# Patient Record
Sex: MALE | Race: WHITE | Employment: OTHER | URBAN - METROPOLITAN AREA
[De-identification: names, ages, dates, MRNs, and addresses within clinical notes are randomized per-mention and may not be internally consistent; named-entity substitution may affect disease eponyms.]

---

## 2017-02-07 ENCOUNTER — OFFICE VISIT (OUTPATIENT)
Dept: NEUROLOGY | Age: 82
End: 2017-02-07

## 2017-02-07 VITALS
RESPIRATION RATE: 16 BRPM | DIASTOLIC BLOOD PRESSURE: 80 MMHG | HEIGHT: 70 IN | SYSTOLIC BLOOD PRESSURE: 134 MMHG | BODY MASS INDEX: 29.03 KG/M2 | OXYGEN SATURATION: 98 % | WEIGHT: 202.8 LBS | HEART RATE: 61 BPM

## 2017-02-07 DIAGNOSIS — E55.9 VITAMIN D DEFICIENCY: ICD-10-CM

## 2017-02-07 DIAGNOSIS — E11.42 DIABETIC PERIPHERAL NEUROPATHY ASSOCIATED WITH TYPE 2 DIABETES MELLITUS (HCC): ICD-10-CM

## 2017-02-07 DIAGNOSIS — R42 DIZZINESS: ICD-10-CM

## 2017-02-07 DIAGNOSIS — R26.0 SENSORY ATAXIC GAIT: ICD-10-CM

## 2017-02-07 DIAGNOSIS — I65.23 STENOSIS OF BOTH INTERNAL CAROTID ARTERIES: ICD-10-CM

## 2017-02-07 DIAGNOSIS — H53.2 MONOCULAR DIPLOPIA OF BOTH EYES: ICD-10-CM

## 2017-02-07 DIAGNOSIS — H81.13 BPV (BENIGN POSITIONAL VERTIGO), BILATERAL: ICD-10-CM

## 2017-02-07 DIAGNOSIS — G45.0 VBI (VERTEBROBASILAR INSUFFICIENCY): ICD-10-CM

## 2017-02-07 DIAGNOSIS — Z86.73 HISTORY OF STROKE: ICD-10-CM

## 2017-02-07 DIAGNOSIS — E89.0 HYPOTHYROIDISM, POSTSURGICAL: ICD-10-CM

## 2017-02-07 DIAGNOSIS — E53.8 B12 DEFICIENCY: Primary | ICD-10-CM

## 2017-02-07 DIAGNOSIS — E53.8 B12 DEFICIENCY: ICD-10-CM

## 2017-02-07 DIAGNOSIS — R27.0 ATAXIA: ICD-10-CM

## 2017-02-07 PROBLEM — H81.10 BPV (BENIGN POSITIONAL VERTIGO): Status: ACTIVE | Noted: 2017-02-07

## 2017-02-07 RX ORDER — MECLIZINE HYDROCHLORIDE 25 MG/1
25 TABLET ORAL
Qty: 100 TAB | Refills: 11 | Status: SHIPPED | OUTPATIENT
Start: 2017-02-07 | End: 2017-02-17

## 2017-02-07 NOTE — LETTER
2/7/2017 9:18 PM 
 
Patient:  Sunshine Mosley YOB: 1928 Date of Visit: 2/7/2017 Dear No Recipients: Thank you for referring Mr. Prosper Child to me for evaluation/treatment. Below are the relevant portions of my assessment and plan of care. Consult Subjective:  
 
Sunshine Mosley is a 80 y.o. right-handed  male who is seen for urgent work in evaluation as a new patient for new problem of marked worsening of his ataxia and dizziness and new onset diplopia. The patient has a several week history of increasing dizziness when he moves around associated with instability and unsteadiness when he walks. 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 He was treated for a vestibular dysfunction and seemed to slowly improvein April 2013. He has not had any recent therapy and seems to be worsening. He has a new onset of double vision that seems to come and go with the images being side by side, and he has seen his ophthalmologist who could not really find a clear cause. He is also had some visual symptoms with black dots in front of his eyes and when he stares down at the floor these black dots seem to move. Again ophthalmology has not been able to find any ophthalmologic cause. He seems to have some progressive hearing loss bilaterally and some difficulty with memory loss. He was referred to us for further evaluation. He has seen ENT physicians Dr. Susana Cole for his hearing loss and tinnitus, and no real help was found there. We tried to explain to him that the tinnitus is part of the hearing loss and no medication will really help that. He has had a previous stroke after heart surgery, back in 1996 but no recurrent strokes since that time.   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 He came to see us because of concern that he might have something else going on. 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 He did not have any new focal symptoms as far as weakness or sensory loss, no double vision no blurred vision and no real strokelike symptoms. He had a CTA that showed areas of stenosis in the posterior fossa and vertebral arteries. That was done in 2007. He is on aspirin therapy at this time. He had a carotid Doppler study done today that showed no significant disease. He had a normal CT of the brain in 2007, he has known B12 deficiency and takes a supplement. He had a stroke in 1996 after his CABG, but has had no other stroke since that time and has no significant residual.  He had an MRI scan October 2013 just showed age-related changes. His dizziness seems to come on when he stands up or moves quickly. No recent trauma, falls, fever, meningismus, or other causes for his symptoms. He also has a history of chronic numbness in his feet has been present for about 30 years That began in 1985, mildly symptomatic and more painful at night time. Workup in the past has been negative for treatable causes he says. He has minimal numbness in his hands and no bowel bladder dysfunction, and no family history of similar problems. He does feel it might be slightly worse. His complete review of systems in symptoms was negative for any other new medical problems,, complications or illnesses other than the recent cold and sinus problems and vertigo. Past Medical History Diagnosis Date  CAD (coronary artery disease)  Calculus of kidney  Cancer [facility]) prostate  1996  
 Hearing loss  Heart attack ([facility] [address]  Hypercholesterolemia  Hypertension  Incisional hernia 5/10/2011  Movement disorder  Other ill-defined conditions(799.89) elevated cholesterol  Other ill-defined conditions(799.89)   
  glaucoma  Other ill-defined conditions(799.89)   
  abd hernia  Stroke ([facility] [address]   
  left arm tingling  Thyroid disease  Thyroid mass 5/10/2011 Past Surgical History Procedure Laterality Date  Pr prostate biopsy, needle, saturation sampling  Hx thyroidectomy  1/3/2013  Hx heent    
  thyroid biopsy  Hx heent    
  thyroidectomy 2013  Pr cardiac surg procedure unlist  1996  
  cabg x3  Hx aortic valve replacement  2015  Hx orthopaedic  2/2011  
  compression fracture d.t. fall (back),no surgery Family History Problem Relation Age of Onset  Cancer Mother   
  stomach  
 Heart Disease Sister  Heart Disease Father  Diabetes Son   
 Heart Disease Son  Anesth Problems Neg Hx Social History Substance Use Topics  Smoking status: Former Smoker Packs/day: 0.50 Years: 3.00 Quit date: 1/1/1958  Smokeless tobacco: Never Used  Alcohol use No  
   
Current Outpatient Prescriptions Medication Sig Dispense Refill  meclizine (ANTIVERT) 25 mg tablet Take 1 Tab by mouth three (3) times daily as needed for up to 10 days. 100 Tab 11  
 lovastatin (MEVACOR) 40 mg tablet Take 40 mg by mouth nightly.  brimonidine (ALPHAGAN) 0.15 % ophthalmic solution Administer 1 Drop to both eyes two (2) times a day.  gabapentin (NEURONTIN) 100 mg capsule Take  by mouth nightly.  SYNTHROID 125 mcg tablet Take 1 Tab by mouth Daily (before breakfast). 90 Tab 4  
 dutasteride (AVODART) 0.5 mg capsule Take 0.5 mg by mouth daily.  cyanocobalamin (VITAMIN B-12) 1,000 mcg tablet Take 1,000 mcg by mouth daily.  amlodipine (NORVASC) 10 mg tablet Take 10 mg by mouth daily.  clopidogrel (PLAVIX) 75 mg tablet Take 75 mg by mouth daily.  irbesartan (AVAPRO) 75 mg tablet Take 150 mg by mouth nightly.  tamsulosin (FLOMAX) 0.4 mg capsule Take 0.4 mg by mouth nightly.  aspirin 81 mg tablet Take 81 mg by mouth.  DORZOLAMIDE HCL/TIMOLOL MALEAT (COSOPT OP) Apply 1 Drop to eye two (2) times a day.  latanoprost (XALATAN) 0.005 % ophthalmic solution Administer 1 Drop to both eyes nightly.  colesevelam (WELCHOL) 625 mg tablet Take 625 mg by mouth two (2) times daily (with meals). No Known Allergies Review of Systems: A comprehensive review of systems was negative except for: Constitutional: positive for fatigue and malaise Ears, nose, mouth, throat, and face: positive for earaches and nasal congestion Genitourinary: positive for frequency, dysuria, hesitancy and decreased stream 
Musculoskeletal: positive for myalgias, arthralgias and stiff joints Neurological: positive for dizziness, vertigo, paresthesia and gait problems Behvioral/Psych: positive for anxiety and depression Objective: I 
 
 
NEUROLOGICAL EXAM: 
 
Appearance: The patient is well developed, well nourished, provides a coherent history and is in no acute distress. Mental Status: Oriented to time, place and person, but patient seems to have mild memory impairment and given his history. Mood and affect appropriate. Cranial Nerves:   Intact visual fields. Fundi are benign. LAMAR, EOM's full, but he has an occasional slight deviation of his left eye outward with an exophoric strabismus, no nystagmus, no ptosis. Facial sensation is normal. Corneal reflexes are not tested. Facial movement is symmetric. Hearing is abnormal bilaterally. Palate is midline with normal sternocleidomastoid and trapezius muscles are normal. Tongue is midline. Motor:  5/5 strength in upper and lower proximal and distal muscles. Normal bulk and tone. No fasciculations. Reflexes:   Deep tendon reflexes 1+/4 and symmetrical. No Babinski or clonus present. On Nylan-Barany testing patient develops only mild dizziness Sensory:   Abnormal to touch, pinprick and vibration and both lower extremities to knee level. Gait:  Abnormal gait with patient with slightly wide-based gait. Tremor:   No tremor noted. Cerebellar:  Probably abnormal Romberg and tandem cerebellar signs present. Neurovascular:  Normal heart sounds and regular rhythm, peripheral pulses decreased, and no carotid bruits. Assessment:  
 
 
Plan:  
 
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 He will be followed in 3 months time or earlier if need be He will call if any problem in the interim. Office visit was 60 minutes today examining the patient, going over his history, deciding on a diagnosis, prognosis and therapy and treatment needed, and reviewing his MRI scans and CAT scans on the PACS system personally myself, and reviewing all his previous office notes and evaluations and lab tests on the computer, and I concluded that indeed he is a high risk for vertebrobasilar insufficiency and basal artery stenosis and high risk for possible stroke and marked debility and morbidity. Signed By: Sidney De La Rosa MD   
 February 7, 2017 This note will not be viewable in [address] If you have questions, please do not hesitate to call me. I look forward to following  Cori Amato along with you. Sincerely, Dakota Soriano MD

## 2017-02-07 NOTE — PATIENT INSTRUCTIONS

## 2017-02-07 NOTE — MR AVS SNAPSHOT
Visit Information Date & Time Provider Department Dept. Phone Encounter #  
 2/7/2017  8:00 AM Ana Maria Brannon MD [facility] [phone] [id number] Follow-up Instructions Return in about 3 months (around 5/7/2017). Upcoming Health Maintenance Date Due  
 FOOT EXAM Q1 2/19/1938 MICROALBUMIN Q1 2/19/1938 EYE EXAM RETINAL OR DILATED Q1 2/19/1938 ZOSTER VACCINE AGE 60> 2/19/1988 GLAUCOMA SCREENING Q2Y 2/19/1993 Pneumococcal 65+ Low/Medium Risk (1 of 2 - PCV13) 2/19/1993 MEDICARE YEARLY EXAM 2/19/1993 LIPID PANEL Q1 3/20/2010 HEMOGLOBIN A1C Q6M 10/23/2013 INFLUENZA AGE 9 TO ADULT 8/1/2016 DTaP/Tdap/Td series (2 - Td) 2/19/2023 Allergies as of 2/7/2017  Review Complete On: 2/7/2017 By: Ana Maria Brannon MD  
 No Known Allergies Current Immunizations  Never Reviewed Name Date Tdap 2/19/2013  5:42 AM  
  
 Not reviewed this visit You Were Diagnosed With   
  
 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 
ICD-9-CM: 250.60, 357.2 Sensory ataxic gait     ICD-10-CM: R26.0 ICD-9-CM: 842. 2 Hypothyroidism, postsurgical     ICD-10-CM: E89.0 ICD-9-CM: 244.0   
 VBI (vertebrobasilar insufficiency)     ICD-10-CM: G45.0 ICD-9-CM: 435.3 History of stroke     ICD-10-CM: Z86.73 
ICD-9-CM: V12.54 Ataxia     ICD-10-CM: R27.0 ICD-9-CM: 781.3 Monocular diplopia of both eyes     ICD-10-CM: H53.2 ICD-9-CM: 368.2 Dizziness     ICD-10-CM: Z72 ICD-9-CM: 780.4 Vitamin D deficiency     ICD-10-CM: E55.9 ICD-9-CM: 268.9 BPV (benign positional vertigo), bilateral     ICD-10-CM: H81.13 
ICD-9-CM: 386.11 Vitals BP Pulse Resp Height(growth percentile) Weight(growth percentile) SpO2  
 134/80 61 16 5' 10\" (1.778 m) 202 lb 12.8 oz (92 kg) 98% BMI Smoking Status 29.1 kg/m2 Former Smoker Vitals History BMI and BSA Data Body Mass Index Body Surface Area  
 29.1 kg/m 2 2.13 m 2 Preferred Pharmacy Pharmacy Name Phone [id number] [address] [phone] Your Updated Medication List  
  
   
This list is accurate as of: 2/7/17  9:08 AM.  Always use your most recent med list.  
  
  
  
  
 aspirin 81 mg tablet Take 81 mg by mouth. AVAPRO 75 mg tablet Generic drug:  irbesartan Take 150 mg by mouth nightly. brimonidine 0.15 % ophthalmic solution Commonly known as:  Vernida French Administer 1 Drop to both eyes two (2) times a day. COSOPT OP Apply 1 Drop to eye two (2) times a day. dutasteride 0.5 mg capsule Commonly known as:  AVODART Take 0.5 mg by mouth daily. FLOMAX 0.4 mg capsule Generic drug:  tamsulosin Take 0.4 mg by mouth nightly.  
  
 gabapentin 100 mg capsule Commonly known as:  NEURONTIN Take  by mouth nightly. lovastatin 40 mg tablet Commonly known as:  MEVACOR Take 40 mg by mouth nightly. meclizine 25 mg tablet Commonly known as:  ANTIVERT Take 1 Tab by mouth three (3) times daily as needed for up to 10 days. NORVASC 10 mg tablet Generic drug:  amLODIPine Take 10 mg by mouth daily. PLAVIX 75 mg Tab Generic drug:  clopidogrel Take 75 mg by mouth daily. SYNTHROID 125 mcg tablet Generic drug:  levothyroxine Take 1 Tab by mouth Daily (before breakfast). VITAMIN B-12 1,000 mcg tablet Generic drug:  cyanocobalamin Take 1,000 mcg by mouth daily. WELCHOL 625 mg tablet Generic drug:  colesevelam  
Take 625 mg by mouth two (2) times daily (with meals). XALATAN 0.005 % ophthalmic solution Generic drug:  latanoprost  
Administer 1 Drop to both eyes nightly. Prescriptions Sent to Pharmacy Refills  
 meclizine (ANTIVERT) 25 mg tablet 11 Sig: Take 1 Tab by mouth three (3) times daily as needed for up to 10 days. Class: Normal  
 Pharmacy[id number] [address]  #: [phone] Route: Oral  
  
We Performed the Following DAMARI COMPREHENSIVE PLUS PANEL [[id number] Custom] CK [id number] CPT(R)] HEMOGLOBIN A1C WITH EAG [47281 CPT(R)] MYASTHENIA GRAVIS EVALUATION [id number] CPT(R)] REFERRAL TO PHYSICAL THERAPY [ZQX07 Custom] Comments: [facility] for vestibular therapy for BPV and ataxia and also needs gait training and strengthening SED RATE (ESR) B4032554 CPT(R)] TSH 3RD GENERATION [38385 CPT(R)] VITAMIN B12 & FOLATE [01431 CPT(R)] VITAMIN D, 25 HYROXY PANEL [[id number] Custom] Follow-up Instructions Return in about 3 months (around 5/7/2017). To-Do List   
 02/07/2017 Imaging:  DUPLEX CAROTID BILATERAL AMB NEURO   
  
 02/14/2017 Imaging:  MRA BRAIN WO CONT   
  
 02/14/2017 Imaging:  MRI BRAIN W WO CONT Referral Information Referral ID Referred By Referred To  
  
 [id number] Lisset COLVIN Not Available Visits Status Start Date End Date 1 New Request 2/7/17 2/7/18 If your referral has a status of pending review or denied, additional information will be sent to support the outcome of this decision. Patient Instructions A Healthy Lifestyle: Care Instructions Your Care Instructions A healthy lifestyle can help you feel good, stay at a healthy weight, and have plenty of energy for both work and play. A healthy lifestyle is something you can share with your whole family.  
A healthy lifestyle also can lower your risk for serious health problems, such as high blood pressure, heart disease, and diabetes. You can follow a few steps listed below to improve your health and the health of your family. Follow-up care is a key part of your treatment and safety. Be sure to make and go to all appointments, and call your doctor if you are having problems. Its also a good idea to know your test results and keep a list of the medicines you take. How can you care for yourself at home? · Do not eat too much sugar, fat, or fast foods. You can still have dessert and treats now and then. The goal is moderation. · Start small to improve your eating habits. Pay attention to portion sizes, drink less juice and soda pop, and eat more fruits and vegetables. ¨ Eat a healthy amount of food. A 3-ounce serving of meat, for example, is about the size of a deck of cards. Fill the rest of your plate with vegetables and whole grains. ¨ Limit the amount of soda and sports drinks you have every day. Drink more water when you are thirsty. ¨ Eat at least 5 servings of fruits and vegetables every day. It may seem like a lot, but it is not hard to reach this goal. A serving or helping is 1 piece of fruit, 1 cup of vegetables, or 2 cups of leafy, raw vegetables. Have an apple or some carrot sticks as an afternoon snack instead of a candy bar. Try to have fruits and/or vegetables at every meal. 
· Make exercise part of your daily routine. You may want to start with simple activities, such as walking, bicycling, or slow swimming. Try to be active 30 to 60 minutes every day. You do not need to do all 30 to 60 minutes all at once. For example, you can exercise 3 times a day for 10 or 20 minutes. Moderate exercise is safe for most people, but it is always a good idea to talk to your doctor before starting an exercise program. 
· Keep moving. [place] Feeling the lawn, work in the garden, or [place]. Take the stairs instead of the elevator at work. · If you smoke, quit. People who smoke have an increased risk for heart attack, stroke, cancer, and other lung illnesses. Quitting is hard, but there are ways to boost your chance of quitting tobacco for good. ¨ Use nicotine gum, patches, or lozenges. ¨ Ask your doctor about stop-smoking programs and medicines. ¨ Keep trying. In addition to reducing your risk of diseases in the future, you will notice some benefits soon after you stop using tobacco. If you have shortness of breath or asthma symptoms, they will likely get better within a few weeks after you quit. · Limit how much alcohol you drink. Moderate amounts of alcohol (up to 2 drinks a day for men, 1 drink a day for women) are okay. But drinking too much can lead to liver problems, high blood pressure, and other health problems. Family health If you have a family, there are many things you can do together to improve your health. · Eat meals together as a family as often as possible. · Eat healthy foods. This includes fruits, vegetables, lean meats and dairy, and whole grains. · Include your family in your fitness plan. Most people think of activities such as jogging or tennis as the way to fitness, but there are many ways you and your family can be more active. Anything that makes you breathe hard and gets your heart pumping is exercise. Here are some tips: 
¨ Walk to do errands or to take your child to school or the bus. ¨ Go for a family bike ride after dinner instead of watching TV. Where can you learn more? Go to http://percy-luciana.info/. Enter D136 in the search box to learn more about \"A Healthy Lifestyle: Care Instructions. \" Current as of: July 26, 2016 Content Version: 11.1 © 9407-0595 [employer]. Care instructions adapted under license by [employer] (which disclaims liability or warranty for this information).  If you have questions about a medical condition or this instruction, always ask your healthcare professional. [place] [address] any warranty or liability for your use of this information. Introducing [facility]! Kim Patel introduces Say-Hey patient portal. Now you can access parts of your medical record, email your doctor's office, and request medication refills online. 1. In your internet browser, go to [email or web address] 2. Click on the First Time User? Click Here link in the Sign In box. You will see the New Member Sign Up page. 3. Enter your Say-Hey Access Code exactly as it appears below. You will not need to use this code after youve completed the sign-up process. If you do not sign up before the expiration date, you must request a new code. · Say-Hey Access Code: [email or web address] Expires: 5/8/2017  7:37 AM 
 
4. Enter the last four digits of your Social Security Number (xxxx) and Date of Birth (mm/dd/yyyy) as indicated and click Submit. You will be taken to the next sign-up page. 5. Create a Say-Hey ID. This will be your Say-Hey login ID and cannot be changed, so think of one that is secure and easy to remember. 6. Create a Say-Hey password. You can change your password at any time. 7. Enter your Password Reset Question and Answer. This can be used at a later time if you forget your password. 8. Enter your e-mail address. You will receive e-mail notification when new information is available in [address]. 9. Click Sign Up. You can now view and download portions of your medical record. 10. Click the Download Summary menu link to download a portable copy of your medical information. If you have questions, please visit the Frequently Asked Questions section of the Say-Hey website. Remember, Say-Hey is NOT to be used for urgent needs. For medical emergencies, dial 911. Now available from your iPhone and Android! Please provide this summary of care documentation to your next provider. Your primary care clinician is listed as Mary Locus. If you have any questions after today's visit, please call [phone].

## 2017-02-08 NOTE — PROGRESS NOTES
Consult    Subjective:     Roseline Barnett is a 80 y.o. right-handed  male who is seen for urgent work in evaluation as a new patient for new problem of marked worsening of his ataxia and dizziness and new onset diplopia. The patient has a several week history of increasing dizziness when he moves around associated with instability and unsteadiness when he walks. 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 He was treated for a vestibular dysfunction and seemed to slowly improvein April 2013. He has not had any recent therapy and seems to be worsening. He has a new onset of double vision that seems to come and go with the images being side by side, and he has seen his ophthalmologist who could not really find a clear cause. He is also had some visual symptoms with black dots in front of his eyes and when he stares down at the floor these black dots seem to move. Again ophthalmology has not been able to find any ophthalmologic cause. He seems to have some progressive hearing loss bilaterally and some difficulty with memory loss. He was referred to us for further evaluation. He has seen ENT physicians Dr. Haig Hammans for his hearing loss and tinnitus, and no real help was found there. We tried to explain to him that the tinnitus is part of the hearing loss and no medication will really help that. He has had a previous stroke after heart surgery, back in 1996 but no recurrent strokes since that time. 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 He came to see us because of concern that he might have something else going on. 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  He did not have any new focal symptoms as far as weakness or sensory loss, no double vision no blurred vision and no real strokelike symptoms. He had a CTA that showed areas of stenosis in the posterior fossa and vertebral arteries. That was done in 2007. He is on aspirin therapy at this time. He had a carotid Doppler study done today that showed no significant disease. He had a normal CT of the brain in 2007, he has known B12 deficiency and takes a supplement. He had a stroke in 1996 after his CABG, but has had no other stroke since that time and has no significant residual.  He had an MRI scan October 2013 just showed age-related changes. His dizziness seems to come on when he stands up or moves quickly. No recent trauma, falls, fever, meningismus, or other causes for his symptoms. He also has a history of chronic numbness in his feet has been present for about 30 years That began in 1985, mildly symptomatic and more painful at night time. Workup in the past has been negative for treatable causes he says. He has minimal numbness in his hands and no bowel bladder dysfunction, and no family history of similar problems. He does feel it might be slightly worse. His complete review of systems in symptoms was negative for any other new medical problems,, complications or illnesses other than the recent cold and sinus problems and vertigo.     Past Medical History   Diagnosis Date    CAD (coronary artery disease)     Calculus of kidney     Cancer ([address] prostate  1996    Hearing loss     Heart attack ([address]     Hypercholesterolemia     Hypertension     Incisional hernia 5/10/2011    Movement disorder     Other ill-defined conditions(799.89)      elevated cholesterol    Other ill-defined conditions(799.89)      glaucoma    Other ill-defined conditions(799.89)      abd hernia    Stroke ([address]      left arm tingling    Thyroid disease     Thyroid mass 5/10/2011      Past Surgical History   Procedure Laterality Date    Pr prostate biopsy, needle, saturation sampling      Hx thyroidectomy  1/3/2013    Hx heent       thyroid biopsy    Hx heent       thyroidectomy 2013    Pr cardiac surg procedure unlist  1996     cabg x3    Hx aortic valve replacement  2015    Hx orthopaedic  2/2011     compression fracture d.t. fall (back),no surgery     Family History   Problem Relation Age of Onset    Cancer Mother      stomach    Heart Disease Sister     Heart Disease Father     Diabetes Son     Heart Disease Son     Anesth Problems Neg Hx       Social History   Substance Use Topics    Smoking status: Former Smoker     Packs/day: 0.50     Years: 3.00     Quit date: 1/1/1958    Smokeless tobacco: Never Used    Alcohol use No       Current Outpatient Prescriptions   Medication Sig Dispense Refill    meclizine (ANTIVERT) 25 mg tablet Take 1 Tab by mouth three (3) times daily as needed for up to 10 days. 100 Tab 11    lovastatin (MEVACOR) 40 mg tablet Take 40 mg by mouth nightly.  brimonidine (ALPHAGAN) 0.15 % ophthalmic solution Administer 1 Drop to both eyes two (2) times a day.  gabapentin (NEURONTIN) 100 mg capsule Take  by mouth nightly.  SYNTHROID 125 mcg tablet Take 1 Tab by mouth Daily (before breakfast). 90 Tab 4    dutasteride (AVODART) 0.5 mg capsule Take 0.5 mg by mouth daily.  cyanocobalamin (VITAMIN B-12) 1,000 mcg tablet Take 1,000 mcg by mouth daily.  amlodipine (NORVASC) 10 mg tablet Take 10 mg by mouth daily.  clopidogrel (PLAVIX) 75 mg tablet Take 75 mg by mouth daily.  irbesartan (AVAPRO) 75 mg tablet Take 150 mg by mouth nightly.  tamsulosin (FLOMAX) 0.4 mg capsule Take 0.4 mg by mouth nightly.  aspirin 81 mg tablet Take 81 mg by mouth.  DORZOLAMIDE HCL/TIMOLOL MALEAT (COSOPT OP) Apply 1 Drop to eye two (2) times a day.  latanoprost (XALATAN) 0.005 % ophthalmic solution Administer 1 Drop to both eyes nightly.       colesevelam (WELCHOL) 625 mg tablet Take 625 mg by mouth two (2) times daily (with meals). No Known Allergies     Review of Systems:  A comprehensive review of systems was negative except for: Constitutional: positive for fatigue and malaise  Ears, nose, mouth, throat, and face: positive for earaches and nasal congestion  Genitourinary: positive for frequency, dysuria, hesitancy and decreased stream  Musculoskeletal: positive for myalgias, arthralgias and stiff joints  Neurological: positive for dizziness, vertigo, paresthesia and gait problems  Behvioral/Psych: positive for anxiety and depression     Objective:     I      NEUROLOGICAL EXAM:    Appearance: The patient is well developed, well nourished, provides a coherent history and is in no acute distress. Mental Status: Oriented to time, place and person, but patient seems to have mild memory impairment and given his history. Mood and affect appropriate. Cranial Nerves:   Intact visual fields. Fundi are benign. LAMAR, EOM's full, but he has an occasional slight deviation of his left eye outward with an exophoric strabismus, no nystagmus, no ptosis. Facial sensation is normal. Corneal reflexes are not tested. Facial movement is symmetric. Hearing is abnormal bilaterally. Palate is midline with normal sternocleidomastoid and trapezius muscles are normal. Tongue is midline. Motor:  5/5 strength in upper and lower proximal and distal muscles. Normal bulk and tone. No fasciculations. Reflexes:   Deep tendon reflexes 1+/4 and symmetrical. No Babinski or clonus present. On Nylan-Barany testing patient develops only mild dizziness   Sensory:   Abnormal to touch, pinprick and vibration and both lower extremities to knee level. Gait:  Abnormal gait with patient with slightly wide-based gait. Tremor:   No tremor noted. Cerebellar:  Probably abnormal Romberg and tandem cerebellar signs present. Neurovascular:  Normal heart sounds and regular rhythm, peripheral pulses decreased, and no carotid bruits.            Assessment: Plan:     Patient with new onset double vision, increasing hearing loss, increasing ataxia, increasing dizziness and vertigo all suggesting progressive posterior fossa lesion possibly  Patient needs MRI of the brain and MRA of the brain and carotid Dopplers to rule out treatable causes of his illness  Basilar artery and vertebral artery stenosis, on antiplatelet therapy with possible new vertebral basilar insufficiency  Idiopathic polyneuropathy of unclear type and cause, probably related to prediabetic state  Previous CVA after CABG in 1996 but stable since that time on antiplatelet therapy  Complete metabolic panel was also sent to rule out other causes of his neuropathy, ataxia, and ocular and vestibular symptoms  Patient will call for results of his tests he might need repeat EMG studies in the future. He will be followed in 3 months time or earlier if need be  He will call if any problem in the interim. Office visit was 60 minutes today examining the patient, going over his history, deciding on a diagnosis, prognosis and therapy and treatment needed, and reviewing his MRI scans and CAT scans on the PACS system personally myself, and reviewing all his previous office notes and evaluations and lab tests on the computer, and I concluded that indeed he is a high risk for vertebrobasilar insufficiency and basal artery stenosis and high risk for possible stroke and marked debility and morbidity. Signed By: Hattie Gould MD     February 7, 2017       This note will not be viewable in [address]

## 2017-02-08 NOTE — PROCEDURES
This study consisted of pulsed wave Doppler examination, Color-flow imaging, and Duplex imaging of both the right and left carotid systems, and both vertebral arteries.        Imaging of both right and left carotid systems showed mild mixed plaquing at the bifurcations and proximal and distal internal and external carotid arteries bilaterally, with stenosis in the range of 16-49 % only and with no flow abnormalities identified.        Both vertebral arteries were unable to be visualized, most likely reflecting technical difficulty, but cannot completely rule out congenital anomaly, or occlusive cerebrovascular disease.   If clinically indicated, other imaging modalities like CTA or MRA may be of further diagnostic benefit.    Clinical correlation recommended

## 2017-02-10 LAB
25(OH)D2 SERPL-MCNC: <1 NG/ML
25(OH)D3 SERPL-MCNC: 13 NG/ML
25(OH)D3+25(OH)D2 SERPL-MCNC: 14 NG/ML
ACHR BIND AB SER-SCNC: <0.03 NMOL/L (ref 0–0.24)
CENTROMERE B AB SER-ACNC: <0.2 AI (ref 0–0.9)
CHROMATIN AB SERPL-ACNC: <0.2 AI (ref 0–0.9)
CK SERPL-CCNC: 105 U/L (ref 24–204)
DSDNA AB SER-ACNC: 4 IU/ML (ref 0–9)
ENA JO1 AB SER-ACNC: <0.2 AI (ref 0–0.9)
ENA RNP AB SER-ACNC: <0.2 AI (ref 0–0.9)
ENA SCL70 AB SER-ACNC: <0.2 AI (ref 0–0.9)
ENA SM AB SER-ACNC: <0.2 AI (ref 0–0.9)
ENA SM+RNP AB SER-ACNC: <0.2 AI (ref 0–0.9)
ENA SS-A AB SER-ACNC: <0.2 AI (ref 0–0.9)
ENA SS-B AB SER-ACNC: <0.2 AI (ref 0–0.9)
ERYTHROCYTE [SEDIMENTATION RATE] IN BLOOD BY WESTERGREN METHOD: 13 MM/HR (ref 0–30)
EST. AVERAGE GLUCOSE BLD GHB EST-MCNC: 134 MG/DL
FOLATE SERPL-MCNC: 10.5 NG/ML
HBA1C MFR BLD: 6.3 % (ref 4.8–5.6)
RIBOSOMAL P AB SER-ACNC: <0.2 AI (ref 0–0.9)
SEE BELOW:, 164879: NORMAL
STRIA MUS AB TITR SER IF: NEGATIVE {TITER}
TSH SERPL DL<=0.005 MIU/L-ACNC: 1.12 UIU/ML (ref 0.45–4.5)
VIT B12 SERPL-MCNC: 901 PG/ML (ref 211–946)

## 2017-02-17 ENCOUNTER — HOSPITAL ENCOUNTER (OUTPATIENT)
Dept: MRI IMAGING | Age: 82
Discharge: HOME | End: 2017-02-17
Attending: PSYCHIATRY & NEUROLOGY
Payer: MEDICARE

## 2017-02-17 DIAGNOSIS — R26.0 SENSORY ATAXIC GAIT: ICD-10-CM

## 2017-02-17 DIAGNOSIS — R27.0 ATAXIA: ICD-10-CM

## 2017-02-17 DIAGNOSIS — Z86.73 HISTORY OF STROKE: ICD-10-CM

## 2017-02-17 DIAGNOSIS — E11.42 DIABETIC PERIPHERAL NEUROPATHY ASSOCIATED WITH TYPE 2 DIABETES MELLITUS (HCC): ICD-10-CM

## 2017-02-17 DIAGNOSIS — G45.0 VBI (VERTEBROBASILAR INSUFFICIENCY): ICD-10-CM

## 2017-02-17 DIAGNOSIS — E89.0 HYPOTHYROIDISM, POSTSURGICAL: ICD-10-CM

## 2017-02-17 DIAGNOSIS — I65.23 STENOSIS OF BOTH INTERNAL CAROTID ARTERIES: ICD-10-CM

## 2017-02-17 DIAGNOSIS — H53.2 MONOCULAR DIPLOPIA OF BOTH EYES: ICD-10-CM

## 2017-02-17 DIAGNOSIS — E55.9 VITAMIN D DEFICIENCY: ICD-10-CM

## 2017-02-17 DIAGNOSIS — R42 DIZZINESS: ICD-10-CM

## 2017-02-17 DIAGNOSIS — E53.8 B12 DEFICIENCY: ICD-10-CM

## 2017-02-17 DIAGNOSIS — H81.13 BPV (BENIGN POSITIONAL VERTIGO), BILATERAL: ICD-10-CM

## 2017-02-17 PROCEDURE — 70551 MRI BRAIN STEM W/O DYE: CPT

## 2017-02-17 PROCEDURE — 70544 MR ANGIOGRAPHY HEAD W/O DYE: CPT

## 2017-02-20 ENCOUNTER — TELEPHONE (OUTPATIENT)
Dept: NEUROLOGY | Age: 82
End: 2017-02-20

## 2017-02-27 ENCOUNTER — TELEPHONE (OUTPATIENT)
Dept: NEUROLOGY | Age: 82
End: 2017-02-27

## 2017-02-27 NOTE — TELEPHONE ENCOUNTER
Faxed referral to [facility] for vestibular therapy for BPV w/ clinicals, insurance card, demographic sheet.

## 2017-03-08 ENCOUNTER — TELEPHONE (OUTPATIENT)
Dept: NEUROLOGY | Age: 82
End: 2017-03-08

## 2017-03-08 NOTE — TELEPHONE ENCOUNTER
I called the patient, told him he has stenosis and possibly even occlusion of the basilar artery, with reconstitution, and his best option is to continue aspirin and Plavix, the only other option is to take Coumadin he does not want to do that because he said he almost  on that in the past.  He wants copy of his reports    Lyric Daughters please send him a copy of the MRA and MRI that I printed off

## 2017-05-10 ENCOUNTER — OFFICE VISIT (OUTPATIENT)
Dept: NEUROLOGY | Age: 82
End: 2017-05-10

## 2017-05-10 VITALS
RESPIRATION RATE: 16 BRPM | OXYGEN SATURATION: 98 % | DIASTOLIC BLOOD PRESSURE: 70 MMHG | HEIGHT: 70 IN | BODY MASS INDEX: 29.63 KG/M2 | SYSTOLIC BLOOD PRESSURE: 140 MMHG | WEIGHT: 207 LBS | HEART RATE: 81 BPM

## 2017-05-10 DIAGNOSIS — G45.0 VBI (VERTEBROBASILAR INSUFFICIENCY): ICD-10-CM

## 2017-05-10 DIAGNOSIS — R27.0 ATAXIA: ICD-10-CM

## 2017-05-10 DIAGNOSIS — G60.8 IDIOPATHIC SMALL AND LARGE FIBER SENSORY NEUROPATHY: Primary | ICD-10-CM

## 2017-05-10 DIAGNOSIS — R26.0 SENSORY ATAXIC GAIT: ICD-10-CM

## 2017-05-10 DIAGNOSIS — E11.42 DIABETIC PERIPHERAL NEUROPATHY ASSOCIATED WITH TYPE 2 DIABETES MELLITUS (HCC): ICD-10-CM

## 2017-05-10 DIAGNOSIS — I65.23 STENOSIS OF BOTH INTERNAL CAROTID ARTERIES: ICD-10-CM

## 2017-05-10 PROBLEM — G47.01 INSOMNIA DUE TO MEDICAL CONDITION: Status: ACTIVE | Noted: 2017-05-10

## 2017-05-10 RX ORDER — MEGESTROL ACETATE 20 MG/1
20 TABLET ORAL DAILY
COMMUNITY
End: 2019-01-11 | Stop reason: ALTCHOICE

## 2017-05-10 RX ORDER — MELATONIN 5 MG
5 CAPSULE ORAL
COMMUNITY
End: 2019-01-11 | Stop reason: ALTCHOICE

## 2017-05-10 NOTE — LETTER
5/10/2017 12:54 PM 
 
Patient:  Brody Jones YOB: 1928 Date of Visit: 5/10/2017 Dear No Recipients: Thank you for referring Mr. Devon Cai to me for evaluation/treatment. Below are the relevant portions of my assessment and plan of care. Consult Subjective:  
 
Brody Jones is a 80 y.o. 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 We discussed the option of Coumadin but the patient said he wanted no part of Coumadin at that time because of bad experience with his family members. 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 He is doing well and has no active neurological problems. He had been seen for marked worsening of his ataxia and dizziness and new onset diplopia that occurred during his last visit February 2017. He was treated for a vestibular dysfunction and seemed to slowly improvein April 2013. He is also had some visual symptoms with black dots in front of his eyes and when he stares down at the floor these black dots seem to move and some blurred vision in his right eye will be seen the ophthalmologist for that in the next week or 2. He seems to have some progressive hearing loss bilaterally and some difficulty with memory loss. He was referred to us for further evaluation. He has seen ENT physicians Dr. Max Rodney for his hearing loss and tinnitus, and no real help was found there. We tried to explain to him that the tinnitus is part of the hearing loss and no medication will really help that. He has had a previous stroke after heart surgery, back in 1996 but no recurrent strokes since that time.   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 He came to see us because of concern that he might have something else going on. 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 He did not have any new focal symptoms as far as weakness or sensory loss, no double vision no blurred vision and no real strokelike symptoms. He had a CTA that showed areas of stenosis in the posterior fossa and vertebral arteries. That was done in 2007. He is on aspirin therapy at this time. He had a carotid Doppler study done in February 2017 that showed no significant disease. He had a stroke in 1996 after his CABG, but has had no other stroke since that time and has no significant residual.  His dizziness seems to come on when he stands up or moves quickly. No recent trauma, falls, fever, meningismus, or other causes for his symptoms. He also has a history of chronic numbness in his feet has been present for about 30 years That began in 1985, mildly symptomatic and more painful at night time. Workup in the past has been negative for treatable causes he says. He has minimal numbness in his hands and no bowel bladder dysfunction, and no family history of similar problems. He does feel it might be slightly worse. His complete review of systems in symptoms was negative for any other new medical problems,, complications or illnesses other than the recent cold and sinus problems and vertigo. Past Medical History:  
Diagnosis Date  CAD (coronary artery disease)  Calculus of kidney  Cancer [facility]) prostate  1996  
 Hearing loss  Heart attack ([facility] [address]  Hypercholesterolemia  Hypertension  Incisional hernia 5/10/2011  Movement disorder  Other ill-defined conditions   
 elevated cholesterol  Other ill-defined conditions   
 glaucoma  Other ill-defined conditions   
 abd hernia  Stroke ([facility] [address]   
 left arm tingling  Thyroid disease  Thyroid mass 5/10/2011 Past Surgical History:  
Procedure Laterality Date [address]  
 cabg x3  
 HX AORTIC VALVE REPLACEMENT  2015  HX HEENT    
 thyroid biopsy  HX HEENT    
 thyroidectomy 2013  HX ORTHOPAEDIC  2/2011  
 compression fracture d.t. fall (back),no surgery  HX THYROIDECTOMY  1/3/2013  SD PROSTATE BIOPSY, NEEDLE, SATURATION SAMPLING Family History Problem Relation Age of Onset  Cancer Mother   
  stomach  
 Heart Disease Sister  Heart Disease Father  Diabetes Son   
 Heart Disease Son  Anesth Problems Neg Hx Social History Substance Use Topics  Smoking status: Former Smoker Packs/day: 0.50 Years: 3.00 Quit date: 1/1/1958  Smokeless tobacco: Never Used  Alcohol use No  
   
Current Outpatient Prescriptions Medication Sig Dispense Refill  megestrol (MEGACE) 20 mg tablet Take 20 mg by mouth daily. Patient takes 1/2 tab in morning and 1/2 tab in evening  melatonin 5 mg cap capsule Take 5 mg by mouth nightly.  lovastatin (MEVACOR) 40 mg tablet Take 40 mg by mouth nightly.  brimonidine (ALPHAGAN) 0.15 % ophthalmic solution Administer 1 Drop to both eyes two (2) times a day.  gabapentin (NEURONTIN) 100 mg capsule Take  by mouth nightly.  SYNTHROID 125 mcg tablet Take 1 Tab by mouth Daily (before breakfast). 90 Tab 4  
 dutasteride (AVODART) 0.5 mg capsule Take 0.5 mg by mouth daily.  cyanocobalamin (VITAMIN B-12) 1,000 mcg tablet Take 1,000 mcg by mouth daily.  amlodipine (NORVASC) 10 mg tablet Take 10 mg by mouth daily.  clopidogrel (PLAVIX) 75 mg tablet Take 75 mg by mouth daily.  irbesartan (AVAPRO) 75 mg tablet Take 150 mg by mouth nightly.  tamsulosin (FLOMAX) 0.4 mg capsule Take 0.4 mg by mouth nightly.  aspirin 81 mg tablet Take 81 mg by mouth.  DORZOLAMIDE HCL/TIMOLOL MALEAT (COSOPT OP) Apply 1 Drop to eye two (2) times a day.  latanoprost (XALATAN) 0.005 % ophthalmic solution Administer 1 Drop to both eyes nightly.  colesevelam (WELCHOL) 625 mg tablet Take 625 mg by mouth two (2) times daily (with meals). No Known Allergies Review of Systems: A comprehensive review of systems was negative except for: Constitutional: positive for fatigue and malaise Ears, nose, mouth, throat, and face: positive for earaches and nasal congestion Genitourinary: positive for frequency, dysuria, hesitancy and decreased stream 
Musculoskeletal: positive for myalgias, arthralgias and stiff joints Neurological: positive for dizziness, vertigo, paresthesia and gait problems Behvioral/Psych: positive for anxiety and depression Objective: I 
 
 
NEUROLOGICAL EXAM: 
 
Appearance: The patient is well developed, well nourished, provides a coherent history and is in no acute distress. Mental Status: Oriented to time, place and person, but patient seems to have mild memory impairment and given his history. Mood and affect appropriate. Cranial Nerves:   Intact visual fields. Fundi are benign. LAMAR, EOM's full, but he has an occasional slight deviation of his left eye outward with an exophoric strabismus, no nystagmus, no ptosis. Facial sensation is normal. Corneal reflexes are not tested. Facial movement is symmetric. Hearing is abnormal bilaterally. Palate is midline with normal sternocleidomastoid and trapezius muscles are normal. Tongue is midline. Neck is supple without meningismus or bruits Temporal arteries are not tender or enlarged Motor:  5/5 strength in upper and lower proximal and distal muscles. Normal bulk and tone. No fasciculations. Reflexes:   Deep tendon reflexes 1+/4 and symmetrical. No Babinski or clonus present. On Nylan-Barany testing patient develops only mild dizziness Sensory:   Abnormal to touch, pinprick and vibration and both lower extremities to knee level. Gait:  Abnormal gait with patient with slightly wide-based gait. Tremor:   No tremor noted. Cerebellar:  Probably abnormal Romberg and tandem cerebellar signs present. Neurovascular:  Normal heart sounds and regular rhythm, peripheral pulses decreased, and no carotid bruits. Assessment:  
 
 
Plan:  
 
Patient with new onset of occluded basilar artery, and hypo-plastic vertebrobasilar system, but patient refuses Coumadin and says he is doing better on hormonal therapy so we will continue that MRI of the brain and neck and MRA of the brain all reviewed personally on the PACS system with the patient today. 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 He will be followed in 12 months time or earlier if need be He will call if any problem in the interim. Office visit was 30 minutes today examining the patient, going over his history, deciding on a diagnosis, prognosis and therapy and treatment needed, and reviewing his MRI scans and CAT scans on the PACS system personally myself, and reviewing all his previous office notes and evaluations and lab tests on the computer, and I concluded that indeed he is a high risk for vertebrobasilar insufficiency and basal artery stenosis and high risk for possible stroke and marked debility and morbidity. Signed By: Perri Orellana MD   
 May 10, 2017 This note will not be viewable in [address] If you have questions, please do not hesitate to call me. I look forward to following Mr. Maya Walter along with you. Sincerely, Delmi Fox MD

## 2017-05-10 NOTE — PATIENT INSTRUCTIONS
[facility]   Statement to Patients  April 1, 2014      In an effort to ensure the large volume of patient prescription refills is processed in the most efficient and expeditious manner, we are asking our patients to assist us by calling your Pharmacy for all prescription refills, this will include also your  Mail Order Pharmacy. The pharmacy will contact our office electronically to continue the refill process. Please do not wait until the last minute to call your pharmacy. We need at least 48 hours (2days) to fill prescriptions. We also encourage you to call your pharmacy before going to  your prescription to make sure it is ready. With regard to controlled substance prescription refill requests (narcotic refills) that need to be picked up at our office, we ask your cooperation by providing us with at least 72 hours (3days) notice that you will need a refill. We will not refill narcotic prescription refill requests after 4:00pm on any weekday, Monday through Thursday, or after 2:00pm on Fridays, or on the weekends. We encourage everyone to explore another way of getting your prescription refill request processed using [employer], our patient web portal through our electronic medical record system. [employer] is an efficient and effective way to communicate your medication request directly to the office and  downloadable as an nery on your smart phone . [employer] also features a review functionality that allows you to view your medication list as well as leave messages for your physician. Are you ready to get connected? If so please review the attatched instructions or speak to any of our staff to get you set up right away! Thank you so much for your cooperation. Should you have any questions please contact our Practice Administrator.     The Physicians and Staff,  [facility]

## 2017-05-10 NOTE — PROGRESS NOTES
Consult    Subjective:     Aris Prado is a 80 y.o. 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 We discussed the option of Coumadin but the patient said he wanted no part of Coumadin at that time because of bad experience with his family members. 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 He is doing well and has no active neurological problems. He had been seen for marked worsening of his ataxia and dizziness and new onset diplopia that occurred during his last visit February 2017. He was treated for a vestibular dysfunction and seemed to slowly improvein April 2013. He is also had some visual symptoms with black dots in front of his eyes and when he stares down at the floor these black dots seem to move and some blurred vision in his right eye will be seen the ophthalmologist for that in the next week or 2. He seems to have some progressive hearing loss bilaterally and some difficulty with memory loss. He was referred to us for further evaluation. He has seen ENT physicians Dr. Adeline Salomon for his hearing loss and tinnitus, and no real help was found there. We tried to explain to him that the tinnitus is part of the hearing loss and no medication will really help that. He has had a previous stroke after heart surgery, back in 1996 but no recurrent strokes since that time. 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 He came to see us because of concern that he might have something else going on.  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 He did not have any new focal symptoms as far as weakness or sensory loss, no double vision no blurred vision and no real strokelike symptoms. He had a CTA that showed areas of stenosis in the posterior fossa and vertebral arteries. That was done in 2007. He is on aspirin therapy at this time. He had a carotid Doppler study done in February 2017 that showed no significant disease. He had a stroke in 1996 after his CABG, but has had no other stroke since that time and has no significant residual.  His dizziness seems to come on when he stands up or moves quickly. No recent trauma, falls, fever, meningismus, or other causes for his symptoms. He also has a history of chronic numbness in his feet has been present for about 30 years That began in 1985, mildly symptomatic and more painful at night time. Workup in the past has been negative for treatable causes he says. He has minimal numbness in his hands and no bowel bladder dysfunction, and no family history of similar problems. He does feel it might be slightly worse. His complete review of systems in symptoms was negative for any other new medical problems,, complications or illnesses other than the recent cold and sinus problems and vertigo.     Past Medical History:   Diagnosis Date    CAD (coronary artery disease)     Calculus of kidney     Cancer ([address] prostate  1996    Hearing loss     Heart attack ([address]     Hypercholesterolemia     Hypertension     Incisional hernia 5/10/2011    Movement disorder     Other ill-defined conditions     elevated cholesterol    Other ill-defined conditions     glaucoma    Other ill-defined conditions     abd hernia    Stroke ([address]     left arm tingling    Thyroid disease     Thyroid mass 5/10/2011      Past Surgical History:   Procedure Laterality Date    CARDIAC SURG PROCEDURE UNLIST  1996    cabg x3    HX AORTIC VALVE REPLACEMENT  2015    HX HEENT      thyroid biopsy  HX HEENT      thyroidectomy 2013    HX ORTHOPAEDIC  2/2011    compression fracture d.t. fall (back),no surgery    HX THYROIDECTOMY  1/3/2013    [place] PROSTATE BIOPSY, NEEDLE, SATURATION SAMPLING       Family History   Problem Relation Age of Onset    Cancer Mother      stomach    Heart Disease Sister     Heart Disease Father     Diabetes Son     Heart Disease Son     Anesth Problems Neg Hx       Social History   Substance Use Topics    Smoking status: Former Smoker     Packs/day: 0.50     Years: 3.00     Quit date: 1/1/1958    Smokeless tobacco: Never Used    Alcohol use No       Current Outpatient Prescriptions   Medication Sig Dispense Refill    megestrol (MEGACE) 20 mg tablet Take 20 mg by mouth daily. Patient takes 1/2 tab in morning and 1/2 tab in evening      melatonin 5 mg cap capsule Take 5 mg by mouth nightly.  lovastatin (MEVACOR) 40 mg tablet Take 40 mg by mouth nightly.  brimonidine (ALPHAGAN) 0.15 % ophthalmic solution Administer 1 Drop to both eyes two (2) times a day.  gabapentin (NEURONTIN) 100 mg capsule Take  by mouth nightly.  SYNTHROID 125 mcg tablet Take 1 Tab by mouth Daily (before breakfast). 90 Tab 4    dutasteride (AVODART) 0.5 mg capsule Take 0.5 mg by mouth daily.  cyanocobalamin (VITAMIN B-12) 1,000 mcg tablet Take 1,000 mcg by mouth daily.  amlodipine (NORVASC) 10 mg tablet Take 10 mg by mouth daily.  clopidogrel (PLAVIX) 75 mg tablet Take 75 mg by mouth daily.  irbesartan (AVAPRO) 75 mg tablet Take 150 mg by mouth nightly.  tamsulosin (FLOMAX) 0.4 mg capsule Take 0.4 mg by mouth nightly.  aspirin 81 mg tablet Take 81 mg by mouth.  DORZOLAMIDE HCL/TIMOLOL MALEAT (COSOPT OP) Apply 1 Drop to eye two (2) times a day.  latanoprost (XALATAN) 0.005 % ophthalmic solution Administer 1 Drop to both eyes nightly.  colesevelam (WELCHOL) 625 mg tablet Take 625 mg by mouth two (2) times daily (with meals). No Known Allergies     Review of Systems:  A comprehensive review of systems was negative except for: Constitutional: positive for fatigue and malaise  Ears, nose, mouth, throat, and face: positive for earaches and nasal congestion  Genitourinary: positive for frequency, dysuria, hesitancy and decreased stream  Musculoskeletal: positive for myalgias, arthralgias and stiff joints  Neurological: positive for dizziness, vertigo, paresthesia and gait problems  Behvioral/Psych: positive for anxiety and depression     Objective:     I      NEUROLOGICAL EXAM:    Appearance: The patient is well developed, well nourished, provides a coherent history and is in no acute distress. Mental Status: Oriented to time, place and person, but patient seems to have mild memory impairment and given his history. Mood and affect appropriate. Cranial Nerves:   Intact visual fields. Fundi are benign. LAMAR, EOM's full, but he has an occasional slight deviation of his left eye outward with an exophoric strabismus, no nystagmus, no ptosis. Facial sensation is normal. Corneal reflexes are not tested. Facial movement is symmetric. Hearing is abnormal bilaterally. Palate is midline with normal sternocleidomastoid and trapezius muscles are normal. Tongue is midline. Neck is supple without meningismus or bruits  Temporal arteries are not tender or enlarged    Motor:  5/5 strength in upper and lower proximal and distal muscles. Normal bulk and tone. No fasciculations. Reflexes:   Deep tendon reflexes 1+/4 and symmetrical. No Babinski or clonus present. On Nylan-Barany testing patient develops only mild dizziness   Sensory:   Abnormal to touch, pinprick and vibration and both lower extremities to knee level. Gait:  Abnormal gait with patient with slightly wide-based gait. Tremor:   No tremor noted. Cerebellar:  Probably abnormal Romberg and tandem cerebellar signs present.    Neurovascular:  Normal heart sounds and regular rhythm, peripheral pulses decreased, and no carotid bruits. Assessment:       Plan:     Patient with new onset of occluded basilar artery, and hypo-plastic vertebrobasilar system, but patient refuses Coumadin and says he is doing better on hormonal therapy so we will continue that  MRI of the brain and neck and MRA of the brain all reviewed personally on the PACS system with the patient today. 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 He will be followed in 12 months time or earlier if need be  He will call if any problem in the interim. Office visit was 30 minutes today examining the patient, going over his history, deciding on a diagnosis, prognosis and therapy and treatment needed, and reviewing his MRI scans and CAT scans on the PACS system personally myself, and reviewing all his previous office notes and evaluations and lab tests on the computer, and I concluded that indeed he is a high risk for vertebrobasilar insufficiency and basal artery stenosis and high risk for possible stroke and marked debility and morbidity. Signed By: Vivian Childers MD     May 10, 2017       This note will not be viewable in [address]

## 2017-05-10 NOTE — MR AVS SNAPSHOT
Visit Information Date & Time Provider Department Dept. Phone Encounter #  
 5/10/2017 12:00 PM Elissa Barkley MD [facility] at [facility] [phone] [id number] Follow-up Instructions Return in about 1 year (around 5/10/2018). Upcoming Health Maintenance Date Due  
 FOOT EXAM Q1 2/19/1938 MICROALBUMIN Q1 2/19/1938 EYE EXAM RETINAL OR DILATED Q1 2/19/1938 ZOSTER VACCINE AGE 60> 2/19/1988 GLAUCOMA SCREENING Q2Y 2/19/1993 Pneumococcal 65+ Low/Medium Risk (1 of 2 - PCV13) 2/19/1993 MEDICARE YEARLY EXAM 2/19/1993 LIPID PANEL Q1 3/20/2010 INFLUENZA AGE 9 TO ADULT 8/1/2017 HEMOGLOBIN A1C Q6M 8/7/2017 DTaP/Tdap/Td series (2 - Td) 2/19/2023 Allergies as of 5/10/2017  Review Complete On: 5/10/2017 By: Elissa Barkley MD  
 No Known Allergies Current Immunizations  Never Reviewed Name Date Tdap 2/19/2013  5:42 AM  
  
 Not reviewed this visit You Were Diagnosed With   
  
 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 
ICD-9-CM: 250.60, 357.2 Ataxia     ICD-10-CM: R27.0 ICD-9-CM: 045. 3 VBI (vertebrobasilar insufficiency)     ICD-10-CM: G45.0 ICD-9-CM: 435.3 Stenosis of both internal carotid arteries     ICD-10-CM: I65.23 ICD-9-CM: 433.10, 433.30 Sensory ataxic gait     ICD-10-CM: R26.0 ICD-9-CM: 885. 2 Vitals BP Pulse Resp Height(growth percentile) Weight(growth percentile) SpO2  
 140/70 81 16 5' 10\" (1.778 m) 207 lb (93.9 kg) 98% BMI Smoking Status 29.7 kg/m2 Former Smoker Vitals History BMI and BSA Data Body Mass Index Body Surface Area  
 29.7 kg/m 2 2.15 m 2 Preferred Pharmacy Pharmacy Name Phone [id number]- [place], [facility] [phone] Your Updated Medication List  
  
   
This list is accurate as of: 5/10/17 12:42 PM.  Always use your most recent med list.  
  
  
  
  
 aspirin 81 mg tablet Take 81 mg by mouth. AVAPRO 75 mg tablet Generic drug:  irbesartan Take 150 mg by mouth nightly. brimonidine 0.15 % ophthalmic solution Commonly known as:  [place] Novel Administer 1 Drop to both eyes two (2) times a day. COSOPT OP Apply 1 Drop to eye two (2) times a day. dutasteride 0.5 mg capsule Commonly known as:  AVODART Take 0.5 mg by mouth daily. FLOMAX 0.4 mg capsule Generic drug:  tamsulosin Take 0.4 mg by mouth nightly.  
  
 gabapentin 100 mg capsule Commonly known as:  NEURONTIN Take  by mouth nightly. lovastatin 40 mg tablet Commonly known as:  MEVACOR Take 40 mg by mouth nightly. megestrol 20 mg tablet Commonly known as:  MEGACE Take 20 mg by mouth daily. Patient takes 1/2 tab in morning and 1/2 tab in evening  
  
 melatonin 5 mg Cap capsule Take 5 mg by mouth nightly. NORVASC 10 mg tablet Generic drug:  amLODIPine Take 10 mg by mouth daily. PLAVIX 75 mg Tab Generic drug:  clopidogrel Take 75 mg by mouth daily. SYNTHROID 125 mcg tablet Generic drug:  levothyroxine Take 1 Tab by mouth Daily (before breakfast). VITAMIN B-12 1,000 mcg tablet Generic drug:  cyanocobalamin Take 1,000 mcg by mouth daily. WELCHOL 625 mg tablet Generic drug:  colesevelam  
Take 625 mg by mouth two (2) times daily (with meals). XALATAN 0.005 % ophthalmic solution Generic drug:  latanoprost  
Administer 1 Drop to both eyes nightly. Follow-up Instructions Return in about 1 year (around 5/10/2018). Patient Instructions PRESCRIPTION REFILL POLICY [facility] Statement to Patients April 1, 2014 In an effort to ensure the large volume of patient prescription refills is processed in the most efficient and expeditious manner, we are asking our patients to assist us by calling your Pharmacy for all prescription refills, this will include also your  Mail Order Pharmacy. The pharmacy will contact our office electronically to continue the refill process. Please do not wait until the last minute to call your pharmacy. We need at least 48 hours (2days) to fill prescriptions. We also encourage you to call your pharmacy before going to  your prescription to make sure it is ready. With regard to controlled substance prescription refill requests (narcotic refills) that need to be picked up at our office, we ask your cooperation by providing us with at least 72 hours (3days) notice that you will need a refill. We will not refill narcotic prescription refill requests after 4:00pm on any weekday, Monday through Thursday, or after 2:00pm on Fridays, or on the weekends. We encourage everyone to explore another way of getting your prescription refill request processed using [employer], our patient web portal through our electronic medical record system. [employer] is an efficient and effective way to communicate your medication request directly to the office and  downloadable as an nery on your smart phone . [employer] also features a review functionality that allows you to view your medication list as well as leave messages for your physician. Are you ready to get connected? If so please review the attatched instructions or speak to any of our staff to get you set up right away! Thank you so much for your cooperation. Should you have any questions please contact our Practice Administrator. The Physicians and Staff,  [facility] Introducing [facility]!    
 [facility] introduces [employer] patient portal. Now you can access parts of your medical record, email your doctor's office, and request medication refills online. 1. In your internet browser, go to [email or web address] 2. Click on the First Time User? Click Here link in the Sign In box. You will see the New Member Sign Up page. 3. Enter your [employer] Access Code exactly as it appears below. You will not need to use this code after youve completed the sign-up process. If you do not sign up before the expiration date, you must request a new code. · [employer] Access Code: [email or web address] Expires: 8/8/2017 12:34 PM 
 
4. Enter the last four digits of your Social Security Number (xxxx) and Date of Birth (mm/dd/yyyy) as indicated and click Submit. You will be taken to the next sign-up page. 5. Create a [employer] ID. This will be your [employer] login ID and cannot be changed, so think of one that is secure and easy to remember. 6. Create a [employer] password. You can change your password at any time. 7. Enter your Password Reset Question and Answer. This can be used at a later time if you forget your password. 8. Enter your e-mail address. You will receive e-mail notification when new information is available in [address] 9. Click Sign Up. You can now view and download portions of your medical record. 10. Click the Download Summary menu link to download a portable copy of your medical information. If you have questions, please visit the Frequently Asked Questions section of the [employer] website. Remember, [employer] is NOT to be used for urgent needs. For medical emergencies, dial 911. Now available from your iPhone and Android! Please provide this summary of care documentation to your next provider. Your primary care clinician is listed as [place]es. If you have any questions after today's visit, please call [phone].

## 2017-12-24 ENCOUNTER — APPOINTMENT (OUTPATIENT)
Dept: GENERAL RADIOLOGY | Age: 82
End: 2017-12-24
Attending: EMERGENCY MEDICINE
Payer: MEDICARE

## 2017-12-24 ENCOUNTER — HOSPITAL ENCOUNTER (EMERGENCY)
Age: 82
Discharge: HOME | End: 2017-12-24
Attending: EMERGENCY MEDICINE
Payer: MEDICARE

## 2017-12-24 VITALS
DIASTOLIC BLOOD PRESSURE: 65 MMHG | SYSTOLIC BLOOD PRESSURE: 154 MMHG | HEIGHT: 71 IN | WEIGHT: 204 LBS | HEART RATE: 86 BPM | OXYGEN SATURATION: 97 % | RESPIRATION RATE: 14 BRPM | TEMPERATURE: 97.5 F | BODY MASS INDEX: 28.56 KG/M2

## 2017-12-24 DIAGNOSIS — M25.561 CHRONIC PAIN OF RIGHT KNEE: Primary | ICD-10-CM

## 2017-12-24 DIAGNOSIS — G89.29 CHRONIC PAIN OF RIGHT KNEE: Primary | ICD-10-CM

## 2017-12-24 PROCEDURE — 99282 EMERGENCY DEPT VISIT SF MDM: CPT

## 2017-12-24 NOTE — ED PROVIDER NOTES
EMERGENCY DEPARTMENT HISTORY AND PHYSICAL EXAM      Date: 12/24/2017  Patient Name: Sunshine Mosley    History of Presenting Illness     Chief Complaint   Patient presents with    Knee Pain       History Provided By: Patient    HPI: Sunshine Mosley, 80 y.o. male with PMHx significant for CAD, CVA, and HTN, presents to the ED with cc of progressively worsening non-radiating right knee pain since 4 days ago. Pt reports associated swelling to his right knee as well. He states that he has had chronic pain in his right knee for years but received relief from regularly Cortisone shots , most recently received 8 months ago. He notes an upcoming appointment w/ Dr. Snyder on 1/3/18. However, due to not being able to bear the pain, pt came to ED in hopes to receive a Cortisone injection to his right knee. He reports no improvement of pain when taking Advil. Pt is able to ambulate with a cane. Pt denies any recent injury or GLFs. Pt specifically denies calf pain, fever, chills, abdominal pain, CP, nausea, vomiting, and diarrhea. Note: Pt refused pain medication at this time. PCP: MD Ac Sanderson MD    There are no other complaints, changes, or physical findings at this time. Current Outpatient Prescriptions   Medication Sig Dispense Refill    lovastatin (MEVACOR) 40 mg tablet Take 40 mg by mouth nightly.  brimonidine (ALPHAGAN) 0.15 % ophthalmic solution Administer 1 Drop to both eyes two (2) times a day.  gabapentin (NEURONTIN) 100 mg capsule Take  by mouth nightly.  SYNTHROID 125 mcg tablet Take 1 Tab by mouth Daily (before breakfast). 90 Tab 4    dutasteride (AVODART) 0.5 mg capsule Take 0.5 mg by mouth daily.  cyanocobalamin (VITAMIN B-12) 1,000 mcg tablet Take 1,000 mcg by mouth daily.  amlodipine (NORVASC) 10 mg tablet Take 10 mg by mouth daily.  clopidogrel (PLAVIX) 75 mg tablet Take 75 mg by mouth daily.       tamsulosin (FLOMAX) 0.4 mg capsule Take 0.4 mg by mouth nightly.  aspirin 81 mg tablet Take 81 mg by mouth.  DORZOLAMIDE HCL/TIMOLOL MALEAT (COSOPT OP) Apply 1 Drop to eye two (2) times a day.  latanoprost (XALATAN) 0.005 % ophthalmic solution Administer 1 Drop to both eyes nightly.  megestrol (MEGACE) 20 mg tablet Take 20 mg by mouth daily. Patient takes 1/2 tab in morning and 1/2 tab in evening      melatonin 5 mg cap capsule Take 5 mg by mouth nightly.  irbesartan (AVAPRO) 75 mg tablet Take 150 mg by mouth nightly.  colesevelam (WELCHOL) 625 mg tablet Take 625 mg by mouth two (2) times daily (with meals).          Past History     Past Medical History:  Past Medical History:   Diagnosis Date    CAD (coronary artery disease)     Calculus of kidney     Cancer ([address] prostate  1996    Hearing loss     [facility]     Hypercholesterolemia     Hypertension     Incisional hernia 5/10/2011    Movement disorder     Other ill-defined conditions     elevated cholesterol    Other ill-defined conditions     glaucoma    Other ill-defined conditions     abd hernia    Stroke ([address]     left arm tingling    Thyroid disease     Thyroid mass 5/10/2011       Past Surgical History:  Past Surgical History:   Procedure Laterality Date    CARDIAC SURG PROCEDURE UNLIST  1996    cabg x3    HX AORTIC VALVE REPLACEMENT  2015    HX HEENT      thyroid biopsy    HX HEENT      thyroidectomy 2013    HX ORTHOPAEDIC  2/2011    compression fracture d.t. fall (back),no surgery    HX THYROIDECTOMY  1/3/2013    OR PROSTATE BIOPSY, NEEDLE, SATURATION SAMPLING         Family History:  Family History   Problem Relation Age of Onset    Cancer Mother      stomach    Heart Disease Sister     Heart Disease Father     Diabetes Son     Heart Disease Son     Anesth Problems Neg Hx        Social History:  Social History   Substance Use Topics    Smoking status: Former Smoker     Packs/day: 0.50     Years: 3.00     Quit date: 1/1/1958    Smokeless tobacco: Never Used    Alcohol use No       Allergies:  No Known Allergies    Review of Systems   Review of Systems   Constitutional: Negative for chills, fatigue and fever. HENT: Negative for congestion, rhinorrhea and sore throat. Eyes: Negative for pain, discharge and visual disturbance. Respiratory: Negative for cough, chest tightness, shortness of breath and wheezing. Cardiovascular: Negative for chest pain, palpitations and leg swelling. Gastrointestinal: Negative for abdominal pain, constipation, diarrhea, nausea and vomiting. Genitourinary: Negative for dysuria, frequency and hematuria. Musculoskeletal: Positive for joint swelling (right knee) and myalgias (right knee). Negative for arthralgias and back pain. Skin: Negative for rash. Neurological: Negative for dizziness, weakness, light-headedness and headaches. Psychiatric/Behavioral: Negative. Physical Exam   Physical Exam   Constitutional: He is oriented to person, place, and time. He appears well-developed and well-nourished. No distress. HENT:   Head: Normocephalic and atraumatic. Eyes: EOM are normal. Right eye exhibits no discharge. Left eye exhibits no discharge. No scleral icterus. Neck: Normal range of motion. Neck supple. No tracheal deviation present. Cardiovascular: Normal rate, regular rhythm, normal heart sounds and intact distal pulses. Exam reveals no gallop and no friction rub. No murmur heard. Pulses:       Dorsalis pedis pulses are 2+ on the right side, and 2+ on the left side. Pulmonary/Chest: Effort normal and breath sounds normal. No respiratory distress. He has no wheezes. He has no rales. Abdominal: Soft. He exhibits no distension. There is no tenderness. Musculoskeletal:   Right knee: Good ROM although some pain with ROM. Minimal warmth, slight edema. No erythema noted. No calf TTP   Lymphadenopathy:     He has no cervical adenopathy.    Neurological: He is alert and oriented to person, place, and time. Gait (stable) normal.   Skin: Skin is warm and dry. No rash noted. Psychiatric: He has a normal mood and affect. Nursing note and vitals reviewed. Medical Decision Making   I am the first provider for this patient. I reviewed the vital signs, available nursing notes, past medical history, past surgical history, family history and social history. Vital Signs-Reviewed the patient's vital signs. Patient Vitals for the past 12 hrs:   Temp Pulse Resp BP SpO2   12/24/17 0615 - - - 154/65 97 %   12/24/17 0611 97.5 °F (36.4 °C) 86 14 - 96 %   12/24/17 0608 97.5 °F (36.4 °C) - - - -     Records Reviewed: Old Medical Records    Provider Notes (Medical Decision Making):   Patient presents to ED with right knee pain. He has a long standing history of right knee pain but receives relief from cortisone injections. This episode began four days ago. No recent injury or trauma. He is able to bear weight without difficulty. Suspect OA. Doubt septic joint, inflammatory arthritis, gout. Advised patient I do not perform cortisone injections in ED and he would have to follow up with orthopedist.  Offered x-ray which patient initially agreed to. Offered pain medication, but pt refused it due to side effects. He refused tylenol/motrin as well. While waiting for x-ray, patient became agitated because he was not able to receive a cortisone injection, dressed himself and advised nursing he was leaving. He ambulated with cane out of ED without difficulty. He left prior to receiving paperwork. ED Course:   Initial assessment performed. The patients presenting problems have been discussed, and they are in agreement with the care plan formulated and outlined with them. I have encouraged them to ask questions as they arise throughout their visit. PROGRESS NOTE:   7:10 AM  Discussed pain medication options with patient.  Pt refused prescriptions to take home due to side effects. PROGRESS NOTE:   7:11 AM   Pt is frustrated because he can't receive a Cortisone injection in ED. He dressed and ambulated with cane out of ED prior to X-ray. Pt left before receiving discharge paper. Discharge Note:  7:11 AM  The pt is ready for discharge. The pt's signs, symptoms, diagnosis, and discharge instructions have been discussed and pt has conveyed their understanding. The pt is to follow up as recommended or return to ER should their symptoms worsen. Plan has been discussed and pt is in agreement. PLAN:  1. Discharge Medication List as of 12/24/2017  7:17 AM        2. Follow-up Information     None        Return to ED if worse     Diagnosis     Clinical Impression:   1. Chronic pain of right knee        Attestations: This note is prepared by [employer], acting as Scribe for MD Ramón Ocampo MD: The scribe's documentation has been prepared under my direction and personally reviewed by me in its entirety. I confirm that the note above accurately reflects all work, treatment, procedures, and medical decision making performed by me.

## 2017-12-24 NOTE — ED NOTES
Pt here 1 hour 15mins. Pt notified, we are not able to give cortisone injections. Pt discontent refusing xray and dressed, and walked out. MD aware. Pt unwilling to stay for paperwork to be completed. Pt ambulatory, no iv placed.

## 2018-05-09 ENCOUNTER — OFFICE VISIT (OUTPATIENT)
Dept: NEUROLOGY | Age: 83
End: 2018-05-09

## 2018-05-09 VITALS
BODY MASS INDEX: 28.28 KG/M2 | WEIGHT: 202 LBS | OXYGEN SATURATION: 97 % | HEIGHT: 71 IN | HEART RATE: 65 BPM | DIASTOLIC BLOOD PRESSURE: 62 MMHG | SYSTOLIC BLOOD PRESSURE: 130 MMHG

## 2018-05-09 DIAGNOSIS — E11.42 DIABETIC PERIPHERAL NEUROPATHY ASSOCIATED WITH TYPE 2 DIABETES MELLITUS (HCC): ICD-10-CM

## 2018-05-09 DIAGNOSIS — R42 DIZZINESS: ICD-10-CM

## 2018-05-09 DIAGNOSIS — G45.0 VBI (VERTEBROBASILAR INSUFFICIENCY): ICD-10-CM

## 2018-05-09 DIAGNOSIS — H81.13 BENIGN PAROXYSMAL POSITIONAL VERTIGO DUE TO BILATERAL VESTIBULAR DISORDER: ICD-10-CM

## 2018-05-09 DIAGNOSIS — Z86.73 HISTORY OF STROKE: ICD-10-CM

## 2018-05-09 DIAGNOSIS — G60.8 IDIOPATHIC SMALL AND LARGE FIBER SENSORY NEUROPATHY: ICD-10-CM

## 2018-05-09 DIAGNOSIS — I65.23 STENOSIS OF BOTH INTERNAL CAROTID ARTERIES: Primary | ICD-10-CM

## 2018-05-09 DIAGNOSIS — R27.0 ATAXIA: ICD-10-CM

## 2018-05-09 RX ORDER — GABAPENTIN 100 MG/1
300 CAPSULE ORAL
Qty: 270 CAP | Refills: 3 | Status: SHIPPED | OUTPATIENT
Start: 2018-05-09 | End: 2019-01-11 | Stop reason: ALTCHOICE

## 2018-05-09 NOTE — MR AVS SNAPSHOT
[address], 
[id number], [address] [facility] 
[phone] Patient: Gretchen Butler MRN: [id number] PCK:9/[birth year]30 Visit Information Date & Time Provider Department Dept. Phone Encounter #  
 5/9/2018  1:00 PM Severo Frederic, MD [facility] [phone] [id number] Follow-up Instructions Return in about 6 months (around 11/9/2018). Upcoming Health Maintenance Date Due  
 FOOT EXAM Q1 2/19/1938 MICROALBUMIN Q1 2/19/1938 EYE EXAM RETINAL OR DILATED Q1 2/19/1938 ZOSTER VACCINE AGE 60> 12/19/1987 GLAUCOMA SCREENING Q2Y 2/19/1993 Pneumococcal 65+ Low/Medium Risk (1 of 2 - PCV13) 2/19/1993 LIPID PANEL Q1 3/20/2010 HEMOGLOBIN A1C Q6M 8/7/2017 MEDICARE YEARLY EXAM 3/14/2018 Influenza Age 5 to Adult 8/1/2018 DTaP/Tdap/Td series (2 - Td) 2/19/2023 Allergies as of 5/9/2018  Review Complete On: 5/9/2018 By: Severo Frederic, MD  
 No Known Allergies Current Immunizations  Never Reviewed Name Date Tdap 2/19/2013  5:42 AM  
  
 Not reviewed this visit You Were Diagnosed With   
  
 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 
ICD-9-CM: 250.60, 357.2 Benign paroxysmal positional vertigo due to bilateral vestibular disorder     ICD-10-CM: H81.13 
ICD-9-CM: 386.11   
 VBI (vertebrobasilar insufficiency)     ICD-10-CM: G45.0 ICD-9-CM: 435.3 Idiopathic small and large fiber sensory neuropathy     ICD-10-CM: G60.8 ICD-9-CM: 356.4 Ataxia     ICD-10-CM: R27.0 ICD-9-CM: 781.3 Dizziness     ICD-10-CM: P05 ICD-9-CM: 780.4 History of stroke     ICD-10-CM: Z86.73 
ICD-9-CM: V12.54 Vitals BP Pulse Height(growth percentile) Weight(growth percentile) SpO2 BMI  
 130/62 65 5' 11\" (1.803 m) 202 lb (91.6 kg) 97% 28.17 kg/m2 Smoking Status Former Smoker Vitals History BMI and BSA Data Body Mass Index Body Surface Area  
 28.17 kg/m 2 2.14 m 2 Preferred Pharmacy Pharmacy Name Phone [address] [phone] Your Updated Medication List  
  
   
This list is accurate as of 5/9/18  1:47 PM.  Always use your most recent med list.  
  
  
  
  
 aspirin 81 mg tablet Take 81 mg by mouth. AVAPRO 75 mg tablet Generic drug:  irbesartan Take 150 mg by mouth nightly. brimonidine 0.15 % ophthalmic solution Commonly known as:  Evelina Osman Administer 1 Drop to both eyes two (2) times a day. COSOPT OP Apply 1 Drop to eye two (2) times a day. dutasteride 0.5 mg capsule Commonly known as:  AVODART Take 0.5 mg by mouth daily. FLOMAX 0.4 mg capsule Generic drug:  tamsulosin Take 0.4 mg by mouth nightly.  
  
 gabapentin 100 mg capsule Commonly known as:  NEURONTIN Take 3 Caps by mouth nightly. lovastatin 40 mg tablet Commonly known as:  MEVACOR Take 40 mg by mouth nightly. megestrol 20 mg tablet Commonly known as:  MEGACE Take 20 mg by mouth daily. Patient takes 1/2 tab in morning and 1/2 tab in evening  
  
 melatonin 5 mg Cap capsule Take 5 mg by mouth nightly. NORVASC 10 mg tablet Generic drug:  amLODIPine Take 10 mg by mouth daily. PLAVIX 75 mg Tab Generic drug:  clopidogrel Take 75 mg by mouth daily. SYNTHROID 125 mcg tablet Generic drug:  levothyroxine Take 1 Tab by mouth Daily (before breakfast). VITAMIN B-12 1,000 mcg tablet Generic drug:  cyanocobalamin Take 1,000 mcg by mouth daily. WELCHOL 625 mg tablet Generic drug:  colesevelam  
 Take 625 mg by mouth two (2) times daily (with meals). XALATAN 0.005 % ophthalmic solution Generic drug:  latanoprost  
Administer 1 Drop to both eyes nightly. Prescriptions Sent to Pharmacy Refills  
 gabapentin (NEURONTIN) 100 mg capsule 3 Sig: Take 3 Caps by mouth nightly. Class: Normal  
 Pharmacy: [address], [address] [place] [address] #: [phone] Route: Oral  
  
Follow-up Instructions Return in about 6 months (around 11/9/2018). Patient Instructions Office Policies Phone calls/patient messages: Please allow up to 24 hours for someone in the office to contact you about your call or message. Be mindful your provider may be out of the office or your message may require further review. We encourage you to use [employer] for your messages as this is a faster, more efficient way to communicate with our office Medication Refills: 
 
Prescription medications require up to 48 business hours to process. We encourage you to use [employer] for your refills. For controlled medications: Please allow up to 72 business hours to process. Certain medications may require you to  a written prescription at our office. NO narcotic/controlled medications will be prescribed after 4pm Monday through Friday or on weekends Form/Paperwork Completion: 
 
Please note there is a $25 fee for all paperwork completed by our providers. We ask that you allow 7-14 business days. Pre-payment is due prior to picking up/faxing the completed form. You may also download your forms to [employer] to have your doctor print off. A Healthy Lifestyle: Care Instructions Your Care Instructions A healthy lifestyle can help you feel good, stay at a healthy weight, and have plenty of energy for both work and play. A healthy lifestyle is something you can share with your whole family. A healthy lifestyle also can lower your risk for serious health problems, such as high blood pressure, heart disease, and diabetes. You can follow a few steps listed below to improve your health and the health of your family. Follow-up care is a key part of your treatment and safety. Be sure to make and go to all appointments, and call your doctor if you are having problems. It's also a good idea to know your test results and keep a list of the medicines you take. How can you care for yourself at home? · Do not eat too much sugar, fat, or fast foods. You can still have dessert and treats now and then. The goal is moderation. · Start small to improve your eating habits. Pay attention to portion sizes, drink less juice and soda pop, and eat more fruits and vegetables. ¨ Eat a healthy amount of food. A 3-ounce serving of meat, for example, is about the size of a deck of cards. Fill the rest of your plate with vegetables and whole grains. ¨ Limit the amount of soda and sports drinks you have every day. Drink more water when you are thirsty. ¨ Eat at least 5 servings of fruits and vegetables every day. It may seem like a lot, but it is not hard to reach this goal. A serving or helping is 1 piece of fruit, 1 cup of vegetables, or 2 cups of leafy, raw vegetables. Have an apple or some carrot sticks as an afternoon snack instead of a candy bar. Try to have fruits and/or vegetables at every meal. 
· Make exercise part of your daily routine. You may want to start with simple activities, such as walking, bicycling, or slow swimming. Try to be active 30 to 60 minutes every day. You do not need to do all 30 to 60 minutes all at once. For example, you can exercise 3 times a day for 10 or 20 minutes. Moderate exercise is safe for most people, but it is always a good idea to talk to your doctor before starting an exercise program. 
· Keep moving. Shana Rodas the lawn, work in the garden, or [employer] ([employer]).  Take the stairs instead of the elevator at work. · If you smoke, quit. People who smoke have an increased risk for heart attack, stroke, cancer, and other lung illnesses. Quitting is hard, but there are ways to boost your chance of quitting tobacco for good. ¨ Use nicotine gum, patches, or lozenges. ¨ Ask your doctor about stop-smoking programs and medicines. ¨ Keep trying. In addition to reducing your risk of diseases in the future, you will notice some benefits soon after you stop using tobacco. If you have shortness of breath or asthma symptoms, they will likely get better within a few weeks after you quit. · Limit how much alcohol you drink. Moderate amounts of alcohol (up to 2 drinks a day for men, 1 drink a day for women) are okay. But drinking too much can lead to liver problems, high blood pressure, and other health problems. Family health If you have a family, there are many things you can do together to improve your health. · Eat meals together as a family as often as possible. · Eat healthy foods. This includes fruits, vegetables, lean meats and dairy, and whole grains. · Include your family in your fitness plan. Most people think of activities such as jogging or tennis as the way to fitness, but there are many ways you and your family can be more active. Anything that makes you breathe hard and gets your heart pumping is exercise. Here are some tips: 
¨ Walk to do errands or to take your child to school or the bus. ¨ Go for a family bike ride after dinner instead of watching TV. Where can you learn more? Go to http://percy-luciana.info/. Enter J992 in the search box to learn more about \"A Healthy Lifestyle: Care Instructions. \" Current as of: May 12, 2017 Content Version: 11.4 © 7166-0632 Healthwise, [employer].  Care instructions adapted under license by [employer] (which disclaims liability or warranty for this information). If you have questions about a medical condition or this instruction, always ask your healthcare professional. [address] any warranty or liability for your use of this information. Introducing [address] [facility] introduces [employer] patient portal. Now you can access parts of your medical record, email your doctor's office, and request medication refills online. 1. In your internet browser, go to [email or web address] 2. Click on the First Time User? Click Here link in the Sign In box. You will see the New Member Sign Up page. 3. Enter your [employer] Access Code exactly as it appears below. You will not need to use this code after youve completed the sign-up process. If you do not sign up before the expiration date, you must request a new code. · [employer] Access Code: [email or web address] Expires: 8/7/2018  1:37 PM 
 
4. Enter the last four digits of your Social Security Number (xxxx) and Date of Birth (mm/dd/yyyy) as indicated and click Submit. You will be taken to the next sign-up page. 5. Create a Play[employer] ID. This will be your [employer] login ID and cannot be changed, so think of one that is secure and easy to remember. 6. Create a [employer] password. You can change your password at any time. 7. Enter your Password Reset Question and Answer. This can be used at a later time if you forget your password. 8. Enter your e-mail address. You will receive e-mail notification when new information is available in [address] 9. Click Sign Up. You can now view and download portions of your medical record. 10. Click the Download Summary menu link to download a portable copy of your medical information. If you have questions, please visit the Frequently Asked Questions section of the [employer] website. Remember, [employer] is NOT to be used for urgent needs. For medical emergencies, dial 911. Now available from your iPhone and Android! Please provide this summary of care documentation to your next provider. Your primary care clinician is listed as Kelechi Theodore. If you have any questions after today's visit, please call [phone].

## 2018-05-09 NOTE — LETTER
5/9/2018 2:56 PM 
 
Patient:  Nelda Martinez YOB: 1928 Date of Visit: 5/9/2018 Dear No Recipients: Thank you for referring Mr. Dot Gonzalez to me for evaluation/treatment. Below are the relevant portions of my assessment and plan of care. Consult Subjective:  
 
Nelda Martinez is a 80 y.o. right-handed  male who is seen for evaluation at the request of Dr. Cordell Kwok of new problem of increasing pain in his feet at night to the point he is not sleeping as well because of the burning pain in his feet and from his neuropathy which is thought to be probably diabetic from his latent diabetes his last hemoglobin A1c being 6.3. He is currently taking Neurontin 100 mg at nighttime, we discussed the possible options of increasing his dose, switching medications, but since she is on such a low-dose my first recommendation is that he go ahead and take 200 mg to 300 mg at night an hour or 2 before he goes to bed to see if that will help him sleep better he can control his pain. He has arthritis in his knees and walking a little worse, and had to get a collagen shot in his right knee seems to be helping some being followed by his orthopedic surgeon. He has not had any new focal weakness, sensory loss, double vision, visual changes, increase headache, fever or meningismus or trauma or other focal neurological symptoms. He complains of a burning pain in his feet is worse at night when he tries to go to bed. We will increase his gabapentin as mentioned above. He was somewhat concerned because of a recent TV show showing that gabapentin was related to the people taking narcotics, but advised him that it is a relatively safe drug and should be no problem for him and is not habit-forming or addicting. He is agreeable to try the medications as above.   He continues take all his vitamins and vitamin D and his antiplatelet therapy because of his previous history of strokes. 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 We discussed the option of Coumadin but the patient said he wanted no part of Coumadin at that time because of bad experience with his family members. 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 He is doing well and has no active neurological problems. He had been seen for marked worsening of his ataxia and dizziness and new onset diplopia that occurred during his last visit February 2017. He was treated for a vestibular dysfunction and seemed to slowly improvein April 2013. He is also had some visual symptoms with black dots in front of his eyes and when he stares down at the floor these black dots seem to move and some blurred vision in his right eye will be seen the ophthalmologist for that in the next week or 2. He seems to have some progressive hearing loss bilaterally and some difficulty with memory loss. He was referred to us for further evaluation. He has seen ENT physicians Dr. Dilcia Godfrey for his hearing loss and tinnitus, and no real help was found there. We tried to explain to him that the tinnitus is part of the hearing loss and no medication will really help that. He has had a previous stroke after heart surgery, back in 1996 but no recurrent strokes since that time. 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   He came to see us because of concern that he might have something else going on. 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 He did not have any new focal symptoms as far as weakness or sensory loss, no double vision no blurred vision and no real strokelike symptoms. He had a CTA that showed areas of stenosis in the posterior fossa and vertebral arteries. That was done in 2007. He is on aspirin therapy at this time. He had a carotid Doppler study done in February 2017 that showed no significant disease. He had a stroke in 1996 after his CABG, but has had no other stroke since that time and has no significant residual.  His dizziness seems to come on when he stands up or moves quickly. No recent trauma, falls, fever, meningismus, or other causes for his symptoms. He also has a history of chronic numbness in his feet has been present for about 30 years That began in 1985, mildly symptomatic and more painful at night time. Workup in the past has been negative for treatable causes he says. He has minimal numbness in his hands and no bowel bladder dysfunction, and no family history of similar problems. He does feel it might be slightly worse. His complete review of systems in symptoms was negative for any other new medical problems,, complications or illnesses other than the recent cold and sinus problems and vertigo. Past Medical History:  
Diagnosis Date  CAD (coronary artery disease)  Calculus of kidney  Cancer [facility]) prostate  1996  
 Hearing loss  Heart attack ([address]  Hypercholesterolemia  Hypertension  Incisional hernia 5/10/2011  Movement disorder  Other ill-defined conditions(799.89)   
 elevated cholesterol  Other ill-defined conditions(799.89)   
 glaucoma  Other ill-defined conditions(799.89)   
 abd hernia  Stroke ([address]   
 left arm tingling  Thyroid disease  Thyroid mass 5/10/2011 Past Surgical History: Procedure Laterality Date [address] [facility]  
 cabg x3  
 HX AORTIC VALVE REPLACEMENT  2015  HX HEENT    
 thyroid biopsy  HX HEENT    
 thyroidectomy 2013  HX ORCHIECTOMY  01/2017  HX ORTHOPAEDIC  2/2011  
 compression fracture d.t. fall (back),no surgery  HX THYROIDECTOMY  1/3/2013  [place] PROSTATE BIOPSY, NEEDLE, SATURATION SAMPLING Family History Problem Relation Age of Onset  Cancer Mother   
  stomach  
 Heart Disease Sister  Heart Disease Father  Diabetes Son   
 Heart Disease Son  Anesth Problems Neg Hx Social History Substance Use Topics  Smoking status: Former Smoker Packs/day: 0.50 Years: 3.00 Quit date: 1/1/1958  Smokeless tobacco: Never Used  Alcohol use No  
   
Current Outpatient Prescriptions Medication Sig Dispense Refill  gabapentin (NEURONTIN) 100 mg capsule Take 3 Caps by mouth nightly. 270 Cap 3  
 megestrol (MEGACE) 20 mg tablet Take 20 mg by mouth daily. Patient takes 1/2 tab in morning and 1/2 tab in evening  melatonin 5 mg cap capsule Take 5 mg by mouth nightly.  lovastatin (MEVACOR) 40 mg tablet Take 40 mg by mouth nightly.  brimonidine (ALPHAGAN) 0.15 % ophthalmic solution Administer 1 Drop to both eyes two (2) times a day.  SYNTHROID 125 mcg tablet Take 1 Tab by mouth Daily (before breakfast). 90 Tab 4  
 dutasteride (AVODART) 0.5 mg capsule Take 0.5 mg by mouth daily.  cyanocobalamin (VITAMIN B-12) 1,000 mcg tablet Take 1,000 mcg by mouth daily.  amlodipine (NORVASC) 10 mg tablet Take 10 mg by mouth daily.  clopidogrel (PLAVIX) 75 mg tablet Take 75 mg by mouth daily.  irbesartan (AVAPRO) 75 mg tablet Take 150 mg by mouth nightly.  tamsulosin (FLOMAX) 0.4 mg capsule Take 0.4 mg by mouth nightly.  aspirin 81 mg tablet Take 81 mg by mouth.  DORZOLAMIDE HCL/TIMOLOL MALEAT (COSOPT OP) Apply 1 Drop to eye two (2) times a day.  latanoprost (XALATAN) 0.005 % ophthalmic solution Administer 1 Drop to both eyes nightly.  colesevelam (WELCHOL) 625 mg tablet Take 625 mg by mouth two (2) times daily (with meals). No Known Allergies Review of Systems: A comprehensive review of systems was negative except for: Constitutional: positive for fatigue and malaise Ears, nose, mouth, throat, and face: positive for earaches and nasal congestion Genitourinary: positive for frequency, dysuria, hesitancy and decreased stream 
Musculoskeletal: positive for myalgias, arthralgias and stiff joints Neurological: positive for dizziness, vertigo, paresthesia and gait problems Behvioral/Psych: positive for anxiety and depression Objective: I 
 
 
NEUROLOGICAL EXAM: 
 
Appearance: The patient is well developed, well nourished, provides a coherent history and is in no acute distress. Mental Status: Oriented to time, place and person, but patient seems to have mild memory impairment and given his history. Mood and affect appropriate. Cranial Nerves:   Intact visual fields. Fundi are benign. LAMAR, EOM's full, but he has an occasional slight deviation of his left eye outward with an exophoric strabismus, no nystagmus, no ptosis. Facial sensation is normal. Corneal reflexes are not tested. Facial movement is symmetric. Hearing is abnormal bilaterally. Palate is midline with normal sternocleidomastoid and trapezius muscles are normal. Tongue is midline. Neck is supple without meningismus or bruits Temporal arteries are not tender or enlarged Motor:  5/5 strength in upper and lower proximal and distal muscles. Normal bulk and tone. No fasciculations. Reflexes:   Deep tendon reflexes 1+/4 and symmetrical. No Babinski or clonus present. On Nylan-Barany testing patient develops only mild dizziness Sensory:   Abnormal to touch, pinprick and vibration and both lower extremities to knee level. Gait:  Abnormal gait with patient with slightly wide-based gait. Tremor:   No tremor noted. Cerebellar:  Probably abnormal Romberg and tandem cerebellar signs present. Neurovascular:  Normal heart sounds and regular rhythm, peripheral pulses decreased, and no carotid bruits. Assessment:  
 
 
Plan:  
 
Patient with new problem of increasing pain in his feet from his neuropathy, will try to increase his Neurontin to 200 mg to 300 mg at night if tolerated, new prescription sent in for patient, we counseled the patient that it is not habit-forming or addicting is relatively safe and should help his pain at a more therapeutic dose Patient with new onset of occluded basilar artery, and hypo-plastic vertebrobasilar system, but patient refuses Coumadin and says he is doing better on hormonal therapy so we will continue that MRI of the brain and neck and MRA of the brain all reviewed personally on the PACS system with the patient today. 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 He will be followed in 12 months time or earlier if need be He will call if any problem in the interim.   
Office visit was 30 minutes today examining the patient, going over his history, deciding on a diagnosis, prognosis and therapy and treatment needed, and reviewing his MRI scans and CAT scans on the PACS system personally myself, and reviewing all his previous office notes and evaluations and lab tests on the computer, and I concluded that indeed he is a high risk for vertebrobasilar insufficiency and basal artery stenosis and high risk for possible stroke and marked debility and morbidity. Signed By: Macario Hodges MD   
 May 9, 2018 This note will not be viewable in [address] If you have questions, please do not hesitate to call me. I look forward to following Mr. Kristine Sigala along with you. Sincerely, Macario Hodges MD

## 2018-05-09 NOTE — PROGRESS NOTES
Consult    Subjective:     Maikel Swanson is a 80 y.o. right-handed  male who is seen for evaluation at the request of Dr. Jordon Koo of new problem of increasing pain in his feet at night to the point he is not sleeping as well because of the burning pain in his feet and from his neuropathy which is thought to be probably diabetic from his latent diabetes his last hemoglobin A1c being 6.3. He is currently taking Neurontin 100 mg at nighttime, we discussed the possible options of increasing his dose, switching medications, but since she is on such a low-dose my first recommendation is that he go ahead and take 200 mg to 300 mg at night an hour or 2 before he goes to bed to see if that will help him sleep better he can control his pain. He has arthritis in his knees and walking a little worse, and had to get a collagen shot in his right knee seems to be helping some being followed by his orthopedic surgeon. He has not had any new focal weakness, sensory loss, double vision, visual changes, increase headache, fever or meningismus or trauma or other focal neurological symptoms. He complains of a burning pain in his feet is worse at night when he tries to go to bed. We will increase his gabapentin as mentioned above. He was somewhat concerned because of a recent TV show showing that gabapentin was related to the people taking narcotics, but advised him that it is a relatively safe drug and should be no problem for him and is not habit-forming or addicting. He is agreeable to try the medications as above. He continues take all his vitamins and vitamin D and his antiplatelet therapy because of his previous history of strokes. 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 We discussed the option of Coumadin but the patient said he wanted no part of Coumadin at that time because of bad experience with his family members. 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 He is doing well and has no active neurological problems. He had been seen for marked worsening of his ataxia and dizziness and new onset diplopia that occurred during his last visit February 2017. He was treated for a vestibular dysfunction and seemed to slowly improvein April 2013. He is also had some visual symptoms with black dots in front of his eyes and when he stares down at the floor these black dots seem to move and some blurred vision in his right eye will be seen the ophthalmologist for that in the next week or 2. He seems to have some progressive hearing loss bilaterally and some difficulty with memory loss. He was referred to us for further evaluation. He has seen ENT physicians Dr. Nicki Abdullahi for his hearing loss and tinnitus, and no real help was found there. We tried to explain to him that the tinnitus is part of the hearing loss and no medication will really help that. He has had a previous stroke after heart surgery, back in 1996 but no recurrent strokes since that time. 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 He came to see us because of concern that he might have something else going on. 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 He did not have any new focal symptoms as far as weakness or sensory loss, no double vision no blurred vision and no real strokelike symptoms.  He had a CTA that showed areas of stenosis in the posterior fossa and vertebral arteries. That was done in 2007. He is on aspirin therapy at this time. He had a carotid Doppler study done in February 2017 that showed no significant disease. He had a stroke in 1996 after his CABG, but has had no other stroke since that time and has no significant residual.  His dizziness seems to come on when he stands up or moves quickly. No recent trauma, falls, fever, meningismus, or other causes for his symptoms. He also has a history of chronic numbness in his feet has been present for about 30 years That began in 1985, mildly symptomatic and more painful at night time. Workup in the past has been negative for treatable causes he says. He has minimal numbness in his hands and no bowel bladder dysfunction, and no family history of similar problems. He does feel it might be slightly worse. His complete review of systems in symptoms was negative for any other new medical problems,, complications or illnesses other than the recent cold and sinus problems and vertigo.     Past Medical History:   Diagnosis Date    CAD (coronary artery disease)     Calculus of kidney     Cancer ([address] prostate  1996    Hearing loss     Heart attack ([address]     Hypercholesterolemia     Hypertension     Incisional hernia 5/10/2011    Movement disorder     Other ill-defined conditions(799.89)     elevated cholesterol    Other ill-defined conditions(799.89)     glaucoma    Other ill-defined conditions(799.89)     abd hernia    Stroke ([address]     left arm tingling    Thyroid disease     Thyroid mass 5/10/2011      Past Surgical History:   Procedure Laterality Date    CARDIAC SURG PROCEDURE UNLIST  1996    cabg x3    HX AORTIC VALVE REPLACEMENT  2015    HX HEENT      thyroid biopsy    HX HEENT      thyroidectomy 2013    HX ORCHIECTOMY  01/2017    HX ORTHOPAEDIC  2/2011    compression fracture d.t. fall (back),no surgery    HX THYROIDECTOMY  1/3/2013    [place] PROSTATE BIOPSY, NEEDLE, SATURATION SAMPLING       Family History   Problem Relation Age of Onset    Cancer Mother      stomach    Heart Disease Sister     Heart Disease Father     Diabetes Son     Heart Disease Son     Anesth Problems Neg Hx       Social History   Substance Use Topics    Smoking status: Former Smoker     Packs/day: 0.50     Years: 3.00     Quit date: 1/1/1958    Smokeless tobacco: Never Used    Alcohol use No       Current Outpatient Prescriptions   Medication Sig Dispense Refill    gabapentin (NEURONTIN) 100 mg capsule Take 3 Caps by mouth nightly. 270 Cap 3    megestrol (MEGACE) 20 mg tablet Take 20 mg by mouth daily. Patient takes 1/2 tab in morning and 1/2 tab in evening      melatonin 5 mg cap capsule Take 5 mg by mouth nightly.  lovastatin (MEVACOR) 40 mg tablet Take 40 mg by mouth nightly.  brimonidine (ALPHAGAN) 0.15 % ophthalmic solution Administer 1 Drop to both eyes two (2) times a day.  SYNTHROID 125 mcg tablet Take 1 Tab by mouth Daily (before breakfast). 90 Tab 4    dutasteride (AVODART) 0.5 mg capsule Take 0.5 mg by mouth daily.  cyanocobalamin (VITAMIN B-12) 1,000 mcg tablet Take 1,000 mcg by mouth daily.  amlodipine (NORVASC) 10 mg tablet Take 10 mg by mouth daily.  clopidogrel (PLAVIX) 75 mg tablet Take 75 mg by mouth daily.  irbesartan (AVAPRO) 75 mg tablet Take 150 mg by mouth nightly.  tamsulosin (FLOMAX) 0.4 mg capsule Take 0.4 mg by mouth nightly.  aspirin 81 mg tablet Take 81 mg by mouth.  DORZOLAMIDE HCL/TIMOLOL MALEAT (COSOPT OP) Apply 1 Drop to eye two (2) times a day.  latanoprost (XALATAN) 0.005 % ophthalmic solution Administer 1 Drop to both eyes nightly.  colesevelam (WELCHOL) 625 mg tablet Take 625 mg by mouth two (2) times daily (with meals).           No Known Allergies     Review of Systems:  A comprehensive review of systems was negative except for: Constitutional: positive for fatigue and malaise  Ears, nose, mouth, throat, and face: positive for earaches and nasal congestion  Genitourinary: positive for frequency, dysuria, hesitancy and decreased stream  Musculoskeletal: positive for myalgias, arthralgias and stiff joints  Neurological: positive for dizziness, vertigo, paresthesia and gait problems  Behvioral/Psych: positive for anxiety and depression     Objective:     I      NEUROLOGICAL EXAM:    Appearance: The patient is well developed, well nourished, provides a coherent history and is in no acute distress. Mental Status: Oriented to time, place and person, but patient seems to have mild memory impairment and given his history. Mood and affect appropriate. Cranial Nerves:   Intact visual fields. Fundi are benign. LAMAR, EOM's full, but he has an occasional slight deviation of his left eye outward with an exophoric strabismus, no nystagmus, no ptosis. Facial sensation is normal. Corneal reflexes are not tested. Facial movement is symmetric. Hearing is abnormal bilaterally. Palate is midline with normal sternocleidomastoid and trapezius muscles are normal. Tongue is midline. Neck is supple without meningismus or bruits  Temporal arteries are not tender or enlarged    Motor:  5/5 strength in upper and lower proximal and distal muscles. Normal bulk and tone. No fasciculations. Reflexes:   Deep tendon reflexes 1+/4 and symmetrical. No Babinski or clonus present. On Nylan-Barany testing patient develops only mild dizziness   Sensory:   Abnormal to touch, pinprick and vibration and both lower extremities to knee level. Gait:  Abnormal gait with patient with slightly wide-based gait. Tremor:   No tremor noted. Cerebellar:  Probably abnormal Romberg and tandem cerebellar signs present. Neurovascular:  Normal heart sounds and regular rhythm, peripheral pulses decreased, and no carotid bruits.            Assessment:       Plan:     Patient with new problem of increasing pain in his feet from his neuropathy, will try to increase his Neurontin to 200 mg to 300 mg at night if tolerated, new prescription sent in for patient, we counseled the patient that it is not habit-forming or addicting is relatively safe and should help his pain at a more therapeutic dose  Patient with new onset of occluded basilar artery, and hypo-plastic vertebrobasilar system, but patient refuses Coumadin and says he is doing better on hormonal therapy so we will continue that  MRI of the brain and neck and MRA of the brain all reviewed personally on the PACS system with the patient today. 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 He will be followed in 12 months time or earlier if need be  He will call if any problem in the interim. Office visit was 30 minutes today examining the patient, going over his history, deciding on a diagnosis, prognosis and therapy and treatment needed, and reviewing his MRI scans and CAT scans on the PACS system personally myself, and reviewing all his previous office notes and evaluations and lab tests on the computer, and I concluded that indeed he is a high risk for vertebrobasilar insufficiency and basal artery stenosis and high risk for possible stroke and marked debility and morbidity. Signed By: Radha Delacruz MD     May 9, 2018       This note will not be viewable in [address]

## 2018-05-09 NOTE — PATIENT INSTRUCTIONS
Office Policies        Phone calls/patient messages:    Please allow up to 24 hours for someone in the office to contact you about your call or message. Be mindful your provider may be out of the office or your message may require further review. We encourage you to use [employer] for your messages as this is a faster, more efficient way to communicate with our office           Medication Refills:    Prescription medications require up to 48 business hours to process. We encourage you to use [employer] for your refills. For controlled medications: Please allow up to 72 business hours to process. Certain medications may require you to  a written prescription at our office. NO narcotic/controlled medications will be prescribed after 4pm Monday through Friday or on weekends              Form/Paperwork Completion:    Please note there is a $25 fee for all paperwork completed by our providers. We ask that you allow 7-14 business days. Pre-payment is due prior to picking up/faxing the completed form. You may also download your forms to [employer] to have your doctor print off. A Healthy Lifestyle: Care Instructions  Your Care Instructions    A healthy lifestyle can help you feel good, stay at a healthy weight, and have plenty of energy for both work and play. A healthy lifestyle is something you can share with your whole family. A healthy lifestyle also can lower your risk for serious health problems, such as high blood pressure, heart disease, and diabetes. You can follow a few steps listed below to improve your health and the health of your family. Follow-up care is a key part of your treatment and safety. Be sure to make and go to all appointments, and call your doctor if you are having problems. It's also a good idea to know your test results and keep a list of the medicines you take. How can you care for yourself at home? · Do not eat too much sugar, fat, or fast foods.  You can still have dessert and treats now and then. The goal is moderation. · Start small to improve your eating habits. Pay attention to portion sizes, drink less juice and soda pop, and eat more fruits and vegetables. ¨ Eat a healthy amount of food. A 3-ounce serving of meat, for example, is about the size of a deck of cards. Fill the rest of your plate with vegetables and whole grains. ¨ Limit the amount of soda and sports drinks you have every day. Drink more water when you are thirsty. ¨ Eat at least 5 servings of fruits and vegetables every day. It may seem like a lot, but it is not hard to reach this goal. A serving or helping is 1 piece of fruit, 1 cup of vegetables, or 2 cups of leafy, raw vegetables. Have an apple or some carrot sticks as an afternoon snack instead of a candy bar. Try to have fruits and/or vegetables at every meal.  · Make exercise part of your daily routine. You may want to start with simple activities, such as walking, bicycling, or slow swimming. Try to be active 30 to 60 minutes every day. You do not need to do all 30 to 60 minutes all at once. For example, you can exercise 3 times a day for 10 or 20 minutes. Moderate exercise is safe for most people, but it is always a good idea to talk to your doctor before starting an exercise program.  · Keep moving. [employer] the lawn, work in the garden, or [email or web address]. Take the stairs instead of the elevator at work. · If you smoke, quit. People who smoke have an increased risk for heart attack, stroke, cancer, and other lung illnesses. Quitting is hard, but there are ways to boost your chance of quitting tobacco for good. ¨ Use nicotine gum, patches, or lozenges. ¨ Ask your doctor about stop-smoking programs and medicines. ¨ Keep trying.   In addition to reducing your risk of diseases in the future, you will notice some benefits soon after you stop using tobacco. If you have shortness of breath or asthma symptoms, they will likely get better within a few weeks after you quit. · Limit how much alcohol you drink. Moderate amounts of alcohol (up to 2 drinks a day for men, 1 drink a day for women) are okay. But drinking too much can lead to liver problems, high blood pressure, and other health problems. Family health  If you have a family, there are many things you can do together to improve your health. · Eat meals together as a family as often as possible. · Eat healthy foods. This includes fruits, vegetables, lean meats and dairy, and whole grains. · Include your family in your fitness plan. Most people think of activities such as jogging or tennis as the way to fitness, but there are many ways you and your family can be more active. Anything that makes you breathe hard and gets your heart pumping is exercise. Here are some tips:  ¨ Walk to do errands or to take your child to school or the bus. ¨ Go for a family bike ride after dinner instead of watching TV. Where can you learn more? Go to http://percy-luciana.info/. Enter U300 in the search box to learn more about \"A Healthy Lifestyle: Care Instructions. \"  Current as of: May 12, 2017  Content Version: 11.4  © 2814-9301 Healthwise, [employer]. Care instructions adapted under license by [employer] (which disclaims liability or warranty for this information). If you have questions about a medical condition or this instruction, always ask your healthcare professional. [place] [address] any warranty or liability for your use of this information.

## 2018-11-16 ENCOUNTER — APPOINTMENT (OUTPATIENT)
Dept: GENERAL RADIOLOGY | Age: 83
End: 2018-11-16
Attending: PHYSICIAN ASSISTANT
Payer: MEDICARE

## 2018-11-16 ENCOUNTER — HOSPITAL ENCOUNTER (EMERGENCY)
Age: 83
Discharge: HOME | End: 2018-11-16
Attending: EMERGENCY MEDICINE
Payer: MEDICARE

## 2018-11-16 VITALS
OXYGEN SATURATION: 99 % | BODY MASS INDEX: 26.57 KG/M2 | RESPIRATION RATE: 16 BRPM | TEMPERATURE: 98 F | SYSTOLIC BLOOD PRESSURE: 169 MMHG | HEART RATE: 74 BPM | HEIGHT: 71 IN | WEIGHT: 189.82 LBS | DIASTOLIC BLOOD PRESSURE: 66 MMHG

## 2018-11-16 DIAGNOSIS — K59.00 CONSTIPATION, UNSPECIFIED CONSTIPATION TYPE: ICD-10-CM

## 2018-11-16 DIAGNOSIS — K43.9 VENTRAL HERNIA WITHOUT OBSTRUCTION OR GANGRENE: Primary | ICD-10-CM

## 2018-11-16 LAB
ALBUMIN SERPL-MCNC: 3.3 G/DL (ref 3.5–5)
ALBUMIN/GLOB SERPL: 0.8 {RATIO} (ref 1.1–2.2)
ALP SERPL-CCNC: 64 U/L (ref 45–117)
ALT SERPL-CCNC: 21 U/L (ref 12–78)
ANION GAP SERPL CALC-SCNC: 7 MMOL/L (ref 5–15)
AST SERPL-CCNC: 14 U/L (ref 15–37)
BASOPHILS # BLD: 0 K/UL (ref 0–0.1)
BASOPHILS NFR BLD: 1 % (ref 0–1)
BILIRUB SERPL-MCNC: 0.8 MG/DL (ref 0.2–1)
BUN SERPL-MCNC: 16 MG/DL (ref 6–20)
BUN/CREAT SERPL: 19 (ref 12–20)
CALCIUM SERPL-MCNC: 8.8 MG/DL (ref 8.5–10.1)
CHLORIDE SERPL-SCNC: 105 MMOL/L (ref 97–108)
CO2 SERPL-SCNC: 28 MMOL/L (ref 21–32)
CREAT SERPL-MCNC: 0.84 MG/DL (ref 0.7–1.3)
DIFFERENTIAL METHOD BLD: NORMAL
EOSINOPHIL # BLD: 0.1 K/UL (ref 0–0.4)
EOSINOPHIL NFR BLD: 2 % (ref 0–7)
ERYTHROCYTE [DISTWIDTH] IN BLOOD BY AUTOMATED COUNT: 12.1 % (ref 11.5–14.5)
GLOBULIN SER CALC-MCNC: 4 G/DL (ref 2–4)
GLUCOSE SERPL-MCNC: 122 MG/DL (ref 65–100)
HCT VFR BLD AUTO: 40.2 % (ref 36.6–50.3)
HGB BLD-MCNC: 13.7 G/DL (ref 12.1–17)
IMM GRANULOCYTES # BLD: 0 K/UL (ref 0–0.04)
IMM GRANULOCYTES NFR BLD AUTO: 0 % (ref 0–0.5)
INR PPP: 1.1 (ref 0.9–1.1)
LACTATE SERPL-SCNC: 1.3 MMOL/L (ref 0.4–2)
LYMPHOCYTES # BLD: 1.8 K/UL (ref 0.8–3.5)
LYMPHOCYTES NFR BLD: 27 % (ref 12–49)
MCH RBC QN AUTO: 31 PG (ref 26–34)
MCHC RBC AUTO-ENTMCNC: 34.1 G/DL (ref 30–36.5)
MCV RBC AUTO: 91 FL (ref 80–99)
MONOCYTES # BLD: 0.5 K/UL (ref 0–1)
MONOCYTES NFR BLD: 8 % (ref 5–13)
NEUTS SEG # BLD: 4.1 K/UL (ref 1.8–8)
NEUTS SEG NFR BLD: 62 % (ref 32–75)
NRBC # BLD: 0 K/UL (ref 0–0.01)
NRBC BLD-RTO: 0 PER 100 WBC
PLATELET # BLD AUTO: 258 K/UL (ref 150–400)
PMV BLD AUTO: 9.2 FL (ref 8.9–12.9)
POTASSIUM SERPL-SCNC: 3.9 MMOL/L (ref 3.5–5.1)
PROT SERPL-MCNC: 7.3 G/DL (ref 6.4–8.2)
PROTHROMBIN TIME: 10.9 SEC (ref 9–11.1)
RBC # BLD AUTO: 4.42 M/UL (ref 4.1–5.7)
SODIUM SERPL-SCNC: 140 MMOL/L (ref 136–145)
WBC # BLD AUTO: 6.5 K/UL (ref 4.1–11.1)

## 2018-11-16 PROCEDURE — 85025 COMPLETE CBC W/AUTO DIFF WBC: CPT

## 2018-11-16 PROCEDURE — 74022 RADEX COMPL AQT ABD SERIES: CPT

## 2018-11-16 PROCEDURE — 80053 COMPREHEN METABOLIC PANEL: CPT

## 2018-11-16 PROCEDURE — 36415 COLL VENOUS BLD VENIPUNCTURE: CPT

## 2018-11-16 PROCEDURE — 85610 PROTHROMBIN TIME: CPT

## 2018-11-16 PROCEDURE — 83605 ASSAY OF LACTIC ACID: CPT

## 2018-11-16 PROCEDURE — 99283 EMERGENCY DEPT VISIT LOW MDM: CPT

## 2018-11-16 RX ORDER — DOCUSATE SODIUM 100 MG/1
100 CAPSULE, LIQUID FILLED ORAL 2 TIMES DAILY
Qty: 60 CAP | Refills: 2 | Status: SHIPPED | OUTPATIENT
Start: 2018-11-16 | End: 2019-02-14

## 2018-11-16 NOTE — ROUTINE PROCESS
Dr Zena Telles reviewed discharge instructions with the patient. The patient verbalized understanding. Alert and stable to walk at discharge.

## 2018-11-16 NOTE — ED PROVIDER NOTES
EMERGENCY DEPARTMENT HISTORY AND PHYSICAL EXAM 
 
 
Date: 11/16/2018 Patient Name: Kayla Locke PROVIDER IN TRIAGE NOTE: 
7:41 AM 
Virgie Blanco PA-C has evaluated the patient as the Provider in Triage (PIT) for abdominal pain and constipation. He notes that has not had a good BM in 8 days but then today he has had 5 loose stools. He notes that he is concerned because he has a hernia, which has been hard. The vital signs and the triage nurse assessment have been reviewed. The patient and any available family have been advised that the appropriate studies have been ordered to initiate the work up based on the clinical presentation during the assessment. The pt has been advised that they will be accommodated in monitored ED bed as soon as possible. The pt has been requested to contact the triage nurse or PIT immediately if they experiences any changes in their condition during this brief waiting period. History of Presenting Illness Chief Complaint Patient presents with  Constipation  
  pt reports constipation, last normal BM was 8 days ago, reports he has a hernia that has increased in size in right side of abdomen and now feels \"hard\" History Provided By: Patient HPI: Kayla Locke, 80 y.o. male with PMHx significant for CAD, CA of prostate, stroke, MI, HTN, presents ambulatory with cane to the ED with cc of constant, progressive constipation for 8 days with associated R sided intermittent abdominal pain. Pt reports his last normal BM was ~ 8 days ago. Per chart review, pt was seen in ED 01/06/2013 for same symptoms, dx with fecal impaction. He reports during ED visit in 2013, of which stool was disimpacted with soft stool balls removed during rectal exam. Pt mentions endorsing magnesium- citrate last night and woke up ~ 2am with diarrhea. Pt reports conducting ~ 5 episodes of diarrhea, consisting of loose dark stool.  Pt also states sharp abdominal pain is secondary to an enlarged hernia located on R side of abdomen. Pt states following food ingestion last night, the hernia \"harden\" but has now appears to be soft. He denies any hx of abdominal surgery or seeking a GI specialist. Pt conveys compliance of medication regimen, which includes endorsing Plavix and aspirin. He denies any exacerbating and alleviating factors. Pt specifically denies any signs of blood in stool, dysuria, back pain, fever, chills, nausea, vomiting, HA, weakness, and any other associated symptoms. There are no other complaints, changes, or physical findings at this time. PCP: Kiah Dyer MD 
 
Current Outpatient Medications Medication Sig Dispense Refill  docusate sodium (COLACE) 100 mg capsule Take 1 Cap by mouth two (2) times a day for 90 days. 60 Cap 2  
 gabapentin (NEURONTIN) 100 mg capsule Take 3 Caps by mouth nightly. 270 Cap 3  
 megestrol (MEGACE) 20 mg tablet Take 20 mg by mouth daily. Patient takes 1/2 tab in morning and 1/2 tab in evening  melatonin 5 mg cap capsule Take 5 mg by mouth nightly.  lovastatin (MEVACOR) 40 mg tablet Take 40 mg by mouth nightly.  brimonidine (ALPHAGAN) 0.15 % ophthalmic solution Administer 1 Drop to both eyes two (2) times a day.  SYNTHROID 125 mcg tablet Take 1 Tab by mouth Daily (before breakfast). 90 Tab 4  
 dutasteride (AVODART) 0.5 mg capsule Take 0.5 mg by mouth daily.  cyanocobalamin (VITAMIN B-12) 1,000 mcg tablet Take 1,000 mcg by mouth daily.  amlodipine (NORVASC) 10 mg tablet Take 10 mg by mouth daily.  clopidogrel (PLAVIX) 75 mg tablet Take 75 mg by mouth daily.  irbesartan (AVAPRO) 75 mg tablet Take 150 mg by mouth nightly.  tamsulosin (FLOMAX) 0.4 mg capsule Take 0.4 mg by mouth nightly.  aspirin 81 mg tablet Take 81 mg by mouth.  DORZOLAMIDE HCL/TIMOLOL MALEAT (COSOPT OP) Apply 1 Drop to eye two (2) times a day.  latanoprost (XALATAN) 0.005 % ophthalmic solution Administer 1 Drop to both eyes nightly.  colesevelam (WELCHOL) 625 mg tablet Take 625 mg by mouth two (2) times daily (with meals). Past History Past Medical History: 
Past Medical History:  
Diagnosis Date  CAD (coronary artery disease)  Calculus of kidney  Cancer [facility]) prostate  1996  
 Hearing loss  Heart attack ([facility] [address]  Hypercholesterolemia  Hypertension  Incisional hernia 5/10/2011  Movement disorder  Other ill-defined conditions(799.89)   
 elevated cholesterol  Other ill-defined conditions(799.89)   
 glaucoma  Other ill-defined conditions(799.89)   
 abd hernia  Stroke ([facility] [address]   
 left arm tingling  Thyroid disease  Thyroid mass 5/10/2011 Past Surgical History: 
Past Surgical History:  
Procedure Laterality Date  [address]  
 cabg x3  
 HX AORTIC VALVE REPLACEMENT    HX HEENT    
 thyroid biopsy  HX HEENT    
 thyroidectomy   HX ORCHIECTOMY  2017  HX ORTHOPAEDIC  2011  
 compression fracture d.t. fall (back),no surgery  HX THYROIDECTOMY  1/3/2013  CO PROSTATE BIOPSY, NEEDLE, SATURATION SAMPLING Family History: 
Family History Problem Relation Age of Onset  Cancer Mother   
     stomach  
 Heart Disease Sister  Heart Disease Father  Diabetes Son   
 Heart Disease Son  Anesth Problems Neg Hx Social History: 
Social History Tobacco Use  Smoking status: Former Smoker Packs/day: 0.50 Years: 3.00 Pack years: 1.50 Last attempt to quit: 1958 Years since quittin.9  Smokeless tobacco: Never Used Substance Use Topics  Alcohol use: No  
 Drug use: No  
 
 
Allergies: 
No Known Allergies Review of Systems Review of Systems Constitutional: Negative for activity change, chills and fever. HENT: Negative for congestion and sore throat. Eyes: Negative for pain and redness. Respiratory: Negative for cough, chest tightness and shortness of breath. Cardiovascular: Negative for chest pain and palpitations. Gastrointestinal: Positive for abdominal pain, constipation and diarrhea. Negative for nausea and vomiting. Genitourinary: Negative for dysuria, frequency and urgency. Musculoskeletal: Negative for back pain and neck pain. Skin: Negative for rash. Neurological: Negative for syncope, light-headedness and headaches. Psychiatric/Behavioral: Negative for confusion. All other systems reviewed and are negative. Physical Exam  
Physical Exam  
Constitutional: He is oriented to person, place, and time. He appears well-developed and well-nourished. No distress. HENT:  
Head: Normocephalic. Nose: Nose normal.  
Mouth/Throat: Oropharynx is clear and moist. No oropharyngeal exudate. Eyes: Conjunctivae are normal. Pupils are equal, round, and reactive to light. No scleral icterus. Neck: Normal range of motion. Neck supple. No JVD present. No tracheal deviation present. No thyromegaly present. Cardiovascular: Normal rate, regular rhythm and intact distal pulses. Exam reveals no gallop and no friction rub. No murmur heard. Pulmonary/Chest: Effort normal and breath sounds normal. No stridor. No respiratory distress. He has no wheezes. He has no rales. Abdominal: Soft. Bowel sounds are normal. He exhibits no distension. There is no tenderness. There is no rebound and no guarding. A hernia is present. Hernia confirmed positive in the ventral area. Large easily reducible ventral hernia superior to umbilicus Musculoskeletal: Normal range of motion. He exhibits no edema. Lymphadenopathy:  
  He has no cervical adenopathy. Neurological: He is alert and oriented to person, place, and time. No cranial nerve deficit. He exhibits normal muscle tone.  Coordination normal.  
 Skin: Skin is warm and dry. No rash noted. He is not diaphoretic. No erythema. Psychiatric: He has a normal mood and affect. His behavior is normal.  
Nursing note and vitals reviewed. Diagnostic Study Results Labs - Recent Results (from the past 12 hour(s)) CBC WITH AUTOMATED DIFF Collection Time: 11/16/18  8:57 AM  
Result Value Ref Range WBC 6.5 4.1 - 11.1 K/uL  
 RBC 4.42 4.10 - 5.70 M/uL  
 HGB 13.7 12.1 - 17.0 g/dL HCT 40.2 36.6 - 50.3 % MCV 91.0 80.0 - 99.0 FL  
 MCH 31.0 26.0 - 34.0 PG  
 MCHC 34.1 30.0 - 36.5 g/dL  
 RDW 12.1 11.5 - 14.5 % PLATELET 740 773 - 922 K/uL MPV 9.2 8.9 - 12.9 FL  
 NRBC 0.0 0  WBC ABSOLUTE NRBC 0.00 0.00 - 0.01 K/uL NEUTROPHILS 62 32 - 75 % LYMPHOCYTES 27 12 - 49 % MONOCYTES 8 5 - 13 % EOSINOPHILS 2 0 - 7 % BASOPHILS 1 0 - 1 % IMMATURE GRANULOCYTES 0 0.0 - 0.5 % ABS. NEUTROPHILS 4.1 1.8 - 8.0 K/UL  
 ABS. LYMPHOCYTES 1.8 0.8 - 3.5 K/UL  
 ABS. MONOCYTES 0.5 0.0 - 1.0 K/UL  
 ABS. EOSINOPHILS 0.1 0.0 - 0.4 K/UL  
 ABS. BASOPHILS 0.0 0.0 - 0.1 K/UL  
 ABS. IMM. GRANS. 0.0 0.00 - 0.04 K/UL  
 DF AUTOMATED METABOLIC PANEL, COMPREHENSIVE Collection Time: 11/16/18  8:57 AM  
Result Value Ref Range Sodium 140 136 - 145 mmol/L Potassium 3.9 3.5 - 5.1 mmol/L Chloride 105 97 - 108 mmol/L  
 CO2 28 21 - 32 mmol/L Anion gap 7 5 - 15 mmol/L Glucose 122 (H) 65 - 100 mg/dL BUN 16 6 - 20 MG/DL Creatinine 0.84 0.70 - 1.30 MG/DL  
 BUN/Creatinine ratio 19 12 - 20 GFR est AA >60 >60 ml/min/1.73m2 GFR est non-AA >60 >60 ml/min/1.73m2 Calcium 8.8 8.5 - 10.1 MG/DL Bilirubin, total 0.8 0.2 - 1.0 MG/DL  
 ALT (SGPT) 21 12 - 78 U/L  
 AST (SGOT) 14 (L) 15 - 37 U/L Alk. phosphatase 64 45 - 117 U/L Protein, total 7.3 6.4 - 8.2 g/dL Albumin 3.3 (L) 3.5 - 5.0 g/dL Globulin 4.0 2.0 - 4.0 g/dL A-G Ratio 0.8 (L) 1.1 - 2.2 LACTIC ACID  Collection Time: 11/16/18  8:57 AM  
 Result Value Ref Range Lactic acid 1.3 0.4 - 2.0 MMOL/L  
PROTHROMBIN TIME + INR Collection Time: 11/16/18  8:57 AM  
Result Value Ref Range INR 1.1 0.9 - 1.1 Prothrombin time 10.9 9.0 - 11.1 sec Radiologic Studies - CXR Results  (Last 48 hours)  
          
 11/16/18 0755  XR ABD ACUTE W 1 V CHEST Final result Impression:  IMPRESSION: No acute abnormality. Narrative:  EXAM:  XR ABD ACUTE W 1 V CHEST INDICATION:  Abdominal Pain, constipation COMPARISON: 6/15/2015. FINDINGS: The upright chest radiograph demonstrates normal heart size. The  
patient is status post median sternotomy. No acute process is noted in the lung  
fields. There is no pleural effusion or free air under the diaphragm. Supine and upright views of the abdomen demonstrate a nonobstructive bowel gas  
pattern. There is no free intraperitoneal air. Calcifications project over the  
pelvis likely within the prostate gland. Generative changes are seen in the  
lumbar spine and the hip joints bilaterally. No significant fecal stasis is  
noted. Medical Decision Making I am the first provider for this patient. I reviewed the vital signs, available nursing notes, past medical history, past surgical history, family history and social history. Vital Signs-Reviewed the patient's vital signs. Patient Vitals for the past 12 hrs: 
 Temp Pulse Resp BP SpO2  
11/16/18 0716 98 °F (36.7 °C) 74 16 169/66 99 % Pulse Oximetry Analysis - 99% on RA Records Reviewed: Nursing Notes, Old Medical Records, Previous Radiology Studies and Previous Laboratory Studies Provider Notes (Medical Decision Making): DDx: SBO, incarcerated hernia, constipation, fecal impaction ED Course:  
Initial assessment performed.  The patients presenting problems have been discussed, and they are in agreement with the care plan formulated and outlined with them. I have encouraged them to ask questions as they arise throughout their visit. PROCEDURE NOTE - RECTAL EXAM:  
8:44 AM 
Performed by: Jody Sears MD 
Rectal external exam performed WNL. Light brown stool was collected. No evidence of fecal impaction. The procedure took 1-15 minutes, and pt tolerated well. Written by Noe Ramos, ED Scribe, as dictated by Jody Sears MD. Critical Care Time:  
0 minutes Disposition: 
Discharge Note: 
10:51 AM 
The pt is ready for discharge. The pt's signs, symptoms, diagnosis, and discharge instructions have been discussed and pt has conveyed their understanding. The pt is to follow up as recommended or return to ER should their symptoms worsen. Plan has been discussed and pt is in agreement. Abd benign; afebrile; easily reducible hernia and nt; acute series without evidence of obstruction and no evidence of significant fecal stasis; sx improved with soap suds enema; cbc,cmp unremarkable; dc home with colace bid; pt agreed to follow up with pcp next week and also to return to ed if any worsening abd pain; Jody Sears MD 
 
 
PLAN: 
1. Discharge Medication List as of 11/16/2018 10:48 AM  
  
START taking these medications Details  
docusate sodium (COLACE) 100 mg capsule Take 1 Cap by mouth two (2) times a day for 90 days. , Print, Disp-60 Cap, R-2  
  
  
CONTINUE these medications which have NOT CHANGED Details  
gabapentin (NEURONTIN) 100 mg capsule Take 3 Caps by mouth nightly., Normal, Disp-270 Cap, R-3  
  
megestrol (MEGACE) 20 mg tablet Take 20 mg by mouth daily.  Patient takes 1/2 tab in morning and 1/2 tab in evening, Historical Med  
  
melatonin 5 mg cap capsule Take 5 mg by mouth nightly., Historical Med  
  
lovastatin (MEVACOR) 40 mg tablet Take 40 mg by mouth nightly., Historical Med  
  
brimonidine (ALPHAGAN) 0.15 % ophthalmic solution Administer 1 Drop to both eyes two (2) times a day., Historical Med  
  
 SYNTHROID 125 mcg tablet Take 1 Tab by mouth Daily (before breakfast). , Print, Disp-90 Tab, R-4, DIETER  
  
dutasteride (AVODART) 0.5 mg capsule Take 0.5 mg by mouth daily. , Historical Med  
  
cyanocobalamin (VITAMIN B-12) 1,000 mcg tablet Take 1,000 mcg by mouth daily. , Historical Med  
  
amlodipine (NORVASC) 10 mg tablet Take 10 mg by mouth daily. , Historical Med  
  
clopidogrel (PLAVIX) 75 mg tablet Take 75 mg by mouth daily. , Historical Med  
  
irbesartan (AVAPRO) 75 mg tablet Take 150 mg by mouth nightly., Historical Med  
  
tamsulosin (FLOMAX) 0.4 mg capsule Take 0.4 mg by mouth nightly., Historical Med  
  
aspirin 81 mg tablet Take 81 mg by mouth., Historical Med DORZOLAMIDE HCL/TIMOLOL MALEAT (COSOPT OP) Apply 1 Drop to eye two (2) times a day., Historical Med  
  
latanoprost (XALATAN) 0.005 % ophthalmic solution Administer 1 Drop to both eyes nightly., Historical Med  
  
colesevelam (WELCHOL) 625 mg tablet Take 625 mg by mouth two (2) times daily (with meals). , Historical Med 2. Follow-up Information Follow up With Specialties Details Why Contact Info Angelia Rodriguez MD Family Practice Schedule an appointment as soon as possible for a visit in 3 days  [address] [place] [address] 
[phone] \[facility] [place] [facility]\"" EMERGENCY DEPT Emergency Medicine Go in 1 day If symptoms worsen [address] [place] [id number] [facility] 
[phone] Return to ED if worse Diagnosis Clinical Impression: 1. Ventral hernia without obstruction or gangrene 2. Constipation, unspecified constipation type Attestations: This note is prepared by Barak Campos, acting as Scribe for Edward Jasso MD. Edward Jasso MD: The scribe's documentation has been prepared under my direction and personally reviewed by me in its entirety. I confirm that the note above accurately reflects all work, treatment, procedures, and medical decision making performed by me

## 2018-11-16 NOTE — DISCHARGE INSTRUCTIONS
Constipation: Care Instructions  Your Care Instructions    Constipation means that you have a hard time passing stools (bowel movements). People pass stools from 3 times a day to once every 3 days. What is normal for you may be different. Constipation may occur with pain in the rectum and cramping. The pain may get worse when you try to pass stools. Sometimes there are small amounts of bright red blood on toilet paper or the surface of stools. This is because of enlarged veins near the rectum (hemorrhoids). A few changes in your diet and lifestyle may help you avoid ongoing constipation. Your doctor may also prescribe medicine to help loosen your stool. Some medicines can cause constipation. These include pain medicines and antidepressants. Tell your doctor about all the medicines you take. Your doctor may want to make a medicine change to ease your symptoms. Follow-up care is a key part of your treatment and safety. Be sure to make and go to all appointments, and call your doctor if you are having problems. It's also a good idea to know your test results and keep a list of the medicines you take. How can you care for yourself at home? · Drink plenty of fluids, enough so that your urine is light yellow or clear like water. If you have kidney, heart, or liver disease and have to limit fluids, talk with your doctor before you increase the amount of fluids you drink. · Include high-fiber foods in your diet each day. These include fruits, vegetables, beans, and whole grains. · Get at least 30 minutes of exercise on most days of the week. Walking is a good choice. You also may want to do other activities, such as running, swimming, cycling, or playing tennis or team sports. · Take a fiber supplement, such as Citrucel or Metamucil, every day. Read and follow all instructions on the label. · Schedule time each day for a bowel movement. A daily routine may help.  Take your time having your bowel movement. · Support your feet with a small step stool when you sit on the toilet. This helps flex your hips and places your pelvis in a squatting position. · Your doctor may recommend an over-the-counter laxative to relieve your constipation. Examples are Milk of Magnesia and MiraLax. Read and follow all instructions on the label. Do not use laxatives on a long-term basis. When should you call for help? Call your doctor now or seek immediate medical care if:    · You have new or worse belly pain.     · You have new or worse nausea or vomiting.     · You have blood in your stools.    Watch closely for changes in your health, and be sure to contact your doctor if:    · Your constipation is getting worse.     · You do not get better as expected. Where can you learn more? Go to http://percy-luciana.info/. Enter 21 [phone] in the search box to learn more about \"Constipation: Care Instructions. \"  Current as of: November 20, 2017  Content Version: 11.8  © 3915-3335 Healthwise[employer]. Care instructions adapted under license by [employer] (which disclaims liability or warranty for this information). If you have questions about a medical condition or this instruction, always ask your healthcare professional. [address] any warranty or liability for your use of this information.

## 2018-11-28 ENCOUNTER — HOSPITAL ENCOUNTER (OUTPATIENT)
Dept: CT IMAGING | Age: 83
Discharge: HOME | End: 2018-11-28
Attending: FAMILY MEDICINE
Payer: MEDICARE

## 2018-11-28 DIAGNOSIS — K46.9 UNSPECIFIED ABDOMINAL HERNIA WITHOUT OBSTRUCTION OR GANGRENE: ICD-10-CM

## 2018-11-28 DIAGNOSIS — R10.11 RIGHT UPPER QUADRANT PAIN: ICD-10-CM

## 2018-11-28 PROCEDURE — 74176 CT ABD & PELVIS W/O CONTRAST: CPT

## 2018-11-28 PROCEDURE — 74011636320 HC RX REV CODE- 636/320: Performed by: FAMILY MEDICINE

## 2018-11-28 RX ADMIN — IOHEXOL 50 ML: 240 INJECTION, SOLUTION INTRATHECAL; INTRAVASCULAR; INTRAVENOUS; ORAL at 13:00

## 2018-12-11 ENCOUNTER — APPOINTMENT (OUTPATIENT)
Dept: GENERAL RADIOLOGY | Age: 83
End: 2018-12-11
Attending: EMERGENCY MEDICINE
Payer: MEDICARE

## 2018-12-11 ENCOUNTER — HOSPITAL ENCOUNTER (EMERGENCY)
Age: 83
Discharge: HOME | End: 2018-12-11
Attending: EMERGENCY MEDICINE
Payer: MEDICARE

## 2018-12-11 VITALS
RESPIRATION RATE: 20 BRPM | SYSTOLIC BLOOD PRESSURE: 140 MMHG | DIASTOLIC BLOOD PRESSURE: 52 MMHG | TEMPERATURE: 97.8 F | WEIGHT: 191 LBS | BODY MASS INDEX: 26.74 KG/M2 | HEART RATE: 52 BPM | HEIGHT: 71 IN | OXYGEN SATURATION: 94 %

## 2018-12-11 DIAGNOSIS — R42 VERTIGO: Primary | ICD-10-CM

## 2018-12-11 LAB
ALBUMIN SERPL-MCNC: 3.4 G/DL (ref 3.5–5)
ALBUMIN/GLOB SERPL: 1 {RATIO} (ref 1.1–2.2)
ALP SERPL-CCNC: 62 U/L (ref 45–117)
ALT SERPL-CCNC: 20 U/L (ref 12–78)
ANION GAP SERPL CALC-SCNC: 5 MMOL/L (ref 5–15)
AST SERPL-CCNC: 26 U/L (ref 15–37)
ATRIAL RATE: 54 BPM
BASOPHILS # BLD: 0.1 K/UL (ref 0–0.1)
BASOPHILS NFR BLD: 1 % (ref 0–1)
BILIRUB SERPL-MCNC: 0.6 MG/DL (ref 0.2–1)
BUN SERPL-MCNC: 16 MG/DL (ref 6–20)
BUN/CREAT SERPL: 19 (ref 12–20)
CALCIUM SERPL-MCNC: 8.7 MG/DL (ref 8.5–10.1)
CALCULATED P AXIS, ECG09: -3 DEGREES
CALCULATED R AXIS, ECG10: -38 DEGREES
CALCULATED T AXIS, ECG11: 106 DEGREES
CHLORIDE SERPL-SCNC: 104 MMOL/L (ref 97–108)
CK SERPL-CCNC: 135 U/L (ref 39–308)
CO2 SERPL-SCNC: 28 MMOL/L (ref 21–32)
COMMENT, HOLDF: NORMAL
CREAT SERPL-MCNC: 0.83 MG/DL (ref 0.7–1.3)
DIAGNOSIS, 93000: NORMAL
DIFFERENTIAL METHOD BLD: NORMAL
EOSINOPHIL # BLD: 0.2 K/UL (ref 0–0.4)
EOSINOPHIL NFR BLD: 3 % (ref 0–7)
ERYTHROCYTE [DISTWIDTH] IN BLOOD BY AUTOMATED COUNT: 12.4 % (ref 11.5–14.5)
GLOBULIN SER CALC-MCNC: 3.5 G/DL (ref 2–4)
GLUCOSE SERPL-MCNC: 109 MG/DL (ref 65–100)
HCT VFR BLD AUTO: 39.1 % (ref 36.6–50.3)
HGB BLD-MCNC: 12.8 G/DL (ref 12.1–17)
IMM GRANULOCYTES # BLD: 0 K/UL (ref 0–0.04)
IMM GRANULOCYTES NFR BLD AUTO: 0 % (ref 0–0.5)
LYMPHOCYTES # BLD: 1.7 K/UL (ref 0.8–3.5)
LYMPHOCYTES NFR BLD: 24 % (ref 12–49)
MCH RBC QN AUTO: 30.3 PG (ref 26–34)
MCHC RBC AUTO-ENTMCNC: 32.7 G/DL (ref 30–36.5)
MCV RBC AUTO: 92.7 FL (ref 80–99)
MONOCYTES # BLD: 0.7 K/UL (ref 0–1)
MONOCYTES NFR BLD: 10 % (ref 5–13)
NEUTS SEG # BLD: 4.3 K/UL (ref 1.8–8)
NEUTS SEG NFR BLD: 63 % (ref 32–75)
NRBC # BLD: 0 K/UL (ref 0–0.01)
NRBC BLD-RTO: 0 PER 100 WBC
P-R INTERVAL, ECG05: 242 MS
PLATELET # BLD AUTO: 247 K/UL (ref 150–400)
PMV BLD AUTO: 9.2 FL (ref 8.9–12.9)
POTASSIUM SERPL-SCNC: 4.7 MMOL/L (ref 3.5–5.1)
PROT SERPL-MCNC: 6.9 G/DL (ref 6.4–8.2)
Q-T INTERVAL, ECG07: 500 MS
QRS DURATION, ECG06: 158 MS
QTC CALCULATION (BEZET), ECG08: 474 MS
RBC # BLD AUTO: 4.22 M/UL (ref 4.1–5.7)
SAMPLES BEING HELD,HOLD: NORMAL
SODIUM SERPL-SCNC: 137 MMOL/L (ref 136–145)
TROPONIN I SERPL-MCNC: <0.05 NG/ML
VENTRICULAR RATE, ECG03: 54 BPM
WBC # BLD AUTO: 6.9 K/UL (ref 4.1–11.1)

## 2018-12-11 PROCEDURE — 84484 ASSAY OF TROPONIN QUANT: CPT

## 2018-12-11 PROCEDURE — 97162 PT EVAL MOD COMPLEX 30 MIN: CPT

## 2018-12-11 PROCEDURE — G8980 MOBILITY D/C STATUS: HCPCS

## 2018-12-11 PROCEDURE — 97530 THERAPEUTIC ACTIVITIES: CPT

## 2018-12-11 PROCEDURE — 85025 COMPLETE CBC W/AUTO DIFF WBC: CPT

## 2018-12-11 PROCEDURE — 82550 ASSAY OF CK (CPK): CPT

## 2018-12-11 PROCEDURE — 93005 ELECTROCARDIOGRAM TRACING: CPT

## 2018-12-11 PROCEDURE — 80053 COMPREHEN METABOLIC PANEL: CPT

## 2018-12-11 PROCEDURE — 97116 GAIT TRAINING THERAPY: CPT

## 2018-12-11 PROCEDURE — 96360 HYDRATION IV INFUSION INIT: CPT

## 2018-12-11 PROCEDURE — 71045 X-RAY EXAM CHEST 1 VIEW: CPT

## 2018-12-11 PROCEDURE — 36415 COLL VENOUS BLD VENIPUNCTURE: CPT

## 2018-12-11 PROCEDURE — G8978 MOBILITY CURRENT STATUS: HCPCS

## 2018-12-11 PROCEDURE — G8979 MOBILITY GOAL STATUS: HCPCS

## 2018-12-11 PROCEDURE — 74011250636 HC RX REV CODE- 250/636: Performed by: EMERGENCY MEDICINE

## 2018-12-11 PROCEDURE — 99285 EMERGENCY DEPT VISIT HI MDM: CPT

## 2018-12-11 RX ORDER — MECLIZINE HCL 12.5 MG 12.5 MG/1
25 TABLET ORAL
Status: COMPLETED | OUTPATIENT
Start: 2018-12-11 | End: 2018-12-11

## 2018-12-11 RX ORDER — MECLIZINE HYDROCHLORIDE 25 MG/1
25 TABLET ORAL
Qty: 20 TAB | Refills: 0 | Status: SHIPPED | OUTPATIENT
Start: 2018-12-11 | End: 2021-01-01

## 2018-12-11 RX ADMIN — SODIUM CHLORIDE 500 ML: 900 INJECTION, SOLUTION INTRAVENOUS at 09:52

## 2018-12-11 RX ADMIN — MECLIZINE 25 MG: 12.5 TABLET ORAL at 09:52

## 2018-12-11 NOTE — ED PROVIDER NOTES
EMERGENCY DEPARTMENT HISTORY AND PHYSICAL EXAM 
 
 
Date: 12/11/2018 Patient Name: Jackelin Gilbert History of Presenting Illness Chief Complaint Patient presents with  Dizziness  
  arrived from [facility] with c/o sudden onset \"dizziness room spinning when stood up after breakfast\", . alert skin warm dry, states feels ok now while lying History Provided By: Patient HPI: Jackelin Gilbert, 80 y.o. male with PMHx significant for CAD, stroke, HLD, HTN, and MI, presents via EMS to the ED with cc of sudden onset room-spinning dizziness beginning 0730. Pt reports he was hanging items up in his closet at onset of sx's and had to hold onto a nearby object to ease himself to a sitting position. he states his vision felt \"wobbly\" at that time. He notes he has been awake since 0400 and felt fine at that time. Pt reports while laying in the exam bed he is comfortable, however turning his head to the left exacerbates sx's. He reports taking Plavix daily secondary to prior hx of CABG. Pt notes chronic LE edema, but denies any worsening or changes. He specifically denies any chest pain, SOB, headache, nausea, vomiting, dysuria, frequency, constipation, diarrhea. There are no other complaints, changes, or physical findings at this time. PCP: Celestino Salinas MD 
 
Current Outpatient Medications Medication Sig Dispense Refill  meclizine (ANTIVERT) 25 mg tablet Take 1 Tab by mouth three (3) times daily as needed for Dizziness. 20 Tab 0  
 docusate sodium (COLACE) 100 mg capsule Take 1 Cap by mouth two (2) times a day for 90 days. 60 Cap 2  
 gabapentin (NEURONTIN) 100 mg capsule Take 3 Caps by mouth nightly. 270 Cap 3  
 megestrol (MEGACE) 20 mg tablet Take 20 mg by mouth daily. Patient takes 1/2 tab in morning and 1/2 tab in evening  melatonin 5 mg cap capsule Take 5 mg by mouth nightly.  lovastatin (MEVACOR) 40 mg tablet Take 40 mg by mouth nightly.  brimonidine (ALPHAGAN) 0.15 % ophthalmic solution Administer 1 Drop to both eyes two (2) times a day.  SYNTHROID 125 mcg tablet Take 1 Tab by mouth Daily (before breakfast). 90 Tab 4  
 dutasteride (AVODART) 0.5 mg capsule Take 0.5 mg by mouth daily.  cyanocobalamin (VITAMIN B-12) 1,000 mcg tablet Take 1,000 mcg by mouth daily.  amlodipine (NORVASC) 10 mg tablet Take 10 mg by mouth daily.  clopidogrel (PLAVIX) 75 mg tablet Take 75 mg by mouth daily.  irbesartan (AVAPRO) 75 mg tablet Take 150 mg by mouth nightly.  tamsulosin (FLOMAX) 0.4 mg capsule Take 0.4 mg by mouth nightly.  aspirin 81 mg tablet Take 81 mg by mouth.  DORZOLAMIDE HCL/TIMOLOL MALEAT (COSOPT OP) Apply 1 Drop to eye two (2) times a day.  latanoprost (XALATAN) 0.005 % ophthalmic solution Administer 1 Drop to both eyes nightly.  colesevelam (WELCHOL) 625 mg tablet Take 625 mg by mouth two (2) times daily (with meals). Past History Past Medical History: 
Past Medical History:  
Diagnosis Date  CAD (coronary artery disease)  Calculus of kidney  Cancer [facility]) prostate  1996  
 Hearing loss  Heart attack ([address]  Hypercholesterolemia  Hypertension  Incisional hernia 5/10/2011  Movement disorder  Other ill-defined conditions(799.89)   
 elevated cholesterol  Other ill-defined conditions(799.89)   
 glaucoma  Other ill-defined conditions(799.89)   
 abd hernia  Stroke ([address]   
 left arm tingling  Thyroid disease  Thyroid mass 5/10/2011 Past Surgical History: 
Past Surgical History:  
Procedure Laterality Date [address] [facility]  
 cabg x3  
 HX AORTIC VALVE REPLACEMENT  2015  HX HEENT    
 thyroid biopsy  HX HEENT    
 thyroidectomy 2013  HX ORCHIECTOMY  01/2017  HX ORTHOPAEDIC  2/2011  
 compression fracture d.t. fall (back),no surgery  HX THYROIDECTOMY  1/3/2013  ID PROSTATE BIOPSY, NEEDLE, SATURATION SAMPLING Family History: 
Family History Problem Relation Age of Onset  Cancer Mother   
     stomach  
 Heart Disease Sister  Heart Disease Father  Diabetes Son   
 Heart Disease Son  Anesth Problems Neg Hx Social History: 
Social History Tobacco Use  Smoking status: Former Smoker Packs/day: 0.50 Years: 3.00 Pack years: 1.50 Last attempt to quit: 1958 Years since quittin.9  Smokeless tobacco: Never Used Substance Use Topics  Alcohol use: No  
 Drug use: No  
 
 
Allergies: 
No Known Allergies Review of Systems Review of Systems Constitutional: Negative for chills, fatigue and fever. HENT: Negative for congestion and rhinorrhea. Eyes: Positive for visual disturbance. Respiratory: Negative for cough, shortness of breath and wheezing. Cardiovascular: Negative for chest pain and palpitations. Gastrointestinal: Negative for abdominal distention, abdominal pain, constipation, diarrhea, nausea and vomiting. Endocrine: Negative. Genitourinary: Negative for difficulty urinating, dysuria and frequency. Musculoskeletal: Negative. Skin: Negative for rash. Neurological: Positive for dizziness. Negative for weakness, light-headedness and headaches. Psychiatric/Behavioral: Negative for suicidal ideas. Physical Exam  
Physical Exam  
Constitutional: He is oriented to person, place, and time. He appears well-developed and well-nourished. No distress. HENT:  
Head: Normocephalic and atraumatic. Mouth/Throat: Oropharynx is clear and moist.  
Eyes: Conjunctivae and EOM are normal.  
Neck: Neck supple. No JVD present. No tracheal deviation present. Cardiovascular: Regular rhythm and intact distal pulses. Bradycardia present. Exam reveals no gallop and no friction rub. No murmur heard. Pulmonary/Chest: Effort normal and breath sounds normal. No stridor.  No respiratory distress. He has no wheezes. Abdominal: Soft. Bowel sounds are normal. He exhibits no distension and no mass. There is no tenderness. There is no guarding. Musculoskeletal: Normal range of motion. He exhibits no edema or tenderness. No deformity Neurological: He is alert and oriented to person, place, and time. He has normal strength. FNF normal, no facial droop, no slurred speech, 5/5 strength in upper and lower extremities, sensation intact to upper and lower extremities. No focal deficits. Skin: Skin is warm, dry and intact. No rash noted. Psychiatric: He has a normal mood and affect. His behavior is normal. Judgment and thought content normal.  
Nursing note and vitals reviewed. Diagnostic Study Results Labs - Recent Results (from the past 12 hour(s)) EKG, 12 LEAD, INITIAL Collection Time: 12/11/18  8:44 AM  
Result Value Ref Range Ventricular Rate 54 BPM  
 Atrial Rate 54 BPM  
 P-R Interval 242 ms QRS Duration 158 ms Q-T Interval 500 ms QTC Calculation (Bezet) 474 ms Calculated P Axis -3 degrees Calculated R Axis -38 degrees Calculated T Axis 106 degrees Diagnosis Sinus bradycardia with sinus arrhythmia with 1st degree AV block Left axis deviation Left bundle branch block When compared with ECG of 26-DEC-2012 09:32, Left bundle branch block is now present CBC WITH AUTOMATED DIFF Collection Time: 12/11/18  8:53 AM  
Result Value Ref Range WBC 6.9 4.1 - 11.1 K/uL  
 RBC 4.22 4.10 - 5.70 M/uL  
 HGB 12.8 12.1 - 17.0 g/dL HCT 39.1 36.6 - 50.3 % MCV 92.7 80.0 - 99.0 FL  
 MCH 30.3 26.0 - 34.0 PG  
 MCHC 32.7 30.0 - 36.5 g/dL  
 RDW 12.4 11.5 - 14.5 % PLATELET 614 033 - 676 K/uL MPV 9.2 8.9 - 12.9 FL  
 NRBC 0.0 0  WBC ABSOLUTE NRBC 0.00 0.00 - 0.01 K/uL NEUTROPHILS 63 32 - 75 % LYMPHOCYTES 24 12 - 49 % MONOCYTES 10 5 - 13 % EOSINOPHILS 3 0 - 7 % BASOPHILS 1 0 - 1 % IMMATURE GRANULOCYTES 0 0.0 - 0.5 % ABS. NEUTROPHILS 4.3 1.8 - 8.0 K/UL  
 ABS. LYMPHOCYTES 1.7 0.8 - 3.5 K/UL  
 ABS. MONOCYTES 0.7 0.0 - 1.0 K/UL  
 ABS. EOSINOPHILS 0.2 0.0 - 0.4 K/UL  
 ABS. BASOPHILS 0.1 0.0 - 0.1 K/UL  
 ABS. IMM. GRANS. 0.0 0.00 - 0.04 K/UL  
 DF AUTOMATED METABOLIC PANEL, COMPREHENSIVE Collection Time: 12/11/18  8:53 AM  
Result Value Ref Range Sodium 137 136 - 145 mmol/L Potassium 4.7 3.5 - 5.1 mmol/L Chloride 104 97 - 108 mmol/L  
 CO2 28 21 - 32 mmol/L Anion gap 5 5 - 15 mmol/L Glucose 109 (H) 65 - 100 mg/dL BUN 16 6 - 20 MG/DL Creatinine 0.83 0.70 - 1.30 MG/DL  
 BUN/Creatinine ratio 19 12 - 20 GFR est AA >60 >60 ml/min/1.73m2 GFR est non-AA >60 >60 ml/min/1.73m2 Calcium 8.7 8.5 - 10.1 MG/DL Bilirubin, total 0.6 0.2 - 1.0 MG/DL  
 ALT (SGPT) 20 12 - 78 U/L  
 AST (SGOT) 26 15 - 37 U/L Alk. phosphatase 62 45 - 117 U/L Protein, total 6.9 6.4 - 8.2 g/dL Albumin 3.4 (L) 3.5 - 5.0 g/dL Globulin 3.5 2.0 - 4.0 g/dL A-G Ratio 1.0 (L) 1.1 - 2.2 CK W/ REFLX CKMB Collection Time: 12/11/18  8:53 AM  
Result Value Ref Range  39 - 308 U/L  
SAMPLES BEING HELD Collection Time: 12/11/18  8:53 AM  
Result Value Ref Range SAMPLES BEING HELD 1BLUE   
 COMMENT Add-on orders for these samples will be processed based on acceptable specimen integrity and analyte stability, which may vary by analyte. TROPONIN I Collection Time: 12/11/18  8:53 AM  
Result Value Ref Range Troponin-I, Qt. <0.05 <0.05 ng/mL Radiologic Studies - CXR Results  (Last 48 hours)  
          
 12/11/18 0920  XR CHEST PORT Final result Impression:  IMPRESSION:  
   
No acute process. No change given difference in technique. Narrative:  EXAM: XR CHEST PORT INDICATION: Dizziness this morning. Coronary artery disease. COMPARISON: Chest view on 11/16/2018. CT abdomen with images of the lower thorax on 11/28/2018. TECHNIQUE: Upright portable chest AP view FINDINGS: Sternotomy wires and aortic valve stent graft are unchanged. Cardiac  
monitoring wires overlie the thorax. The cardiomediastinal and hilar contours  
are within normal limits. The pulmonary vasculature is within normal limits. Right middle lobe scarring is unchanged. Lungs and pleural spaces are otherwise  
clear. Osteopenia is unchanged. Lobulated right diaphragm is unchanged. Medical Decision Making I am the first provider for this patient. I reviewed the vital signs, available nursing notes, past medical history, past surgical history, family history and social history. Vital Signs-Reviewed the patient's vital signs. Patient Vitals for the past 12 hrs: 
 Temp Pulse Resp BP SpO2  
12/11/18 1100  (!) 52 20  94 % 12/11/18 1030  (!) 53 20  95 % 12/11/18 0930  (!) 52 15 140/52 99 % 12/11/18 0910  (!) 59  156/65   
12/11/18 0900  60 17 135/74 98 % 12/11/18 0856     97 % 12/11/18 0841 97.8 °F (36.6 °C) (!) 46 18 159/51 98 % Pulse Oximetry Analysis - 98% on RA Cardiac Monitor:  
Rate: 60 bpm 
Rhythm: Normal Sinus Rhythm EKG interpretation: (Preliminary) 9387 Rhythm: sinus bradycardia with sinus arrhythmia with 1st degree AV block and new LBBB; and regular . Rate (approx.): 54; Axis: left axis deviation; AZ interval: widened; QRS interval: widened; ST/T wave: normal 
Written by DANYELLE Corona, as dictated by Kareem Jack DO. Records Reviewed: Nursing Notes, Old Medical Records, Previous electrocardiograms, Ambulance Run Sheet, Previous Radiology Studies and Previous Laboratory Studies Provider Notes (Medical Decision Making):  
Sxs are positional in nature and more c/w vertigo. He has no focal neurological deficits. Will check labs to evaluate for dehydration, anemia, electrolyte abnormality. Will get orthostatics and treat with meclizine and fluids. ED Course:  
Initial assessment performed. The patients presenting problems have been discussed, and they are in agreement with the care plan formulated and outlined with them. I have encouraged them to ask questions as they arise throughout their visit. PROGRESS NOTE: 
9:19 AM 
Pt is not orthostatic. Written by DANYELLE Corona, as dictated by Kareem Jack DO. PROGRESS NOTE: 
10:00 AM 
Physical therapy at bedside. Written by DANYELLE Corona, as dictated by Kareem Jack DO. PROGRESS NOTE: 
10:26 AM 
Physical therapy has evaluated pt. She performed a modified Epley maneuver due to age with pillows and trendelenburg. Afterwards pt reports he is feeling somewhat better. Pt was able to walk down the hallway with one hand assist, however is still unsteady. Physical therapy will walk pt with walker and reassess. Written by DANYELLE Corona, as dictated by Kareem Jack DO. PROGRESS NOTE: 
10:43 AM 
Pt ambulated with walker without complication. He states he is still feeling \"woozy\", will reassess after rest and IVF. Pt wishes to be discharged home and has family that can stay throughout the day with him. Written by DANYELLE Corona, as dictated by Kareem Jack DO. Critical Care Time: 0 Disposition: 
DISCHARGE NOTE: 
11:10 AM 
The patient is ready for discharge. The patients signs, symptoms, diagnosis, and instructions for discharge have been discussed and the pt has conveyed their understanding. The patient is to follow up as recommended with PCP or return to the ER should their symptoms worsen. Plan has been discussed and patient has conveyed their agreement. PLAN: 
1. Discharge home Discharge Medication List as of 12/11/2018 11:10 AM  
  
START taking these medications  Details  
meclizine (ANTIVERT) 25 mg tablet Take 1 Tab by mouth three (3) times daily as needed for Dizziness., Normal, Disp-20 Tab, R-0  
  
  
 CONTINUE these medications which have NOT CHANGED Details  
docusate sodium (COLACE) 100 mg capsule Take 1 Cap by mouth two (2) times a day for 90 days. , Print, Disp-60 Cap, R-2  
  
gabapentin (NEURONTIN) 100 mg capsule Take 3 Caps by mouth nightly., Normal, Disp-270 Cap, R-3  
  
megestrol (MEGACE) 20 mg tablet Take 20 mg by mouth daily. Patient takes 1/2 tab in morning and 1/2 tab in evening, Historical Med  
  
melatonin 5 mg cap capsule Take 5 mg by mouth nightly., Historical Med  
  
lovastatin (MEVACOR) 40 mg tablet Take 40 mg by mouth nightly., Historical Med  
  
brimonidine (ALPHAGAN) 0.15 % ophthalmic solution Administer 1 Drop to both eyes two (2) times a day., Historical Med SYNTHROID 125 mcg tablet Take 1 Tab by mouth Daily (before breakfast). , Print, Disp-90 Tab, R-4, DIETER  
  
dutasteride (AVODART) 0.5 mg capsule Take 0.5 mg by mouth daily. , Historical Med  
  
cyanocobalamin (VITAMIN B-12) 1,000 mcg tablet Take 1,000 mcg by mouth daily. , Historical Med  
  
amlodipine (NORVASC) 10 mg tablet Take 10 mg by mouth daily. , Historical Med  
  
clopidogrel (PLAVIX) 75 mg tablet Take 75 mg by mouth daily. , Historical Med  
  
irbesartan (AVAPRO) 75 mg tablet Take 150 mg by mouth nightly., Historical Med  
  
tamsulosin (FLOMAX) 0.4 mg capsule Take 0.4 mg by mouth nightly., Historical Med  
  
aspirin 81 mg tablet Take 81 mg by mouth., Historical Med DORZOLAMIDE HCL/TIMOLOL MALEAT (COSOPT OP) Apply 1 Drop to eye two (2) times a day., Historical Med  
  
latanoprost (XALATAN) 0.005 % ophthalmic solution Administer 1 Drop to both eyes nightly., Historical Med  
  
colesevelam (WELCHOL) 625 mg tablet Take 625 mg by mouth two (2) times daily (with meals). , Historical Med 2. Follow-up Information Follow up With Specialties Details Why Contact Info Kelsie Crawford MD Family [facility] Schedule an appointment as soon as possible for a visit  [address] [place] [address] Sylvain Lugorenato 
[phone] [facility] EMERGENCY DEPT Emergency Medicine  As needed, If symptoms worsen [address] [facility] 
[phone] Return to ED if worse Diagnosis Clinical Impression: 1. Vertigo Attestations: This note is prepared by Yordy Nevarez, acting as Scribe for Yesy Mcdonald DO. Yesy Mcdonald DO: The scribe's documentation has been prepared under my direction and personally reviewed by me in its entirety. I confirm that the note above accurately reflects all work, treatment, procedures, and medical decision making performed by me.

## 2018-12-11 NOTE — ED NOTES
Complaint of room-spinning dizziness starting at around 0730 this morning. Pt got up around 0400 this morning. Denies chest pain, sob/n/v/d/urinary sx.

## 2018-12-11 NOTE — PROGRESS NOTES
Date of previous inpatient admission/ ED visit? Pt was previously seen in the ED on 11/16/18 with a cc of constipation What brought the patient back to ED? Pt presented to the ED via EMS with cc of dizziness Did patient decline recommended services during last admission/ ED visit (if yes, what)? No 
 
Has patient seen a provider since their last inpatient admission/ED visit (if yes, when)? Pt is a resident at [facility] and has access to providers at [facility] Interventions: 
From previous inpatient admission/ED visit: Pt was discharged to own home From current inpatient admission/ED visit: CM consulted re: home health. CM met with pt and pt's family, introduced self, role. Pt verbalized understanding. Pt verified he is living in independent living at [facility]. CM offered information re: home health. Pt stated he is not interested in home health at this time. CM offered education re: obtaining PT from [facility]. Pt verbalized understanding. No further questions or needs identified at this time. Pt's family to transport at discharge. CM will continue to remain available for support, discharge planning as needed. Care Management Interventions PCP Verified by CM: Yes Mode of Transport at Discharge: Other (see comment)(Pt's family) Transition of Care Consult (CM Consult): Discharge Planning Discharge Durable Medical Equipment: No 
Physical Therapy Consult: Yes Occupational Therapy Consult: No 
Speech Therapy Consult: No 
Current Support Network: [address] [place] [address] Confirm Follow Up Transport: Family Plan discussed with Pt/Family/Caregiver: Yes Discharge Location Discharge Placement: Assisted Living [facility] [phone]

## 2018-12-11 NOTE — DISCHARGE INSTRUCTIONS
Vertigo: Care Instructions  Your Care Instructions    Vertigo is the feeling that you or your surroundings are moving when there is no actual movement. It is often described as a feeling of spinning, whirling, falling, or tilting. Vertigo may make you vomit or feel nauseated. You may have trouble standing or walking and may lose your balance. Vertigo is often related to an inner ear problem, but it can have other more serious causes. If vertigo continues, you may need more tests to find its cause. Follow-up care is a key part of your treatment and safety. Be sure to make and go to all appointments, and call your doctor if you are having problems. It's also a good idea to know your test results and keep a list of the medicines you take. How can you care for yourself at home? · Do not lie flat on your back. Prop yourself up slightly. This may reduce the spinning feeling. Keep your eyes open. · Move slowly so that you do not fall. · If your doctor recommends medicine, take it exactly as directed. · Do not drive while you are having vertigo. Certain exercises, called Telles-Daroff exercises, can help decrease vertigo. To do Telles-Daroff exercises:  · Sit on the edge of a bed or sofa and quickly lie down on the side that causes the worst vertigo. Lie on your side with your ear down. · Stay in this position for at least 30 seconds or until the vertigo goes away. · Sit up. If this causes vertigo, wait for it to stop. · Repeat the procedure on the other side. · Repeat this 10 times. Do these exercises 2 times a day until the vertigo is gone. When should you call for help? Call 911 anytime you think you may need emergency care. For example, call if:    · You passed out (lost consciousness).     · You have symptoms of a stroke. These may include:  ? Sudden numbness, tingling, weakness, or loss of movement in your face, arm, or leg, especially on only one side of your body.   ? Sudden vision changes. ? Sudden trouble speaking. ? Sudden confusion or trouble understanding simple statements. ? Sudden problems with walking or balance. ? A sudden, severe headache that is different from past headaches.    Call your doctor now or seek immediate medical care if:    · Vertigo occurs with a fever, a headache, or ringing in your ears.     · You have new or increased nausea and vomiting.    Watch closely for changes in your health, and be sure to contact your doctor if:    · Vertigo gets worse or happens more often.     · Vertigo has not gotten better after 2 weeks. Where can you learn more? Go to http://percy-luciana.info/. Enter I571 in the search box to learn more about \"Vertigo: Care Instructions. \"  Current as of: March 28, 2018  Content Version: 11.8  © 6241-5540 [employer]. Care instructions adapted under license by [employer] (which disclaims liability or warranty for this information). If you have questions about a medical condition or this instruction, always ask your healthcare professional. [place] [address] any warranty or liability for your use of this information. Epley Maneuver at Home for Vertigo: Exercises  Your Care Instructions  Vertigo is a spinning or whirling sensation when you move your head. Your doctor may have moved you in different positions to help your vertigo get better faster. This is called the Epley maneuver. Your doctor also may have asked you to do these exercises at home. Do the exercises as often as your doctor recommends. If your vertigo is getting worse, your doctor may have you change the exercise or stop it. Step 1  Step 1    1. Sit on the edge of a bed or sofa. Step 2    1. Turn your head 45 degrees in the direction your doctor told you to. This should be toward the ear that causes the most vertigo for you. In this picture, the woman is turning toward her left ear. Step 3    1.  Tilt yourself backward until you are lying on your back. Your head should still be at a 45-degree turn. Your head should be about midway between looking straight ahead and looking out to your side. Hold for 30 seconds. If you have vertigo, stay in this position until it stops. Step 4    1. Turn your head 90 degrees toward the ear that has the least vertigo. In this picture, the woman is turning to the right because she has vertigo on her left side. The point of your chin should be raised and over your shoulder. Hold for 30 seconds. Step 5    1. Roll onto the side with the least vertigo. You should now be looking at the floor. Hold for 30 seconds. Follow-up care is a key part of your treatment and safety. Be sure to make and go to all appointments, and call your doctor if you are having problems. It's also a good idea to know your test results and keep a list of the medicines you take. Where can you learn more? Go to http://percy-luciana.info/. Enter 470 [id number] in the search box to learn more about \"Epley Maneuver at Home for Vertigo: Exercises. \"  Current as of: June 4, 2018  Content Version: 11.8  © 2929-8728 [employer]. Care instructions adapted under license by [employer] (which disclaims liability or warranty for this information). If you have questions about a medical condition or this instruction, always ask your healthcare professional. [place] [address] any warranty or liability for your use of this information.

## 2018-12-11 NOTE — ED NOTES
Dr Js Jang reviewed discharge instructions with the patient. The patient verbalized understanding. Pt ambulatory to waiting room with family. A & O, no apparent distress.

## 2018-12-11 NOTE — PROGRESS NOTES
physical Therapy Emergency Department EVALUATION/DISCHARGE with CMS G codes Patient: Kathy Lowry (77 y.o. male) Date: 12/11/2018 Primary Diagnosis: No admission diagnoses are documented for this encounter. Precautions: fall ASSESSMENT : 
Based on the objective data described below, the patient presents with improving limitations in gait, functional mobility, and balance s/p episode of vertigo this am, no fall. Pt lives alone in an independent living apt at [facility]. He is normally independent with use of SPC and completes ADLs on his own. He does not have hx of recent falls. Pt to ED this am s/p onset of spinning dizziness especially w/ L head turn. At this time, pt shows intact saccades, smooth pursuit and VOR. His coordination is grossly intact and there is no noted focal weakness. Pt is + sxs with modified mary hallpike to the L, no nystagmus. Proceeded with modified epley for L involvement with pt showing sxs in first and third position and initially on sitting but resolved. After Epley, pt only c/o some wooziness but no spinning dizziness. Pt was medicated with meclizine. Pt amb with min A to CGA HHA for 100' with slight increased trunk sway but no over LOB or path deviation, no c/o spinning dizziness. Gait training with RW completed and pt presented with increased steadiness, no path deviation or sway or LOB. No c/o spinning. Only c/o = some wooziness. Pt voiced feeling more stable with RW. Pt returned to bed with call bell in reach. Family present for session. Rounded with MD. 
Would recommend S for this pt for next 24-48 hrs to assure he returns to his safe baseline. Would also recommend RW use initially (pt owns RW). Recommended PT at [facility] to f/u to assure safe progression back to cane and reduced risk of falls. Pt refuses any change in level of care even for next few days.  Family will stay with pt today and encouraged them to stay overnight as well. Educated in post [employer] and safety especially at night. Referred pt to CM for set up of PT. CM met with pt and he refused referral to PT. Further acute physical therapy in the ED is not indicated at this time. PLAN : 
Discharge Recommendations:  
 
[x]   Home with family []   Skilled nursing facility []   Admission to hospital with rehab likely needed 
[]   Inpatient rehab referral 
[]   Outpatient physical therapy referral 
[]   Other: Further Equipment Recommendations for Discharge:  
[x]   Rolling walker with 5\" wheels 
[]   Crutches  
[]   Cane  
[]   Wheelchair  
[]   Other: COMMUNICATION/EDUCATION:  
Communication/Collaboration: 
[x]   Fall prevention education was provided and the patient/caregiver indicated understanding. [x]   Patient/family have participated as able and agree with findings and recommendations. []   Patient is unable to participate in plan of care at this time. Findings and recommendations were discussed with: MD physician and  SUBJECTIVE:  
Patient stated I feel a little woozy.  OBJECTIVE DATA SUMMARY:  
 
Past Medical History:  
Diagnosis Date  CAD (coronary artery disease)  Calculus of kidney  Cancer [facility]) prostate  1996  
 Hearing loss  Heart attack ([facility] [address]  Hypercholesterolemia  Hypertension  Incisional hernia 5/10/2011  Movement disorder  Other ill-defined conditions(799.89)   
 elevated cholesterol  Other ill-defined conditions(799.89)   
 glaucoma  Other ill-defined conditions(799.89)   
 abd hernia  Stroke ([facility] [address]   
 left arm tingling  Thyroid disease  Thyroid mass 5/10/2011 Past Surgical History:  
Procedure Laterality Date [address]  
 cabg x3  
 HX AORTIC VALVE REPLACEMENT  2015  HX HEENT    
 thyroid biopsy  HX HEENT    
 thyroidectomy 2013  HX ORCHIECTOMY  01/2017  HX ORTHOPAEDIC  2/2011 compression fracture d.t. fall (back),no surgery  HX THYROIDECTOMY  1/3/2013  NC PROSTATE BIOPSY, NEEDLE, SATURATION SAMPLING Prior Level of Function/Home Situation: Pt lives alone in an independent living apt at [facility]. He is normally independent with use of SPC and completes ADLs on his own. He does not have hx of recent falls. Home Situation Home Environment: Independent living # Steps to Enter: 0 One/Two Story Residence: One story Living Alone: Yes Support Systems: Family member(s), Child(yaquelin) Patient Expects to be Discharged to[de-identified] (independent living apt) Current DME Used/Available at Home: Cane, straight, Wheelchair Critical Behavior: 
Neurologic State: Alert Orientation Level: Oriented X4 Cognition: Follows commands Strength:   
Strength: Generally decreased, functional 
  
  
  
  
  
  
Tone & Sensation:  
Tone: Normal 
  
  
  
  
Sensation: Intact Range Of Motion: 
AROM: Within functional limits PROM: Within functional limits Coordination: 
Coordination: Within functional limits Functional Mobility: 
Bed Mobility: 
  
Supine to Sit: Modified independent Sit to Supine: Modified independent Transfers: 
Sit to Stand: Supervision Stand to Sit: Supervision Balance:  
Sitting: Intact Standing: Impaired Standing - Static: Good(with RW) Standing - Dynamic : Fair Ambulation/Gait Training: 
Distance (ft): 100 Feet (ft)(x2) 
Assistive Device: Gait belt;Walker, rolling Ambulation - Level of Assistance: Minimal assistance;Supervision;Contact guard assistance(min to CGA w/ no device and S with RW) Gait Abnormalities: Decreased step clearance;Trunk sway increased(trunk sway improved with use of RW) Speed/Margot: Slow Step Length: Right shortened;Left shortened Functional Measure: 
Tinetti test: 
 
Sitting Balance: 1 Arises: 1 Attempts to Rise: 2 Immediate Standing Balance: 1 Standing Balance: 1 Nudged: 1 Eyes Closed: 0 Turn 360 Degrees - Continuous/Discontinuous: 1 Turn 360 Degrees - Steady/Unsteady: 1 Sitting Down: 2 Balance Score: 11 Indication of Gait: 1 
R Step Length/Height: 1 L Step Length/Height: 1 
R Foot Clearance: 1 L Foot Clearance: 1 Step Symmetry: 1 Step Continuity: 1 Path: 1 Trunk: 1 Walking Time: 1 Gait Score: 10 Total Score: 21 Tinetti Test and G-code impairment scale: 
Percentage of Impairment CH 
 
0% 
 CI 
 
1-19% CJ 
 
20-39% CK 
 
40-59% CL 
 
60-79% CM 
 
80-99% CN  
 
100% Tinetti Score 0-28 28 23-27 17-22 12-16 6-11 1-5 0 Tinetti Tool Score Risk of Falls 
<19 = High Fall Risk 19-24 = Moderate Fall Risk 25-28 = Low Fall Risk Tinetti ME. Performance-Oriented Assessment of Mobility Problems in Elderly Patients. [address] 66; [id number]. (Scoring Description: PT Bulletin Feb. 10, 1993) Older adults: Dorothy Bailey et al, 2009; n = [address] [facility] elderly evaluated with ABC, MAINOR, ADL, and IADL) · Mean MAINOR score for males aged 69-68 years = 26.21(3.40) · Mean MAINOR score for females age 69-68 years = 25.16(4.30) · Mean MAINOR score for males over 80 years = 23.29(6.02) · Mean MAINOR score for females over 80 years = 17.20(8.32) In compliance with CMSs Claims Based Outcome Reporting, the following G-code set was chosen for this patient based on their primary functional limitation being treated: The outcome measure chosen to determine the severity of the functional limitation was the tinetti with a score of 21/28 which was correlated with the impairment scale. ? Mobility - Walking and Moving Around:  
  - CURRENT STATUS: CJ - 20%-39% impaired, limited or restricted  - GOAL STATUS: CJ - 20%-39% impaired, limited or restricted  - D/C STATUS:  CJ - 20%-39% impaired, limited or restricted Pain: 
Pain Scale 1: Numeric (0 - 10) Pain Intensity 1: 0 Activity Tolerance:  
See above narrative Please refer to the flowsheet for vital signs taken during this treatment. After treatment:  
[]         Patient left in no apparent distress sitting up in chair 
[x]         Patient left in no apparent distress in bed 
[x]         Call bell left within reach [x]         Nursing notified 
[]         Caregiver present 
[]         Bed alarm activated Thank you for this referral. 
Yan Maldonado, PT Time Calculation: 50 mins

## 2019-01-11 ENCOUNTER — OFFICE VISIT (OUTPATIENT)
Dept: NEUROLOGY | Age: 84
End: 2019-01-11

## 2019-01-11 VITALS
BODY MASS INDEX: 27.02 KG/M2 | SYSTOLIC BLOOD PRESSURE: 124 MMHG | DIASTOLIC BLOOD PRESSURE: 56 MMHG | OXYGEN SATURATION: 98 % | RESPIRATION RATE: 12 BRPM | HEART RATE: 60 BPM | HEIGHT: 71 IN | WEIGHT: 193 LBS

## 2019-01-11 DIAGNOSIS — G45.0 VBI (VERTEBROBASILAR INSUFFICIENCY): ICD-10-CM

## 2019-01-11 DIAGNOSIS — G60.8 IDIOPATHIC SMALL AND LARGE FIBER SENSORY NEUROPATHY: ICD-10-CM

## 2019-01-11 DIAGNOSIS — E11.42 DIABETIC PERIPHERAL NEUROPATHY ASSOCIATED WITH TYPE 2 DIABETES MELLITUS (HCC): ICD-10-CM

## 2019-01-11 DIAGNOSIS — R42 DIZZINESS: ICD-10-CM

## 2019-01-11 DIAGNOSIS — R27.0 ATAXIA: ICD-10-CM

## 2019-01-11 DIAGNOSIS — R26.0 SENSORY ATAXIC GAIT: ICD-10-CM

## 2019-01-11 DIAGNOSIS — I65.23 STENOSIS OF BOTH INTERNAL CAROTID ARTERIES: Primary | ICD-10-CM

## 2019-01-11 RX ORDER — GABAPENTIN 300 MG/1
300 CAPSULE ORAL
Qty: 100 CAP | Refills: 11 | Status: SHIPPED | OUTPATIENT
Start: 2019-01-11 | End: 2021-01-01

## 2019-01-11 RX ORDER — GABAPENTIN 300 MG/1
300 CAPSULE ORAL
Qty: 100 CAP | Refills: 11 | Status: SHIPPED | OUTPATIENT
Start: 2019-01-11 | End: 2019-01-11 | Stop reason: CLARIF

## 2019-01-12 NOTE — PROGRESS NOTES
Consult Subjective:  
 
Sharon Zapata is a 80 y.o. right-handed  male who is seen for evaluation at the request of Dr. Rio Quezada of new problem of recurrent vertigo, causing him to go to the emergency room in December 1 month ago, where he was evaluated and thought to have probably recurrent vertigo, was given meclizine and referred back to us. It has been 2 years since his last carotid Doppler, we will repeat that to make sure there is no circulation insufficiency. He had no recent imaging done of his brain. He had no other new focal neurologic deficit. He states that he has enough meclizine in case he gets recurrent attacks. He had no new hearing loss or tinnitus. No other precipitating cause for the vertigo. He also has increasing pain in his feet at night to the point he is not sleeping as well because of the burning pain in his feet and from his neuropathy which is thought to be probably diabetic from his latent diabetes his last hemoglobin A1c being 6.3. He is currently taking Neurontin 200 mg an hour or 2 before he goes to bed to help him sleep better he can control his pain. He finds that this is not controlling all his pain, so we gave him a 300 mg pill and told him to take 300 mg at night, gradually advance that up to 300 mg 3 times a day to try to control his pain and symptoms, advised him of the side effect of the medication as far as drowsiness, sedation, dizziness, or any side effect to call us immediately. He has arthritis in his knees and walking a little worse, and had to get a collagen shot in his right knee seems to be helping some being followed by his orthopedic surgeon. He has not had any new focal weakness, sensory loss, double vision, visual changes, increase headache, fever or meningismus or trauma or other focal neurological symptoms.   He continues take all his vitamins and vitamin D and his antiplatelet therapy because of his previous history of strokes. 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 We discussed the option of Coumadin but the patient said he wanted no part of Coumadin at that time because of bad experience with his family members. 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 He is doing well and has no active neurological problems. He had been seen for marked worsening of his ataxia and dizziness and new onset diplopia that occurred during his last visit February 2017. He was treated for a vestibular dysfunction and seemed to slowly improvein April 2013. He is also had some visual symptoms with black dots in front of his eyes and when he stares down at the floor these black dots seem to move and some blurred vision in his right eye and was seen the ophthalmologist who could not find much wrong. He seems to have some progressive hearing loss bilaterally and some difficulty with memory loss. He was referred to us for further evaluation. He has seen ENT physicians Dr. Colette Schafer for his hearing loss and tinnitus, and no real help was found there. We tried to explain to him that the tinnitus is part of the hearing loss and no medication will really help that. He has had a previous stroke after heart surgery, back in 1996 but no recurrent strokes since that time. 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 He is on aspirin therapy at this time.  He had a carotid Doppler study done in February 2017 that showed no significant disease. He had a stroke in 1996 after his CABG, but has had no other stroke since that time and has no significant residual.  His dizziness seems to come on when he stands up or moves quickly. No recent trauma, falls, fever, meningismus, or other causes for his symptoms. He also has a history of chronic numbness in his feet has been present for about 30 years That began in 1985, mildly symptomatic and more painful at night time. Workup in the past has been negative for treatable causes he says. He has minimal numbness in his hands and no bowel bladder dysfunction, and no family history of similar problems. He does feel it might be slightly worse. His complete review of systems in symptoms was negative for any other new medical problems,, complications or illnesses other than the recent cold and sinus problems and vertigo. Past Medical History:  
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  
 Hearing loss  Heart attack ([address]  Hypercholesterolemia  Hypertension  Incisional hernia 5/10/2011  Movement disorder  Other ill-defined conditions(799.89)   
 elevated cholesterol  Other ill-defined conditions(799.89)   
 glaucoma  Other ill-defined conditions(799.89)   
 abd hernia  Stroke ([address]   
 left arm tingling  Thyroid disease  Thyroid mass 5/10/2011 Past Surgical History:  
Procedure Laterality Date [address] [facility]  
 cabg x3  
 HX AORTIC VALVE REPLACEMENT  2015  HX HEENT    
 thyroid biopsy  HX HEENT    
 thyroidectomy 2013  HX ORCHIECTOMY  01/2017  HX ORTHOPAEDIC  2/2011  
 compression fracture d.t. fall (back),no surgery  HX THYROIDECTOMY  1/3/2013  [place] PROSTATE BIOPSY, NEEDLE, SATURATION SAMPLING Family History Problem Relation Age of Onset  Cancer Mother   
     stomach  
 Heart Disease Sister  Heart Disease Father  Diabetes Son   
 Heart Disease Son  Anesth Problems Neg Hx Social History Tobacco Use  Smoking status: Former Smoker Packs/day: 0.50 Years: 3.00 Pack years: 1.50 Last attempt to quit: 1958 Years since quittin.0  Smokeless tobacco: Never Used Substance Use Topics  Alcohol use: No  
   
Current Outpatient Medications Medication Sig Dispense Refill  gabapentin (NEURONTIN) 300 mg capsule Take 1 Cap by mouth nightly. 100 Cap 11  
 meclizine (ANTIVERT) 25 mg tablet Take 1 Tab by mouth three (3) times daily as needed for Dizziness. 20 Tab 0  
 docusate sodium (COLACE) 100 mg capsule Take 1 Cap by mouth two (2) times a day for 90 days. 60 Cap 2  
 lovastatin (MEVACOR) 40 mg tablet Take 40 mg by mouth nightly.  brimonidine (ALPHAGAN) 0.15 % ophthalmic solution Administer 1 Drop to both eyes two (2) times a day.  SYNTHROID 125 mcg tablet Take 1 Tab by mouth Daily (before breakfast). 90 Tab 4  
 dutasteride (AVODART) 0.5 mg capsule Take 0.5 mg by mouth daily.  cyanocobalamin (VITAMIN B-12) 1,000 mcg tablet Take 1,000 mcg by mouth daily.  amlodipine (NORVASC) 10 mg tablet Take 10 mg by mouth daily.  clopidogrel (PLAVIX) 75 mg tablet Take 75 mg by mouth daily.  irbesartan (AVAPRO) 75 mg tablet Take 150 mg by mouth nightly.  tamsulosin (FLOMAX) 0.4 mg capsule Take 0.4 mg by mouth nightly.  aspirin 81 mg tablet Take 81 mg by mouth.  DORZOLAMIDE HCL/TIMOLOL MALEAT (COSOPT OP) Apply 1 Drop to eye nightly.  latanoprost (XALATAN) 0.005 % ophthalmic solution Administer 1 Drop to both eyes nightly. No Known Allergies Review of Systems: A comprehensive review of systems was negative except for: Constitutional: positive for fatigue and malaise Ears, nose, mouth, throat, and face: positive for earaches and nasal congestion Genitourinary: positive for frequency, dysuria, hesitancy and decreased stream 
 Musculoskeletal: positive for myalgias, arthralgias and stiff joints Neurological: positive for dizziness, vertigo, paresthesia and gait problems Behvioral/Psych: positive for anxiety and depression Objective: I 
 
 
NEUROLOGICAL EXAM: 
 
Appearance: The patient is well developed, well nourished, provides a coherent history and is in no acute distress. Mental Status: Oriented to time, place and person, but patient seems to have mild memory impairment and given his history. Mood and affect appropriate. Cranial Nerves:   Intact visual fields. Fundi are benign, discs are flat, no lesions seen on funduscopy. LAMAR, EOM's full, but he has an occasional slight deviation of his left eye outward with an exophoric strabismus, no nystagmus, no ptosis. Facial sensation is normal. Corneal reflexes are not tested. Facial movement is symmetric. Hearing is abnormal bilaterally. Palate is midline with normal sternocleidomastoid and trapezius muscles are normal. Tongue is midline. Neck is supple without meningismus or bruits Temporal arteries are not tender or enlarged Motor:  5/5 strength in upper and lower proximal and distal muscles. Normal bulk and tone. No fasciculations. Rapid alternating movement is slow but symmetric Reflexes:   Deep tendon reflexes 1+/4 and symmetrical. No Babinski or clonus present. On Nylan-Barany testing patient develops only mild dizziness Sensory:   Abnormal to touch, pinprick and vibration and both lower extremities to knee level. Gait:  Abnormal gait with patient with slightly wide-based gait. Tremor:   No tremor noted. Cerebellar:  Probably abnormal Romberg and tandem cerebellar signs present. Finger-nose-finger examination shows no significant dysmetria Neurovascular:  Normal heart sounds and regular rhythm, peripheral pulses decreased, and no carotid bruits. Assessment:  
 
 
Plan: Patient with recurrent episode of vertigo, which she had to go to the emergency room, better with meclizine, we will continue that for his vertigo, and check a carotid Doppler study since is been 2 years since his last Doppler study. Patient with new problem of increasing pain in his feet from his neuropathy, will try to increase his Neurontin to 300 mg at night, and even up to 300 mg 3 times a day if tolerated, new prescription sent in for patient, we counseled the patient that it is not habit-forming or addicting is relatively safe and should help his pain at a more therapeutic dose Patient with new onset of occluded basilar artery, and hypo-plastic vertebrobasilar system, but patient refuses Coumadin and says he is doing better on hormonal therapy so we will continue that MRI of the brain and neck and MRA of the brain all reviewed personally on the PACS system with the patient today. 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 Office visit was 35 minutes today examining the patient, going over his history, deciding on a diagnosis, prognosis and therapy and treatment needed, and reviewing his MRI scans and CAT scans on the PACS system personally myself, and reviewing all his previous office notes and evaluations and lab tests on the computer, and I concluded that indeed he is a high risk for vertebrobasilar insufficiency and basal artery stenosis and high risk for possible stroke and marked debility and morbidity.  
 
Signed By: Luis Eduardo Olsen MD   
 January 11, 2019 This note will not be viewable in [address]

## 2019-01-15 ENCOUNTER — OFFICE VISIT (OUTPATIENT)
Dept: NEUROLOGY | Age: 84
End: 2019-01-15

## 2019-01-15 DIAGNOSIS — I65.23 STENOSIS OF BOTH INTERNAL CAROTID ARTERIES: ICD-10-CM

## 2019-01-15 DIAGNOSIS — E11.42 DIABETIC PERIPHERAL NEUROPATHY ASSOCIATED WITH TYPE 2 DIABETES MELLITUS (HCC): ICD-10-CM

## 2019-01-15 DIAGNOSIS — G60.8 IDIOPATHIC SMALL AND LARGE FIBER SENSORY NEUROPATHY: ICD-10-CM

## 2019-01-15 DIAGNOSIS — R26.0 SENSORY ATAXIC GAIT: ICD-10-CM

## 2019-01-15 DIAGNOSIS — R42 DIZZINESS: ICD-10-CM

## 2019-01-15 DIAGNOSIS — G45.0 VBI (VERTEBROBASILAR INSUFFICIENCY): ICD-10-CM

## 2019-01-15 DIAGNOSIS — R27.0 ATAXIA: ICD-10-CM

## 2019-01-16 NOTE — PROCEDURES
This study consisted of pulsed wave Doppler examination, Color-flow imaging, and Duplex imaging of both the right and left carotid systems, and both vertebral arteries.     Imaging of both right and left carotid systems showed mild mixed plaquing at the bifurcations and proximal and distal internal and external carotid arteries bilaterally, with stenosis in the range of 16-49% only and with no flow abnormalities identified.     Both vertebral arteries showed normal antegrade flow

## 2019-04-27 ENCOUNTER — APPOINTMENT (OUTPATIENT)
Dept: CT IMAGING | Age: 84
DRG: 068 | End: 2019-04-27
Attending: EMERGENCY MEDICINE
Payer: MEDICARE

## 2019-04-27 ENCOUNTER — APPOINTMENT (OUTPATIENT)
Dept: MRI IMAGING | Age: 84
DRG: 068 | End: 2019-04-27
Attending: INTERNAL MEDICINE
Payer: MEDICARE

## 2019-04-27 ENCOUNTER — HOSPITAL ENCOUNTER (INPATIENT)
Age: 84
LOS: 6 days | Discharge: HOME HEALTH | DRG: 068 | End: 2019-05-03
Attending: EMERGENCY MEDICINE | Admitting: INTERNAL MEDICINE
Payer: MEDICARE

## 2019-04-27 DIAGNOSIS — M19.90 ARTHRITIS: Primary | ICD-10-CM

## 2019-04-27 DIAGNOSIS — I65.23 STENOSIS OF BOTH INTERNAL CAROTID ARTERIES: ICD-10-CM

## 2019-04-27 DIAGNOSIS — N30.00 ACUTE CYSTITIS WITHOUT HEMATURIA: ICD-10-CM

## 2019-04-27 DIAGNOSIS — G45.9 TIA (TRANSIENT ISCHEMIC ATTACK): ICD-10-CM

## 2019-04-27 DIAGNOSIS — G45.0 VERTEBRO BASILAR INSUFFICIENCY: ICD-10-CM

## 2019-04-27 DIAGNOSIS — G45.0 VBI (VERTEBROBASILAR INSUFFICIENCY): ICD-10-CM

## 2019-04-27 DIAGNOSIS — E78.2 MIXED HYPERLIPIDEMIA: ICD-10-CM

## 2019-04-27 DIAGNOSIS — G60.3 IDIOPATHIC PROGRESSIVE NEUROPATHY: ICD-10-CM

## 2019-04-27 PROBLEM — I63.9 CVA (CEREBRAL VASCULAR ACCIDENT) (HCC): Status: ACTIVE | Noted: 2019-04-27

## 2019-04-27 LAB
ALBUMIN SERPL-MCNC: 3.5 G/DL (ref 3.5–5)
ALBUMIN/GLOB SERPL: 1 {RATIO} (ref 1.1–2.2)
ALP SERPL-CCNC: 64 U/L (ref 45–117)
ALT SERPL-CCNC: 16 U/L (ref 12–78)
ANION GAP SERPL CALC-SCNC: 1 MMOL/L (ref 5–15)
APPEARANCE UR: CLEAR
AST SERPL-CCNC: 13 U/L (ref 15–37)
BACTERIA URNS QL MICRO: ABNORMAL /HPF
BASOPHILS # BLD: 0 K/UL (ref 0–0.1)
BASOPHILS NFR BLD: 1 % (ref 0–1)
BILIRUB SERPL-MCNC: 0.6 MG/DL (ref 0.2–1)
BILIRUB UR QL: NEGATIVE
BUN SERPL-MCNC: 13 MG/DL (ref 6–20)
BUN/CREAT SERPL: 17 (ref 12–20)
CALCIUM SERPL-MCNC: 8.7 MG/DL (ref 8.5–10.1)
CHLORIDE SERPL-SCNC: 107 MMOL/L (ref 97–108)
CO2 SERPL-SCNC: 32 MMOL/L (ref 21–32)
COLOR UR: ABNORMAL
COMMENT, HOLDF: NORMAL
CREAT SERPL-MCNC: 0.78 MG/DL (ref 0.7–1.3)
DIFFERENTIAL METHOD BLD: ABNORMAL
EOSINOPHIL # BLD: 0.2 K/UL (ref 0–0.4)
EOSINOPHIL NFR BLD: 3 % (ref 0–7)
EPITH CASTS URNS QL MICRO: ABNORMAL /LPF
ERYTHROCYTE [DISTWIDTH] IN BLOOD BY AUTOMATED COUNT: 12.5 % (ref 11.5–14.5)
GLOBULIN SER CALC-MCNC: 3.5 G/DL (ref 2–4)
GLUCOSE BLD STRIP.AUTO-MCNC: 124 MG/DL (ref 65–100)
GLUCOSE SERPL-MCNC: 111 MG/DL (ref 65–100)
GLUCOSE UR STRIP.AUTO-MCNC: NEGATIVE MG/DL
HCT VFR BLD AUTO: 41.4 % (ref 36.6–50.3)
HGB BLD-MCNC: 13.5 G/DL (ref 12.1–17)
HGB UR QL STRIP: NEGATIVE
HYALINE CASTS URNS QL MICRO: ABNORMAL /LPF (ref 0–5)
IMM GRANULOCYTES # BLD AUTO: 0 K/UL (ref 0–0.04)
IMM GRANULOCYTES NFR BLD AUTO: 0 % (ref 0–0.5)
KETONES UR QL STRIP.AUTO: NEGATIVE MG/DL
LEUKOCYTE ESTERASE UR QL STRIP.AUTO: ABNORMAL
LYMPHOCYTES # BLD: 1.9 K/UL (ref 0.8–3.5)
LYMPHOCYTES NFR BLD: 28 % (ref 12–49)
MCH RBC QN AUTO: 30.5 PG (ref 26–34)
MCHC RBC AUTO-ENTMCNC: 32.6 G/DL (ref 30–36.5)
MCV RBC AUTO: 93.7 FL (ref 80–99)
MONOCYTES # BLD: 0.6 K/UL (ref 0–1)
MONOCYTES NFR BLD: 9 % (ref 5–13)
NEUTS SEG # BLD: 4 K/UL (ref 1.8–8)
NEUTS SEG NFR BLD: 59 % (ref 32–75)
NITRITE UR QL STRIP.AUTO: NEGATIVE
NRBC # BLD: 0 K/UL (ref 0–0.01)
NRBC BLD-RTO: 0 PER 100 WBC
PH UR STRIP: 7 [PH] (ref 5–8)
PLATELET # BLD AUTO: 249 K/UL (ref 150–400)
PMV BLD AUTO: 8.8 FL (ref 8.9–12.9)
POTASSIUM SERPL-SCNC: 4.1 MMOL/L (ref 3.5–5.1)
PROT SERPL-MCNC: 7 G/DL (ref 6.4–8.2)
PROT UR STRIP-MCNC: NEGATIVE MG/DL
RBC # BLD AUTO: 4.42 M/UL (ref 4.1–5.7)
RBC #/AREA URNS HPF: ABNORMAL /HPF (ref 0–5)
SAMPLES BEING HELD,HOLD: NORMAL
SERVICE CMNT-IMP: ABNORMAL
SODIUM SERPL-SCNC: 140 MMOL/L (ref 136–145)
SP GR UR REFRACTOMETRY: 1.01 (ref 1–1.03)
UA: UC IF INDICATED,UAUC: ABNORMAL
UROBILINOGEN UR QL STRIP.AUTO: 0.2 EU/DL (ref 0.2–1)
WBC # BLD AUTO: 6.7 K/UL (ref 4.1–11.1)
WBC URNS QL MICRO: ABNORMAL /HPF (ref 0–4)

## 2019-04-27 PROCEDURE — 0S9C3ZX DRAINAGE OF RIGHT KNEE JOINT, PERCUTANEOUS APPROACH, DIAGNOSTIC: ICD-10-PCS | Performed by: HOSPITALIST

## 2019-04-27 PROCEDURE — 87186 SC STD MICRODIL/AGAR DIL: CPT

## 2019-04-27 PROCEDURE — 74011636320 HC RX REV CODE- 636/320: Performed by: EMERGENCY MEDICINE

## 2019-04-27 PROCEDURE — 65660000000 HC RM CCU STEPDOWN

## 2019-04-27 PROCEDURE — 87086 URINE CULTURE/COLONY COUNT: CPT

## 2019-04-27 PROCEDURE — 80053 COMPREHEN METABOLIC PANEL: CPT

## 2019-04-27 PROCEDURE — 74011250636 HC RX REV CODE- 250/636: Performed by: INTERNAL MEDICINE

## 2019-04-27 PROCEDURE — 36415 COLL VENOUS BLD VENIPUNCTURE: CPT

## 2019-04-27 PROCEDURE — 70496 CT ANGIOGRAPHY HEAD: CPT

## 2019-04-27 PROCEDURE — 93005 ELECTROCARDIOGRAM TRACING: CPT

## 2019-04-27 PROCEDURE — 87077 CULTURE AEROBIC IDENTIFY: CPT

## 2019-04-27 PROCEDURE — 82962 GLUCOSE BLOOD TEST: CPT

## 2019-04-27 PROCEDURE — 74011250636 HC RX REV CODE- 250/636: Performed by: PSYCHIATRY & NEUROLOGY

## 2019-04-27 PROCEDURE — 70551 MRI BRAIN STEM W/O DYE: CPT

## 2019-04-27 PROCEDURE — 74011000250 HC RX REV CODE- 250: Performed by: INTERNAL MEDICINE

## 2019-04-27 PROCEDURE — 74011000258 HC RX REV CODE- 258: Performed by: PSYCHIATRY & NEUROLOGY

## 2019-04-27 PROCEDURE — 85025 COMPLETE CBC W/AUTO DIFF WBC: CPT

## 2019-04-27 PROCEDURE — 81001 URINALYSIS AUTO W/SCOPE: CPT

## 2019-04-27 PROCEDURE — 99285 EMERGENCY DEPT VISIT HI MDM: CPT

## 2019-04-27 PROCEDURE — 74011250637 HC RX REV CODE- 250/637: Performed by: INTERNAL MEDICINE

## 2019-04-27 PROCEDURE — 70450 CT HEAD/BRAIN W/O DYE: CPT

## 2019-04-27 RX ORDER — SODIUM CHLORIDE AND POTASSIUM CHLORIDE .9; .15 G/100ML; G/100ML
3000 SOLUTION INTRAVENOUS CONTINUOUS
Status: DISCONTINUED | OUTPATIENT
Start: 2019-04-27 | End: 2019-04-28 | Stop reason: CLARIF

## 2019-04-27 RX ORDER — CHOLECALCIFEROL (VITAMIN D3) 125 MCG
440 CAPSULE ORAL
Status: ON HOLD | COMMUNITY
End: 2019-04-27

## 2019-04-27 RX ORDER — POLYETHYLENE GLYCOL 3350 17 G/17G
17 POWDER, FOR SOLUTION ORAL DAILY PRN
Status: DISCONTINUED | OUTPATIENT
Start: 2019-04-27 | End: 2019-05-03 | Stop reason: HOSPADM

## 2019-04-27 RX ORDER — DUTASTERIDE 0.5 MG/1
0.5 CAPSULE, LIQUID FILLED ORAL DAILY
Status: DISCONTINUED | OUTPATIENT
Start: 2019-04-28 | End: 2019-05-03 | Stop reason: HOSPADM

## 2019-04-27 RX ORDER — LATANOPROST 50 UG/ML
1 SOLUTION/ DROPS OPHTHALMIC
Status: DISCONTINUED | OUTPATIENT
Start: 2019-04-27 | End: 2019-05-03 | Stop reason: HOSPADM

## 2019-04-27 RX ORDER — GUAIFENESIN 100 MG/5ML
81 LIQUID (ML) ORAL DAILY
Status: DISCONTINUED | OUTPATIENT
Start: 2019-04-28 | End: 2019-05-02

## 2019-04-27 RX ORDER — CLOPIDOGREL BISULFATE 75 MG/1
75 TABLET ORAL DAILY
Status: DISCONTINUED | OUTPATIENT
Start: 2019-04-28 | End: 2019-05-03

## 2019-04-27 RX ORDER — SODIUM CHLORIDE 0.9 % (FLUSH) 0.9 %
5-40 SYRINGE (ML) INJECTION AS NEEDED
Status: DISCONTINUED | OUTPATIENT
Start: 2019-04-27 | End: 2019-05-03 | Stop reason: HOSPADM

## 2019-04-27 RX ORDER — DORZOLAMIDE HYDROCHLORIDE AND TIMOLOL MALEATE 20; 5 MG/ML; MG/ML
1 SOLUTION/ DROPS OPHTHALMIC DAILY
Status: DISCONTINUED | OUTPATIENT
Start: 2019-04-28 | End: 2019-05-03 | Stop reason: HOSPADM

## 2019-04-27 RX ORDER — COLESEVELAM 180 1/1
625 TABLET ORAL 2 TIMES DAILY WITH MEALS
Status: DISCONTINUED | OUTPATIENT
Start: 2019-04-27 | End: 2019-04-28 | Stop reason: SDUPTHER

## 2019-04-27 RX ORDER — LABETALOL HCL 20 MG/4 ML
5 SYRINGE (ML) INTRAVENOUS
Status: DISCONTINUED | OUTPATIENT
Start: 2019-04-27 | End: 2019-05-03 | Stop reason: HOSPADM

## 2019-04-27 RX ORDER — ATORVASTATIN CALCIUM 40 MG/1
40 TABLET, FILM COATED ORAL
Status: DISCONTINUED | OUTPATIENT
Start: 2019-04-27 | End: 2019-05-03 | Stop reason: HOSPADM

## 2019-04-27 RX ORDER — LEVOTHYROXINE SODIUM 125 UG/1
125 TABLET ORAL
Status: DISCONTINUED | OUTPATIENT
Start: 2019-04-28 | End: 2019-05-03 | Stop reason: HOSPADM

## 2019-04-27 RX ORDER — COLESEVELAM 180 1/1
625 TABLET ORAL 2 TIMES DAILY WITH MEALS
COMMUNITY
End: 2019-05-05

## 2019-04-27 RX ORDER — ACETAMINOPHEN 325 MG/1
650 TABLET ORAL
Status: DISCONTINUED | OUTPATIENT
Start: 2019-04-27 | End: 2019-05-01

## 2019-04-27 RX ORDER — MECLIZINE HCL 12.5 MG 12.5 MG/1
25 TABLET ORAL
Status: DISCONTINUED | OUTPATIENT
Start: 2019-04-27 | End: 2019-05-03 | Stop reason: HOSPADM

## 2019-04-27 RX ORDER — AMLODIPINE BESYLATE 5 MG/1
5 TABLET ORAL DAILY
Status: DISCONTINUED | OUTPATIENT
Start: 2019-04-28 | End: 2019-05-02

## 2019-04-27 RX ORDER — SODIUM CHLORIDE 0.9 % (FLUSH) 0.9 %
5-40 SYRINGE (ML) INJECTION EVERY 8 HOURS
Status: DISCONTINUED | OUTPATIENT
Start: 2019-04-27 | End: 2019-05-03 | Stop reason: HOSPADM

## 2019-04-27 RX ORDER — LANOLIN ALCOHOL/MO/W.PET/CERES
1000 CREAM (GRAM) TOPICAL DAILY
Status: DISCONTINUED | OUTPATIENT
Start: 2019-04-28 | End: 2019-05-03 | Stop reason: HOSPADM

## 2019-04-27 RX ORDER — SODIUM CHLORIDE 0.9 % (FLUSH) 0.9 %
10 SYRINGE (ML) INJECTION
Status: ACTIVE | OUTPATIENT
Start: 2019-04-27 | End: 2019-04-27

## 2019-04-27 RX ORDER — IRBESARTAN 150 MG/1
150 TABLET ORAL DAILY
Status: DISCONTINUED | OUTPATIENT
Start: 2019-04-28 | End: 2019-05-02

## 2019-04-27 RX ORDER — MEGESTROL ACETATE 20 MG/1
20 TABLET ORAL DAILY
COMMUNITY
End: 2019-04-27

## 2019-04-27 RX ORDER — HEPARIN SODIUM 5000 [USP'U]/ML
5000 INJECTION, SOLUTION INTRAVENOUS; SUBCUTANEOUS EVERY 8 HOURS
Status: DISCONTINUED | OUTPATIENT
Start: 2019-04-27 | End: 2019-05-03 | Stop reason: HOSPADM

## 2019-04-27 RX ORDER — TAMSULOSIN HYDROCHLORIDE 0.4 MG/1
0.4 CAPSULE ORAL 2 TIMES DAILY
Status: DISCONTINUED | OUTPATIENT
Start: 2019-04-27 | End: 2019-05-03 | Stop reason: HOSPADM

## 2019-04-27 RX ORDER — GABAPENTIN 300 MG/1
300 CAPSULE ORAL
Status: DISCONTINUED | OUTPATIENT
Start: 2019-04-27 | End: 2019-05-03 | Stop reason: HOSPADM

## 2019-04-27 RX ORDER — DUTASTERIDE 0.5 MG/1
0.5 CAPSULE, LIQUID FILLED ORAL DAILY
Status: DISCONTINUED | OUTPATIENT
Start: 2019-04-28 | End: 2019-04-27

## 2019-04-27 RX ORDER — ACETAMINOPHEN 650 MG/1
650 SUPPOSITORY RECTAL
Status: DISCONTINUED | OUTPATIENT
Start: 2019-04-27 | End: 2019-05-01

## 2019-04-27 RX ADMIN — ATORVASTATIN CALCIUM 40 MG: 40 TABLET, FILM COATED ORAL at 22:25

## 2019-04-27 RX ADMIN — HEPARIN SODIUM 5000 UNITS: 5000 INJECTION INTRAVENOUS; SUBCUTANEOUS at 15:20

## 2019-04-27 RX ADMIN — CEFTRIAXONE 1 G: 1 INJECTION, POWDER, FOR SOLUTION INTRAMUSCULAR; INTRAVENOUS at 18:23

## 2019-04-27 RX ADMIN — Medication 10 ML: at 15:17

## 2019-04-27 RX ADMIN — LATANOPROST 1 DROP: 50 SOLUTION OPHTHALMIC at 22:26

## 2019-04-27 RX ADMIN — SODIUM CHLORIDE AND POTASSIUM CHLORIDE 3000 ML: 9; 1.49 INJECTION, SOLUTION INTRAVENOUS at 18:24

## 2019-04-27 RX ADMIN — Medication 10 ML: at 22:26

## 2019-04-27 RX ADMIN — HEPARIN SODIUM 5000 UNITS: 5000 INJECTION INTRAVENOUS; SUBCUTANEOUS at 22:25

## 2019-04-27 RX ADMIN — TAMSULOSIN HYDROCHLORIDE 0.4 MG: 0.4 CAPSULE ORAL at 17:57

## 2019-04-27 RX ADMIN — IOPAMIDOL 100 ML: 755 INJECTION, SOLUTION INTRAVENOUS at 10:36

## 2019-04-27 RX ADMIN — GABAPENTIN 300 MG: 300 CAPSULE ORAL at 22:25

## 2019-04-27 NOTE — PROGRESS NOTES
Physical Therapy Received PT order. Pt currently to MRI and then being transferred to IP floor. Will f/u tomorrow as appropriate and pt is able to tolerate.  
 
Srinivas Back, PT

## 2019-04-27 NOTE — PROGRESS NOTES
Pharmacy Clarification of Prior to Admission Medication Regimen The patient was interviewed regarding clarification of the prior to admission medication regimen. The patient's girlfriend was present in room and obtained permission from patient to discuss drug regimen with visitor(s) present. The patient was questioned regarding use of any other inhalers, topical products, over the counter medications, herbal medications, vitamin products or ophthalmic/nasal/otic medication use. Information Obtained From: RX Query, Patient Pertinent Pharmacy Findings: 
Updated patient?s preferred outpatient pharmacy to: [facility]/pharmacy #[id number]- [address] Patient manages his own medication however appears confused at times. PTA Med List updated based on the information obtained by the patient Nurse stated that the patient's son was there in the room with the patient but during the time of the interview the patient was alone until his girlfriend appeared in the room towards the end of the interview. PTA medication list was corrected to the following:  
 
Prior to Admission Medications Prescriptions Last Dose Informant Patient Reported? Taking? SYNTHROID 125 mcg tablet 4/27/2019 at Unknown time Self No Yes Sig: Take 1 Tab by mouth Daily (before breakfast). amLODIPine (NORVASC) 5 mg tablet 4/27/2019 at Unknown time Self Yes Yes Sig: Take 5 mg by mouth daily. aspirin 81 mg tablet 4/26/2019 at Unknown time Self Yes Yes Sig: Take 81 mg by mouth. brimonidine (ALPHAGAN) 0.15 % ophthalmic solution 4/27/2019 at Unknown time Self Yes Yes Sig: Administer 1 Drop to both eyes two (2) times a day. clopidogrel (PLAVIX) 75 mg tablet 4/27/2019 at Unknown time Self Yes Yes Sig: Take 75 mg by mouth daily. [facility]) 625 mg tablet 4/27/2019 at Unknown time Self Yes Yes Sig: Take 625 mg by mouth two (2) times daily (with meals). cyanocobalamin (VITAMIN B-12) 1,000 mcg tablet 4/27/2019 at Unknown time Self Yes Yes Sig: Take 1,000 mcg by mouth daily. dorzolamide-timolol (COSOPT) 22.3-6.8 mg/mL ophthalmic solution 4/27/2019 at Unknown time Self Yes Yes Sig: Administer 1 Drop to both eyes daily. dutasteride (AVODART) 0.5 mg capsule 4/27/2019 at Unknown time Self Yes Yes Sig: Take 0.5 mg by mouth daily. gabapentin (NEURONTIN) 300 mg capsule 4/26/2019 at Unknown time Self No Yes Sig: Take 1 Cap by mouth nightly. irbesartan (AVAPRO) 75 mg tablet 4/26/2019 at Unknown time Self Yes Yes Sig: Take 150 mg by mouth nightly. latanoprost (XALATAN) 0.005 % ophthalmic solution 4/26/2019 at Unknown time Self Yes Yes Sig: Administer 1 Drop to both eyes nightly. lovastatin (MEVACOR) 40 mg tablet 4/27/2019 at Unknown time Self Yes Yes Sig: Take 40 mg by mouth nightly. meclizine (ANTIVERT) 25 mg tablet 4/26/2019 at Unknown time Self No Yes Sig: Take 1 Tab by mouth three (3) times daily as needed for Dizziness. naproxen sodium (ALEVE) 220 mg cap 4/13/2019 at Unknown time Self Yes Yes Sig: Take 440 mg by mouth daily as needed for Pain.  
tamsulosin (FLOMAX) 0.4 mg capsule 4/26/2019 at Unknown time Self Yes Yes Sig: Take 0.4 mg by mouth two (2) times a day. Facility-Administered Medications: None Thank you, Hannah Lopez CPhT Medication History Pharmacy Technician

## 2019-04-27 NOTE — ROUTINE PROCESS
Bedside and Verbal shift change report given to Jhonatan Beltran RN (oncoming nurse) by Jayla Moran RN (offgoing nurse). Report included the following information SBAR, Recent Results and Cardiac Rhythm SR/SB 1ºAVB, BBB.

## 2019-04-27 NOTE — ED NOTES
Pt stood to void; clear yellow urine in urinal 
Pt did not complain of pain nor did he have any unsteady gait with standing

## 2019-04-27 NOTE — PROGRESS NOTES
Pt is in the process of going to MRI and then will be transferring up from ED to neuro unit. Will defer and continue to follow.

## 2019-04-27 NOTE — H&P
Hospitalist Admission NoteNAME: Sadi Buckley :  1928 MRN:  [id number] Date/Time:  2019 1:30 PM 
 
Patient PCP: Macky Kussmaul, MD 
______________________________________________________________________ Given the patient's current clinical presentation, I have a high level of concern for decompensation if discharged from the emergency department. Complex decision making was performed, which includes reviewing the patient's available past medical records, laboratory results, and x-ray films. My assessment of this patient's clinical condition and my plan of care is as follows. Assessment / Plan: 
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  
-presenting symptoms: incomprehensible speech, weakness with all exts, loss of balance 
-head CT neg, CTA head/neck showed developmentally small vertebral basilar system was superimposed significant atherosclerotic disease and stenosis involving primarily distal vertebral arteries and basilar artery. Approximately 50% stenosis proximal right internal carotid artery and 40%. Stenosis proximal left internal carotid artery by NASCET criteria. Chronic cervical spondylosis and stenosis. No acute arterial abnormality demonstrated. 
-allow permissive HTN today with prn IV labetalol 
-obtain MRI head, Echo 
-check A1C, lipid, TSH 
-cont' plavix/asa, statin for now 
-discussed with in house neuro, consult placed -PT/OT consultation UTI 
-empiric rocephin, f/u w ucx HTN 
-resume home meds in the AM.  Permissive HTN today Idiopathic polyneuropathy 
-cont' gabapentin BPH 
-cont' avodart, flomax Hypothyroidism 
-cont' synthroid Hx of skin ca 
-completed chemo per pt Code Status: DNR Surrogate Decision Maker: pt's son DVT Prophylaxis: heparin GI Prophylaxis: not indicated [facility]: [facility] Subjective: CHIEF COMPLAINT: weakness and garbled speech HISTORY OF PRESENT ILLNESS:    
Isra Palacios is a 80 y.o.  male with PMHx significant for CVA, CAD s/p CABG, HLD, HTN, vertigo with known vertebrobasilar insufficiency,CVA, present to the ER for evaluation of sudden onset of b/l upper and lower ext weakness and incomprehensible speech. Symptoms occurred at ~9AM when pt was on the phone with his friend. He started feeling weakness and was not able to control his tongue. Friend said his speech was garbled. Symptoms lasted about 5 mins per friend as pt called her back within 5 mins and speech was back to normal.  Pt then called staffs which recommended him to come to the ER for evaluation. In the ER, pt was able to move all exts. He states his lower exts feels heavy and feels off balanced. He denies association to confusion, numbness/tingling, dysphagia, dizziness. In the ER, head CT neg, CTA head/neck showed developmentally small vertebral basilar system was superimposed significant atherosclerotic disease and stenosis involving primarily distal vertebral 
arteries and basilar artery. Approximately 50% stenosis proximal right internal carotid artery and 40%. Stenosis proximal left internal carotid artery by NASCET criteria. Chronic cervical spondylosis and stenosis. No acute arterial abnormality demonstrated. Vitals/labs reviewed with patient We were asked to admit for work up and evaluation of the above problems. Past Medical History:  
Diagnosis Date  CAD (coronary artery disease)  Calculus of kidney  Cancer [facility]) prostate  1996  
 Hearing loss  Heart attack ([facility] [address]  Hypercholesterolemia  Hypertension  Incisional hernia 5/10/2011  Movement disorder  Other ill-defined conditions(799.89)   
 elevated cholesterol  Other ill-defined conditions(799.89)   
 glaucoma  Other ill-defined conditions(799.89)   
 abd hernia  Stroke ([address] left arm tingling  Thyroid disease  Thyroid mass 5/10/2011 Past Surgical History:  
Procedure Laterality Date [address] [facility]  
 cabg x3  
 HX AORTIC VALVE REPLACEMENT    HX HEENT    
 thyroid biopsy  HX HEENT    
 thyroidectomy   HX ORCHIECTOMY  2017  HX ORTHOPAEDIC  2011  
 compression fracture d.t. fall (back),no surgery  HX THYROIDECTOMY  1/3/2013  MT PROSTATE BIOPSY, NEEDLE, SATURATION SAMPLING Social History Tobacco Use  Smoking status: Former Smoker Packs/day: 0.50 Years: 3.00 Pack years: 1.50 Last attempt to quit: 1958 Years since quittin.3  Smokeless tobacco: Never Used Substance Use Topics  Alcohol use: No  
  
 
Family History Problem Relation Age of Onset  Cancer Mother   
     stomach  
 Heart Disease Sister  Heart Disease Father  Diabetes Son   
 Heart Disease Son  Anesth Problems Neg Hx No Known Allergies Prior to Admission medications Medication Sig Start Date End Date Taking? Authorizing Provider  
[facility]) 625 mg tablet Take 625 mg by mouth two (2) times daily (with meals). Yes Other, MD Erick  
naproxen sodium (ALEVE) 220 mg cap Take 440 mg by mouth daily as needed for Pain. Yes Provider, Historical  
gabapentin (NEURONTIN) 300 mg capsule Take 1 Cap by mouth nightly. 19  Yes Luiza Esquivel MD  
meclizine (ANTIVERT) 25 mg tablet Take 1 Tab by mouth three (3) times daily as needed for Dizziness. 18  Yes Karis Soto DO  
lovastatin (MEVACOR) 40 mg tablet Take 40 mg by mouth nightly. Yes Provider, Historical  
brimonidine (ALPHAGAN) 0.15 % ophthalmic solution Administer 1 Drop to both eyes two (2) times a day. Yes Provider, Historical  
SYNTHROID 125 mcg tablet Take 1 Tab by mouth Daily (before breakfast).  3/13/13  Yes Jes Clemente MD  
 dutasteride (AVODART) 0.5 mg capsule Take 0.5 mg by mouth daily. Yes Provider, Historical  
cyanocobalamin (VITAMIN B-12) 1,000 mcg tablet Take 1,000 mcg by mouth daily. Yes Provider, Historical  
amLODIPine (NORVASC) 5 mg tablet Take 5 mg by mouth daily. Yes Provider, Historical  
clopidogrel (PLAVIX) 75 mg tablet Take 75 mg by mouth daily. Yes Provider, Historical  
irbesartan (AVAPRO) 75 mg tablet Take 150 mg by mouth nightly. Yes Provider, Historical  
tamsulosin (FLOMAX) 0.4 mg capsule Take 0.4 mg by mouth two (2) times a day. Yes Provider, Historical  
aspirin 81 mg tablet Take 81 mg by mouth. Yes Provider, Historical  
dorzolamide-timolol (COSOPT) 22.3-6.8 mg/mL ophthalmic solution Administer 1 Drop to both eyes daily. Yes Provider, Historical  
latanoprost (XALATAN) 0.005 % ophthalmic solution Administer 1 Drop to both eyes nightly. Yes Provider, Historical  
 
 
REVIEW OF SYSTEMS:    
I am not able to complete the review of systems because: The patient is intubated and sedated The patient has altered mental status due to his acute medical problems The patient has baseline aphasia from prior stroke(s) The patient has baseline dementia and is not reliable historian The patient is in acute medical distress and unable to provide information Total of 12 systems reviewed as follows:   
   POSITIVE= BOLD text  Negative = text not BOLD General:  fever, chills, sweats, generalized weakness, weight loss/gain,  
   loss of appetite Eyes:    blurred vision, eye pain, loss of vision, double vision ENT:    rhinorrhea, pharyngitis Respiratory:   cough, sputum production, SOB, ENG, wheezing, pleuritic pain  
Cardiology:   chest pain, palpitations, orthopnea, PND, edema, syncope Gastrointestinal:  abdominal pain , N/V, diarrhea, dysphagia, constipation, bleeding Genitourinary:  frequency, urgency, dysuria, hematuria, incontinence Muskuloskeletal :  arthralgia, myalgia, back pain Hematology:  easy bruising, nose or gum bleeding, lymphadenopathy Dermatological: rash, ulceration, pruritis, color change / jaundice Endocrine:   hot flashes or polydipsia Neurological:  headache, dizziness, confusion, b/l upper and lower exts weakness, paresthesia, Speech difficulties, memory loss, gait difficulty Psychological: Feelings of anxiety, depression, agitation Objective: VITALS:   
Visit Vitals /68 Pulse 62 Temp 98.2 °F (36.8 °C) Resp 17 Ht 5' 11\" (1.803 m) Wt 93.5 kg (206 lb 2.1 oz) SpO2 96% BMI 28.75 kg/m² PHYSICAL EXAM: 
 
General:    Alert, cooperative, no distress, appears stated age. HEENT: Atraumatic, anicteric sclerae, pink conjunctivae No oral ulcers, mucosa moist, throat clear Neck:  Supple, symmetrical,  thyroid: non tender Lungs:   CTA b/l. No wheezing or Rhonchi. No rales. Chest wall:  No tenderness. No accessory muscle use. Heart:   Regular  rhythm,  No  Murmur. No edema Abdomen:   Soft, NT. ND  BS+ Extremities: No cyanosis. No clubbing,   
  Skin turgor normal, Radial dial pulse 2+. Capillary refill normal 
Skin:     Not pale. Not Jaundiced  No rashes Psych:  Not depressed. Not anxious or agitated. Neurologic: No facial asymmetry. No aphasia or slurred speech. Symmetrical strength 4/5 in LE, Sensation grossly intact. AAOx4.  
 
_______________________________________________________________________ Care Plan discussed with: 
  Comments Patient x Family RN x Care Manager Consultant:  [facility] ED physician  
_______________________________________________________________________ Expected  Disposition:  
Home with Family HH/PT/OT/RN x  
SNF/LTC   
[facility]   
________________________________________________________________________ TOTAL TIME:  65 Minutes Critical Care Provided     Minutes non procedure based Comments x Reviewed previous records  
>50% of visit spent in counseling and coordination of care x Discussion with patient and/or family and questions answered 
  
 
________________________________________________________________________ Signed: Jodi Terrell MD 
 
Procedures: see electronic medical records for all procedures/Xrays and details which were not copied into this note but were reviewed prior to creation of Plan. LAB DATA REVIEWED:   
Recent Results (from the past 24 hour(s)) CBC WITH AUTOMATED DIFF Collection Time: 04/27/19 10:33 AM  
Result Value Ref Range WBC 6.7 4.1 - 11.1 K/uL  
 RBC 4.42 4.10 - 5.70 M/uL  
 HGB 13.5 12.1 - 17.0 g/dL HCT 41.4 36.6 - 50.3 % MCV 93.7 80.0 - 99.0 FL  
 MCH 30.5 26.0 - 34.0 PG  
 MCHC 32.6 30.0 - 36.5 g/dL  
 RDW 12.5 11.5 - 14.5 % PLATELET 102 394 - 250 K/uL MPV 8.8 (L) 8.9 - 12.9 FL  
 NRBC 0.0 0  WBC ABSOLUTE NRBC 0.00 0.00 - 0.01 K/uL NEUTROPHILS 59 32 - 75 % LYMPHOCYTES 28 12 - 49 % MONOCYTES 9 5 - 13 % EOSINOPHILS 3 0 - 7 % BASOPHILS 1 0 - 1 % IMMATURE GRANULOCYTES 0 0.0 - 0.5 % ABS. NEUTROPHILS 4.0 1.8 - 8.0 K/UL  
 ABS. LYMPHOCYTES 1.9 0.8 - 3.5 K/UL  
 ABS. MONOCYTES 0.6 0.0 - 1.0 K/UL  
 ABS. EOSINOPHILS 0.2 0.0 - 0.4 K/UL  
 ABS. BASOPHILS 0.0 0.0 - 0.1 K/UL  
 ABS. IMM. GRANS. 0.0 0.00 - 0.04 K/UL  
 DF AUTOMATED METABOLIC PANEL, COMPREHENSIVE Collection Time: 04/27/19 10:33 AM  
Result Value Ref Range Sodium 140 136 - 145 mmol/L Potassium 4.1 3.5 - 5.1 mmol/L Chloride 107 97 - 108 mmol/L  
 CO2 32 21 - 32 mmol/L Anion gap 1 (L) 5 - 15 mmol/L Glucose 111 (H) 65 - 100 mg/dL BUN 13 6 - 20 MG/DL Creatinine 0.78 0.70 - 1.30 MG/DL  
 BUN/Creatinine ratio 17 12 - 20 GFR est AA >60 >60 ml/min/1.73m2 GFR est non-AA >60 >60 ml/min/1.73m2 Calcium 8.7 8.5 - 10.1 MG/DL  Bilirubin, total 0.6 0.2 - 1.0 MG/DL  
 ALT (SGPT) 16 12 - 78 U/L  
 AST (SGOT) 13 (L) 15 - 37 U/L  
 Alk. phosphatase 64 45 - 117 U/L Protein, total 7.0 6.4 - 8.2 g/dL Albumin 3.5 3.5 - 5.0 g/dL Globulin 3.5 2.0 - 4.0 g/dL A-G Ratio 1.0 (L) 1.1 - 2.2 SAMPLES BEING HELD Collection Time: 04/27/19 10:33 AM  
Result Value Ref Range SAMPLES BEING HELD 1BLUE 1RED COMMENT Add-on orders for these samples will be processed based on acceptable specimen integrity and analyte stability, which may vary by analyte. EKG, 12 LEAD, INITIAL Collection Time: 04/27/19 10:37 AM  
Result Value Ref Range Ventricular Rate 52 BPM  
 Atrial Rate 52 BPM  
 P-R Interval 222 ms QRS Duration 156 ms  
 Q-T Interval 492 ms QTC Calculation (Bezet) 457 ms Calculated P Axis 11 degrees Calculated R Axis -42 degrees Calculated T Axis 144 degrees Diagnosis Sinus bradycardia with 1st degree AV block with occasional premature  
ventricular complexes Left axis deviation Left bundle branch block When compared with ECG of 11-DEC-2018 08:44, 
premature ventricular complexes are now present T wave inversion more evident in Lateral leads URINALYSIS W/ REFLEX CULTURE Collection Time: 04/27/19 11:00 AM  
Result Value Ref Range Color YELLOW/STRAW Appearance CLEAR CLEAR Specific gravity 1.006 1.003 - 1.030    
 pH (UA) 7.0 5.0 - 8.0 Protein NEGATIVE  NEG mg/dL Glucose NEGATIVE  NEG mg/dL Ketone NEGATIVE  NEG mg/dL Bilirubin NEGATIVE  NEG Blood NEGATIVE  NEG Urobilinogen 0.2 0.2 - 1.0 EU/dL Nitrites NEGATIVE  NEG Leukocyte Esterase LARGE (A) NEG    
 WBC 20-50 0 - 4 /hpf  
 RBC 0-5 0 - 5 /hpf Epithelial cells FEW FEW /lpf Bacteria 1+ (A) NEG /hpf  
 UA:UC IF INDICATED URINE CULTURE ORDERED (A) CNI Hyaline cast 0-2 0 - 5 /lpf  
GLUCOSE, POC Collection Time: 04/27/19 11:03 AM  
Result Value Ref Range Glucose (POC) 124 (H) 65 - 100 mg/dL Performed by Lars Shield (PCT)

## 2019-04-27 NOTE — ACP (ADVANCE CARE PLANNING)
Advance Care Planning Note Name: Jayla Vera YOB: 1928 MRN: [id number] Admission Date: 4/27/2019 10:09 AM 
 
Date of discussion: 4/27/2019 Active Diagnoses: 
 
Hospital Problems  Date Reviewed: 1/11/2019 Codes Class Noted POA  
 CVA (cerebral vascular accident) ([facility] [address] ICD-10-CM: I63.9 ICD-9-CM: 434.91  4/27/2019 Unknown CAD s/p CABG Hx of vertebral basilar insufficiency Skin CA These active diagnoses are of sufficient risk that focused discussion on advance care planning is indicated in order to allow the patient to thoughtfully consider personal goals of care, and if situations arise that prevent the ability to personally give input, to ensure appropriate representation of their personal desires for different levels and aggressiveness of care. Persons present and participating in discussion: patient Isra Palacios who is AAOx4 during the time of this discussion. Patient wants to remain a DNR. He has a living will at home that states he does not want to be resuscitated if there was a cardiac/pulmonary event.   
  
Discussion: Code status addressed due to above mentioned medical problems and also his chronic comorbidity /advance age and pt wish to be a DNR. Time Spent:  
Total time spent face-to-face in education and discussion:16 minutes. Facundo Mc MD 
4/27/2019 
1:45 PM

## 2019-04-27 NOTE — ED PROVIDER NOTES
EMERGENCY DEPARTMENT HISTORY AND PHYSICAL EXAM 
 
 
Date: 4/27/2019 Patient Name: Isabel Mcarthur History of Presenting Illness Chief Complaint Patient presents with  Extremity Weakness  
  arms and legs for a week off and on; about hour ago talking on phone couldn't move arms and slurred speech; returned to normal now History Provided By: Patient and Patient's Son HPI: Isabel Mcarthur, 80 y.o. male with PMHx significant for hypertension, hyperlipidemia, recurrent vertigo with known vertebrobasilar insufficiency, presents via EMS to the ED with cc of episode of weakness and slurred speech, now resolved. Patient states that approximately 1 hour prior to arrival he was having difficulty moving his bilateral arms and legs and trouble forming words. Son spoke with him on the phone and noted his speech to be quite slurred. Patient does have episodes where he has weakness in his arms and legs and has occasional episodes of slurred speech, however this was reportedly much more severe than normal.  The patient's son and patient were both report that he is now back to normal.  Patient does note some mild left-sided neck pain, however thinks he slept on it wrong causing his symptoms. Patient denies any chest pain, shortness of breath, nausea, vomiting, abdominal pain, dysuria. Patient did just complete radiation to the RLE for melanoma. Last tx 3 wks ago. PCP: El Silva MD 
 
There are no other complaints, changes, or physical findings at this time. Current Facility-Administered Medications Medication Dose Route Frequency Provider Last Rate Last Dose  sodium chloride (NS) flush 10 mL  10 mL IntraVENous RAD ONCE Yokasta Longoria MD      
 iopamidol (ISOVUE-370) 76 % injection 100 mL  100 mL IntraVENous RAD ONCE Yokasta Longoria MD      
 sodium chloride (NS) flush 5-40 mL  5-40 mL IntraVENous Q8H Jaya Flores MD      
  sodium chloride (NS) flush 5-40 mL  5-40 mL IntraVENous PRN Olman Hoyt MD      
 acetaminophen (TYLENOL) tablet 650 mg  650 mg Oral Q4H PRN Olman Hoyt MD      
 Or  
 acetaminophen (TYLENOL) solution 650 mg  650 mg Per NG tube Q4H PRN Olman Hoyt MD      
 Or  
 acetaminophen (TYLENOL) suppository 650 mg  650 mg Rectal Q4H PRN Olman Hoyt MD      
 [START ON 4/28/2019] aspirin chewable tablet 81 mg  81 mg Oral DAILY Olman Hoyt MD      
 atorvastatin (LIPITOR) tablet 40 mg  40 mg Oral QHS Olman Hoyt MD      
 labetalol (NORMODYNE;TRANDATE) injection 5 mg  5 mg IntraVENous Q10MIN PRN Olman Hoyt MD      
 polyethylene glycol (MIRALAX) packet 17 g  17 g Oral DAILY PRN Olman Hoyt MD      
 heparin (porcine) injection 5,000 Units  5,000 Units SubCUTAneous Q8H Olman Hoyt MD      
 [START ON 4/28/2019] amLODIPine (NORVASC) tablet 5 mg  5 mg Oral DAILY Olman Hoyt MD      
 [START ON 4/28/2019] dutasteride (AVODART) capsule 0.5 mg  0.5 mg Oral DAILY Olman Hoyt MD      
 [START ON 4/28/2019] dorzolamide-timolol (COSOPT) 22.3-6.8 mg/mL ophthalmic solution 1 Drop  1 Drop Both Eyes DAILY Olman Hoyt MD      
 [facility]) tablet 625 mg  625 mg Oral BID WITH MEALS Olman Hoyt MD      
 [START ON 4/28/2019] cyanocobalamin (VITAMIN B12) tablet 1,000 mcg  1,000 mcg Oral DAILY Olman Hoyt MD      
 gabapentin (NEURONTIN) capsule 300 mg  300 mg Oral QHS Olman Hoyt MD      
 [START ON 4/28/2019] irbesartan (AVAPRO) tablet 150 mg  150 mg Oral DAILY Olman Hoyt MD      
 latanoprost (XALATAN) 0.005 % ophthalmic solution 1 Drop  1 Drop Both Eyes QHS Olman Hoyt MD      
 meclizine (ANTIVERT) tablet 25 mg  25 mg Oral TID PRN Olman Hoyt MD      
 [START ON 4/28/2019] levothyroxine (SYNTHROID) tablet 125 mcg  125 mcg Oral ACB Olman Hoyt MD      
 tamsulosin (FLOMAX) capsule 0.4 mg  0.4 mg Oral BID Olman Hoyt MD      
 
Current Outpatient Medications Medication Sig Dispense Refill  colesevelam (WELCHOL) 625 mg tablet Take 625 mg by mouth two (2) times daily (with meals).  naproxen sodium (ALEVE) 220 mg cap Take 440 mg by mouth daily as needed for Pain.  gabapentin (NEURONTIN) 300 mg capsule Take 1 Cap by mouth nightly. 100 Cap 11  
 meclizine (ANTIVERT) 25 mg tablet Take 1 Tab by mouth three (3) times daily as needed for Dizziness. 20 Tab 0  
 lovastatin (MEVACOR) 40 mg tablet Take 40 mg by mouth nightly.  brimonidine (ALPHAGAN) 0.15 % ophthalmic solution Administer 1 Drop to both eyes two (2) times a day.  SYNTHROID 125 mcg tablet Take 1 Tab by mouth Daily (before breakfast). 90 Tab 4  
 dutasteride (AVODART) 0.5 mg capsule Take 0.5 mg by mouth daily.  cyanocobalamin (VITAMIN B-12) 1,000 mcg tablet Take 1,000 mcg by mouth daily.  amLODIPine (NORVASC) 5 mg tablet Take 5 mg by mouth daily.  clopidogrel (PLAVIX) 75 mg tablet Take 75 mg by mouth daily.  irbesartan (AVAPRO) 75 mg tablet Take 150 mg by mouth nightly.  tamsulosin (FLOMAX) 0.4 mg capsule Take 0.4 mg by mouth two (2) times a day.  aspirin 81 mg tablet Take 81 mg by mouth.  dorzolamide-timolol (COSOPT) 22.3-6.8 mg/mL ophthalmic solution Administer 1 Drop to both eyes daily.  latanoprost (XALATAN) 0.005 % ophthalmic solution Administer 1 Drop to both eyes nightly. Past History Past Medical History: 
Past Medical History:  
Diagnosis Date  CAD (coronary artery disease)  Calculus of kidney  Cancer [facility]) prostate  1996  
 Hearing loss  Heart attack ([facility] [address]  Hypercholesterolemia  Hypertension  Incisional hernia 5/10/2011  Movement disorder  Other ill-defined conditions(799.89)   
 elevated cholesterol  Other ill-defined conditions(799.89)   
 glaucoma  Other ill-defined conditions(799.89)   
 abd hernia  Stroke ([facility] [address]   
 left arm tingling  Thyroid disease  Thyroid mass 5/10/2011 Past Surgical History: Past Surgical History:  
Procedure Laterality Date [facility]  
 cabg x3  
 HX AORTIC VALVE REPLACEMENT    HX HEENT    
 thyroid biopsy  HX HEENT    
 thyroidectomy   HX ORCHIECTOMY  2017  HX ORTHOPAEDIC  2011  
 compression fracture d.t. fall (back),no surgery  HX THYROIDECTOMY  1/3/2013  SD PROSTATE BIOPSY, NEEDLE, SATURATION SAMPLING Family History: 
Family History Problem Relation Age of Onset  Cancer Mother   
     stomach  
 Heart Disease Sister  Heart Disease Father  Diabetes Son   
 Heart Disease Son  Anesth Problems Neg Hx Social History: 
Social History Tobacco Use  Smoking status: Former Smoker Packs/day: 0.50 Years: 3.00 Pack years: 1.50 Last attempt to quit: 1958 Years since quittin.3  Smokeless tobacco: Never Used Substance Use Topics  Alcohol use: No  
 Drug use: No  
 
Allergies: 
No Known Allergies Review of Systems Review of Systems Constitutional: Negative for chills and fever. HENT: Negative for congestion, rhinorrhea and sore throat. Respiratory: Negative for cough and shortness of breath. Cardiovascular: Negative for chest pain. Gastrointestinal: Negative for abdominal pain, nausea and vomiting. Genitourinary: Negative for dysuria and urgency. Skin: Negative for rash. Neurological: Positive for speech difficulty and weakness. Negative for dizziness, light-headedness and headaches. All other systems reviewed and are negative. Physical Exam  
Physical Exam  
Constitutional: He is oriented to person, place, and time. He appears well-developed and well-nourished. No distress. HENT:  
Head: Normocephalic and atraumatic. Eyes: Pupils are equal, round, and reactive to light. Conjunctivae and EOM are normal.  
Neck: Normal range of motion. Cardiovascular: Normal rate, regular rhythm and intact distal pulses. Pulmonary/Chest: Effort normal and breath sounds normal. No stridor. No respiratory distress. Abdominal: Soft. He exhibits no distension. There is no tenderness. Musculoskeletal: Normal range of motion. Neurological: He is alert and oriented to person, place, and time. No cranial nerve deficit or sensory deficit. GCS eye subscore is 4. GCS verbal subscore is 5. GCS motor subscore is 6.  
4/5 strength b/l LE No pronator drift, normal finger to nose Skin: Skin is warm and dry. Psychiatric: He has a normal mood and affect. Nursing note and vitals reviewed. Diagnostic Study Results Labs - Recent Results (from the past 12 hour(s)) CBC WITH AUTOMATED DIFF Collection Time: 04/27/19 10:33 AM  
Result Value Ref Range WBC 6.7 4.1 - 11.1 K/uL  
 RBC 4.42 4.10 - 5.70 M/uL  
 HGB 13.5 12.1 - 17.0 g/dL HCT 41.4 36.6 - 50.3 % MCV 93.7 80.0 - 99.0 FL  
 MCH 30.5 26.0 - 34.0 PG  
 MCHC 32.6 30.0 - 36.5 g/dL  
 RDW 12.5 11.5 - 14.5 % PLATELET 360 541 - 568 K/uL MPV 8.8 (L) 8.9 - 12.9 FL  
 NRBC 0.0 0  WBC ABSOLUTE NRBC 0.00 0.00 - 0.01 K/uL NEUTROPHILS 59 32 - 75 % LYMPHOCYTES 28 12 - 49 % MONOCYTES 9 5 - 13 % EOSINOPHILS 3 0 - 7 % BASOPHILS 1 0 - 1 % IMMATURE GRANULOCYTES 0 0.0 - 0.5 % ABS. NEUTROPHILS 4.0 1.8 - 8.0 K/UL  
 ABS. LYMPHOCYTES 1.9 0.8 - 3.5 K/UL  
 ABS. MONOCYTES 0.6 0.0 - 1.0 K/UL  
 ABS. EOSINOPHILS 0.2 0.0 - 0.4 K/UL  
 ABS. BASOPHILS 0.0 0.0 - 0.1 K/UL  
 ABS. IMM. GRANS. 0.0 0.00 - 0.04 K/UL  
 DF AUTOMATED METABOLIC PANEL, COMPREHENSIVE Collection Time: 04/27/19 10:33 AM  
Result Value Ref Range Sodium 140 136 - 145 mmol/L Potassium 4.1 3.5 - 5.1 mmol/L Chloride 107 97 - 108 mmol/L  
 CO2 32 21 - 32 mmol/L Anion gap 1 (L) 5 - 15 mmol/L Glucose 111 (H) 65 - 100 mg/dL BUN 13 6 - 20 MG/DL Creatinine 0.78 0.70 - 1.30 MG/DL  
 BUN/Creatinine ratio 17 12 - 20 GFR est AA >60 >60 ml/min/1.73m2 GFR est non-AA >60 >60 ml/min/1.73m2 Calcium 8.7 8.5 - 10.1 MG/DL Bilirubin, total 0.6 0.2 - 1.0 MG/DL  
 ALT (SGPT) 16 12 - 78 U/L  
 AST (SGOT) 13 (L) 15 - 37 U/L Alk. phosphatase 64 45 - 117 U/L Protein, total 7.0 6.4 - 8.2 g/dL Albumin 3.5 3.5 - 5.0 g/dL Globulin 3.5 2.0 - 4.0 g/dL A-G Ratio 1.0 (L) 1.1 - 2.2 SAMPLES BEING HELD Collection Time: 04/27/19 10:33 AM  
Result Value Ref Range SAMPLES BEING HELD 1BLUE 1RED COMMENT Add-on orders for these samples will be processed based on acceptable specimen integrity and analyte stability, which may vary by analyte. EKG, 12 LEAD, INITIAL Collection Time: 04/27/19 10:37 AM  
Result Value Ref Range Ventricular Rate 52 BPM  
 Atrial Rate 52 BPM  
 P-R Interval 222 ms QRS Duration 156 ms  
 Q-T Interval 492 ms QTC Calculation (Bezet) 457 ms Calculated P Axis 11 degrees Calculated R Axis -42 degrees Calculated T Axis 144 degrees Diagnosis Sinus bradycardia with 1st degree AV block with occasional premature  
ventricular complexes Left axis deviation Left bundle branch block When compared with ECG of 11-DEC-2018 08:44, 
premature ventricular complexes are now present T wave inversion more evident in Lateral leads URINALYSIS W/ REFLEX CULTURE Collection Time: 04/27/19 11:00 AM  
Result Value Ref Range Color YELLOW/STRAW Appearance CLEAR CLEAR Specific gravity 1.006 1.003 - 1.030    
 pH (UA) 7.0 5.0 - 8.0 Protein NEGATIVE  NEG mg/dL Glucose NEGATIVE  NEG mg/dL Ketone NEGATIVE  NEG mg/dL Bilirubin NEGATIVE  NEG Blood NEGATIVE  NEG Urobilinogen 0.2 0.2 - 1.0 EU/dL Nitrites NEGATIVE  NEG Leukocyte Esterase LARGE (A) NEG    
 WBC 20-50 0 - 4 /hpf  
 RBC 0-5 0 - 5 /hpf Epithelial cells FEW FEW /lpf Bacteria 1+ (A) NEG /hpf  
 UA:UC IF INDICATED URINE CULTURE ORDERED (A) CNI  Hyaline cast 0-2 0 - 5 /lpf  
GLUCOSE, POC  
 Collection Time: 04/27/19 11:03 AM  
Result Value Ref Range Glucose (POC) 124 (H) 65 - 100 mg/dL Performed by [facility] Radiologic Studies -  
CTA HEAD NECK W CONT Final Result IMPRESSION:  
1. Developmentally small vertebral basilar system was superimposed significant  
atherosclerotic disease and stenosis involving primarily distal vertebral  
arteries and basilar artery. 2. Approximately 50% stenosis proximal right internal carotid artery and 40%  
stenosis proximal left internal carotid artery by NASCET criteria. 3. Chronic cervical spondylosis and stenosis. 4. No acute arterial abnormality demonstrated. CT HEAD WO CONT Final Result IMPRESSION: No acute finding or significant change. MRI BRAIN WO CONT    (Results Pending) Ct Head Wo Cont Result Date: 4/27/2019 IMPRESSION: No acute finding or significant change. Cta Head Neck W Cont Result Date: 4/27/2019 IMPRESSION: 1. Developmentally small vertebral basilar system was superimposed significant atherosclerotic disease and stenosis involving primarily distal vertebral arteries and basilar artery. 2. Approximately 50% stenosis proximal right internal carotid artery and 40% stenosis proximal left internal carotid artery by NASCET criteria. 3. Chronic cervical spondylosis and stenosis. 4. No acute arterial abnormality demonstrated. Medical Decision Making I am the first provider for this patient. I reviewed the vital signs, available nursing notes, past medical history, past surgical history, family history and social history. Vital Signs-Reviewed the patient's vital signs. Patient Vitals for the past 12 hrs: 
 Temp Pulse Resp BP SpO2  
04/27/19 1300 97.7 °F (36.5 °C) 62 17 151/68 96 % 04/27/19 1224  66 16 176/71 97 % 04/27/19 1145  (!) 59 17  94 % 04/27/19 1105  60 17 151/53 95 % 04/27/19 1017 98.2 °F (36.8 °C) (!) 55 14 140/46 96 % Pulse Oximetry Analysis - 96% on ra Cardiac Monitor:  
Rate: 60 bpm 
Rhythm: Normal Sinus Rhythm ED EKG interpretation: 
Rhythm: sinus bradycardia; and regular . Rate (approx.): 52; Axis: left axis deviation; P wave: normal; QRS interval: prolonged; ST/T wave: normal; Other findings: First-degree AV block, PVC. This EKG was interpreted by ANJU Williamson MD, ED Provider. Records Reviewed: Nursing Notes and Old Medical Records Provider Notes (Medical Decision Making):  
Patient presents with an episode of slurred speech and bilateral upper and lower extremely weakness in the setting of known vertebrobasilar insufficiency. Differential includes TIA, electrolyte imbalance. Unlikely seizure as patient was aware for the entire episode. Given symptoms we will get CTA of the head and neck, however since symptoms have resolved patient was not made a code neuro. Will check basic lab work, EKG, urinalysis ED Course:  
Initial assessment performed. The patients presenting problems have been discussed, and they are in agreement with the care plan formulated and outlined with them. I have encouraged them to ask questions as they arise throughout their visit. ED Course as of Apr 27 1444 Sat Apr 27, 2019  
1304 Spoke with Dr. Asad Jean, hospitalist, will see patient for admission Anais Rossi ED Course User Index Mg Rivera MD  
 
 
Critical Care: 
none Disposition: 
Admit to the hospitalist 
 
 
Diagnosis Clinical Impression: 1. TIA (transient ischemic attack) This note will not be viewable in [address]

## 2019-04-27 NOTE — PROGRESS NOTES
-Please complete MRI History and Safety Screening Form for this patient using [facility] only under Orders Requiring a Screening Form: 
 

## 2019-04-27 NOTE — CONSULTS
IP CONSULT TO NEUROLOGY Consult performed by: Ephraim Mathew MD 
Consult ordered by: Jovany Goldman MD 
 
 
 
   
NEUROLOGY CONSULT 
NAME Rosie Myers AGE 80 y.o. MRN [id number]  1928 REQUESTING PHYSICIAN: Nicolette Arguelles MD   
 
CHIEF COMPLAINT:  confusion This is a 80 y.o. male with a medical history of cerebral atherosclerosis who presents with acute confusion and lower extremity weakness and unsteady gait. No cranial nerve deficits. ASSESSMENT AND PLAN 1. TIA (transient ischemic attack) Risk stratification. MRI of brain Continue aspirin and Plavix 2. Urinary tract infection Ceftriaxone x 3 days IV fluids 3. Peripheral neuropathy Fall precautions 4. Vertebrobasilar insufficiency Aspirin and plavix and atorvastatin ALLERGIES: 
Patient has no known allergies. Current Facility-Administered Medications Medication Dose Route Frequency  sodium chloride (NS) flush 10 mL  10 mL IntraVENous RAD ONCE  
 iopamidol (ISOVUE-370) 76 % injection 100 mL  100 mL IntraVENous RAD ONCE  
 sodium chloride (NS) flush 5-40 mL  5-40 mL IntraVENous Q8H  
 sodium chloride (NS) flush 5-40 mL  5-40 mL IntraVENous PRN  
 acetaminophen (TYLENOL) tablet 650 mg  650 mg Oral Q4H PRN Or  
 acetaminophen (TYLENOL) solution 650 mg  650 mg Per NG tube Q4H PRN Or  
 acetaminophen (TYLENOL) suppository 650 mg  650 mg Rectal Q4H PRN  
 [START ON 2019] aspirin chewable tablet 81 mg  81 mg Oral DAILY  atorvastatin (LIPITOR) tablet 40 mg  40 mg Oral QHS  labetalol (NORMODYNE;TRANDATE) 20 mg/4 mL (5 mg/mL) injection 5 mg  5 mg IntraVENous Q10MIN PRN  polyethylene glycol (MIRALAX) packet 17 g  17 g Oral DAILY PRN  
 heparin (porcine) injection 5,000 Units  5,000 Units SubCUTAneous Q8H  
 [START ON 2019] amLODIPine (NORVASC) tablet 5 mg  5 mg Oral DAILY  [START ON 2019] dorzolamide-timolol (COSOPT) 22.3-6.8 mg/mL ophthalmic solution 1 Drop  1 Drop Both Eyes DAILY  [facility]) tablet 625 mg  625 mg Oral BID WITH MEALS  
 [START ON 2019] cyanocobalamin (VITAMIN B12) tablet 1,000 mcg  1,000 mcg Oral DAILY  gabapentin (NEURONTIN) capsule 300 mg  300 mg Oral QHS  [START ON 2019] irbesartan (AVAPRO) tablet 150 mg  150 mg Oral DAILY  latanoprost (XALATAN) 0.005 % ophthalmic solution 1 Drop  1 Drop Both Eyes QHS  meclizine (ANTIVERT) tablet 25 mg  25 mg Oral TID PRN  
 [START ON 2019] levothyroxine (SYNTHROID) tablet 125 mcg  125 mcg Oral 6am  
 tamsulosin (FLOMAX) capsule 0.4 mg  0.4 mg Oral BID  [START ON 2019] dutasteride (AVODART) capsule 0.5 mg  0.5 mg Oral DAILY Past Medical History:  
Diagnosis Date  CAD (coronary artery disease)  Calculus of kidney  Cancer [facility]) prostate  1996  
 Hearing loss  Heart attack ([facility] [address]  Hypercholesterolemia  Hypertension  Incisional hernia 5/10/2011  Movement disorder  Other ill-defined conditions(799.89)   
 elevated cholesterol  Other ill-defined conditions(799.89)   
 glaucoma  Other ill-defined conditions(799.89)   
 abd hernia  Stroke ([facility] [address]   
 left arm tingling  Thyroid disease  Thyroid mass 5/10/2011 Social History Tobacco Use  Smoking status: Former Smoker Packs/day: 0.50 Years: 3.00 Pack years: 1.50 Last attempt to quit: 1958 Years since quittin.3  Smokeless tobacco: Never Used Substance Use Topics  Alcohol use: No  
 
 
Family History Problem Relation Age of Onset  Cancer Mother   
     stomach  
 Heart Disease Sister  Heart Disease Father  Diabetes Son   
 Heart Disease Son  Anesth Problems Neg Hx Review of Systems Constitutional: Negative for chills and fever. HENT: Positive for tinnitus. Negative for ear pain. Eyes: Negative for blurred vision, double vision, pain and discharge. Respiratory: Negative for cough, hemoptysis and shortness of breath. Cardiovascular: Negative for chest pain, palpitations, orthopnea and claudication. Gastrointestinal: Negative for constipation, diarrhea, nausea and vomiting. Genitourinary: Negative for flank pain and hematuria. Musculoskeletal: Positive for myalgias. Negative for back pain. Skin: Negative for itching and rash. Neurological: Positive for dizziness, tingling and weakness. Negative for headaches. Endo/Heme/Allergies: Negative for environmental allergies. Does not bruise/bleed easily. Psychiatric/Behavioral: Negative for depression and hallucinations. The patient is not nervous/anxious. Visit Vitals /56 (BP 1 Location: Left arm, BP Patient Position: At rest) Pulse (!) 58 Temp 97.5 °F (36.4 °C) Resp 16 Ht 5' 11\" (1.803 m) Wt 206 lb 2.1 oz (93.5 kg) SpO2 97% BMI 28.75 kg/m² General: Well developed, well nourished. Patient in no apparent distress Head: Normocephalic, atraumatic, anicteric sclera Neck Normal ROM, No thyromegally Lungs:  Clear to auscultation bilaterally, No wheezes or rubs Cardiac: Regular rate and rhythm with no murmurs. Abd: Bowel sounds were audible. No tenderness on palpation Ext: No pedal edema Skin: Supple no rash NeurologicExam: 
Mental Status: Alert and oriented to person and place Speech: Fluent no aphasia or dysarthria. Cranial Nerves:  II - XII Intact Motor:  Full and symmetric strength of upper and lower proximal and distal muscles. Normal bulk and tone. Reflexes:   +1/4 over the proximal tendons of the upper and lower extremities. +0/4 over distal tendons. Sensory:   Symmetric distal reduction in sensory perception affecting all modalities. Gait:  Gait is balanced and fluid with normal arm swing. Tremor:   No tremor noted. Cerebellar:  Coordination intact. Neurovascular: No carotid bruits. No JVD REVIEWED IMAGING: 
 
MRI : 
 [place] [facility] [place]/17/17 MRA BRAIN WO CONT Narrative INDICATION: Vertebrobasilar insufficiency with dizziness COMPARISON:  MR brain 10/20/2013, MRA brain 3/22/2007 TECHNIQUE:  MR imaging of the brain was performed with sagittal T1, axial T1, 
T2, FLAIR, GRE, DWI/ADC. 3-D time-of-flight MRA of the brain was performed. Multiplanar reconstructions were obtained. FINDINGS: 
 
MR brain: 
 
Mild generalized cerebral atrophy. There is no acute intra or extra-axial fluid collection. Very mild 
periventricular increased T2/FLAIR signal abnormality which is likely of no 
clinical significance for the patient's age. No other significant brain 
parenchymal abnormality No evidence for acute infarction. Focal susceptibility artifact within the right 
cerebellar hemisphere and right parietal lobe may represent chronic hemosiderin 
deposition The major intracranial vascular flow-voids are patent. Mild mucosal thickening in the ethmoidal air cells and maxillary sinuses MRA HEAD: 
 
Right cavernous internal carotid artery: Patent Left cavernous internal carotid artery: Patent Anterior cerebral arteries: Patent Anterior communicating artery: Patent Right middle cerebral artery: Patent Left middle cerebral artery: Patent Posterior communicating arteries: Patent Posterior cerebral arteries: Patent with bilateral fetal origins Basilar artery: Diminutive in caliber with moderate atherosclerotic disease and 
short segment mid to distal near total occlusion. Distal vertebral arteries: Patent with diminutive right V4 segment MRA may be insensitive for aneurysms measuring less than 3 mm. Impression IMPRESSION: 
 
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 This would be 
better delineated on CT angiography. Fetal origin of bilateral posterior cerebral arteries. No evidence for acute infarction. Please refer to above findings for complete details. 23X CT: 
Results from Hospital Encounter encounter on 04/27/19 CT HEAD WO CONT Narrative EXAM: CT HEAD WO CONT INDICATION: TIA 
 
COMPARISON: 4/6/2007. CONTRAST: None. TECHNIQUE: Unenhanced CT of the head was performed using 5 mm images. Brain and 
bone windows were generated. CT dose reduction was achieved through use of a 
standardized protocol tailored for this examination and automatic exposure 
control for dose modulation. FINDINGS: 
The ventricles and sulci are normal in size, shape and configuration and 
midline. There is no significant white matter disease. There is no intracranial 
hemorrhage, extra-axial collection, mass, mass effect or midline shift. The 
basilar cisterns are open. No acute infarct is identified. The bone windows 
demonstrate no abnormalities. The visualized portions of the paranasal sinuses 
and mastoid air cells are clear. Impression IMPRESSION: No acute finding or significant change. REVIEWED LABS: 
Lab Results Component Value Date/Time WBC 6.7 04/27/2019 10:33 AM  
 HCT 41.4 04/27/2019 10:33 AM  
 HGB 13.5 04/27/2019 10:33 AM  
 PLATELET 632 42/13/1386 10:33 AM  
 
Lab Results Component Value Date/Time Sodium 140 04/27/2019 10:33 AM  
 Potassium 4.1 04/27/2019 10:33 AM  
 Chloride 107 04/27/2019 10:33 AM  
 CO2 32 04/27/2019 10:33 AM  
 Glucose 111 (H) 04/27/2019 10:33 AM  
 BUN 13 04/27/2019 10:33 AM  
 Creatinine 0.78 04/27/2019 10:33 AM  
 Calcium 8.7 04/27/2019 10:33 AM  
 
Lab Results Component Value Date/Time Vitamin B12 901 02/07/2017 09:53 AM  
 Folate 10.5 02/07/2017 09:53 AM  
 
Lab Results Component Value Date/Time LDL, calculated 94.6 03/20/2009 11:10 AM  
 
Lab Results Component Value Date/Time  Hemoglobin A1c 6.3 (H) 02/07/2017 09:53 AM

## 2019-04-27 NOTE — ROUTINE PROCESS
TRANSFER - OUT REPORT: 
 
Verbal report given to Angelia(name) on Rosie Myers  being transferred to [facility] [place] [facility] [place] for routine progression of care Report consisted of patients Situation, Background, Assessment and  
Recommendations(SBAR). Information from the following report(s) SBAR, Kardex, ED Summary, Intake/Output, MAR, Accordion, Recent Results, Med Rec Status and Cardiac Rhythm SR; BBB;Sinus eusebia was reviewed with the receiving nurse. Lines:  
Peripheral IV 04/27/19 Left Antecubital (Active) Site Assessment Clean, dry, & intact 4/27/2019 12:11 PM  
Phlebitis Assessment 0 4/27/2019 12:11 PM  
Infiltration Assessment 0 4/27/2019 12:11 PM  
Dressing Status Clean, dry, & intact 4/27/2019 12:11 PM  
Dressing Type Transparent 4/27/2019 12:11 PM  
  
 
Opportunity for questions and clarification was provided.    
 
Patient transported with:

## 2019-04-27 NOTE — PROGRESS NOTES
RLE redness, swelling and increased warmth noted upon assessment. Notified Dr. Pascale Hollins, admitting physician. No new orders at this time.

## 2019-04-28 ENCOUNTER — APPOINTMENT (OUTPATIENT)
Dept: NON INVASIVE DIAGNOSTICS | Age: 84
DRG: 068 | End: 2019-04-28
Attending: INTERNAL MEDICINE
Payer: MEDICARE

## 2019-04-28 LAB
ANION GAP SERPL CALC-SCNC: 7 MMOL/L (ref 5–15)
ATRIAL RATE: 52 BPM
BUN SERPL-MCNC: 13 MG/DL (ref 6–20)
BUN/CREAT SERPL: 17 (ref 12–20)
CALCIUM SERPL-MCNC: 8.5 MG/DL (ref 8.5–10.1)
CALCULATED P AXIS, ECG09: 11 DEGREES
CALCULATED R AXIS, ECG10: -42 DEGREES
CALCULATED T AXIS, ECG11: 144 DEGREES
CHLORIDE SERPL-SCNC: 109 MMOL/L (ref 97–108)
CHOLEST SERPL-MCNC: 180 MG/DL
CO2 SERPL-SCNC: 25 MMOL/L (ref 21–32)
CREAT SERPL-MCNC: 0.76 MG/DL (ref 0.7–1.3)
DIAGNOSIS, 93000: NORMAL
ERYTHROCYTE [DISTWIDTH] IN BLOOD BY AUTOMATED COUNT: 12.6 % (ref 11.5–14.5)
EST. AVERAGE GLUCOSE BLD GHB EST-MCNC: 128 MG/DL
GLUCOSE BLD STRIP.AUTO-MCNC: 110 MG/DL (ref 65–100)
GLUCOSE SERPL-MCNC: 110 MG/DL (ref 65–100)
HBA1C MFR BLD: 6.1 % (ref 4.2–6.3)
HCT VFR BLD AUTO: 41 % (ref 36.6–50.3)
HDLC SERPL-MCNC: 47 MG/DL
HDLC SERPL: 3.8 {RATIO} (ref 0–5)
HGB BLD-MCNC: 13.4 G/DL (ref 12.1–17)
LDLC SERPL CALC-MCNC: 89.6 MG/DL (ref 0–100)
LIPID PROFILE,FLP: ABNORMAL
MCH RBC QN AUTO: 30.2 PG (ref 26–34)
MCHC RBC AUTO-ENTMCNC: 32.7 G/DL (ref 30–36.5)
MCV RBC AUTO: 92.3 FL (ref 80–99)
NRBC # BLD: 0 K/UL (ref 0–0.01)
NRBC BLD-RTO: 0 PER 100 WBC
P-R INTERVAL, ECG05: 222 MS
PLATELET # BLD AUTO: 235 K/UL (ref 150–400)
PMV BLD AUTO: 9 FL (ref 8.9–12.9)
POTASSIUM SERPL-SCNC: 3.9 MMOL/L (ref 3.5–5.1)
Q-T INTERVAL, ECG07: 492 MS
QRS DURATION, ECG06: 156 MS
QTC CALCULATION (BEZET), ECG08: 457 MS
RBC # BLD AUTO: 4.44 M/UL (ref 4.1–5.7)
SERVICE CMNT-IMP: ABNORMAL
SODIUM SERPL-SCNC: 141 MMOL/L (ref 136–145)
TRIGL SERPL-MCNC: 217 MG/DL (ref ?–150)
TSH SERPL DL<=0.05 MIU/L-ACNC: 1.97 UIU/ML (ref 0.36–3.74)
VENTRICULAR RATE, ECG03: 52 BPM
VLDLC SERPL CALC-MCNC: 43.4 MG/DL
WBC # BLD AUTO: 7.1 K/UL (ref 4.1–11.1)

## 2019-04-28 PROCEDURE — 74011000250 HC RX REV CODE- 250: Performed by: INTERNAL MEDICINE

## 2019-04-28 PROCEDURE — 74011250637 HC RX REV CODE- 250/637: Performed by: INTERNAL MEDICINE

## 2019-04-28 PROCEDURE — 74011250637 HC RX REV CODE- 250/637: Performed by: PSYCHIATRY & NEUROLOGY

## 2019-04-28 PROCEDURE — 83036 HEMOGLOBIN GLYCOSYLATED A1C: CPT

## 2019-04-28 PROCEDURE — 97161 PT EVAL LOW COMPLEX 20 MIN: CPT

## 2019-04-28 PROCEDURE — 65660000000 HC RM CCU STEPDOWN

## 2019-04-28 PROCEDURE — 85027 COMPLETE CBC AUTOMATED: CPT

## 2019-04-28 PROCEDURE — 80048 BASIC METABOLIC PNL TOTAL CA: CPT

## 2019-04-28 PROCEDURE — 93306 TTE W/DOPPLER COMPLETE: CPT

## 2019-04-28 PROCEDURE — 74011250636 HC RX REV CODE- 250/636: Performed by: INTERNAL MEDICINE

## 2019-04-28 PROCEDURE — 36415 COLL VENOUS BLD VENIPUNCTURE: CPT

## 2019-04-28 PROCEDURE — 84443 ASSAY THYROID STIM HORMONE: CPT

## 2019-04-28 PROCEDURE — 97116 GAIT TRAINING THERAPY: CPT

## 2019-04-28 PROCEDURE — 80061 LIPID PANEL: CPT

## 2019-04-28 PROCEDURE — 74011250636 HC RX REV CODE- 250/636: Performed by: PSYCHIATRY & NEUROLOGY

## 2019-04-28 PROCEDURE — 74011250637 HC RX REV CODE- 250/637: Performed by: HOSPITALIST

## 2019-04-28 PROCEDURE — 82962 GLUCOSE BLOOD TEST: CPT

## 2019-04-28 PROCEDURE — 74011250636 HC RX REV CODE- 250/636: Performed by: HOSPITALIST

## 2019-04-28 PROCEDURE — 74011000258 HC RX REV CODE- 258: Performed by: HOSPITALIST

## 2019-04-28 RX ORDER — SODIUM CHLORIDE AND POTASSIUM CHLORIDE .9; .15 G/100ML; G/100ML
SOLUTION INTRAVENOUS CONTINUOUS
Status: DISPENSED | OUTPATIENT
Start: 2019-04-28 | End: 2019-04-29

## 2019-04-28 RX ORDER — SENNOSIDES 8.6 MG/1
1 TABLET ORAL DAILY
Status: DISCONTINUED | OUTPATIENT
Start: 2019-04-28 | End: 2019-05-03 | Stop reason: HOSPADM

## 2019-04-28 RX ORDER — CLOPIDOGREL BISULFATE 75 MG/1
75 TABLET ORAL DAILY
Status: DISCONTINUED | OUTPATIENT
Start: 2019-04-29 | End: 2019-04-29 | Stop reason: SDUPTHER

## 2019-04-28 RX ORDER — POLYETHYLENE GLYCOL 3350 17 G/17G
17 POWDER, FOR SOLUTION ORAL DAILY
Status: DISCONTINUED | OUTPATIENT
Start: 2019-04-29 | End: 2019-05-03 | Stop reason: HOSPADM

## 2019-04-28 RX ADMIN — ATORVASTATIN CALCIUM 40 MG: 40 TABLET, FILM COATED ORAL at 21:41

## 2019-04-28 RX ADMIN — LATANOPROST 1 DROP: 50 SOLUTION OPHTHALMIC at 21:47

## 2019-04-28 RX ADMIN — Medication 10 ML: at 15:43

## 2019-04-28 RX ADMIN — DUTASTERIDE 0.5 MG: 0.5 CAPSULE, LIQUID FILLED ORAL at 08:23

## 2019-04-28 RX ADMIN — ASPIRIN 81 MG 81 MG: 81 TABLET ORAL at 08:18

## 2019-04-28 RX ADMIN — HEPARIN SODIUM 5000 UNITS: 5000 INJECTION INTRAVENOUS; SUBCUTANEOUS at 07:43

## 2019-04-28 RX ADMIN — COLESEVELAM 625 MG: 180 TABLET ORAL at 08:23

## 2019-04-28 RX ADMIN — IRBESARTAN 150 MG: 150 TABLET, FILM COATED ORAL at 12:32

## 2019-04-28 RX ADMIN — TAMSULOSIN HYDROCHLORIDE 0.4 MG: 0.4 CAPSULE ORAL at 08:19

## 2019-04-28 RX ADMIN — CLOPIDOGREL BISULFATE 75 MG: 75 TABLET ORAL at 08:18

## 2019-04-28 RX ADMIN — TAMSULOSIN HYDROCHLORIDE 0.4 MG: 0.4 CAPSULE ORAL at 17:41

## 2019-04-28 RX ADMIN — LEVOTHYROXINE SODIUM 125 MCG: 125 TABLET ORAL at 07:42

## 2019-04-28 RX ADMIN — GABAPENTIN 300 MG: 300 CAPSULE ORAL at 21:41

## 2019-04-28 RX ADMIN — Medication 10 ML: at 06:00

## 2019-04-28 RX ADMIN — DORZOLAMIDE HYDROCHLORIDE AND TIMOLOL MALEATE 1 DROP: 20; 5 SOLUTION/ DROPS OPHTHALMIC at 08:24

## 2019-04-28 RX ADMIN — CEFEPIME 2 G: 2 INJECTION, POWDER, FOR SOLUTION INTRAVENOUS at 12:31

## 2019-04-28 RX ADMIN — CEFEPIME 2 G: 2 INJECTION, POWDER, FOR SOLUTION INTRAVENOUS at 23:56

## 2019-04-28 RX ADMIN — HEPARIN SODIUM 5000 UNITS: 5000 INJECTION INTRAVENOUS; SUBCUTANEOUS at 15:43

## 2019-04-28 RX ADMIN — CYANOCOBALAMIN TAB 500 MCG 1000 MCG: 500 TAB at 08:18

## 2019-04-28 RX ADMIN — ACETAMINOPHEN 650 MG: 325 TABLET ORAL at 22:09

## 2019-04-28 RX ADMIN — SENNOSIDES 8.6 MG: 8.6 TABLET, FILM COATED ORAL at 17:42

## 2019-04-28 RX ADMIN — HEPARIN SODIUM 5000 UNITS: 5000 INJECTION INTRAVENOUS; SUBCUTANEOUS at 21:41

## 2019-04-28 RX ADMIN — SODIUM CHLORIDE AND POTASSIUM CHLORIDE: 9; 1.49 INJECTION, SOLUTION INTRAVENOUS at 10:15

## 2019-04-28 RX ADMIN — AMLODIPINE BESYLATE 5 MG: 5 TABLET ORAL at 08:18

## 2019-04-28 NOTE — PROGRESS NOTES
Mr. Alan Castro is a 80 YOM, admitted on 4/27/19 for dx acute CVA. Weekend CM received MD consult re: discharge planning, reviewed medical record, met with Patient, introduced self/explained role. Patient is a resident in independent living at [facility] (address/contact information confirmed on chart). Patient reported continued independence with ADLs/ IADLs, DME in use at home includes can. CM reviewed PT recommendations for continued PT at [facility], Patient verbalized understanding/agreement. No additional questions/concerns reported at this time. CM provided emotional support, encouragement. Transitional care goal/plan remains to return to [facility] when medically cleared for discharge. Reason for Admission:    dx acute CVA RRAT Score:     42 Resources/supports as identified by patient/family:   Supportive family, friends Top Challenges facing patient (as identified by patient/family and CM): Finances/Medication cost?    Assessed, none reported/identified Transportation? Assessed, none reported/identified Support system or lack thereof? Assessed, none reported/identified Living arrangements? Assessed, none reported/identified Self-care/ADLs/Cognition? Assessed, none reported/identified Current Advanced Directive/Advance Care Plan: On file Plan for utilizing home health: PT recommendation for continued physical therapy. Patient will need MD order sent to [address] [place] [address] [phone] at [id number] [address] Likelihood of readmission:  Moderate Transition of Care Plan:   Transitional care goal/plan remains to return to [facility] when medically cleared for discharge. Care Management Interventions PCP Verified by CM: Yes Mode of Transport at Discharge: Other (see comment) Transition of Care Consult (CM Consult): Discharge Planning Discharge Durable Medical Equipment: No 
Physical Therapy Consult: Yes Occupational Therapy Consult: Yes Speech Therapy Consult: No 
Current Support Network: Other(independent living at [facility]) Confirm Follow Up Transport: Family Plan discussed with Pt/Family/Caregiver: Yes Discharge Location Discharge Placement: Home with home health [facility]

## 2019-04-28 NOTE — PROGRESS NOTES
Problem: Mobility Impaired (Adult and Pediatric) Goal: *Acute Goals and Plan of Care (Insert Text) Description Physical Therapy Goals Initiated 4/28/2019 1. Patient will perform sit to stand with modified independence within 7 day(s). 2.  Patient will ambulate with modified independence for 300 feet with the least restrictive device within 7 day(s). 3.  Patient will improve Balderas Balance score by 7 points within 7 days. Outcome: Progressing Towards Goal 
  
PHYSICAL THERAPY EVALUATION- NEURO POPULATION Patient: Estela Callaway (89 y.o. male) Date: 4/28/2019 Primary Diagnosis: CVA (cerebral vascular accident) ([facility] [address] [I63.9] Precautions: Fall ASSESSMENT : 
Pt cleared via nurse for mobility and has been up to the bathroom in room. Based on the objective data described below, the patient presents with slightly altered balance dynamically using SPC today likely due to recent immobility since incident, strength WNL and equal BLE. Mild postural sway without LOB noted. Will check on 1-2 more times for gait stability prior to discharge with HHPT follow-up for further balance training should it be needed. Patient Vitals for the past 4 hrs: 
 Pulse BP  
04/28/19 1213  169/52  
04/28/19 1115 65 (!) 155/99 Patient will benefit from skilled intervention to address the above impairments. Patient?s rehabilitation potential is considered to be Good Factors which may influence rehabilitation potential include:  
? None noted ? Mental ability/status ? Medical condition ? Home/family situation and support systems ? Safety awareness 
? Pain tolerance/management 
? Other: PLAN : 
Recommendations and Planned Interventions: ?             Bed Mobility Training             ? Neuromuscular Re-Education ? Transfer Training                   ? Orthotic/Prosthetic Training ?             Gait Training                         ? Modalities ? Therapeutic Exercises           ? Edema Management/Control ? Therapeutic Activities            ? Patient and Family Training/Education ? Other (comment): Frequency/Duration: Patient will be followed by physical therapy 3 times a week to address goals. Discharge Recommendations: Home Health (at [facility]) Further Equipment Recommendations for Discharge: none (SPC use at this time) SUBJECTIVE:  
Patient stated ? Okay. ? OBJECTIVE DATA SUMMARY:  
HISTORY:   
Past Medical History:  
Diagnosis Date CAD (coronary artery disease) Calculus of kidney Cancer [facility]) prostate  1996 Hearing loss Heart attack ([facility] [address] Hypercholesterolemia Hypertension Incisional hernia 5/10/2011 Movement disorder Other ill-defined conditions(799.89)   
 elevated cholesterol Other ill-defined conditions(799.89)   
 glaucoma Other ill-defined conditions(799.89)   
 abd hernia Stroke [facility])   
 left arm tingling Thyroid disease Thyroid mass 5/10/2011 Past Surgical History:  
Procedure Laterality Date CARDIAC SURG PROCEDURE UNLIST  1996  
 cabg x3 HX AORTIC VALVE REPLACEMENT  2015 HX HEENT    
 thyroid biopsy HX HEENT    
 thyroidectomy 2013 HX ORCHIECTOMY  01/2017 HX ORTHOPAEDIC  2/2011  
 compression fracture d.t. fall (back),no surgery HX THYROIDECTOMY  1/3/2013 AR PROSTATE BIOPSY, NEEDLE, SATURATION SAMPLING Prior Level of Function/Home Situation: mod. I with SPC use at [facility] Personal factors and/or comorbidities impacting plan of care:  
 
Home Situation Home Environment: ([facility]) One/Two Story Residence: One story Living Alone: Yes Support Systems: Assisted living, Child(yaquelin) Patient Expects to be Discharged to[de-identified] Other (comment)(Paige Ritter) Current DME Used/Available at Home: Walker, rolling, Cane, straight, Raised toilet seat, Grab bars EXAMINATION/PRESENTATION/DECISION MAKING:  
Critical Behavior: 
Neurologic State: Alert Orientation Level: Oriented X4 Cognition: Appropriate decision making Safety/Judgement: Awareness of environment, Insight into deficits Hearing: Auditory Auditory Impairment: Hard of hearing, bilateral 
Skin:  Intact Edema: slight BLE Range Of Motion: 
AROM: Within functional limits PROM: Within functional limits Strength:   
Strength: Within functional limits Tone & Sensation:  
Tone: Normal 
  
  
  
  
Sensation: Intact Coordination: 
Coordination: Within functional limits Functional Mobility: 
Bed Mobility: 
Rolling: Modified independent Supine to Sit: Modified independent Scooting: Modified independent Transfers: 
Sit to Stand: Stand-by assistance Stand to Sit: Stand-by assistance Balance:  
Sitting: Intact Standing: Impaired Standing - Static: Good Standing - Dynamic : Fair Ambulation/Gait Training: 
Distance (ft): 150 Feet (ft) Assistive Device: Cane, straight;Gait belt Ambulation - Level of Assistance: Contact guard assistance Gait Description (WDL): Exceptions to [facility] Gait Abnormalities: Decreased step clearance;Trunk sway increased Speed/Margot: Pace decreased (<100 feet/min) Step Length: Left shortened;Right shortened Interventions: Safety awareness training; Tactile cues; Verbal cues Functional Measure Balderas Balance Test: 
 
Sitting to Standin Standing Unsupported: 3 Sitting with Back Unsupported: 4 Standing to Sitting: 3 Transfers: 3 Standing Unsupported with Eyes Closed: 3 Standing Unsupported with Feet Together: 3 Reach Forward with Outstretched Arm: 2  Object: 3 Turn to Look Over Shoulders: 3 Turn 360 Degrees: 2 Alternate Foot on Step/Stool: 1 Standing Unsupported One Foot in Front: 1 Stand on One Le Total: 33 
 
 
56=Maximum possible score;  
0-20=High fall risk 21-40=Moderate fall risk 41-56=Low fall risk Physical Therapy Evaluation Charge Determination History Examination Presentation Decision-Making LOW Complexity : Zero comorbidities / personal factors that will impact the outcome / POC LOW Complexity : 1-2 Standardized tests and measures addressing body structure, function, activity limitation and / or participation in recreation  LOW Complexity : Stable, uncomplicated  Other outcome measures Balderas  LOW Based on the above components, the patient evaluation is determined to be of the following complexity level: LOW Pain: 
Pain Scale 1: Numeric (0 - 10) Pain Intensity 1: 0 Activity Tolerance:  
Good: 
Please refer to the flowsheet for vital signs taken during this treatment. After treatment:  
?     Patient left in no apparent distress sitting up in chair ? Patient left in no apparent distress in bed 
? Call bell left within reach ? Nursing notified ? Caregiver present ? Bed alarm activated COMMUNICATION/EDUCATION:  
The patient?s plan of care was discussed with: Registered Nurse and Rehabilitation Attendant. Patient was educated regarding his deficit(s) of decreased mobility/balance as this relates to his diagnosis of TIA, ?CVA (tests pending). He demonstrated Good understanding as evidenced by verbalization. Patient and/or family was verbally educated on the BE FAST acronym for signs/symptoms of CVA and TIA. BE FAST was written on patient's communication board  for visual education and reinforcement. All questions answered with patient indicating good understanding. ?  Fall prevention education was provided and the patient/caregiver indicated understanding. ? Patient/family have participated as able in goal setting and plan of care. ?  Patient/family agree to work toward stated goals and plan of care. ?  Patient understands intent and goals of therapy, but is neutral about his/her participation. ? Patient is unable to participate in goal setting and plan of care. Thank you for this referral. 
Katia Hatch, PT, DPT Time Calculation: 25 mins

## 2019-04-28 NOTE — ROUTINE PROCESS
Bedside and Verbal shift change report given to Thiago Colin RN (oncoming nurse) by Jef Rogers RN (offgoing nurse). Report included the following information SBAR, Recent Results and Cardiac Rhythm SR/SB 1ºAVB, BBB.

## 2019-04-28 NOTE — PROGRESS NOTES
Morning report received from [place] [address] [place] [address] and [facility] [address] [place] [address]. Patient with no significant events during shift.

## 2019-04-28 NOTE — PROGRESS NOTES
Hospitalist Progress Note NAME: Karolina Ochoa :  1928 MRN:  [id number] Assessment / Plan: TIA r/o Stroke With symptoms incomprehensible speech, weakness with all extremities CT: negative. Risk factors: history of vertebral basilar insufficiency Neurology In house consult. ASA. lipitor Risk factor evaluation with ECHO, lipids pabel--89.6, A1C  6.1,  MRI Permissive hypertension PT/OT/Speech/CM. Pt ws started on gabapentin by his neurologist 
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 UTI: cultures with pseudomonas Changed to cefepime, Would complete 3 days ( treat as simple UTI) HTN 
-norvasc, avapro 
  
Idiopathic polyneuropathy 
-cont' gabapentin 
  
BPH 
-cont' avodart, flomax 
  
Hypothyroidism 
-cont' synthroid History of CAD/ severe AS Follows with Dr. Boom Briscoe Echo as above, consider cardiology eval pending above 
  
Hx of skin ca 
-completed chemo per pt Noted ankle swelling in the right ankle, monitor No calf tenderness, no leg swelling no concern for DVT at this time 
  
  
Code Status: DNR Surrogate Decision Maker: pt's son DVT Prophylaxis: heparin GI Prophylaxis: not indicated Baseline: from [facility] independent living Body mass index is 28.75 kg/m². overweight Subjective: Chief Complaint / Reason for Physician Visit At the side of bed, feel ok Discussed with RN events overnight. Review of Systems: 
Symptom Y/N Comments  Symptom Y/N Comments Fever/Chills n   Chest Pain n   
Poor Appetite    Edema Cough n   Abdominal Pain n   
Sputum    Joint Pain SOB/ENG n   Pruritis/Rash Nausea/vomit    Tolerating PT/OT Diarrhea    Tolerating Diet Constipation    Other Could NOT obtain due to:   
 
Objective: VITALS:  
Last 24hrs VS reviewed since prior progress note. Most recent are: Patient Vitals for the past 24 hrs: 
 Temp Pulse Resp BP SpO2  
04/28/19 1213    169/52   
04/28/19 1115  65  (!) 155/99   
04/28/19 0739 97.9 °F (36.6 °C) (!) 55 16 169/52 98 % 04/28/19 0411 97.4 °F (36.3 °C) 60 16 160/51 97 % 04/28/19 0015 97.5 °F (36.4 °C) (!) 55 24 134/55 96 % 04/27/19 1943 97.5 °F (36.4 °C) (!) 54 17 118/40 96 % 04/27/19 1522 97.5 °F (36.4 °C) (!) 58 16 166/56 97 % No intake or output data in the 24 hours ending 04/28/19 1425 PHYSICAL EXAM: 
General: WD, WN. Alert, cooperative, no acute distress   
EENT:  EOMI. Anicteric sclerae. MMM Resp:  Bilateral air entry, no wheezing, no crackles  No accessory muscle use CV:  Regular  rhythm,  No edema GI:  Soft, Non distended, Non tender.  +Bowel sounds Neurologic:  Alert and oriented X 3, normal speech, Psych:   Good insight. Not anxious nor agitated Skin:  No rashes. No jaundice Reviewed most current lab test results and cultures  YES Reviewed most current radiology test results   YES Review and summation of old records today    NO Reviewed patient's current orders and MAR    YES 
PMH/SH reviewed - no change compared to H&P 
________________________________________________________________________ Care Plan discussed with: 
  Comments Patient y Family RN y   
Care Manager Consultant Multidiciplinary team rounds were held today with , nursing, pharmacist and clinical coordinator. Patient's plan of care was discussed; medications were reviewed and discharge planning was addressed. ________________________________________________________________________ Total NON critical care TIME:  35  Minutes Total CRITICAL CARE TIME Spent:   Minutes non procedure based Comments >50% of visit spent in counseling and coordination of care    
________________________________________________________________________ Chong Jeans, MD  
 
 Procedures: see electronic medical records for all procedures/Xrays and details which were not copied into this note but were reviewed prior to creation of Plan. LABS: 
I reviewed today's most current labs and imaging studies. Pertinent labs include: 
Recent Labs  
  04/28/19 0412 04/27/19 
1033 WBC 7.1 6.7 HGB 13.4 13.5 HCT 41.0 41.4  249 Recent Labs  
  04/28/19 
0412 04/27/19 
1033  140  
K 3.9 4.1 * 107 CO2 25 32 * 111* BUN 13 13 CREA 0.76 0.78  
CA 8.5 8.7 ALB  --  3.5 TBILI  --  0.6 SGOT  --  13* ALT  --  16 Signed: Parul Boykin MD

## 2019-04-29 ENCOUNTER — APPOINTMENT (OUTPATIENT)
Dept: GENERAL RADIOLOGY | Age: 84
DRG: 068 | End: 2019-04-29
Attending: INTERNAL MEDICINE
Payer: MEDICARE

## 2019-04-29 LAB
ECHO AO ROOT DIAM: 2.8 CM
ECHO AV AREA PEAK VELOCITY: 1.5 CM2
ECHO AV AREA VTI: 1.6 CM2
ECHO AV AREA/BSA PEAK VELOCITY: 0.7 CM2/M2
ECHO AV AREA/BSA VTI: 0.7 CM2/M2
ECHO AV MEAN GRADIENT: 10 MMHG
ECHO AV PEAK GRADIENT: 17.3 MMHG
ECHO AV PEAK VELOCITY: 207.81 CM/S
ECHO AV REGURGITANT PHT: 518.4 CM
ECHO AV VTI: 52.44 CM
ECHO EST RA PRESSURE: 10 MMHG
ECHO LA AREA 2C: 26 CM2
ECHO LA AREA 4C: 29.5 CM2
ECHO LA MAJOR AXIS: 5.84 CM
ECHO LA TO AORTIC ROOT RATIO: 2.09
ECHO LA VOL 2C: 91.61 ML (ref 18–58)
ECHO LA VOL 4C: 116.43 ML (ref 18–58)
ECHO LA VOL BP: 108.4 ML (ref 18–58)
ECHO LA VOL/BSA BIPLANE: 50.75 ML/M2 (ref 16–28)
ECHO LA VOLUME INDEX A2C: 42.89 ML/M2 (ref 16–28)
ECHO LA VOLUME INDEX A4C: 54.51 ML/M2 (ref 16–28)
ECHO LV E' LATERAL VELOCITY: 10.25 CM/S
ECHO LV E' SEPTAL VELOCITY: 4.46 CM/S
ECHO LV INTERNAL DIMENSION DIASTOLIC: 4.64 CM (ref 4.2–5.9)
ECHO LV INTERNAL DIMENSION SYSTOLIC: 3.49 CM
ECHO LV IVSD: 1.39 CM (ref 0.6–1)
ECHO LV MASS 2D: 334.9 G (ref 88–224)
ECHO LV MASS INDEX 2D: 156.8 G/M2 (ref 49–115)
ECHO LV POSTERIOR WALL DIASTOLIC: 1.54 CM (ref 0.6–1)
ECHO LVOT DIAM: 2.03 CM
ECHO LVOT PEAK GRADIENT: 3.9 MMHG
ECHO LVOT PEAK VELOCITY: 98.16 CM/S
ECHO LVOT SV: 84.7 ML
ECHO LVOT VTI: 26.1 CM
ECHO MV A VELOCITY: 126.57 CM/S
ECHO MV AREA PHT: 3.6 CM2
ECHO MV AREA VTI: 2.1 CM2
ECHO MV E DECELERATION TIME (DT): 213.6 MS
ECHO MV E POINT SEPTAL SEPARATION (EPSS): 13.3 CM
ECHO MV E VELOCITY: 132.54 CM/S
ECHO MV E/A RATIO: 1.05
ECHO MV E/E' LATERAL: 12.93
ECHO MV E/E' RATIO (AVERAGED): 21.32
ECHO MV E/E' SEPTAL: 29.72
ECHO MV MAX VELOCITY: 132.7 CM/S
ECHO MV MEAN GRADIENT: 3.7 MMHG
ECHO MV PEAK GRADIENT: 7 MMHG
ECHO MV PRESSURE HALF TIME (PHT): 60.6 MS
ECHO MV REGURGITANT PEAK GRADIENT: 116.9 MMHG
ECHO MV REGURGITANT PEAK VELOCITY: 540.68 CM/S
ECHO MV VTI: 40.02 CM
ECHO PULMONARY ARTERY SYSTOLIC PRESSURE (PASP): 38.3 MMHG
ECHO PV MAX VELOCITY: 87.91 CM/S
ECHO PV PEAK GRADIENT: 3.1 MMHG
ECHO RA AREA 4C: 13.49 CM2
ECHO RIGHT VENTRICULAR SYSTOLIC PRESSURE (RVSP): 38.3 MMHG
ECHO RV INTERNAL DIMENSION: 3.09 CM
ECHO TV REGURGITANT MAX VELOCITY: 266.04 CM/S
ECHO TV REGURGITANT PEAK GRADIENT: 28.3 MMHG
PISA AR MAX VEL: 339.5 CM/S

## 2019-04-29 PROCEDURE — 74011250637 HC RX REV CODE- 250/637: Performed by: PSYCHIATRY & NEUROLOGY

## 2019-04-29 PROCEDURE — 74011250637 HC RX REV CODE- 250/637: Performed by: INTERNAL MEDICINE

## 2019-04-29 PROCEDURE — 65660000000 HC RM CCU STEPDOWN

## 2019-04-29 PROCEDURE — 73562 X-RAY EXAM OF KNEE 3: CPT

## 2019-04-29 PROCEDURE — 74011250637 HC RX REV CODE- 250/637: Performed by: HOSPITALIST

## 2019-04-29 PROCEDURE — 74011250636 HC RX REV CODE- 250/636: Performed by: INTERNAL MEDICINE

## 2019-04-29 PROCEDURE — 74011250636 HC RX REV CODE- 250/636: Performed by: PSYCHIATRY & NEUROLOGY

## 2019-04-29 PROCEDURE — 74011250636 HC RX REV CODE- 250/636: Performed by: HOSPITALIST

## 2019-04-29 PROCEDURE — 74011000258 HC RX REV CODE- 258: Performed by: HOSPITALIST

## 2019-04-29 RX ORDER — HYDROCODONE BITARTRATE AND ACETAMINOPHEN 5; 325 MG/1; MG/1
1 TABLET ORAL
Status: DISCONTINUED | OUTPATIENT
Start: 2019-04-29 | End: 2019-05-01

## 2019-04-29 RX ADMIN — AMLODIPINE BESYLATE 5 MG: 5 TABLET ORAL at 11:55

## 2019-04-29 RX ADMIN — Medication 10 ML: at 22:14

## 2019-04-29 RX ADMIN — SODIUM CHLORIDE AND POTASSIUM CHLORIDE: 9; 1.49 INJECTION, SOLUTION INTRAVENOUS at 03:41

## 2019-04-29 RX ADMIN — CEFEPIME 2 G: 2 INJECTION, POWDER, FOR SOLUTION INTRAVENOUS at 11:36

## 2019-04-29 RX ADMIN — HYDROCODONE BITARTRATE AND ACETAMINOPHEN 1 TABLET: 5; 325 TABLET ORAL at 22:23

## 2019-04-29 RX ADMIN — IRBESARTAN 150 MG: 150 TABLET, FILM COATED ORAL at 11:54

## 2019-04-29 RX ADMIN — CYANOCOBALAMIN TAB 500 MCG 1000 MCG: 500 TAB at 11:32

## 2019-04-29 RX ADMIN — SENNOSIDES 8.6 MG: 8.6 TABLET, FILM COATED ORAL at 11:32

## 2019-04-29 RX ADMIN — TAMSULOSIN HYDROCHLORIDE 0.4 MG: 0.4 CAPSULE ORAL at 22:14

## 2019-04-29 RX ADMIN — ACETAMINOPHEN 650 MG: 325 TABLET ORAL at 12:10

## 2019-04-29 RX ADMIN — ASPIRIN 81 MG 81 MG: 81 TABLET ORAL at 11:32

## 2019-04-29 RX ADMIN — ACETAMINOPHEN 650 MG: 325 TABLET ORAL at 04:27

## 2019-04-29 RX ADMIN — ATORVASTATIN CALCIUM 40 MG: 40 TABLET, FILM COATED ORAL at 22:14

## 2019-04-29 RX ADMIN — TAMSULOSIN HYDROCHLORIDE 0.4 MG: 0.4 CAPSULE ORAL at 11:29

## 2019-04-29 RX ADMIN — POLYETHYLENE GLYCOL 3350 17 G: 17 POWDER, FOR SOLUTION ORAL at 11:28

## 2019-04-29 RX ADMIN — CLOPIDOGREL BISULFATE 75 MG: 75 TABLET ORAL at 11:30

## 2019-04-29 RX ADMIN — HEPARIN SODIUM 5000 UNITS: 5000 INJECTION INTRAVENOUS; SUBCUTANEOUS at 22:14

## 2019-04-29 RX ADMIN — Medication 10 ML: at 16:26

## 2019-04-29 RX ADMIN — ACETAMINOPHEN 650 MG: 325 TABLET ORAL at 16:20

## 2019-04-29 RX ADMIN — HEPARIN SODIUM 5000 UNITS: 5000 INJECTION INTRAVENOUS; SUBCUTANEOUS at 06:24

## 2019-04-29 RX ADMIN — GABAPENTIN 300 MG: 300 CAPSULE ORAL at 22:14

## 2019-04-29 RX ADMIN — DUTASTERIDE 0.5 MG: 0.5 CAPSULE, LIQUID FILLED ORAL at 11:30

## 2019-04-29 RX ADMIN — LATANOPROST 1 DROP: 50 SOLUTION OPHTHALMIC at 22:15

## 2019-04-29 RX ADMIN — HEPARIN SODIUM 5000 UNITS: 5000 INJECTION INTRAVENOUS; SUBCUTANEOUS at 16:21

## 2019-04-29 RX ADMIN — LEVOTHYROXINE SODIUM 125 MCG: 125 TABLET ORAL at 06:24

## 2019-04-29 NOTE — PROGRESS NOTES
Problem: Falls - Risk of 
Goal: *Absence of Falls Description Document Lesli Getting Fall Risk and appropriate interventions in the flowsheet. Outcome: Progressing Towards Goal 
  
Problem: Patient Education: Go to Patient Education Activity Goal: Patient/Family Education Outcome: Progressing Towards Goal 
  
Problem: Patient Education: Go to Patient Education Activity Goal: Patient/Family Education Outcome: Progressing Towards Goal 
  
Problem: TIA/CVA Stroke: 0-24 hours Goal: Off Pathway (Use only if patient is Off Pathway) Outcome: Progressing Towards Goal 
Goal: Activity/Safety Outcome: Progressing Towards Goal 
Goal: Consults, if ordered Outcome: Progressing Towards Goal 
Goal: Diagnostic Test/Procedures Outcome: Progressing Towards Goal 
Goal: Nutrition/Diet Outcome: Progressing Towards Goal 
Goal: Discharge Planning Outcome: Progressing Towards Goal 
Goal: Medications Outcome: Progressing Towards Goal 
Goal: Respiratory Outcome: Progressing Towards Goal 
Goal: Treatments/Interventions/Procedures Outcome: Progressing Towards Goal 
Goal: Minimize risk of bleeding post-thrombolytic infusion Outcome: Progressing Towards Goal 
Goal: Monitor for complications post-thrombolytic infusion Outcome: Progressing Towards Goal 
Goal: Psychosocial 
Outcome: Progressing Towards Goal 
Goal: *Hemodynamically stable Outcome: Progressing Towards Goal 
Goal: *Neurologically stable Description Absence of additional neurological deficits Outcome: Progressing Towards Goal 
Goal: *Verbalizes anxiety and depression are reduced or absent Outcome: Progressing Towards Goal 
Goal: *Absence of Signs of Aspiration on Current Diet Outcome: Progressing Towards Goal 
Goal: *Absence of deep venous thrombosis signs and symptoms(Stroke Metric) Outcome: Progressing Towards Goal 
Goal: *Ability to perform ADLs and demonstrates progressive mobility and function Outcome: Progressing Towards Goal 
 Goal: *Stroke education started(Stroke Metric) Outcome: Progressing Towards Goal 
Goal: *Dysphagia screen performed(Stroke Metric) Outcome: Progressing Towards Goal 
Goal: *Rehab consulted(Stroke Metric) Outcome: Progressing Towards Goal 
  
Problem: TIA/CVA Stroke: Day 2 Until Discharge Goal: Off Pathway (Use only if patient is Off Pathway) Outcome: Progressing Towards Goal 
Goal: Activity/Safety Outcome: Progressing Towards Goal 
Goal: Diagnostic Test/Procedures Outcome: Progressing Towards Goal 
Goal: Nutrition/Diet Outcome: Progressing Towards Goal 
Goal: Discharge Planning Outcome: Progressing Towards Goal 
Goal: Medications Outcome: Progressing Towards Goal 
Goal: Respiratory Outcome: Progressing Towards Goal 
Goal: Treatments/Interventions/Procedures Outcome: Progressing Towards Goal 
Goal: Psychosocial 
Outcome: Progressing Towards Goal 
Goal: *Verbalizes anxiety and depression are reduced or absent Outcome: Progressing Towards Goal 
Goal: *Absence of aspiration Outcome: Progressing Towards Goal 
Goal: *Absence of deep venous thrombosis signs and symptoms(Stroke Metric) Outcome: Progressing Towards Goal 
Goal: *Optimal pain control at patient's stated goal 
Outcome: Progressing Towards Goal 
Goal: *Tolerating diet Outcome: Progressing Towards Goal 
Goal: *Ability to perform ADLs and demonstrates progressive mobility and function Outcome: Progressing Towards Goal 
Goal: *Stroke education continued(Stroke Metric) Outcome: Progressing Towards Goal 
  
Problem: Ischemic Stroke: Discharge Outcomes Goal: *Verbalizes anxiety and depression are reduced or absent Outcome: Progressing Towards Goal 
Goal: *Verbalize understanding of risk factor modification(Stroke Metric) Outcome: Progressing Towards Goal 
Goal: *Hemodynamically stable Outcome: Progressing Towards Goal 
Goal: *Absence of aspiration pneumonia Outcome: Progressing Towards Goal 
Goal: *Aware of needed dietary changes Outcome: Progressing Towards Goal 
Goal: *Verbalize understanding of prescribed medications including anti-coagulants, anti-lipid, and/or anti-platelets(Stroke Metric) Outcome: Progressing Towards Goal 
Goal: *Tolerating diet Outcome: Progressing Towards Goal 
Goal: *Aware of follow-up diagnostics related to anticoagulants Outcome: Progressing Towards Goal 
Goal: *Ability to perform ADLs and demonstrates progressive mobility and function Outcome: Progressing Towards Goal 
Goal: *Absence of DVT(Stroke Metric) Outcome: Progressing Towards Goal 
Goal: *Absence of aspiration Outcome: Progressing Towards Goal 
Goal: *Optimal pain control at patient's stated goal 
Outcome: Progressing Towards Goal 
Goal: *Home safety concerns addressed Outcome: Progressing Towards Goal 
Goal: *Describes available resources and support systems Outcome: Progressing Towards Goal 
Goal: *Verbalizes understanding of activation of EMS(911) for stroke symptoms(Stroke Metric) Outcome: Progressing Towards Goal 
Goal: *Understands and describes signs and symptoms to report to providers(Stroke Metric) Outcome: Progressing Towards Goal 
Goal: *Neurolgocially stable (absence of additional neurological deficits) Outcome: Progressing Towards Goal 
Goal: *Verbalizes importance of follow-up with primary care physician(Stroke Metric) Outcome: Progressing Towards Goal 
Goal: *Smoking cessation discussed,if applicable(Stroke Metric) Outcome: Progressing Towards Goal 
Goal: *Depression screening completed(Stroke Metric) Outcome: Progressing Towards Goal 
  
Problem: Patient Education: Go to Patient Education Activity Goal: Patient/Family Education Outcome: Progressing Towards Goal

## 2019-04-29 NOTE — PROGRESS NOTES
Spent several minutes with patient. R knee and inner thigh warm, swollen and painful. Would like to be seen by physician before therapy. Concern relayed to nursing. Jesus Marks

## 2019-04-29 NOTE — PROGRESS NOTES
Patient continues to report knee pain and is upset regarding needing to talk to physician about test results. Noted uric acid lab and x-ray of R knee has been ordered. Will defer at this time.

## 2019-04-29 NOTE — PROGRESS NOTES
Speech path Acknowledge consult. We will sign off as his incomprehensible speech has resolved. Pt is fluent and appropriate. His daughter in law reported he is at baseline.   
Leona Castellon, SLP

## 2019-04-29 NOTE — PROGRESS NOTES
Hospitalist Progress Note NAME: Nafisa Bell :  1928 MRN:  [id number] Assessment / Plan: 
TIA back to baseline With symptoms incomprehensible speech, weakness with all extremities CT: negative. Risk factors: history of vertebral basilar insufficiency Neurology In house consult. ASA. lipitor Risk factor evaluation with ECHO, lipids pabel--89.6, A1C  6.1,  MRI Permissive hypertension PT/OT/Speech/CM. Pt ws started on gabapentin by his neurologist 
MRI 1. Evaluation is significantly limited by motion. No evidence of acute 
intracranial abnormality CTA neck  1. Developmentally small vertebral basilar system was superimposed significant 
atherosclerotic disease and stenosis involving primarily distal vertebral 
arteries and basilar artery. 2. Approximately 50% stenosis proximal right internal carotid artery and 40% 
stenosis proximal left internal carotid artery by NASCET criteria. 3. Chronic cervical spondylosis and stenosis. 4. No acute arterial abnormality demonstrated Continue  ASA and Plavix Right Knee pain Xray  Right  Knee Consult ortho Dr Britt Sanchez who he  Has seen in past for this Check  UA  Level May  Have  tramatized knee when  Had weakness from TIA seams to have  Happened overnight. UTI: cultures with pseudomonas Changed to cefepime, Would complete 3 days ( treat as simple UTI) HTN 
-norvasc, avapro 
  
Idiopathic polyneuropathy 
-cont' gabapentin 
  
BPH 
-cont' avodart, flomax 
  
Hypothyroidism 
-cont' synthroid History of CAD/ severe AS Follows with Dr. Chana Andrews Echo as above, consider cardiology eval pending above 
  
Hx of skin ca 
-completed chemo per pt Noted ankle swelling in the right ankle, monitor No calf tenderness, no leg swelling no concern for DVT at this time 
  
  
Code Status: DNR Surrogate Decision Maker: pt's son DVT Prophylaxis: heparin GI Prophylaxis: not indicated Baseline: from [facility] independent living Body mass index is 28.75 kg/m². overweight Subjective: Chief Complaint / Reason for Physician Visit C/o knee pain  Right  Denies trauma  Has  OA and seen by  Dr Deon Clark in past  Right  Side was  Weak with TIA but  Now  Resolved  MRI  No  Acute abnormality Discussed with RN events overnight. Review of Systems: 
Symptom Y/N Comments  Symptom Y/N Comments Fever/Chills n   Chest Pain n   
Poor Appetite    Edema Cough n   Abdominal Pain n   
Sputum    Joint Pain SOB/ENG n   Pruritis/Rash Nausea/vomit    Tolerating PT/OT Diarrhea    Tolerating Diet Constipation    Other Could NOT obtain due to:   
 
Objective: VITALS:  
Last 24hrs VS reviewed since prior progress note. Most recent are: 
Patient Vitals for the past 24 hrs: 
 Temp Pulse Resp BP SpO2  
04/29/19 1556 98.1 °F (36.7 °C) 74 18 158/55 99 % 04/29/19 1150 97.7 °F (36.5 °C) 76 18 151/71 98 % 04/29/19 0727 98 °F (36.7 °C) 63 18 143/50 99 % 04/29/19 0339 97.8 °F (36.6 °C) 73 18 167/57 99 % 04/28/19 2351 97.6 °F (36.4 °C) 62 18 165/60 96 % 04/28/19 2139 98 °F (36.7 °C) 66 18 150/72 97 % Intake/Output Summary (Last 24 hours) at 4/29/2019 1712 Last data filed at 4/29/2019 1150 Gross per 24 hour Intake 2012.5 ml Output 400 ml Net 1612.5 ml PHYSICAL EXAM: 
General: WD, WN. Alert, cooperative, no acute distress   
EENT:  EOMI. Anicteric sclerae. MMM Resp:  Bilateral air entry, no wheezing, no crackles  No accessory muscle use CV:  Regular  rhythm,  No edema GI:  Soft, Non distended, Non tender.  +Bowel sounds Neurologic:  Alert and oriented X 3, normal speech, no focal deficit Muscular:        right knee  Changes of OA  POS swelling  Decreased ROM Psych:   Good insight. Not anxious nor agitated Skin:  No rashes. No jaundice Reviewed most current lab test results and cultures  YES 
 Reviewed most current radiology test results   YES Review and summation of old records today    NO Reviewed patient's current orders and MAR    YES 
PMH/SH reviewed - no change compared to H&P 
________________________________________________________________________ Care Plan discussed with: 
  Comments Patient y Family  y   
RN y   
Care Manager Consultant Multidiciplinary team rounds were held today with , nursing, pharmacist and clinical coordinator. Patient's plan of care was discussed; medications were reviewed and discharge planning was addressed. ________________________________________________________________________ Total NON critical care TIME:  35  Minutes Total CRITICAL CARE TIME Spent:   Minutes non procedure based Comments >50% of visit spent in counseling and coordination of care    
________________________________________________________________________ Bedmanjit Palms, DO  
 
Procedures: see electronic medical records for all procedures/Xrays and details which were not copied into this note but were reviewed prior to creation of Plan. LABS: 
I reviewed today's most current labs and imaging studies. Pertinent labs include: 
Recent Labs  
  04/28/19 
0412 04/27/19 
1033 WBC 7.1 6.7 HGB 13.4 13.5 HCT 41.0 41.4  249 Recent Labs  
  04/28/19 
0412 04/27/19 
1033  140  
K 3.9 4.1 * 107 CO2 25 32 * 111* BUN 13 13 CREA 0.76 0.78  
CA 8.5 8.7 ALB  --  3.5 TBILI  --  0.6 SGOT  --  13* ALT  --  16 Signed: Jade Austin, DO

## 2019-04-30 LAB
APPEARANCE SNV: ABNORMAL
BACTERIA SPEC CULT: ABNORMAL
BODY FLD TYPE: NORMAL
CC UR VC: ABNORMAL
COLOR SNV: YELLOW
COMMENT, HOLDF: NORMAL
CRYSTALS FLD MICRO: NORMAL
LYMPHOCYTES NFR SNV MANUAL: 1 % (ref 0–74)
MONOCYTES NFR SNV MANUAL: 23 % (ref 0–69)
NEUTROPHILS NFR SNV MANUAL: 76 % (ref 0–24)
RBC # SNV: 33 /CU MM
SAMPLES BEING HELD,HOLD: NORMAL
SERVICE CMNT-IMP: ABNORMAL
SPECIMEN SOURCE FLD: ABNORMAL
URATE UR-MCNC: 21 MG/DL
WBC # SNV: ABNORMAL /CU MM (ref 0–150)

## 2019-04-30 PROCEDURE — 74011250636 HC RX REV CODE- 250/636: Performed by: INTERNAL MEDICINE

## 2019-04-30 PROCEDURE — 74011000258 HC RX REV CODE- 258: Performed by: HOSPITALIST

## 2019-04-30 PROCEDURE — 74011250637 HC RX REV CODE- 250/637: Performed by: INTERNAL MEDICINE

## 2019-04-30 PROCEDURE — 97535 SELF CARE MNGMENT TRAINING: CPT

## 2019-04-30 PROCEDURE — 84560 ASSAY OF URINE/URIC ACID: CPT

## 2019-04-30 PROCEDURE — 97165 OT EVAL LOW COMPLEX 30 MIN: CPT

## 2019-04-30 PROCEDURE — 89060 EXAM SYNOVIAL FLUID CRYSTALS: CPT

## 2019-04-30 PROCEDURE — 74011250636 HC RX REV CODE- 250/636: Performed by: HOSPITALIST

## 2019-04-30 PROCEDURE — 74011250637 HC RX REV CODE- 250/637: Performed by: HOSPITALIST

## 2019-04-30 PROCEDURE — 97530 THERAPEUTIC ACTIVITIES: CPT

## 2019-04-30 PROCEDURE — 89050 BODY FLUID CELL COUNT: CPT

## 2019-04-30 PROCEDURE — 74011250637 HC RX REV CODE- 250/637: Performed by: PSYCHIATRY & NEUROLOGY

## 2019-04-30 PROCEDURE — 65660000000 HC RM CCU STEPDOWN

## 2019-04-30 PROCEDURE — 87205 SMEAR GRAM STAIN: CPT

## 2019-04-30 RX ORDER — KETOROLAC TROMETHAMINE 30 MG/ML
15 INJECTION, SOLUTION INTRAMUSCULAR; INTRAVENOUS
Status: COMPLETED | OUTPATIENT
Start: 2019-04-30 | End: 2019-04-30

## 2019-04-30 RX ORDER — KETOROLAC TROMETHAMINE 30 MG/ML
30 INJECTION, SOLUTION INTRAMUSCULAR; INTRAVENOUS
Status: DISCONTINUED | OUTPATIENT
Start: 2019-04-30 | End: 2019-04-30 | Stop reason: SDUPTHER

## 2019-04-30 RX ORDER — LACTULOSE 10 G/15ML
30 SOLUTION ORAL; RECTAL
Status: DISCONTINUED | OUTPATIENT
Start: 2019-04-30 | End: 2019-04-30

## 2019-04-30 RX ADMIN — GABAPENTIN 300 MG: 300 CAPSULE ORAL at 21:13

## 2019-04-30 RX ADMIN — IRBESARTAN 150 MG: 150 TABLET, FILM COATED ORAL at 10:36

## 2019-04-30 RX ADMIN — HEPARIN SODIUM 5000 UNITS: 5000 INJECTION INTRAVENOUS; SUBCUTANEOUS at 14:26

## 2019-04-30 RX ADMIN — SENNOSIDES 8.6 MG: 8.6 TABLET, FILM COATED ORAL at 10:17

## 2019-04-30 RX ADMIN — POLYETHYLENE GLYCOL 3350 17 G: 17 POWDER, FOR SOLUTION ORAL at 06:38

## 2019-04-30 RX ADMIN — POLYETHYLENE GLYCOL 3350 17 G: 17 POWDER, FOR SOLUTION ORAL at 10:17

## 2019-04-30 RX ADMIN — TAMSULOSIN HYDROCHLORIDE 0.4 MG: 0.4 CAPSULE ORAL at 10:16

## 2019-04-30 RX ADMIN — CLOPIDOGREL BISULFATE 75 MG: 75 TABLET ORAL at 10:17

## 2019-04-30 RX ADMIN — Medication 10 ML: at 06:20

## 2019-04-30 RX ADMIN — AMLODIPINE BESYLATE 5 MG: 5 TABLET ORAL at 10:17

## 2019-04-30 RX ADMIN — HEPARIN SODIUM 5000 UNITS: 5000 INJECTION INTRAVENOUS; SUBCUTANEOUS at 06:14

## 2019-04-30 RX ADMIN — Medication 10 ML: at 21:13

## 2019-04-30 RX ADMIN — DORZOLAMIDE HYDROCHLORIDE AND TIMOLOL MALEATE 1 DROP: 20; 5 SOLUTION/ DROPS OPHTHALMIC at 10:37

## 2019-04-30 RX ADMIN — HEPARIN SODIUM 5000 UNITS: 5000 INJECTION INTRAVENOUS; SUBCUTANEOUS at 21:14

## 2019-04-30 RX ADMIN — Medication 10 ML: at 08:43

## 2019-04-30 RX ADMIN — DUTASTERIDE 0.5 MG: 0.5 CAPSULE, LIQUID FILLED ORAL at 10:36

## 2019-04-30 RX ADMIN — CEFEPIME 2 G: 2 INJECTION, POWDER, FOR SOLUTION INTRAVENOUS at 01:32

## 2019-04-30 RX ADMIN — TAMSULOSIN HYDROCHLORIDE 0.4 MG: 0.4 CAPSULE ORAL at 18:20

## 2019-04-30 RX ADMIN — HYDROCODONE BITARTRATE AND ACETAMINOPHEN 1 TABLET: 5; 325 TABLET ORAL at 06:14

## 2019-04-30 RX ADMIN — LEVOTHYROXINE SODIUM 125 MCG: 125 TABLET ORAL at 06:14

## 2019-04-30 RX ADMIN — CEFEPIME 2 G: 2 INJECTION, POWDER, FOR SOLUTION INTRAVENOUS at 13:25

## 2019-04-30 RX ADMIN — LATANOPROST 1 DROP: 50 SOLUTION OPHTHALMIC at 21:15

## 2019-04-30 RX ADMIN — ASPIRIN 81 MG 81 MG: 81 TABLET ORAL at 10:16

## 2019-04-30 RX ADMIN — Medication 10 ML: at 14:30

## 2019-04-30 RX ADMIN — KETOROLAC TROMETHAMINE 15 MG: 30 INJECTION, SOLUTION INTRAMUSCULAR at 08:42

## 2019-04-30 RX ADMIN — ATORVASTATIN CALCIUM 40 MG: 40 TABLET, FILM COATED ORAL at 21:13

## 2019-04-30 RX ADMIN — CYANOCOBALAMIN TAB 500 MCG 1000 MCG: 500 TAB at 10:16

## 2019-04-30 NOTE — PROGRESS NOTES
Bedside report given to DAVID OF [facility]. Kardex, MAR, and current orders reviewed.

## 2019-04-30 NOTE — PROGRESS NOTES
Problem: Mobility Impaired (Adult and Pediatric) Goal: *Acute Goals and Plan of Care (Insert Text) Description Physical Therapy Goals Initiated 4/28/2019 1. Patient will perform sit to stand with modified independence within 7 day(s). 2.  Patient will ambulate with modified independence for 300 feet with the least restrictive device within 7 day(s). 3.  Patient will improve Balderas Balance score by 7 points within 7 days. Outcome: Progressing Towards Goal 
 
PHYSICAL THERAPY TREATMENT Patient: Cristhian Rick (37 y.o. male) Date: 4/30/2019 Diagnosis: [facility] [I63.9] <principal problem not specified> Precautions:   
Chart, physical therapy assessment, plan of care and goals were reviewed. ASSESSMENT: 
Pt was received sitting on the EOB and cleared by nursing to mobilize. He has declined in his mobility since evaluation due to increased pain and swelling of the R knee. He had fluid taken off the knee, but pain remained 6-7/10 during entire session even at rest. He was able to stand with RW and CGA and only took a few steps to the [facility] with CGA. Knee pain limited ability to full participate with session. He was brought a reclining chair and LEs elevated and ice applied. If continues to have issues with knee pain recommending healthcare section of [facility]. If he improves he could have [place] come to him. Progression toward goals: 
?    Improving appropriately and progressing toward goals ? Improving slowly and progressing toward goals ? Not making progress toward goals and plan of care will be adjusted PLAN: 
Patient continues to benefit from skilled intervention to address the above impairments. Continue treatment per established plan of care. Discharge Recommendations:  healthcare section of [facility] Further Equipment Recommendations for Discharge:  TBD SUBJECTIVE:  
 Patient stated ? my R knee hurts, we are waiting for the results of the fluid. ? OBJECTIVE DATA SUMMARY:  
Critical Behavior: 
Neurologic State: Alert Orientation Level: Oriented X4 Cognition: Follows commands Safety/Judgement: Awareness of environment, Fall prevention Functional Mobility Training: 
Bed Mobility: 
  
  
 Session began and ended in sitting Transfers: 
  
  
     
Bed to Chair: Contact guard assistance;Assist x1 Balance: 
Sitting: Intact Standing: Impaired; Without support Standing - Static: Good;Constant support Standing - Dynamic : Fair Ambulation/Gait Training: 
Distance (ft): 3 Feet (ft) Assistive Device: Gait belt;Walker, rolling Ambulation - Level of Assistance: Contact guard assistance Gait Abnormalities: Decreased step clearance; Antalgic Speed/Margot: Pace decreased (<100 feet/min); Shuffled Step Length: Left shortened;Right shortened Therapeutic Exercises: LAQ Activity Tolerance:  
Fair, painful Please refer to the flowsheet for vital signs taken during this treatment. After treatment:  
?    Patient left in no apparent distress sitting up in chair ? Patient left in no apparent distress in bed 
? Call bell left within reach ? Nursing notified ? Caregiver present ? Bed alarm activated COMMUNICATION/COLLABORATION:  
The patient?s plan of care was discussed with: Occupational Therapist and Registered Nurse Christopher Rapp, PT, DPT Time Calculation: 22 mins

## 2019-04-30 NOTE — CONSULTS
Right knee pain and swelling since admit, no injury. Denies fever and chills. Patient Vitals for the past 12 hrs: 
 Temp Pulse Resp BP SpO2  
04/30/19 0946  84  176/57 95 % 04/30/19 0738 98.4 °F (36.9 °C) 89 18 (!) 162/92 96 % 04/30/19 0402 97.3 °F (36.3 °C) 78 20 153/72 98 % 04/29/19 2304 98.5 °F (36.9 °C) 80 18 162/66 99 % Right knee with swelling and tense effusion . Knee aspirated under sterile conditions with consent . 90 ml piyush fluid removed and sent to lab for analysis Knee pain R/O septic knee Ice and elevate for now

## 2019-04-30 NOTE — PROGRESS NOTES
Spoke to radiology department regarding the pt x rays ,explain that I  call earlier and pt was never added for transport, radiology tech stated she will talk to supervisor and probably come to do it at bedside

## 2019-04-30 NOTE — PROGRESS NOTES
Spoke to David in Dr Ruperto Gonzales office regarding no one form the office seeing patient yesterday. She stated Dr Ruperto Gonzales was in surgery this morning but she would try to get a message to him.

## 2019-04-30 NOTE — PROGRESS NOTES
Hospitalist Progress Note NAME: Nafisa Bell :  1928 MRN:  [id number] Assessment / Plan: 
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  
-Sx: weakness/garbled speech - resolved  
-Head CT:negative  
-MRI:no acute stroke  
-CTA neck 1. Developmentally small vertebral basilar system was superimposed significant 
atherosclerotic disease and stenosis involving primarily distal vertebral 
arteries and basilar artery. 2. Approximately 50% stenosis proximal right internal carotid artery and 40% 
stenosis proximal left internal carotid artery by NASCET criteria. 3. Chronic cervical spondylosis and stenosis. 4. No acute arterial abnormality demonstrated -ECHO: EF 46%, mild AR, mod MR, mod TR, mild pulm HTN  
-Stroke blood work: LDL 89.6   hba1c 6.1 TSH 1.97 
-Seen by neurology: cont asa/plavix  
-Speech:none  
-PT/OT:healthcare section of [facility] R knee effusion r/o septic knee 
-s/p arthrocentesis  Ortho help appreciated 
-follow cultures  
  
UTI 
-UC: pseudomonas  
-empiric rocephin --> cefepime (  ) - treat for 5 days, can be switched to PO LVQ  
  
HTN 
-BP stable 
-continue home meds : Norvasc/ avapro  
  
Idiopathic polyneuropathy, cont' gabapentin 
BPH, cont' avodart, flomax Hypothyroidism, cont' synthroid Hx of skin ca, completed chemo per pt 
  
  
Code Status: DNR Surrogate Decision Maker: pt's son DVT Prophylaxis: heparin Baseline: from [facility] independent living Recommended Disposition: back to [facility] 1-2 days pending R knee cultures Subjective: Chief Complaint / Reason for Physician Visit: following TIA / uti/ R knee effusion S/p R knee arthrocentesis today which improved pain significantly Weakness resolved Speech is normal  
 
Discussed with RN events overnight. Review of Systems: 
Symptom Y/N Comments  Symptom Y/N Comments Fever/Chills n   Chest Pain n   
Poor Appetite    Edema Cough    Abdominal Pain n   
Sputum    Joint Pain SOB/ENG n   Pruritis/Rash Nausea/vomit    Tolerating PT/OT Diarrhea    Tolerating Diet Constipation    Other Could NOT obtain due to:   
 
Objective: VITALS:  
Last 24hrs VS reviewed since prior progress note. Most recent are: 
Patient Vitals for the past 24 hrs: 
 Temp Pulse Resp BP SpO2  
04/30/19 1520 98.7 °F (37.1 °C) 66 20 134/47 96 % 04/30/19 1247 98 °F (36.7 °C) 72 20 124/46 100 % 04/30/19 0946  84  176/57 95 % 04/30/19 0738 98.4 °F (36.9 °C) 89 18 (!) 162/92 96 % 04/30/19 0402 97.3 °F (36.3 °C) 78 20 153/72 98 % 04/29/19 2304 98.5 °F (36.9 °C) 80 18 162/66 99 % 04/29/19 1911 97.8 °F (36.6 °C) 72 18 126/67 98 % Intake/Output Summary (Last 24 hours) at 4/30/2019 1746 Last data filed at 4/30/2019 9412 Gross per 24 hour Intake 180 ml Output  Net 180 ml PHYSICAL EXAM: 
General: WD, WN. Alert, cooperative, no acute distress   
EENT:  EOMI. Anicteric sclerae. MMM Resp:  CTA bilaterally, no wheezing or rales. No accessory muscle use CV:  Regular  rhythm,  No edema GI:  Soft, Non distended, Non tender.  +Bowel sounds Neurologic:  Alert and oriented X 3, normal speech, Psych:   Good insight. Not anxious nor agitated Skin:  No rashes. No jaundice Extr:                 R knee residual swelling/ warm to touch/ not tender Reviewed most current lab test results and cultures  YES Reviewed most current radiology test results   YES Review and summation of old records today    NO Reviewed patient's current orders and MAR    YES 
PMH/SH reviewed - no change compared to H&P 
________________________________________________________________________ Care Plan discussed with: 
  Comments Patient y Family  y dtr bedside RN y   
Care Manager Consultant                   Multidiciplinary team rounds were held today with case manager, nursing, pharmacist and clinical coordinator. Patient's plan of care was discussed; medications were reviewed and discharge planning was addressed. ________________________________________________________________________ Total NON critical care TIME:  35 Minutes Total CRITICAL CARE TIME Spent:   Minutes non procedure based Comments >50% of visit spent in counseling and coordination of care    
________________________________________________________________________ Iglesia Mcdermott MD  
 
Procedures: see electronic medical records for all procedures/Xrays and details which were not copied into this note but were reviewed prior to creation of Plan. LABS: 
I reviewed today's most current labs and imaging studies. Pertinent labs include: 
Recent Labs  
  04/28/19 
1726 WBC 7.1 HGB 13.4 HCT 41.0  
 Recent Labs  
  04/28/19 
6393   
K 3.9 * CO2 25 * BUN 13  
CREA 0.76 CA 8.5 Signed: Iglesia Mcdermott MD

## 2019-04-30 NOTE — ROUTINE PROCESS
* No surgery found * 
* No surgery found * Bedside shift change report given to Monserrat Dillon [phone] (oncoming nurse) by Cornelia Muñoz RN (offgoing nurse). Report included the following information [facility] Phone:   [phone] Significant changes during shift: X rays of rt knee done , no results yet Patient Information Abbie Cullen 
80 y.o. 
4/27/2019 10:09 AM by Fernanda Oneill MD. Abbie Cullen was admitted from [facility] 171 Problem List 
 
Patient Active Problem List  
 Diagnosis Date Noted  CVA (cerebral vascular accident) ([address] 04/27/2019  Insomnia due to medical condition 05/10/2017  Stenosis of both internal carotid arteries 02/07/2017  Ataxia 02/07/2017  Diabetic peripheral neuropathy associated with type 2 diabetes mellitus ([address] 02/07/2017  Sensory ataxic gait 02/07/2017  Monocular diplopia of both eyes 02/07/2017  VBI (vertebrobasilar insufficiency) 02/07/2017  History of stroke 02/07/2017  Vitamin D deficiency 02/07/2017  BPV (benign positional vertigo) 02/07/2017  Dizziness 04/23/2013  Idiopathic small and large fiber sensory neuropathy 04/23/2013  B12 deficiency 04/23/2013  Hypothyroidism, postsurgical 03/13/2013  Thyroid mass 05/10/2011  Incisional hernia 05/10/2011  CVA (cerebral infarction) 05/10/2011  CAD (coronary artery disease) 05/10/2011 Past Medical History:  
Diagnosis Date  CAD (coronary artery disease)  Calculus of kidney  Cancer [facility]) prostate  1996  
 Hearing loss  Heart attack ([address]  Hypercholesterolemia  Hypertension  Incisional hernia 5/10/2011  Movement disorder  Other ill-defined conditions(799.89)   
 elevated cholesterol  Other ill-defined conditions(799.89)   
 glaucoma  Other ill-defined conditions(799.89)   
 abd hernia  Stroke ([address]   
 left arm tingling  Thyroid disease  Thyroid mass 5/10/2011 Core Measures: CVA: Yes Yes CHF:No No 
PNA:No No 
 
 
 Activity Status: OOB to Chair Yes Ambulated this shift Yes Bed Rest No 
 
Supplemental O2: (If Applicable) NC No 
NRB No 
Venti-mask No 
On 0 Liters/min LINES AND DRAINS: 
PIV 
 
 
DVT prophylaxis: DVT prophylaxis Med- Yes DVT prophylaxis SCD or ANDREW- No  
 
Wounds: (If Applicable) Wounds- No 
 
Location Patient Safety: 
 
Falls Score Total Score: 2 Safety Level_______ Bed Alarm On? No 
Sitter? No 
 
Plan for upcoming shift:safety, neurochecks, pain medications, IV antibiotics. Discharge Plan: Yes TBD Active Consults: 
IP CONSULT TO HOSPITALIST 
IP CONSULT TO NEUROLOGY 
IP CONSULT TO ORTHOPEDIC SURGERY

## 2019-04-30 NOTE — PROGRESS NOTES
Patient c/o pain rated in 8 in knee and last received  pain  med at 0615 and pain med is ordered every four hours prn. Dr Humera patel

## 2019-04-30 NOTE — ROUTINE PROCESS
* No surgery found * 
* No surgery found * Bedside shift change report given to Deepti (oncoming nurse) by Rudi Kawasaki (offgoing nurse). Report included the following information [facility] Phone:   [phone] Significant changes during shift:  PT/OT seen, Ortho consult ordered Patient Information Steve Barker 
80 y.o. 
4/27/2019 10:09 AM by Jose Lyle MD. Steve Barker was admitted from [facility] 171 Problem List 
 
Patient Active Problem List  
 Diagnosis Date Noted  CVA (cerebral vascular accident) ([address] 04/27/2019  Insomnia due to medical condition 05/10/2017  Stenosis of both internal carotid arteries 02/07/2017  Ataxia 02/07/2017  Diabetic peripheral neuropathy associated with type 2 diabetes mellitus ([address] 02/07/2017  Sensory ataxic gait 02/07/2017  Monocular diplopia of both eyes 02/07/2017  VBI (vertebrobasilar insufficiency) 02/07/2017  History of stroke 02/07/2017  Vitamin D deficiency 02/07/2017  BPV (benign positional vertigo) 02/07/2017  Dizziness 04/23/2013  Idiopathic small and large fiber sensory neuropathy 04/23/2013  B12 deficiency 04/23/2013  Hypothyroidism, postsurgical 03/13/2013  Thyroid mass 05/10/2011  Incisional hernia 05/10/2011  CVA (cerebral infarction) 05/10/2011  CAD (coronary artery disease) 05/10/2011 Past Medical History:  
Diagnosis Date  CAD (coronary artery disease)  Calculus of kidney  Cancer [facility]) prostate  1996  
 Hearing loss  Heart attack ([address]  Hypercholesterolemia  Hypertension  Incisional hernia 5/10/2011  Movement disorder  Other ill-defined conditions(799.89)   
 elevated cholesterol  Other ill-defined conditions(799.89)   
 glaucoma  Other ill-defined conditions(799.89)   
 abd hernia  Stroke ([address]   
 left arm tingling  Thyroid disease  Thyroid mass 5/10/2011 Core Measures: CVA: Yes Yes CHF:No No 
PNA:No No 
 
 
 Activity Status: OOB to Chair Yes Ambulated this shift Yes Bed Rest No 
 
Supplemental O2: (If Applicable) NC No 
NRB No 
Venti-mask No 
On 0 Liters/min LINES AND DRAINS: 
 
PIV 
 
 
DVT prophylaxis: DVT prophylaxis Med- Yes DVT prophylaxis SCD or ANDREW- No  
 
Wounds: (If Applicable) Wounds- No 
 
Location Patient Safety: 
 
Falls Score Total Score: 2 Safety Level_______ Bed Alarm On? No 
Sitter? No 
 
Plan for upcoming shift: imaging, ortho consult Discharge Plan: Yes TBD Active Consults: 
IP CONSULT TO HOSPITALIST 
IP CONSULT TO NEUROLOGY 
IP CONSULT TO ORTHOPEDIC SURGERY

## 2019-04-30 NOTE — ROUTINE PROCESS
* No surgery found * 
* No surgery found * Bedside shift change report given to Deepti (oncoming nurse) by Matilda Guzman (offgoing nurse). Report included the following information [facility] Phone:   [phone] Significant changes during shift:  Seen by Ortho - fluid drained from knee, BM occured Patient Information Brendon Wong 
80 y.o. 
4/27/2019 10:09 AM by Cornell Dawkins MD. Brendon Wong was admitted from [facility] 171 Problem List 
 
Patient Active Problem List  
 Diagnosis Date Noted  CVA (cerebral vascular accident) ([address] 04/27/2019  Insomnia due to medical condition 05/10/2017  Stenosis of both internal carotid arteries 02/07/2017  Ataxia 02/07/2017  Diabetic peripheral neuropathy associated with type 2 diabetes mellitus ([address] 02/07/2017  Sensory ataxic gait 02/07/2017  Monocular diplopia of both eyes 02/07/2017  VBI (vertebrobasilar insufficiency) 02/07/2017  History of stroke 02/07/2017  Vitamin D deficiency 02/07/2017  BPV (benign positional vertigo) 02/07/2017  Dizziness 04/23/2013  Idiopathic small and large fiber sensory neuropathy 04/23/2013  B12 deficiency 04/23/2013  Hypothyroidism, postsurgical 03/13/2013  Thyroid mass 05/10/2011  Incisional hernia 05/10/2011  CVA (cerebral infarction) 05/10/2011  CAD (coronary artery disease) 05/10/2011 Past Medical History:  
Diagnosis Date  CAD (coronary artery disease)  Calculus of kidney  Cancer [facility]) prostate  1996  
 Hearing loss  Heart attack ([address]  Hypercholesterolemia  Hypertension  Incisional hernia 5/10/2011  Movement disorder  Other ill-defined conditions(799.89)   
 elevated cholesterol  Other ill-defined conditions(799.89)   
 glaucoma  Other ill-defined conditions(799.89)   
 abd hernia  Stroke ([address]   
 left arm tingling  Thyroid disease  Thyroid mass 5/10/2011 Core Measures: CVA: Yes Yes CHF:No No 
PNA:No No 
 
 
 Activity Status: OOB to Chair Yes Ambulated this shift Yes Bed Rest No 
 
Supplemental O2: (If Applicable) NC No 
NRB No 
Venti-mask No 
On 0 Liters/min LINES AND DRAINS: 
PIV 
 
 
DVT prophylaxis: DVT prophylaxis Med- Yes DVT prophylaxis SCD or ANDREW- No  
 
Wounds: (If Applicable) Wounds- No 
 
Location Patient Safety: 
 
Falls Score Total Score: 3 Safety Level_______ Bed Alarm On? No 
Sitter? No 
 
Plan for upcoming shift: knee fluid results Discharge Plan: Yes Darell Simpler Active Consults: 
IP CONSULT TO HOSPITALIST 
IP CONSULT TO NEUROLOGY 
IP CONSULT TO ORTHOPEDIC SURGERY

## 2019-04-30 NOTE — PROGRESS NOTES
Problem: Self Care Deficits Care Plan (Adult) Goal: *Acute Goals and Plan of Care (Insert Text) Description Occupational Therapy Goals Initiated 4/30/2019 1. Patient will perform grooming at the sink with modified independence within 7 day(s). 2.  Patient will perform bathing at the sink with supervision/set-up within 7 day(s). 3.  Patient will perform lower body dressing with minimal assistance/contact guard assist within 7 day(s). 4.  Patient will perform toilet transfers with supervision/set-up within 7 day(s). 5.  Patient will perform all aspects of toileting with independence within 7 day(s). 6.  Patient will participate in upper extremity therapeutic exercise/activities with independence for 5 minutes within 7 day(s). 7.  Patient will utilize energy conservation techniques during functional activities with verbal cues within 7 day(s). Outcome: Progressing Towards Goal 
 OCCUPATIONAL THERAPY EVALUATION Patient: Canelo Plummer (10 y.o. male) Date: 4/30/2019 Primary Diagnosis: CVA (cerebral vascular accident) ([facility] [address] [I63.9] Precautions: fall ASSESSMENT : 
Based on the objective data described below, the patient presents with generalized weakness, decreased endurance, strength, functional mobility and ADLs. Pt was living at [facility] in independent living. Pt was using a cane at times for walking. Pt was cleared to be seen for therapy  and all VSs. Pt was sitting up on EOB when OT arrived and stated that he had pain in his right knee,and just had fluid removed from his knee. Pt was have functional range and strength in BUE. He was able to stand with CGA and with use of RW pt was CGA for transfer to recliner. Pt was max assist for donning his socks due to knee pain and pt was reclined and ice applied to right knee. Recommend that pt have further therapy at discharge at home with home care and may benefit from staying in home care setting for a short time. Patient will benefit from skilled intervention to address the above impairments. Patient?s rehabilitation potential is considered to be Good Factors which may influence rehabilitation potential include: ? None noted ? Mental ability/status ? Medical condition ? Home/family situation and support systems ? Safety awareness ? Pain tolerance/management ? Other: PLAN : 
Recommendations and Planned Interventions: 
?               Self Care Training                  ? Therapeutic Activities ? Functional Mobility Training    ? Cognitive Retraining 
? Therapeutic Exercises           ? Endurance Activities ? Balance Training                   ? Neuromuscular Re-Education ? Visual/Perceptual Training     ? Home Safety Training 
? Patient Education                 ? Family Training/Education ? Other (comment): Frequency/Duration: Patient will be followed by occupational therapy 3 times a week to address goals. Discharge Recommendations: Home Health Further Equipment Recommendations for Discharge: tbd SUBJECTIVE:  
Patient stated ? My pain is a 3 sitting here and its a 10 when I put weight on it.? OBJECTIVE DATA SUMMARY:  
HISTORY:  
Past Medical History:  
Diagnosis Date CAD (coronary artery disease) Calculus of kidney Cancer [facility]) prostate  1996 Hearing loss Heart attack ([facility] [address] Hypercholesterolemia Hypertension Incisional hernia 5/10/2011 Movement disorder Other ill-defined conditions(799.89)   
 elevated cholesterol Other ill-defined conditions(799.89)   
 glaucoma Other ill-defined conditions(799.89)   
 abd hernia Stroke [facility])   
 left arm tingling Thyroid disease Thyroid mass 5/10/2011 Past Surgical History: Procedure Laterality Date CARDIAC SURG PROCEDURE UNLIST  1996  
 cabg x3 HX AORTIC VALVE REPLACEMENT  2015 HX HEENT    
 thyroid biopsy HX HEENT    
 thyroidectomy 2013 HX ORCHIECTOMY  01/2017 HX ORTHOPAEDIC  2/2011  
 compression fracture d.t. fall (back),no surgery HX THYROIDECTOMY  1/3/2013 TX PROSTATE BIOPSY, NEEDLE, SATURATION SAMPLING Prior Level of Function/Environment/Context: pt lives with family and was independent iwht ADLs and ILS and stated that he uses a cane when he is not feeling steady. Expanded or extensive additional review of patient history:  
 
Home Situation Home Environment: ([address] [facility]) One/Two Story Residence: One story Living Alone: Yes Support Systems: Assisted living, Child(yaquelin) Patient Expects to be Discharged to[de-identified] Other (comment)(Paige Ritter) Current DME Used/Available at Home: Walker, rolling, Cane, straight, Raised toilet seat, Grab bars Hand dominance: Right EXAMINATION OF PERFORMANCE DEFICITS: 
Cognitive/Behavioral Status: 
Neurologic State: Alert Orientation Level: Oriented X4 Cognition: Follows commands Perception: Appears intact Perseveration: No perseveration noted Safety/Judgement: Awareness of environment; Fall prevention Skin: in good health Edema: none Hearing: Auditory Auditory Impairment: Hard of hearing, bilateral 
 
Vision/Perceptual:   
    
    
    
 intact Range of Motion: 
AROM: Generally decreased, functional 
PROM: Generally decreased, functional 
  
  
  
  
  
  
Strength: 
Strength: Generally decreased, functional 
  
  
  
  
Coordination: 
Coordination: Within functional limits Fine Motor Skills-Upper: Left Intact; Right Intact Gross Motor Skills-Upper: Left Intact; Right Intact Tone & Sensation: 
Tone: Normal 
Sensation: Intact Balance: 
Sitting: Intact Standing: Impaired; Without support Standing - Static: Good;Constant support Standing - Dynamic : Fair Functional Mobility and Transfers for ADLs: 
Bed Mobility: SBA Transfers: 
Bed to Chair: Contact guard assistance;Assist x1 Bathroom Mobility: Supervision/set up Toilet Transfer : Minimum assistance;Contact guard assistance ADL Assessment: 
Feeding: Independent Oral Facial Hygiene/Grooming: Setup Bathing: Maximum assistance;Minimum assistance(for LB) Upper Body Dressing: Independent Lower Body Dressing: Maximum assistance;Minimum assistance Toileting: Independent Cognitive Retraining Safety/Judgement: Awareness of environment; Fall prevention Functional Measure: 
Barthel Index: 
Bathin Bladder: 10 Bowels: 10 
Groomin Dressin Feeding: 10 Mobility: 0 Stairs: 0 Toilet Use: 5 Transfer (Bed to Chair and Back): 10 Total: 55/100 Percentage of impairment  
0% 1-19% 20-39% 40-59% 60-79% 80-99% 100% Barthel Score 0-100 100 99-80 79-60 59-40 20-39 1-19 
 0 The Barthel ADL Index: Guidelines 1. The index should be used as a record of what a patient does, not as a record of what a patient could do. 2. The main aim is to establish degree of independence from any help, physical or verbal, however minor and for whatever reason. 3. The need for supervision renders the patient not independent. 4. A patient's performance should be established using the best available evidence. Asking the patient, friends/relatives and nurses are the usual sources, but direct observation and common sense are also important. However direct testing is not needed. 5. Usually the patient's performance over the preceding 24-48 hours is important, but occasionally longer periods will be relevant. 6. Middle categories imply that the patient supplies over 50 per cent of the effort. 7. Use of aids to be independent is allowed. Sherri Cerda., Barthel, D.W. (2794). Functional evaluation: the Barthel Index. [facility] (14)2. YUAN Puga, Yuly Lua.Melanie., [facility], [address] (1999). Measuring the change indisability after inpatient rehabilitation; comparison of the responsiveness of the Barthel Index and Functional [place]ta Measure. Journal of Neurology, Neurosurgery, and Psychiatry, 66(4), 118-974. RACHEL Alexander, IVETH Cotto, & Brandon Valdes M.A. (2004.) Assessment of post-stroke quality of life in cost-effectiveness studies: The usefulness of the Barthel Index and the EuroQoL-5D. [facility], 13, 290-20 Occupational Therapy Evaluation Charge Determination History Examination Decision-Making LOW Complexity : Brief history review  LOW Complexity : 1-3 performance deficits relating to physical, cognitive , or psychosocial skils that result in activity limitations and / or participation restrictions  LOW Complexity : No comorbidities that affect functional and no verbal or physical assistance needed to complete eval tasks Based on the above components, the patient evaluation is determined to be of the following complexity level: LOW Pain: 
Pain Scale 1: Numeric (0 - 10) Pain Intensity 1: 0 Pain Location 1: Knee Pain Orientation 1: Right Pain Description 1: Aching Pain Intervention(s) 1: Medication (see MAR) Activity Tolerance:  
good Please refer to the flowsheet for vital signs taken during this treatment. After treatment:  
? Patient left in no apparent distress sitting up in chair ? Patient left in no apparent distress in bed 
? Call bell left within reach ? Nursing notified ? Caregiver present ? Bed alarm activated COMMUNICATION/EDUCATION:  
The patient?s plan of care was discussed with: Physical Therapist and Registered Nurse and family. ? Home safety education was provided and the patient/caregiver indicated understanding. ? Patient/family have participated as able in goal setting and plan of care. ? Patient/family agree to work toward stated goals and plan of care. ? Patient understands intent and goals of therapy, but is neutral about his/her participation. ? Patient is unable to participate in goal setting and plan of care. This patient?s plan of care is appropriate for delegation to [facility]. Thank you for this referral. 
Magdalena Juarez OT Time Calculation: 22 mins

## 2019-05-01 ENCOUNTER — HOME HEALTH ADMISSION (OUTPATIENT)
Dept: HOME HEALTH SERVICES | Facility: HOME HEALTH | Age: 84
End: 2019-05-01
Payer: MEDICARE

## 2019-05-01 ENCOUNTER — APPOINTMENT (OUTPATIENT)
Dept: CT IMAGING | Age: 84
DRG: 068 | End: 2019-05-01
Attending: HOSPITALIST
Payer: MEDICARE

## 2019-05-01 PROBLEM — G45.9 TIA (TRANSIENT ISCHEMIC ATTACK): Status: ACTIVE | Noted: 2019-05-01

## 2019-05-01 PROBLEM — I63.9 CVA (CEREBRAL VASCULAR ACCIDENT) (HCC): Status: RESOLVED | Noted: 2019-04-27 | Resolved: 2019-05-01

## 2019-05-01 LAB
GLUCOSE BLD STRIP.AUTO-MCNC: 108 MG/DL (ref 65–100)
GLUCOSE BLD STRIP.AUTO-MCNC: 127 MG/DL (ref 65–100)
SERVICE CMNT-IMP: ABNORMAL
SERVICE CMNT-IMP: ABNORMAL

## 2019-05-01 PROCEDURE — 74011250637 HC RX REV CODE- 250/637: Performed by: HOSPITALIST

## 2019-05-01 PROCEDURE — 74011250636 HC RX REV CODE- 250/636: Performed by: HOSPITALIST

## 2019-05-01 PROCEDURE — 70450 CT HEAD/BRAIN W/O DYE: CPT

## 2019-05-01 PROCEDURE — 74011250636 HC RX REV CODE- 250/636: Performed by: INTERNAL MEDICINE

## 2019-05-01 PROCEDURE — 74011250637 HC RX REV CODE- 250/637: Performed by: PSYCHIATRY & NEUROLOGY

## 2019-05-01 PROCEDURE — 97535 SELF CARE MNGMENT TRAINING: CPT

## 2019-05-01 PROCEDURE — 82962 GLUCOSE BLOOD TEST: CPT

## 2019-05-01 PROCEDURE — 74011250637 HC RX REV CODE- 250/637: Performed by: INTERNAL MEDICINE

## 2019-05-01 PROCEDURE — 20610 DRAIN/INJ JOINT/BURSA W/O US: CPT

## 2019-05-01 PROCEDURE — 65660000000 HC RM CCU STEPDOWN

## 2019-05-01 PROCEDURE — 97116 GAIT TRAINING THERAPY: CPT

## 2019-05-01 PROCEDURE — 97110 THERAPEUTIC EXERCISES: CPT

## 2019-05-01 PROCEDURE — 74011000258 HC RX REV CODE- 258: Performed by: HOSPITALIST

## 2019-05-01 RX ORDER — LEVOFLOXACIN 750 MG/1
750 TABLET ORAL EVERY 24 HOURS
Status: COMPLETED | OUTPATIENT
Start: 2019-05-01 | End: 2019-05-02

## 2019-05-01 RX ORDER — ACETAMINOPHEN 325 MG/1
650 TABLET ORAL EVERY 6 HOURS
Status: DISCONTINUED | OUTPATIENT
Start: 2019-05-01 | End: 2019-05-02

## 2019-05-01 RX ORDER — OXYCODONE HYDROCHLORIDE 5 MG/1
5 TABLET ORAL
Status: DISCONTINUED | OUTPATIENT
Start: 2019-05-01 | End: 2019-05-03 | Stop reason: HOSPADM

## 2019-05-01 RX ORDER — LEVOFLOXACIN 750 MG/1
750 TABLET ORAL
Qty: 2 TAB | Refills: 0 | Status: SHIPPED | OUTPATIENT
Start: 2019-05-01 | End: 2019-05-03

## 2019-05-01 RX ORDER — OXYCODONE HYDROCHLORIDE 5 MG/1
5 TABLET ORAL
Qty: 5 TAB | Refills: 0 | Status: SHIPPED | OUTPATIENT
Start: 2019-05-01 | End: 2019-05-03

## 2019-05-01 RX ADMIN — LEVOTHYROXINE SODIUM 125 MCG: 125 TABLET ORAL at 06:12

## 2019-05-01 RX ADMIN — HEPARIN SODIUM 5000 UNITS: 5000 INJECTION INTRAVENOUS; SUBCUTANEOUS at 21:56

## 2019-05-01 RX ADMIN — ACETAMINOPHEN 650 MG: 325 TABLET ORAL at 06:12

## 2019-05-01 RX ADMIN — POLYETHYLENE GLYCOL 3350 17 G: 17 POWDER, FOR SOLUTION ORAL at 10:38

## 2019-05-01 RX ADMIN — LEVOFLOXACIN 750 MG: 750 TABLET, FILM COATED ORAL at 13:00

## 2019-05-01 RX ADMIN — GABAPENTIN 300 MG: 300 CAPSULE ORAL at 21:56

## 2019-05-01 RX ADMIN — ASPIRIN 81 MG 81 MG: 81 TABLET ORAL at 10:38

## 2019-05-01 RX ADMIN — Medication 10 ML: at 04:16

## 2019-05-01 RX ADMIN — Medication 10 ML: at 15:00

## 2019-05-01 RX ADMIN — IRBESARTAN 150 MG: 150 TABLET, FILM COATED ORAL at 12:40

## 2019-05-01 RX ADMIN — DUTASTERIDE 0.5 MG: 0.5 CAPSULE, LIQUID FILLED ORAL at 12:40

## 2019-05-01 RX ADMIN — TAMSULOSIN HYDROCHLORIDE 0.4 MG: 0.4 CAPSULE ORAL at 10:38

## 2019-05-01 RX ADMIN — ACETAMINOPHEN 650 MG: 325 TABLET ORAL at 10:47

## 2019-05-01 RX ADMIN — CYANOCOBALAMIN TAB 500 MCG 1000 MCG: 500 TAB at 10:47

## 2019-05-01 RX ADMIN — CLOPIDOGREL BISULFATE 75 MG: 75 TABLET ORAL at 10:38

## 2019-05-01 RX ADMIN — OXYCODONE HYDROCHLORIDE 5 MG: 5 TABLET ORAL at 22:03

## 2019-05-01 RX ADMIN — TAMSULOSIN HYDROCHLORIDE 0.4 MG: 0.4 CAPSULE ORAL at 19:36

## 2019-05-01 RX ADMIN — HEPARIN SODIUM 5000 UNITS: 5000 INJECTION INTRAVENOUS; SUBCUTANEOUS at 06:12

## 2019-05-01 RX ADMIN — DORZOLAMIDE HYDROCHLORIDE AND TIMOLOL MALEATE 1 DROP: 20; 5 SOLUTION/ DROPS OPHTHALMIC at 10:48

## 2019-05-01 RX ADMIN — ATORVASTATIN CALCIUM 40 MG: 40 TABLET, FILM COATED ORAL at 21:56

## 2019-05-01 RX ADMIN — ACETAMINOPHEN 650 MG: 325 TABLET ORAL at 19:35

## 2019-05-01 RX ADMIN — Medication 10 ML: at 21:56

## 2019-05-01 RX ADMIN — LATANOPROST 1 DROP: 50 SOLUTION OPHTHALMIC at 21:57

## 2019-05-01 RX ADMIN — AMLODIPINE BESYLATE 5 MG: 5 TABLET ORAL at 10:56

## 2019-05-01 RX ADMIN — CEFEPIME 2 G: 2 INJECTION, POWDER, FOR SOLUTION INTRAVENOUS at 00:02

## 2019-05-01 RX ADMIN — HYDROCODONE BITARTRATE AND ACETAMINOPHEN 1 TABLET: 5; 325 TABLET ORAL at 03:52

## 2019-05-01 NOTE — ROUTINE PROCESS
Called into patient's room by [facility]. When arrived into patient's room, patient was in the chair. Daughter in law said patient \"isnt acting right. \" Stated that  patient was getting dressed for discharge when all of a sudden, patient started speaking slowly. Patient was still alert and oriented. Patient stated that \"it feels like I can't get my words out. \" NIH was done and right side was much weaker than left. BG was 108 and all vital signs stable except BP. BP was low. After VS and BG checked, patient was back to baseline. Family still present in room. Neurologist and MD paged. MD cancelled order for discharge and gave new orders to check orthostatics and reconsult neuro. Family updated on plan of care. Patient put back to bed and into gown.

## 2019-05-01 NOTE — PROGRESS NOTES
Problem: Self Care Deficits Care Plan (Adult) Goal: *Acute Goals and Plan of Care (Insert Text) Description Occupational Therapy Goals Initiated 4/30/2019 1. Patient will perform grooming at the sink with modified independence within 7 day(s). 2.  Patient will perform bathing at the sink with supervision/set-up within 7 day(s). 3.  Patient will perform lower body dressing with minimal assistance/contact guard assist within 7 day(s). 4.  Patient will perform toilet transfers with supervision/set-up within 7 day(s). 5.  Patient will perform all aspects of toileting with independence within 7 day(s). 6.  Patient will participate in upper extremity therapeutic exercise/activities with independence for 5 minutes within 7 day(s). 7.  Patient will utilize energy conservation techniques during functional activities with verbal cues within 7 day(s). Outcome: Progressing Towards Goal 
 OCCUPATIONAL THERAPY TREATMENT Patient: Umang Valdes (63 y.o. male) Date: 5/1/2019 Diagnosis: CVA (cerebral vascular accident) ([facility] [address] [I63.9] <principal problem not specified> Precautions:   
Chart, occupational therapy assessment, plan of care, and goals were reviewed. ASSESSMENT: 
Pt was cleared to be seen for therapy and all VSS and pt was sitting up in recliner when OT arrived. Pt was able to stand with supervision from recliner and with use of RW walk to bathroom and stand and void with supervision. He was supervision for washing his hands at the sink. OT had pt demonstrate how he is able to tailor sit and kanwal and doff his socks. Pt was also able to bend forward and demonstrate how he would kanwal his pants over feet. Pt is making good progress and recommend that pt return home with home care OT and PT and assist from family. Progression toward goals: 
?       Improving appropriately and progressing toward goals ? Improving slowly and progressing toward goals ?       Not making progress toward goals and plan of care will be adjusted PLAN: 
Patient continues to benefit from skilled intervention to address the above impairments. Continue treatment per established plan of care. Discharge Recommendations:  Home Health Further Equipment Recommendations for Discharge:  tbd SUBJECTIVE:  
Patient stated ? I think I can do that. My knee feels okay. ? OBJECTIVE DATA SUMMARY:  
Cognitive/Behavioral Status: 
Neurologic State: Alert Orientation Level: Appropriate for age Cognition: Appropriate decision making Perception: Appears intact Perseveration: No perseveration noted Safety/Judgement: Awareness of environment Functional Mobility and Transfers for ADLs: 
 
  
 
Transfers: 
Sit to Stand: Stand-by assistance Functional Transfers Bathroom Mobility: Supervision/set up Toilet Transfer : Supervision Balance: 
Sitting: Intact Standing: Impaired Standing - Static: Good;Constant support Standing - Dynamic : Fair ADL Intervention: 
Feeding Feeding Assistance: Independent Grooming Grooming Assistance: Set-up Washing Face: Supervision/set-up Washing Hands: Supervision/set-up Brushing Teeth: Supervision/set-up Brushing/Combing Hair: Supervision/set-up Upper Body Bathing Bathing Assistance: Set-up Upper Body Dressing Assistance Dressing Assistance: Set-up Toileting Toileting Assistance: Supervision Bladder Hygiene: Supervision/set-up Bowel Hygiene: Supervision/set-up Cognitive Retraining Safety/Judgement: Awareness of environment Pain: 
Pain Scale 1: Numeric (0 - 10) Pain Intensity 1: 0 Pain Location 1: Knee Pain Orientation 1: Right Pain Description 1: Aching Pain Intervention(s) 1: Medication (see MAR) Activity Tolerance:  
Good Please refer to the flowsheet for vital signs taken during this treatment. After treatment:  
? Patient left in no apparent distress sitting up in chair ? Patient left in no apparent distress in bed 
? Call bell left within reach ? Nursing notified ? Caregiver present ? Bed alarm activated COMMUNICATION/COLLABORATION:  
The patient?s plan of care was discussed with: Physical Therapist, Registered Nurse and family MONICA Driscoll Time Calculation: 22 mins

## 2019-05-01 NOTE — PROGRESS NOTES
Spoke with patient's family and patient and he does not want to go to the healthcare section. They are interested in going back to the independent with home health services. Called and spoke with Diana Veloz at [facility] ([phone]) and made her aware of the discharge plan. Choice given and referral being sent for home health pt,ot and nursing and will await their response. Daughter and daughter in law updated on process.

## 2019-05-01 NOTE — ROUTINE PROCESS
Bedside shift change report given to [facility] ALYSON RN(oncoming nurse) by Girish Bass RN(offgoing nurse). Report included the following information [facility]. 
  
[facility] Phone:   [phone] 
  
  
Significant changes during shift: none 
  
Patient Information 
  
Janiya Fajardo 
80 y.o. 
4/27/2019 10:09 AM by Ellen Bustamante MD. [facility] 
Problem List 
  
    
Patient Active Problem List  
  Diagnosis Date Noted  CVA (cerebral vascular accident) ([facility] [address] 04/27/2019  Insomnia due to medical condition 05/10/2017  Stenosis of both internal carotid arteries 02/07/2017  Ataxia 02/07/2017  Diabetic peripheral neuropathy associated with type 2 diabetes mellitus ([facility] [address] 02/07/2017  Sensory ataxic gait 02/07/2017  Monocular diplopia of both eyes 02/07/2017  VBI (vertebrobasilar insufficiency) 02/07/2017  History of stroke 02/07/2017  Vitamin D deficiency 02/07/2017  BPV (benign positional vertigo) 02/07/2017  Dizziness 04/23/2013  Idiopathic small and large fiber sensory neuropathy 04/23/2013  B12 deficiency 04/23/2013  Hypothyroidism, postsurgical 03/13/2013  Thyroid mass 05/10/2011  Incisional hernia 05/10/2011  CVA (cerebral infarction) 05/10/2011  CAD (coronary artery disease) 05/10/2011  
  
    
Past Medical History:  
Diagnosis Date  CAD (coronary artery disease)    
 Calculus of kidney    
 Cancer [facility]) prostate  1996  
 Hearing loss    
 Heart attack ([facility] [address]    
 Hypercholesterolemia    
 Hypertension    
 Incisional hernia 5/10/2011  Movement disorder    
 Other ill-defined conditions(799.89)    
  elevated cholesterol  Other ill-defined conditions(799.89)    
  glaucoma  Other ill-defined conditions(799.89)    
  abd hernia  Stroke [facility])    
  left arm tingling  Thyroid disease    
 Thyroid mass 5/10/2011  
  
  
  
Core Measures: 
  
CVA: Yes Yes CHF:No No 
PNA:No No 
  
  
Activity Status: 
  
OOB to Chair Yes Ambulated this shift Yes Bed Rest No 
  
Supplemental O2: (If Applicable) 
  
NC No 
NRB No 
Venti-mask No 
On 0 Liters/min 
  
  
LINES AND DRAINS: 
  
PIV 
  
  
DVT prophylaxis: 
  
DVT prophylaxis Med- Yes DVT prophylaxis SCD or ANDREW- No  
  
Wounds: (If Applicable) 
  
Wounds- No 
  
Location  
  
Patient Safety: 
  
Falls Score Total Score: 3 Safety Level_______ Bed Alarm On? No 
Sitter?  No 
  
Plan for upcoming shift:  Safety, IV antibiotics, pain medication 
  
  
  
Discharge Plan: Yes [place] 
  
Active Consults: 
IP CONSULT TO HOSPITALIST 
IP CONSULT TO NEUROLOGY 
IP CONSULT TO ORTHOPEDIC SURGERY

## 2019-05-01 NOTE — ROUTINE PROCESS
Patient being discharged back to [facility] today. Discharge instructions given to patient. Reviewed medications and follow up appointments with patient and daughter. Prescriptions handed to daughter. Both verbalized understanding. IV taken out and telebox off. Patient getting dressed. Will ring out when ready. Daughter in law at bedside helping patient.

## 2019-05-01 NOTE — PROGRESS NOTES
Hospitalist 
 
Pt was DC today. He transferred from the bed to the [facility] then started with sudden onset slow/slurred speech and R sided weakness. Per pt and family - recurrent Sx, the same what brought him here. Sx lasted ~ 2 min and now resolved. Pt is back to normal. Episode happened ~ 30 minutes ago. Will cancel discharge. Head CT. Continue tele monitoring. Re consult neurology Monitor orthostatics Herminia Amaral MD

## 2019-05-01 NOTE — PROGRESS NOTES
Medicare pt has received, reviewed, and signed 2nd IM letter informing them of their right to appeal the discharge. Signed copy has been placed on pt bedside chart. Billie Blake [phone]

## 2019-05-01 NOTE — CONSULTS
Neurology Progress Note Patient ID: 
Rosanna Meadows 
[id number] 
16 y.o. 
2/19/1928 CC: transient slurred speech and right sided weakness Subjective:  
  
Patient was getting dressed to go home and when he stood up he developed slurred speech and right sided weakness. He sat back down and it resolved after 5 minutes or so. Reports similar episodes before. Review of records reveal head CT without contrast done 4/27/2019 revealed no acute findings. Head and neck CTA done with contrast 4/27/2019 revealed developmentally small vertebrobasilar system with superimposed significant atherosclerotic disease and stenosis involving primarily distal vertebral arteries and basilar artery. Approximately 50% stenosis proximal right ICA and 40% stenosis proximal left ICA. Brain MRI without contrast done 4/27/2019 revealed no acute stroke but limited due to motion. LDL was 89.6. Patient is on Aspirin 81 mg rene and Plavix 75 mg daily for stroke prophylaxis. Current Facility-Administered Medications Medication Dose Route Frequency  acetaminophen (TYLENOL) tablet 650 mg  650 mg Oral Q6H  
 oxyCODONE IR (ROXICODONE) tablet 5 mg  5 mg Oral Q4H PRN  
 levoFLOXacin (LEVAQUIN) tablet 750 mg  750 mg Oral Q24H  
 senna (SENOKOT) tablet 8.6 mg  1 Tab Oral DAILY  polyethylene glycol (MIRALAX) packet 17 g  17 g Oral DAILY  sodium chloride (NS) flush 5-40 mL  5-40 mL IntraVENous Q8H  
 sodium chloride (NS) flush 5-40 mL  5-40 mL IntraVENous PRN  
 aspirin chewable tablet 81 mg  81 mg Oral DAILY  atorvastatin (LIPITOR) tablet 40 mg  40 mg Oral QHS  labetalol (NORMODYNE;TRANDATE) 20 mg/4 mL (5 mg/mL) injection 5 mg  5 mg IntraVENous Q10MIN PRN  polyethylene glycol (MIRALAX) packet 17 g  17 g Oral DAILY PRN  
 heparin (porcine) injection 5,000 Units  5,000 Units SubCUTAneous Q8H  
 amLODIPine (NORVASC) tablet 5 mg  5 mg Oral DAILY  dorzolamide-timolol (COSOPT) 22.3-6.8 mg/mL ophthalmic solution 1 Drop  1 Drop Both Eyes DAILY  cyanocobalamin (VITAMIN B12) tablet 1,000 mcg  1,000 mcg Oral DAILY  gabapentin (NEURONTIN) capsule 300 mg  300 mg Oral QHS  irbesartan (AVAPRO) tablet 150 mg  150 mg Oral DAILY  latanoprost (XALATAN) 0.005 % ophthalmic solution 1 Drop  1 Drop Both Eyes QHS  meclizine (ANTIVERT) tablet 25 mg  25 mg Oral TID PRN  
 levothyroxine (SYNTHROID) tablet 125 mcg  125 mcg Oral 6am  
 tamsulosin (FLOMAX) capsule 0.4 mg  0.4 mg Oral BID  dutasteride (AVODART) capsule 0.5 mg  0.5 mg Oral DAILY  clopidogrel (PLAVIX) tablet 75 mg  75 mg Oral DAILY Review of Systems: 
 
Pertinent items are noted in HPI. Objective:  
 
Patient Vitals for the past 8 hrs: 
 BP Temp Pulse Resp SpO2  
05/01/19 1736 122/40  (!) 54    
05/01/19 1734 110/48  (!) 56    
05/01/19 1731 (!) 111/38  (!) 50 16   
05/01/19 1617 104/49  (!) 56 18 100 % 05/01/19 1613 (!) 105/37  (!) 57 18 95 % 05/01/19 1107 142/44 97.9 °F (36.6 °C) (!) 58 18 100 % 05/01/19 1052 130/74      
 
 
05/01 0701 - 05/01 1900 In: 5 [P.O.:420] Out: -  
04/29 1901 - 05/01 0700 In: 56 [P.O.:630] Out: -  
 
Lab Review Recent Results (from the past 24 hour(s)) GLUCOSE, POC Collection Time: 05/01/19  4:11 PM  
Result Value Ref Range Glucose (POC) 108 (H) 65 - 100 mg/dL Performed by Sukumar Schneider (PCT) NEUROLOGICAL EXAM: 
 
Appearance: The patient is well developed, well nourished, provides a coherent history and is in no acute distress. Mental Status: Oriented to time, place and person. Fluent, no aphasia or dysarthria. Mood and affect appropriate. Cranial Nerves:   Intact visual fields. LAMAR, EOM's full, no nystagmus, no ptosis. Facial sensation is normal. Corneal reflexes are intact. Facial movement is symmetric. Decrease hearing bilaterally.  Palate is midline with normal sternocleidomastoid and trapezius muscles are normal. Tongue is midline. Motor:  5/5 strength except favoring right leg due to recent knee aspiration. Normal bulk and tone. No pronator drift. Reflexes:   Deep tendon reflexes 1+/4 and symmetrical.  
Sensory:   Normal to cold and pinprick. Gait:  Not tested. Tremor:   No tremor noted. Cerebellar:  No cerebellar signs present. Assessment:  
 
Principal Problem: 
  TIA (transient ischemic attack) (5/1/2019) Vertebrobasilar insufficiency Carotid stenosis Hyperlipidemia Plan:  
Neurological examination currently is nonfocal.  Consider TIA. Likely due to significant vertebral and basilar artery pathology. Stat head CT without contrast was ordered. CBC, CMP and magnesium was ordered. Head/neck CTA revealed developmentally small vertebrobasilar system with superimposed significant atherosclerotic disease and stenosis involving primarily distal vertebral arteries and basilar artery. This is likely the culprit of his recurrent transient neurological deficits. It also showed 50% stenosis proximal right ICA and 40% stenosis proximal left ICA. No intervention is necessary from that standpoint. Discussed with the patient and family that likely aspirin and Plavix is not enough to prevent this spells from recurring. Patient may eventually need anticoagulation. They understood. Discussed also referral to neuro interventional service to discuss if there is any other treatment option aside from anticoagulation which can be done as an outpatient. Further intervention be done pending results of neuroimaging. LDL is not at goal.  Patient is on statin therapy. Signed: 
Nicola Yip MD 
5/1/2019 5:39 PM

## 2019-05-01 NOTE — PROGRESS NOTES
Problem: Mobility Impaired (Adult and Pediatric) Goal: *Acute Goals and Plan of Care (Insert Text) Description Physical Therapy Goals Initiated 4/28/2019 1. Patient will perform sit to stand with modified independence within 7 day(s). 2.  Patient will ambulate with modified independence for 300 feet with the least restrictive device within 7 day(s). 3.  Patient will improve Balderas Balance score by 7 points within 7 days. Outcome: Progressing Towards Goal 
 
PHYSICAL THERAPY TREATMENT Patient: Margaret Rossi (51 y.o. male) Date: 5/1/2019 Diagnosis: [facility] [I63.9] <principal problem not specified> Precautions:   
Chart, physical therapy assessment, plan of care and goals were reviewed. ASSESSMENT: 
Pt was received in sitting up in the chair and cleared by nursing to mobilize. He continues to still have pain in the R knee, but improved. He was able to perform all mobility at a CGA/SBA level. Ambulated into the cardona with RW and good step through gait pattern. He was returned to the room and perform seated exercises listed below. Continue to recommend rehab when returning to [facility]. Progression toward goals: 
?    Improving appropriately and progressing toward goals ? Improving slowly and progressing toward goals ? Not making progress toward goals and plan of care will be adjusted PLAN: 
Patient continues to benefit from skilled intervention to address the above impairments. Continue treatment per established plan of care. Discharge Recommendations:  rehab at [facility] Further Equipment Recommendations for Discharge:  TBD SUBJECTIVE:  
Patient stated ?it is better than yesterday, but not 100%. ? OBJECTIVE DATA SUMMARY:  
Critical Behavior: 
Neurologic State: Alert Orientation Level: Oriented X4 Cognition: Appropriate decision making, Appropriate safety awareness Safety/Judgement: Awareness of environment, Fall prevention Functional Mobility Training: 
Bed Mobility: 
  
  
  
 Session began and ended in sitting Transfers: 
Sit to Stand: Stand-by assistance Stand to Sit: Stand-by assistance Balance: 
Sitting: Intact Standing: Impaired Standing - Static: Good;Constant support Standing - Dynamic : Fair Ambulation/Gait Training: 
Distance (ft): 175 Feet (ft) Assistive Device: Gait belt;Walker, rolling Ambulation - Level of Assistance: Contact guard assistance Gait Abnormalities: Decreased step clearance;Shuffling gait Speed/Margot: Pace decreased (<100 feet/min); Shuffled Step Length: Left shortened;Right shortened Therapeutic Exercises: Ankle pumps, heel slides, marches, LAQ Pain: 
Pain Scale 1: Numeric (0 - 10) Pain Intensity 1: 0 Pain Location 1: Knee Pain Orientation 1: Right Pain Description 1: Aching Pain Intervention(s) 1: Medication (see MAR) Activity Tolerance: WFL Please refer to the flowsheet for vital signs taken during this treatment. After treatment:  
?    Patient left in no apparent distress sitting up in chair ? Patient left in no apparent distress in bed 
? Call bell left within reach ? Nursing notified ? Caregiver present ? Bed alarm activated COMMUNICATION/COLLABORATION:  
The patient?s plan of care was discussed with: Occupational Therapist and Registered Nurse Concepcion Brandt PT, DPT Time Calculation: 25 mins

## 2019-05-01 NOTE — PROGRESS NOTES
Hospitalist Progress Note NAME: Cristhian Rick :  1928 MRN:  [id number] Assessment / Plan: 
R knee effusion r/o septic knee 
-s/p arthrocentesis  Ortho help appreciated: 
Continue PT/OT/Rehab Fluid aspiration and exam more c/w inflammatory effusion (poss gout, although no crystals seen but high uric acid) versus infection. Okay for discharge from Ortho standpoint. 
-Pt has scheduled appointment tomorrow  with Dr Jocy Cruz to re-exam and possible cortisone injection TIA Previous CVA after CABG in  Hx of vertebral basilar insufficiency  
-Sx: weakness/garbled speech - resolved  
-Head CT:negative  
-MRI:no acute stroke  
-CTA neck 1. Developmentally small vertebral basilar system was superimposed significant 
atherosclerotic disease and stenosis involving primarily distal vertebral 
arteries and basilar artery. 2. Approximately 50% stenosis proximal right internal carotid artery and 40% 
stenosis proximal left internal carotid artery by NASCET criteria. 3. Chronic cervical spondylosis and stenosis. 4. No acute arterial abnormality demonstrated -ECHO: EF 46%, mild AR, mod MR, mod TR, mild pulm HTN  
-Stroke blood work: LDL 89.6   hba1c 6.1 TSH 1.97 
-Seen by neurology: cont asa/plavix  
-Speech:none  
-PT/OT:healthcare section of [facility] UTI 
-UC: pseudomonas  
-empiric rocephin --> cefepime (  ) - treat for 5 days, switched to PO LVQ  
  
HTN 
-BP stable 
-continue home meds : Norvasc/ avapro  
  
Idiopathic polyneuropathy, cont' gabapentin 
BPH, cont' avodart, flomax Hypothyroidism, cont' synthroid Hx of skin ca, completed chemo per pt 
  
  
Code Status: DNR Surrogate Decision Maker: pt's son DVT Prophylaxis: heparin Baseline: from [address] [facility] independent living Recommended Disposition: back to [facility] once ok with ortho and pending negative cultures Subjective: Chief Complaint / Reason for Physician Visit: following TIA / uti/ R knee effusion No new neurological Sx  
R knee swollen more today / stiff this am and painful but better now Discussed with RN events overnight. Review of Systems: 
Symptom Y/N Comments  Symptom Y/N Comments Fever/Chills n   Chest Pain n   
Poor Appetite    Edema Cough    Abdominal Pain n   
Sputum    Joint Pain SOB/ENG n   Pruritis/Rash Nausea/vomit    Tolerating PT/OT Diarrhea    Tolerating Diet Constipation    Other Could NOT obtain due to:   
 
Objective: VITALS:  
Last 24hrs VS reviewed since prior progress note. Most recent are: 
Patient Vitals for the past 24 hrs: 
 Temp Pulse Resp BP SpO2  
05/01/19 0740 98.3 °F (36.8 °C) (!) 54 20 107/41 93 % 05/01/19 0332 98.1 °F (36.7 °C) 69 16 128/48 95 % 05/01/19 0026 97.9 °F (36.6 °C) 67 20 107/63 96 % 04/30/19 1935 98.4 °F (36.9 °C) 67 22 106/43 94 % 04/30/19 1520 98.7 °F (37.1 °C) 66 20 134/47 96 % 04/30/19 1247 98 °F (36.7 °C) 72 20 124/46 100 % Intake/Output Summary (Last 24 hours) at 5/1/2019 1007 Last data filed at 4/30/2019 1717 Gross per 24 hour Intake 450 ml Output  Net 450 ml PHYSICAL EXAM: 
General: WD, WN. Alert, cooperative, no acute distress   
EENT:  EOMI. Anicteric sclerae. MMM Resp:  CTA bilaterally, no wheezing or rales. No accessory muscle use CV:  Regular  rhythm,  No edema GI:  Soft, Non distended, Non tender.  +Bowel sounds Neurologic:  Alert and oriented X 3, normal speech, Psych:   Good insight. Not anxious nor agitated Skin:  No rashes. No jaundice Extr:                 R knee residual swelling/ warm to touch/ not tender Reviewed most current lab test results and cultures  YES Reviewed most current radiology test results   YES Review and summation of old records today    NO Reviewed patient's current orders and MAR    YES 
PMH/SH reviewed - no change compared to H&P 
 ________________________________________________________________________ Care Plan discussed with: 
  Comments Patient y Family  y dtrs bedside RN y   
Care Manager y Consultant  y Ortho Multidiciplinary team rounds were held today with , nursing, pharmacist and clinical coordinator. Patient's plan of care was discussed; medications were reviewed and discharge planning was addressed. ________________________________________________________________________ Total NON critical care TIME:  35 Minutes Total CRITICAL CARE TIME Spent:   Minutes non procedure based Comments >50% of visit spent in counseling and coordination of care    
________________________________________________________________________ Mihir Sneed MD  
 
Procedures: see electronic medical records for all procedures/Xrays and details which were not copied into this note but were reviewed prior to creation of Plan. LABS: 
I reviewed today's most current labs and imaging studies. Pertinent labs include: No results for input(s): WBC, HGB, HCT, PLT, HGBEXT, HCTEXT, PLTEXT, HGBEXT, HCTEXT, PLTEXT in the last 72 hours. No results for input(s): NA, K, CL, CO2, GLU, BUN, CREA, CA, MG, PHOS, ALB, TBIL, TBILI, SGOT, ALT, INR in the last 72 hours. No lab exists for component: INREXT, INREXT Signed: Mihir Sneed MD

## 2019-05-01 NOTE — ROUTINE PROCESS
Called Dr. Kae Sy' office and left message requesting physician follow up re:  Fluid draw and lab results.

## 2019-05-01 NOTE — DISCHARGE SUMMARY
Hospitalist Discharge Summary Patient ID: 
Isabel Mcarthur 
[id number] 
69 y.o. 
2/19/1928 PCP on record: El Silva MD 
 
Admit date: 4/27/2019 Discharge date and time: 5/1/2019 DISCHARGE DIAGNOSIS: 
 
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 
 
CONSULTATIONS: 
IP CONSULT TO HOSPITALIST 
IP CONSULT TO NEUROLOGY 
IP CONSULT TO ORTHOPEDIC SURGERY Excerpted HPI from H&P of Raeann Dobson MD: 
 
Jose Presley is a 80 y.o.  male with PMHx significant for CVA, CAD s/p CABG, HLD, HTN, vertigo with known vertebrobasilar insufficiency,CVA, present to the ER for evaluation of sudden onset of b/l upper and lower ext weakness and incomprehensible speech. Symptoms occurred at ~9AM when pt was on the phone with his friend. He started feeling weakness and was not able to control his tongue. Friend said his speech was garbled. Symptoms lasted about 5 mins per friend as pt called her back within 5 mins and speech was back to normal.  Pt then called staffs which recommended him to come to the ER for evaluation. In the ER, pt was able to move all exts. He states his lower exts feels heavy and feels off balanced. He denies association to confusion, numbness/tingling, dysphagia, dizziness. In the ER, head CT neg, CTA head/neck showed developmentally small vertebral basilar system was superimposed significant atherosclerotic disease and stenosis involving primarily distal vertebral 
arteries and basilar artery. Approximately 50% stenosis proximal right internal carotid artery and 40%. Stenosis proximal left internal carotid artery by NASCET criteria. Chronic cervical spondylosis and stenosis. No acute arterial abnormality demonstrated. Vitals/labs reviewed with patient We were asked to admit for work up and evaluation of the above problems. ______________________________________________________________________ DISCHARGE SUMMARY/HOSPITAL COURSE:  for full details see H&P, daily progress notes, labs, consult notes. R knee effusion: likely inflammatory -s/p arthrocentesis 4/30 Ortho help appreciated: 
Continue PT/OT/Rehab Fluid aspiration and exam more c/w inflammatory effusion (poss gout, although no crystals seen but high uric acid) versus infection.  Okay for discharge from Ortho standpoint. 
-Pt has scheduled appointment tomorrow 5/2 with Dr Ruperto Gonzales to re-exam and possible cortisone injection 
  
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  
-Sx: weakness/garbled speech - resolved  
-Head CT:negative  
-MRI:no acute stroke  
-CTA neck   
1. Developmentally small vertebral basilar system was superimposed significant 
atherosclerotic disease and stenosis involving primarily distal vertebral 
arteries and basilar artery. 2. Approximately 50% stenosis proximal right internal carotid artery and 40% 
stenosis proximal left internal carotid artery by NASCET criteria. 3. Chronic cervical spondylosis and stenosis. 4. No acute arterial abnormality demonstrated -ECHO: EF 46%, mild AR, mod MR, mod TR, mild pulm HTN  
-Stroke blood work: LDL 89.6   hba1c 6.1 TSH 1.97 
-Seen by neurology: cont asa/plavix  
-Speech:none  
-PT/OT:healthcare section of [facility] 
  
UTI 
-UC: pseudomonas  
-empiric rocephin --> cefepime ( 4/28 ) - treat for 5 days, switched to PO LVQ  
  
HTN 
-BP stable 
-continue home meds : Norvasc/ avapro  
  
Idiopathic polyneuropathy, cont' gabapentin 
BPH, cont' avodart, flomax Hypothyroidism, cont' synthroid Hx of skin ca, completed chemo per pt 
  
  
Code Status: DNR Surrogate Decision Maker: pt's son DVT Prophylaxis: heparin 
  
Baseline: from [facility] Recommended Disposition: back to [facility]  today  
 
 
_______________________________________________________________________ Patient seen and examined by me on discharge day. General:          WD, WN. Alert, cooperative, no acute distress   
EENT:              EOMI. Anicteric sclerae. MMM Resp:               CTA bilaterally, no wheezing or rales. No accessory muscle use CV:                  Regular  rhythm,  No edema GI:                   Soft, Non distended, Non tender.  +Bowel sounds Neurologic:      Alert and oriented X 3, normal speech, Psych:             Good insight. Not anxious nor agitated Skin:                No rashes. No jaundice Extr:                 R knee residual swelling/ warm to touch/ not tender  
  
 
 
_______________________________________________________________________ DISCHARGE MEDICATIONS:  
Current Discharge Medication List  
  
START taking these medications Details  
levoFLOXacin (LEVAQUIN) 750 mg tablet Take 1 Tab by mouth daily (with lunch). Qty: 2 Tab, Refills: 0  
  
oxyCODONE IR (ROXICODONE) 5 mg immediate release tablet Take 1 Tab by mouth every six (6) hours as needed for Pain for up to 3 days. Max Daily Amount: 20 mg. 
Qty: 5 Tab, Refills: 0 Associated Diagnoses: Arthritis CONTINUE these medications which have NOT CHANGED Details  
gabapentin (NEURONTIN) 300 mg capsule Take 1 Cap by mouth nightly. Qty: 100 Cap, Refills: 11  
 Associated Diagnoses: Stenosis of both internal carotid arteries; VBI (vertebrobasilar insufficiency); Diabetic peripheral neuropathy associated with type 2 diabetes mellitus ([address] Idiopathic small and large fiber sensory neuropathy; Sensory ataxic gait; Ataxia; Dizziness  
  
meclizine (ANTIVERT) 25 mg tablet Take 1 Tab by mouth three (3) times daily as needed for Dizziness. Qty: 20 Tab, Refills: 0  
  
lovastatin (MEVACOR) 40 mg tablet Take 40 mg by mouth nightly. brimonidine (ALPHAGAN) 0.15 % ophthalmic solution Administer 1 Drop to both eyes two (2) times a day. SYNTHROID 125 mcg tablet Take 1 Tab by mouth Daily (before breakfast). Qty: 90 Tab, Refills: 4 Associated Diagnoses: Hypothyroidism, postsurgical  
  
dutasteride (AVODART) 0.5 mg capsule Take 0.5 mg by mouth daily. cyanocobalamin (VITAMIN B-12) 1,000 mcg tablet Take 1,000 mcg by mouth daily. amLODIPine (NORVASC) 5 mg tablet Take 5 mg by mouth daily. clopidogrel (PLAVIX) 75 mg tablet Take 75 mg by mouth daily. irbesartan (AVAPRO) 75 mg tablet Take 150 mg by mouth nightly. tamsulosin (FLOMAX) 0.4 mg capsule Take 0.4 mg by mouth two (2) times a day. aspirin 81 mg tablet Take 81 mg by mouth. dorzolamide-timolol (COSOPT) 22.3-6.8 mg/mL ophthalmic solution Administer 1 Drop to both eyes daily. latanoprost (XALATAN) 0.005 % ophthalmic solution Administer 1 Drop to both eyes nightly. [facility]) 625 mg tablet Take 625 mg by mouth two (2) times daily (with meals). STOP taking these medications  
  
 naproxen sodium (ALEVE) 220 mg cap Comments:  
Reason for Stopping: My Recommended Diet, Activity, Wound Care, and follow-up labs are listed in the patient's Discharge Insturctions which I have personally completed and reviewed. ______________________________________________________________________ Risk of deterioration: Low 
 
Condition at Discharge:  Stable 
______________________________________________________________________ Disposition SNF/LTC 
______________________________________________________________________ Care Plan discussed with:  
Patient, Family, RN, Care Manager, Consultant 
 
______________________________________________________________________ Code Status: DNR/DNI 
______________________________________________________________________ Follow up with: PCP : Tony Rice MD 
 Follow-up Information Follow up With Specialties Details Why Contact Info Roger Summers MD Family Practice In 1 week  [address] [address] 
[phone] Wilda Lang MD Orthopedic Surgery  as scheduled 1-3 days  [address] [facility] 
[phone] Total time in minutes spent coordinating this discharge (includes going over instructions, follow-up, prescriptions, and preparing report for sign off to her PCP) :  > 30 minutes Signed: 
Parker Salmeron MD

## 2019-05-01 NOTE — PROGRESS NOTES
Orthopedic Joint Progress Note May 1, 2019 Admit Date: 2019 Admit Diagnosis: CVA (cerebral vascular accident) ([facility] [I63.9] * No surgery found * Subjective:  
 
Lan Willingham more comfortable this afternoon. Pain this am 
 
Review of Systems: A review of systems was negative. Objective:  
 
PT/OT:  
 
PATIENT MOBILITY Bed Mobility Rolling: Modified independent Supine to Sit: Modified independent Scooting: Modified independent Transfers Sit to Stand: Stand-by assistance Stand to Sit: Stand-by assistance Bed to Chair: Contact guard assistance, Assist x1 Gait Speed/Margot: Pace decreased (<100 feet/min), Shuffled Step Length: Left shortened, Right shortened Gait Abnormalities: Decreased step clearance, Antalgic Ambulation - Level of Assistance: Contact guard assistance Distance (ft): 3 Feet (ft) Assistive Device: Gait belt, Walker, rolling Interventions: Safety awareness training, Tactile cues, Verbal cues Vital Signs:   
Blood pressure 142/44, pulse (!) 58, temperature 97.9 °F (36.6 °C), resp. rate 18, height 5' 11\" (1.803 m), weight 93.5 kg (206 lb 2 oz), SpO2 100 %. Temp (24hrs), Av.2 °F (36.8 °C), Min:97.9 °F (36.6 °C), Max:98.7 °F (37.1 °C) Pain Control:  
Pain Assessment Pain Scale 1: Numeric (0 - 10) Pain Intensity 1: 0 Pain Onset 1: 2 days Pain Location 1: Knee Pain Orientation 1: Right Pain Description 1: Aching Pain Intervention(s) 1: Medication (see MAR) Meds: 
Current Facility-Administered Medications Medication Dose Route Frequency  acetaminophen (TYLENOL) tablet 650 mg  650 mg Oral Q6H  
 oxyCODONE IR (ROXICODONE) tablet 5 mg  5 mg Oral Q4H PRN  
 levoFLOXacin (LEVAQUIN) tablet 750 mg  750 mg Oral Q24H  
 senna (SENOKOT) tablet 8.6 mg  1 Tab Oral DAILY  polyethylene glycol (MIRALAX) packet 17 g  17 g Oral DAILY  sodium chloride (NS) flush 5-40 mL  5-40 mL IntraVENous Q8H  
  sodium chloride (NS) flush 5-40 mL  5-40 mL IntraVENous PRN  
 aspirin chewable tablet 81 mg  81 mg Oral DAILY  atorvastatin (LIPITOR) tablet 40 mg  40 mg Oral QHS  labetalol (NORMODYNE;TRANDATE) 20 mg/4 mL (5 mg/mL) injection 5 mg  5 mg IntraVENous Q10MIN PRN  polyethylene glycol (MIRALAX) packet 17 g  17 g Oral DAILY PRN  
 heparin (porcine) injection 5,000 Units  5,000 Units SubCUTAneous Q8H  
 amLODIPine (NORVASC) tablet 5 mg  5 mg Oral DAILY  dorzolamide-timolol (COSOPT) 22.3-6.8 mg/mL ophthalmic solution 1 Drop  1 Drop Both Eyes DAILY  cyanocobalamin (VITAMIN B12) tablet 1,000 mcg  1,000 mcg Oral DAILY  gabapentin (NEURONTIN) capsule 300 mg  300 mg Oral QHS  irbesartan (AVAPRO) tablet 150 mg  150 mg Oral DAILY  latanoprost (XALATAN) 0.005 % ophthalmic solution 1 Drop  1 Drop Both Eyes QHS  meclizine (ANTIVERT) tablet 25 mg  25 mg Oral TID PRN  
 levothyroxine (SYNTHROID) tablet 125 mcg  125 mcg Oral 6am  
 tamsulosin (FLOMAX) capsule 0.4 mg  0.4 mg Oral BID  dutasteride (AVODART) capsule 0.5 mg  0.5 mg Oral DAILY  clopidogrel (PLAVIX) tablet 75 mg  75 mg Oral DAILY  
  
 
LAB:   
Lab Results Component Value Date/Time INR 1.1 11/16/2018 08:57 AM  
 
Lab Results Component Value Date/Time HGB 13.4 04/28/2019 04:12 AM  
 HGB 13.5 04/27/2019 10:33 AM  
 HGB 12.8 12/11/2018 08:53 AM  
 
 
    
 
Physical Exam: Musculoskeletal: negative 
right knee mild effusion, no warmth, no erythema, mild pain with rom Assessment:  
  
Active Problems: 
  CVA (cerebral vascular accident) ([address] (4/27/2019) Plan:  
 
Continue PT/OT/Rehab Consult: Rehab team including PT, OT, recreational therapy, and  Patient Expects to be Discharged to[de-identified] [facility] ([facility] Fluid aspiration and exam more c/w inflammatory effusion (poss gout, although no crystals seen but high uric acid) versus infection. Okay for discharge from Ortho standpoint. Recommend follow up in office next several days for re-exam and possible cortisone injection

## 2019-05-01 NOTE — ROUTINE PROCESS
* No surgery found * 
* No surgery found * Bedside shift change report given to Deepti (oncoming nurse) by Kyleigh Joya (offgoing nurse). Report included the following information [facility] Phone:   [phone] Significant changes during shift:   Cleared by ortho - Anticipate discharge Patient Information Dominique Soliman 
80 y.o. 
4/27/2019 10:09 AM by Rosalina Arnold MD. Dominique Soliman was admitted from [facility] Problem List 
 
Patient Active Problem List  
 Diagnosis Date Noted  TIA (transient ischemic attack) 05/01/2019  Insomnia due to medical condition 05/10/2017  Stenosis of both internal carotid arteries 02/07/2017  Ataxia 02/07/2017  Diabetic peripheral neuropathy associated with type 2 diabetes mellitus ([facility] 02/07/2017  Sensory ataxic gait 02/07/2017  Monocular diplopia of both eyes 02/07/2017  VBI (vertebrobasilar insufficiency) 02/07/2017  History of stroke 02/07/2017  Vitamin D deficiency 02/07/2017  BPV (benign positional vertigo) 02/07/2017  Dizziness 04/23/2013  Idiopathic small and large fiber sensory neuropathy 04/23/2013  B12 deficiency 04/23/2013  Hypothyroidism, postsurgical 03/13/2013  Thyroid mass 05/10/2011  Incisional hernia 05/10/2011  CVA (cerebral infarction) 05/10/2011  CAD (coronary artery disease) 05/10/2011 Past Medical History:  
Diagnosis Date  CAD (coronary artery disease)  Calculus of kidney  Cancer [facility]) prostate  1996  
 Hearing loss  Heart attack ([address]  Hypercholesterolemia  Hypertension  Incisional hernia 5/10/2011  Movement disorder  Other ill-defined conditions(799.89)   
 elevated cholesterol  Other ill-defined conditions(799.89)   
 glaucoma  Other ill-defined conditions(799.89)   
 abd hernia  Stroke ([address]   
 left arm tingling  Thyroid disease  Thyroid mass 5/10/2011 Core Measures: CVA: Yes Yes CHF:No No 
PNA:No No 
 
 
Activity Status: OOB to Chair Yes Ambulated this shift Yes Bed Rest No 
 
Supplemental O2: (If Applicable) NC No 
NRB No 
Venti-mask No 
On 0 Liters/min LINES AND DRAINS: 
PIV 
 
 
DVT prophylaxis: DVT prophylaxis Med- Yes DVT prophylaxis SCD or ANDREW- No  
 
Wounds: (If Applicable) Wounds- No 
 
Location Patient Safety: 
 
Falls Score Total Score: 3 Safety Level_______ Bed Alarm On? No 
Sitter? No 
 
Plan for upcoming shift: discharge Discharge Plan: Yes [facility] Active Consults: 
IP CONSULT TO HOSPITALIST 
IP CONSULT TO NEUROLOGY 
IP CONSULT TO ORTHOPEDIC SURGERY

## 2019-05-02 ENCOUNTER — HOME CARE VISIT (OUTPATIENT)
Dept: HOME HEALTH SERVICES | Facility: HOME HEALTH | Age: 84
End: 2019-05-02

## 2019-05-02 LAB
ALBUMIN SERPL-MCNC: 2.7 G/DL (ref 3.5–5)
ALBUMIN/GLOB SERPL: 0.7 {RATIO} (ref 1.1–2.2)
ALP SERPL-CCNC: 62 U/L (ref 45–117)
ALT SERPL-CCNC: 16 U/L (ref 12–78)
ANION GAP SERPL CALC-SCNC: 7 MMOL/L (ref 5–15)
ANION GAP SERPL CALC-SCNC: 7 MMOL/L (ref 5–15)
AST SERPL-CCNC: 17 U/L (ref 15–37)
BASOPHILS # BLD: 0 K/UL (ref 0–0.1)
BASOPHILS NFR BLD: 1 % (ref 0–1)
BILIRUB SERPL-MCNC: 0.4 MG/DL (ref 0.2–1)
BUN SERPL-MCNC: 20 MG/DL (ref 6–20)
BUN SERPL-MCNC: 21 MG/DL (ref 6–20)
BUN/CREAT SERPL: 26 (ref 12–20)
BUN/CREAT SERPL: 27 (ref 12–20)
CALCIUM SERPL-MCNC: 8.3 MG/DL (ref 8.5–10.1)
CALCIUM SERPL-MCNC: 8.4 MG/DL (ref 8.5–10.1)
CHLORIDE SERPL-SCNC: 108 MMOL/L (ref 97–108)
CHLORIDE SERPL-SCNC: 109 MMOL/L (ref 97–108)
CO2 SERPL-SCNC: 25 MMOL/L (ref 21–32)
CO2 SERPL-SCNC: 25 MMOL/L (ref 21–32)
CREAT SERPL-MCNC: 0.74 MG/DL (ref 0.7–1.3)
CREAT SERPL-MCNC: 0.81 MG/DL (ref 0.7–1.3)
DIFFERENTIAL METHOD BLD: ABNORMAL
EOSINOPHIL # BLD: 0.3 K/UL (ref 0–0.4)
EOSINOPHIL NFR BLD: 4 % (ref 0–7)
ERYTHROCYTE [DISTWIDTH] IN BLOOD BY AUTOMATED COUNT: 12.8 % (ref 11.5–14.5)
GLOBULIN SER CALC-MCNC: 3.9 G/DL (ref 2–4)
GLUCOSE BLD STRIP.AUTO-MCNC: 105 MG/DL (ref 65–100)
GLUCOSE BLD STRIP.AUTO-MCNC: 96 MG/DL (ref 65–100)
GLUCOSE SERPL-MCNC: 111 MG/DL (ref 65–100)
GLUCOSE SERPL-MCNC: 112 MG/DL (ref 65–100)
HCT VFR BLD AUTO: 38.3 % (ref 36.6–50.3)
HGB BLD-MCNC: 12.4 G/DL (ref 12.1–17)
IMM GRANULOCYTES # BLD AUTO: 0 K/UL (ref 0–0.04)
IMM GRANULOCYTES NFR BLD AUTO: 0 % (ref 0–0.5)
LYMPHOCYTES # BLD: 1.8 K/UL (ref 0.8–3.5)
LYMPHOCYTES NFR BLD: 25 % (ref 12–49)
MAGNESIUM SERPL-MCNC: 2.1 MG/DL (ref 1.6–2.4)
MAGNESIUM SERPL-MCNC: 2.3 MG/DL (ref 1.6–2.4)
MCH RBC QN AUTO: 30.5 PG (ref 26–34)
MCHC RBC AUTO-ENTMCNC: 32.4 G/DL (ref 30–36.5)
MCV RBC AUTO: 94.3 FL (ref 80–99)
MONOCYTES # BLD: 0.8 K/UL (ref 0–1)
MONOCYTES NFR BLD: 12 % (ref 5–13)
NEUTS SEG # BLD: 4.2 K/UL (ref 1.8–8)
NEUTS SEG NFR BLD: 58 % (ref 32–75)
NRBC # BLD: 0 K/UL (ref 0–0.01)
NRBC BLD-RTO: 0 PER 100 WBC
P2Y12 PLT RESPONSE,PPPR: 247 PRU (ref 194–418)
PLATELET # BLD AUTO: 254 K/UL (ref 150–400)
PMV BLD AUTO: 9.8 FL (ref 8.9–12.9)
POTASSIUM SERPL-SCNC: 4.3 MMOL/L (ref 3.5–5.1)
POTASSIUM SERPL-SCNC: 4.4 MMOL/L (ref 3.5–5.1)
PROT SERPL-MCNC: 6.6 G/DL (ref 6.4–8.2)
RBC # BLD AUTO: 4.06 M/UL (ref 4.1–5.7)
SALICYLATES SERPL-MCNC: <1.7 MG/DL (ref 2.8–20)
SERVICE CMNT-IMP: ABNORMAL
SERVICE CMNT-IMP: NORMAL
SODIUM SERPL-SCNC: 140 MMOL/L (ref 136–145)
SODIUM SERPL-SCNC: 141 MMOL/L (ref 136–145)
WBC # BLD AUTO: 7.2 K/UL (ref 4.1–11.1)

## 2019-05-02 PROCEDURE — 83735 ASSAY OF MAGNESIUM: CPT

## 2019-05-02 PROCEDURE — 80307 DRUG TEST PRSMV CHEM ANLYZR: CPT

## 2019-05-02 PROCEDURE — 94760 N-INVAS EAR/PLS OXIMETRY 1: CPT

## 2019-05-02 PROCEDURE — 65660000000 HC RM CCU STEPDOWN

## 2019-05-02 PROCEDURE — 74011250637 HC RX REV CODE- 250/637: Performed by: INTERNAL MEDICINE

## 2019-05-02 PROCEDURE — 85576 BLOOD PLATELET AGGREGATION: CPT

## 2019-05-02 PROCEDURE — 74011250637 HC RX REV CODE- 250/637: Performed by: HOSPITALIST

## 2019-05-02 PROCEDURE — 82962 GLUCOSE BLOOD TEST: CPT

## 2019-05-02 PROCEDURE — 80053 COMPREHEN METABOLIC PANEL: CPT

## 2019-05-02 PROCEDURE — 97535 SELF CARE MNGMENT TRAINING: CPT

## 2019-05-02 PROCEDURE — 74011250637 HC RX REV CODE- 250/637: Performed by: PSYCHIATRY & NEUROLOGY

## 2019-05-02 PROCEDURE — 36415 COLL VENOUS BLD VENIPUNCTURE: CPT

## 2019-05-02 PROCEDURE — 74011250636 HC RX REV CODE- 250/636: Performed by: INTERNAL MEDICINE

## 2019-05-02 PROCEDURE — 85025 COMPLETE CBC W/AUTO DIFF WBC: CPT

## 2019-05-02 PROCEDURE — 97116 GAIT TRAINING THERAPY: CPT

## 2019-05-02 RX ORDER — AMLODIPINE BESYLATE 2.5 MG/1
2.5 TABLET ORAL DAILY
Status: DISCONTINUED | OUTPATIENT
Start: 2019-05-03 | End: 2019-05-03 | Stop reason: HOSPADM

## 2019-05-02 RX ORDER — METHYLPREDNISOLONE ACETATE 40 MG/ML
40 INJECTION, SUSPENSION INTRA-ARTICULAR; INTRALESIONAL; INTRAMUSCULAR; SOFT TISSUE ONCE
Status: DISPENSED | OUTPATIENT
Start: 2019-05-02 | End: 2019-05-02

## 2019-05-02 RX ORDER — ACETAMINOPHEN 325 MG/1
650 TABLET ORAL EVERY 6 HOURS
Status: DISCONTINUED | OUTPATIENT
Start: 2019-05-02 | End: 2019-05-03 | Stop reason: HOSPADM

## 2019-05-02 RX ORDER — ACETAMINOPHEN 325 MG/1
650 TABLET ORAL
Status: DISCONTINUED | OUTPATIENT
Start: 2019-05-04 | End: 2019-05-03 | Stop reason: HOSPADM

## 2019-05-02 RX ORDER — LIDOCAINE HYDROCHLORIDE 10 MG/ML
10 INJECTION, SOLUTION EPIDURAL; INFILTRATION; INTRACAUDAL; PERINEURAL ONCE
Status: DISPENSED | OUTPATIENT
Start: 2019-05-02 | End: 2019-05-02

## 2019-05-02 RX ORDER — ASPIRIN 325 MG
325 TABLET ORAL DAILY
Status: DISCONTINUED | OUTPATIENT
Start: 2019-05-02 | End: 2019-05-03

## 2019-05-02 RX ADMIN — DUTASTERIDE 0.5 MG: 0.5 CAPSULE, LIQUID FILLED ORAL at 10:38

## 2019-05-02 RX ADMIN — CLOPIDOGREL BISULFATE 75 MG: 75 TABLET ORAL at 10:39

## 2019-05-02 RX ADMIN — LEVOFLOXACIN 750 MG: 750 TABLET, FILM COATED ORAL at 15:05

## 2019-05-02 RX ADMIN — ASPIRIN 325 MG: 325 TABLET ORAL at 15:06

## 2019-05-02 RX ADMIN — HEPARIN SODIUM 5000 UNITS: 5000 INJECTION INTRAVENOUS; SUBCUTANEOUS at 05:39

## 2019-05-02 RX ADMIN — HEPARIN SODIUM 5000 UNITS: 5000 INJECTION INTRAVENOUS; SUBCUTANEOUS at 15:05

## 2019-05-02 RX ADMIN — ACETAMINOPHEN 650 MG: 325 TABLET ORAL at 03:31

## 2019-05-02 RX ADMIN — TAMSULOSIN HYDROCHLORIDE 0.4 MG: 0.4 CAPSULE ORAL at 18:59

## 2019-05-02 RX ADMIN — GABAPENTIN 300 MG: 300 CAPSULE ORAL at 22:15

## 2019-05-02 RX ADMIN — ASPIRIN 81 MG 81 MG: 81 TABLET ORAL at 10:39

## 2019-05-02 RX ADMIN — Medication 10 ML: at 22:26

## 2019-05-02 RX ADMIN — TAMSULOSIN HYDROCHLORIDE 0.4 MG: 0.4 CAPSULE ORAL at 10:38

## 2019-05-02 RX ADMIN — CYANOCOBALAMIN TAB 500 MCG 1000 MCG: 500 TAB at 10:38

## 2019-05-02 RX ADMIN — POLYETHYLENE GLYCOL 3350 17 G: 17 POWDER, FOR SOLUTION ORAL at 10:40

## 2019-05-02 RX ADMIN — HEPARIN SODIUM 5000 UNITS: 5000 INJECTION INTRAVENOUS; SUBCUTANEOUS at 22:16

## 2019-05-02 RX ADMIN — LEVOTHYROXINE SODIUM 125 MCG: 125 TABLET ORAL at 05:39

## 2019-05-02 RX ADMIN — Medication 10 ML: at 03:31

## 2019-05-02 RX ADMIN — ACETAMINOPHEN 650 MG: 325 TABLET ORAL at 10:40

## 2019-05-02 RX ADMIN — ATORVASTATIN CALCIUM 40 MG: 40 TABLET, FILM COATED ORAL at 22:15

## 2019-05-02 RX ADMIN — DORZOLAMIDE HYDROCHLORIDE AND TIMOLOL MALEATE 1 DROP: 20; 5 SOLUTION/ DROPS OPHTHALMIC at 10:41

## 2019-05-02 RX ADMIN — LATANOPROST 1 DROP: 50 SOLUTION OPHTHALMIC at 22:25

## 2019-05-02 RX ADMIN — ACETAMINOPHEN 650 MG: 325 TABLET ORAL at 22:14

## 2019-05-02 NOTE — PROGRESS NOTES
Problem: Falls - Risk of 
Goal: *Absence of Falls Description Document Redgie Curet Fall Risk and appropriate interventions in the flowsheet.  
Outcome: Progressing Towards Goal

## 2019-05-02 NOTE — PROGRESS NOTES
Problem: Self Care Deficits Care Plan (Adult) Goal: *Acute Goals and Plan of Care (Insert Text) Description Occupational Therapy Goals Initiated 4/30/2019 1. Patient will perform grooming at the sink with modified independence within 7 day(s). 2.  Patient will perform bathing at the sink with supervision/set-up within 7 day(s). 3.  Patient will perform lower body dressing with minimal assistance/contact guard assist within 7 day(s). 4.  Patient will perform toilet transfers with supervision/set-up within 7 day(s). 5.  Patient will perform all aspects of toileting with independence within 7 day(s). 6.  Patient will participate in upper extremity therapeutic exercise/activities with independence for 5 minutes within 7 day(s). 7.  Patient will utilize energy conservation techniques during functional activities with verbal cues within 7 day(s). Outcome: Progressing Towards Goal 
  
OCCUPATIONAL THERAPY TREATMENT Patient: Patricia Ravi (89 y.o. male) Date: 5/2/2019 Diagnosis: CVA (cerebral vascular accident) ([facility] [I63.9] TIA (transient ischemic attack) Precautions:   
Chart, occupational therapy assessment, plan of care, and goals were reviewed. ASSESSMENT: Patient seen briefly by Ot. Reports injection  R knee helped tremendously with pain. Was able to tailor sit to demo ability to reach toes to don/doff socks. SBA for with stand and walking short distance in room. Much improved balance due to pain relief. Plans to discharge to [facility] once cleared by cardiology. Progression toward goals: 
?       Improving appropriately and progressing toward goals ? Improving slowly and progressing toward goals ? Not making progress toward goals and plan of care will be adjusted PLAN: 
Patient continues to benefit from skilled intervention to address the above impairments. Continue treatment per established plan of care. Discharge Recommendations:  Home Health Further Equipment Recommendations for Discharge:  none SUBJECTIVE:  
Patient stated ? I'm fine I can do everything. \" OBJECTIVE DATA SUMMARY:  
Cognitive/Behavioral Status: 
Neurologic State: Alert Orientation Level: Oriented X4 Cognition: Appropriate decision making; Appropriate safety awareness; Follows commands Functional Mobility and Transfers for ADLs: 
Transfers: 
Sit to Stand: Stand-by assistance ADL Intervention: Lower Body Dressing Assistance Socks: Supervision/set-up Activity Tolerance:  
fair Please refer to the flowsheet for vital signs taken during this treatment. After treatment:  
? Patient left in no apparent distress sitting up in chair ? Patient left in no apparent distress in bed 
? Call bell left within reach ? Nursing notified ? Caregiver present ? Bed alarm activated COMMUNICATION/COLLABORATION:  
The patient?s plan of care was discussed with: Physical Therapist and Registered Nurse Renée River Time Calculation: 8 mins

## 2019-05-02 NOTE — ROUTINE PROCESS
Bedside shift change report given to Melchor Griggs, KARLA and [facility] Haskins RN  (oncoming nurse) by [facility] RN(offgoing nurse). Report included the following information [facility]. 
  
[facility] Phone:   [phone] 
  
  
Significant changes during shift:  5 beats of V Tach-Pt asymptomatic during episode. Orders to redraw labs 
 
  
Patient Information 
  
Isabel Mcarthur 
80 y.o. 
4/27/2019 10:09 AM by Raeann Dobson MD. Isabel Mcarthur was admitted from [address] [facility] 
Problem List 
  
    
Patient Active Problem List  
  Diagnosis Date Noted  TIA (transient ischemic attack) 05/01/2019  Insomnia due to medical condition 05/10/2017  Stenosis of both internal carotid arteries 02/07/2017  Ataxia 02/07/2017  Diabetic peripheral neuropathy associated with type 2 diabetes mellitus ([facility] 02/07/2017  Sensory ataxic gait 02/07/2017  Monocular diplopia of both eyes 02/07/2017  VBI (vertebrobasilar insufficiency) 02/07/2017  History of stroke 02/07/2017  Vitamin D deficiency 02/07/2017  BPV (benign positional vertigo) 02/07/2017  Dizziness 04/23/2013  Idiopathic small and large fiber sensory neuropathy 04/23/2013  B12 deficiency 04/23/2013  Hypothyroidism, postsurgical 03/13/2013  Thyroid mass 05/10/2011  Incisional hernia 05/10/2011  CVA (cerebral infarction) 05/10/2011  CAD (coronary artery disease) 05/10/2011  
  
    
Past Medical History:  
Diagnosis Date  CAD (coronary artery disease)    
 Calculus of kidney    
 Cancer [facility]) prostate  1996  
 Hearing loss    
 Heart attack ([facility]    
 Hypercholesterolemia    
 Hypertension    
 Incisional hernia 5/10/2011  Movement disorder    
 Other ill-defined conditions(799.89)    
  elevated cholesterol  Other ill-defined conditions(799.89)    
  glaucoma  Other ill-defined conditions(799.89)    
  abd hernia  Stroke [facility])    
  left arm tingling  Thyroid disease    
 Thyroid mass 5/10/2011  
  
  
  
Core Measures: 
  
 CVA: Yes Yes CHF:No No 
PNA:No No 
  
  
Activity Status: 
  
OOB to Chair Yes Ambulated this shift Yes Bed Rest No 
  
Supplemental O2: (If Applicable) 
  
NC No 
NRB No 
Venti-mask No 
On 0 Liters/min 
  
  
LINES AND DRAINS: 
  
PIV 
  
  
DVT prophylaxis: 
  
DVT prophylaxis Med- Yes DVT prophylaxis SCD or ANDREW- No  
  
Wounds: (If Applicable) 
  
Wounds- No 
  
Location  
  
Patient Safety: 
  
Falls Score Total Score: 3 Safety Level_______ Bed Alarm On? No 
Sitter?  No 
  
Plan for upcoming shift: monitor for s/s of TIA/CVA, pain control 
  
  
  
Discharge Plan: Yes [place] 
  
Active Consults: 
IP CONSULT TO HOSPITALIST 
IP CONSULT TO NEUROLOGY 
IP CONSULT TO ORTHOPEDIC SURGERY

## 2019-05-02 NOTE — ADT AUTH CERT NOTES
Neurology [address] - Care Day 5 (5/1/2019) by Amari Gomes  
 
   
Review Status Review Entered Completed 5/2/2019 16:06  
   
Criteria Review Care Day: 5 Care Date: 5/1/2019 Level of Care: Telemetry Guideline Day 1 Clinical Status ( ) * Clinical Indications met[F] Interventions (X) Inpatient interventions as needed * Milestone Additional Notes c/s review 05/01  
vitals: temp 98.6, hr 75, bp 138/52, rr 18, spo2 96% r/a Meds: amlodipine 5 mg po daily, Lipitor 40 mg po hs, Plavix 75 mg po daily, Avodart 0.5 mg po daily, gabapentin 300 mg po hs, heparin 5000 u sc q8h, irbesartan 150 mg po daily, Levaquin 750 mg po, aspirin 81 mg po daily, cefepime 2 g iv q12h, norco 1 tab po Internal med - Chief Complaint / Reason for Physician Visit: following TIA / uti/ R knee effusion No new neurological Sx R knee swollen more today / stiff this am and painful but better now Assessment / Plan:  
R knee effusion r/o septic knee  
-s/p arthrocentesis 4/30 Ortho help appreciated:  
Continue PT/OT/Rehab Fluid aspiration and exam more c/w inflammatory effusion (poss gout, although no crystals seen but high uric acid) versus infection.  Okay for discharge from Ortho standpoint.  
-Pt has scheduled appointment tomorrow 5/2 with Dr Snyder to re-exam and possible cortisone injection  
   
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   
-Sx: weakness/garbled speech - resolved   
-Head CT:negative   
-MRI:no acute stroke   
-CTA neck    
1. Developmentally small vertebral basilar system was superimposed significant  
atherosclerotic disease and stenosis involving primarily distal vertebral  
arteries and basilar artery. 2. Approximately 50% stenosis proximal right internal carotid artery and 40%  
stenosis proximal left internal carotid artery by NASCET criteria. 3. Chronic cervical spondylosis and stenosis. 4. No acute arterial abnormality demonstrated -ECHO: EF 46%, mild AR, mod MR, mod TR, mild pulm HTN   
-Stroke blood work: LDL 89.6   hba1c 6.1 TSH 1.97  
-Seen by neurology: cont asa/plavix   
-Speech:none   
-PT/OT:[facility]care section of [facility] note - Fluid aspiration and exam more c/w inflammatory effusion (poss gout, although no crystals seen but high uric acid) versus infection.    
Internal med - Hospitalist  
Pt was to be DC today. He transferred from the bed to the [facility] then started with sudden onset slow/slurred speech and R sided weakness. Per pt and family - recurrent Sx, the same what brought him here. Sx lasted ~ 2 min and now resolved. Pt is back to normal. Episode happened ~ 30 minutes ago. Will cancel discharge. Head CT. Continue tele monitoring. Re consult neurology Monitor orthostatics Neuro consult - Subjective: Patient was getting dressed to go home and when he stood up he developed slurred speech and right sided weakness. He sat back down and it resolved after 5 minutes or so. Reports similar episodes before.   
   
Review of records reveal head CT without contrast done 4/27/2019 revealed no acute findings.  Head and neck CTA done with contrast 4/27/2019 revealed developmentally small vertebrobasilar system with superimposed significant atherosclerotic disease and stenosis involving primarily distal vertebral arteries and basilar artery.  Approximately 50% stenosis proximal right ICA and 40% stenosis proximal left ICA.  Brain MRI without contrast done 4/27/2019 revealed no acute stroke but limited due to motion. LDL was 89.6. Patient is on Aspirin 81 mg rene and Plavix 75 mg daily for stroke prophylaxis. Assessment:  
   
Principal Problem:  
  TIA (transient ischemic attack) (5/1/2019)    
Vertebrobasilar insufficiency Carotid stenosis Hyperlipidemia  
   
Plan:  
Neurological examination currently is nonfocal.  Consider TIA.  Likely due to significant vertebral and basilar artery pathology.  Stat head CT without contrast was ordered.  
 CBC, CMP and magnesium was ordered.  
   
Head/neck CTA revealed developmentally small vertebrobasilar system with superimposed significant atherosclerotic disease and stenosis involving primarily distal vertebral arteries and basilar artery.  This is likely the culprit of his recurrent transient neurological deficits. It also showed 50% stenosis proximal right ICA and 40% stenosis proximal left ICA.  No intervention is necessary from that standpoint.  
   
Discussed with the patient and family that likely aspirin and Plavix is not enough to prevent this spells from recurring. Percell Meckel may eventually need anticoagulation.  They understood.  
   
Discussed also referral to neuro interventional service to discuss if there is any other treatment option aside from anticoagulation which can be done as an outpatient.  
   
Further intervention be done pending results of neuroimaging.  
 LDL is not at goal.  Patient is on statin therapy.  
  
   
Neurology [address] Day 4 (4/30/2019) by Gregorio Young  
 
   
Review Status Review Entered Completed 5/2/2019 16:01  
   
Criteria Review Care Day: 4 Care Date: 4/30/2019 Level of Care: Telemetry Guideline Day 1 Clinical Status ( ) * Clinical Indications met[F] Interventions (X) Inpatient interventions as needed * Milestone Additional Notes c/s review 04/30  
vitals: temp 98.4, hr 89, bp 162/92, rr 18, spo2 96% r/a  
labs:   
SYNOVIAL FLD RBC CT 33High 0 /cu mm SYNOVIAL FLD WBC CT 42,725High 0 - 150 /cu mm SYNOVIAL FLD SEGS 76High 0 - 24 % Meds: amlodipine 5 mg po daily, Lipitor 40 mg po hs, Plavix 75 mg po daily, Avodart 0.5 mg po daily, gabapentin 300 mg po hs, heparin 5000 u sc q8h, irbesartan 150 mg po daily, toradol 15 mg iv x1, aspirin 81 mg po daily, cefepime 2 g iv q12h, norco 1 tab po Ortho surgery - Right knee with swelling and tense effusion .  
   
 Knee aspirated under sterile conditions with consent . 90 ml piyush fluid removed and sent to lab for analysis  
   
Knee pain R/O septic knee Ice and elevate for now Internal med - Assessment / Plan:  
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   
-Sx: weakness/garbled speech - resolved   
-Head CT:negative   
-MRI:no acute stroke   
-CTA neck    
1. Developmentally small vertebral basilar system was superimposed significant  
atherosclerotic disease and stenosis involving primarily distal vertebral  
arteries and basilar artery. 2. Approximately 50% stenosis proximal right internal carotid artery and 40%  
stenosis proximal left internal carotid artery by NASCET criteria. 3. Chronic cervical spondylosis and stenosis. 4. No acute arterial abnormality demonstrated -ECHO: EF 46%, mild AR, mod MR, mod TR, mild pulm HTN   
-Stroke blood work: LDL 89.6   hba1c 6.1 TSH 1.97  
-Seen by neurology: cont asa/plavix   
-Speech:none   
-PT/OT:[facility]care section of [facility]  
   
R knee effusion r/o septic knee  
-s/p arthrocentesis 4/30 Ortho help appreciated  
-follow cultures   
   
UTI  
-UC: pseudomonas   
-empiric rocephin --> cefepime ( 4/28 ) - treat for 5 days, can be switched to PO LVQ   
   
HTN  
-BP stable  
-continue home meds : Norvasc/ avapro   
   
Idiopathic polyneuropathy, cont' gabapentin  
BPH, cont' avodart, flomax Hypothyroidism, cont' synthroid Hx of skin ca, completed chemo per pt

## 2019-05-02 NOTE — PROGRESS NOTES
Still with right knee pain. cx neg to date. Was due for dc yesterday but had another mini stroke. Patient Vitals for the past 12 hrs: 
 Temp Pulse Resp BP SpO2  
05/02/19 1139 98 °F (36.7 °C) (!) 51 18 149/42 95 % 05/02/19 1052    120/60   
05/02/19 0740  77 18 128/47   
05/02/19 0737 99.3 °F (37.4 °C) 69 18 (!) 106/39 97 % 05/02/19 0435 97.4 °F (36.3 °C) 62 18 142/53 96 % Knee with moderate swelling TTP  
rom 0-60 with pain Right knee djd with inflammatory arthropathy 
csi today in anticipation of DC Case discussed with DR Mensah Person

## 2019-05-02 NOTE — PROGRESS NOTES
Dr. Cortes Coronado informed me after speaking with neurologist to cancel the consult for eliqu[employer] pricing.

## 2019-05-02 NOTE — PROGRESS NOTES
Hospitalist Progress Note NAME: Freeman Black :  1928 MRN:  [id number] Assessment / Plan: 
Recurrent TIA Previous CVA after CABG in  Hx of vertebral basilar insufficiency  
-Sx: weakness/garbled speech on admission - resolved : the same episode lasted ~ 2 minutes  
-Head CT:negative x 2  
-MRI:no acute stroke  
-CTA neck 1. Developmentally small vertebral basilar system was superimposed significant 
atherosclerotic disease and stenosis involving primarily distal vertebral 
arteries and basilar artery. 2. Approximately 50% stenosis proximal right internal carotid artery and 40% 
stenosis proximal left internal carotid artery by NASCET criteria. 3. Chronic cervical spondylosis and stenosis. 4. No acute arterial abnormality demonstrated -ECHO: EF 46%, mild AR, mod MR, mod TR, mild pulm HTN  
-Stroke blood work: LDL 89.6   hba1c 6.1 TSH 1.97 
-Seen by neurology: likely recurrent TIAs due to significant vertebral and basilar artery pathology. Discussed case with neuro interventional specialist, Dr. Patel Soledad. Plan is to have patient be seen in the clinic. They may be able to offer some intervention for the right vertebral artery but not the left. Check aspirin level and platelet function if adequate. Adjust pending results. Hold off on anticoagulation. Continue aspirin and Plavix for stroke prophylaxis. -Speech:none  
-[facility] R knee effusion due to DJD 
-s/p arthrocentesis , cultures negative Ortho help appreciated: 
Continue PT/OT/Rehab Fluid aspiration and exam more c/w inflammatory effusion (poss gout, although no crystals seen but high uric acid) versus infection. -s/p steroid injections with great response UTI poa  
-UC: pseudomonas  
-empiric rocephin --> cefepime (  ) --> PO LVQ ( completed )  
  
HTN 
NSVT/ sinus bradycardia  
-/120s, orthostatics negative RN hold BP meds this am for BP at 110s  
with BL carotid artery stenosis will keep BP on the high side --> cont Norvasc at lower dose, hold avapro   
-5/2: had run of 7 beats VT; then HR in mid 50s  
-cardiology help appreciated: cont tele, no indication for PPM now  
-home meds : Norvasc/ avapro  
  
Idiopathic polyneuropathy, cont' gabapentin 
BPH, cont' avodart, flomax Hypothyroidism, cont' synthroid Hx of skin ca, completed chemo per pt 
  
  
Code Status: DNR Surrogate Decision Maker: pt's son DVT Prophylaxis: heparin Baseline: from [facility] Recommended Disposition: back to [facility] once work up completed and medically stable Subjective: Chief Complaint / Reason for Physician Visit: following TIA / uti/ R knee effusion No recurrent event since yesterday R knee is much better s/p injection today Discussed with RN events overnight. Review of Systems: 
Symptom Y/N Comments  Symptom Y/N Comments Fever/Chills n   Chest Pain n   
Poor Appetite    Edema Cough    Abdominal Pain n   
Sputum    Joint Pain SOB/ENG n   Pruritis/Rash Nausea/vomit    Tolerating PT/OT Diarrhea    Tolerating Diet Constipation    Other Could NOT obtain due to:   
 
Objective: VITALS:  
Last 24hrs VS reviewed since prior progress note. Most recent are: 
Patient Vitals for the past 24 hrs: 
 Temp Pulse Resp BP SpO2  
05/02/19 1519 97.6 °F (36.4 °C) (!) 57 18 125/40 96 % 05/02/19 1139 98 °F (36.7 °C) (!) 51 18 149/42 95 % 05/02/19 1052    120/60   
05/02/19 0740  77 18 128/47   
05/02/19 0737 99.3 °F (37.4 °C) 69 18 (!) 106/39 97 % 05/02/19 0435 97.4 °F (36.3 °C) 62 18 142/53 96 % 05/01/19 2347 97.9 °F (36.6 °C) (!) 50 18 129/56 96 % 05/01/19 1928 98.6 °F (37 °C) 75 18 138/52 97 % 05/01/19 1738  62  124/42   
05/01/19 1736  (!) 54  122/40   
05/01/19 1734  (!) 56  110/48   
05/01/19 1731  (!) 50 16 (!) 111/38  05/01/19 1617  (!) 56 18 104/49 100 % 05/01/19 1613  (!) 57 18 (!) 105/37 95 % Intake/Output Summary (Last 24 hours) at 5/2/2019 1610 Last data filed at 5/2/2019 0740 Gross per 24 hour Intake 420 ml Output 450 ml Net -30 ml PHYSICAL EXAM: 
General: WD, WN. Alert, cooperative, no acute distress   
EENT:  EOMI. Anicteric sclerae. MMM Resp:  CTA bilaterally, no wheezing or rales. No accessory muscle use CV:  Regular  rhythm,  No edema GI:  Soft, Non distended, Non tender.  +Bowel sounds Neurologic:  Alert and oriented X 3, normal speech, Psych:   Good insight. Not anxious nor agitated Skin:  No rashes. No jaundice Extr:                 R knee residual swelling/ warm to touch/ not tender Reviewed most current lab test results and cultures  YES Reviewed most current radiology test results   YES Review and summation of old records today    NO Reviewed patient's current orders and MAR    YES 
PMH/SH reviewed - no change compared to H&P 
________________________________________________________________________ Care Plan discussed with: 
  Comments Patient y Family  y dtrs bedside RN y   
Care Manager y Consultant  y Ortho / neurology Multidiciplinary team rounds were held today with , nursing, pharmacist and clinical coordinator. Patient's plan of care was discussed; medications were reviewed and discharge planning was addressed. ________________________________________________________________________ Total NON critical care TIME:  35 Minutes Total CRITICAL CARE TIME Spent:   Minutes non procedure based Comments >50% of visit spent in counseling and coordination of care    
________________________________________________________________________ Sadie Ceron MD  
 
Procedures: see electronic medical records for all procedures/Xrays and details which were not copied into this note but were reviewed prior to creation of Plan. LABS: 
I reviewed today's most current labs and imaging studies. Pertinent labs include: 
Recent Labs 05/02/19 
4366 WBC 7.2 HGB 12.4 HCT 38.3  Recent Labs 05/02/19 
6128 05/02/19 
8724  140  
K 4.4 4.3 * 108 CO2 25 25 * 112* BUN 20 21* CREA 0.74 0.81 CA 8.4* 8.3*  
MG 2.3 2.1 ALB  --  2.7* TBILI  --  0.4 SGOT  --  17 ALT  --  16 Signed: Steve Campbell MD

## 2019-05-02 NOTE — ROUTINE PROCESS
Notified MD of overnight vtach episode and periodic bradycardia. MD advised cardiology consult will be ordered.

## 2019-05-02 NOTE — CONSULTS
Pt seen and examined. Full consult dictated. Would cont telemetry while in hosp. No clear indication for pacemaker or other Rx at this time. Will follow.

## 2019-05-02 NOTE — PROGRESS NOTES
Neurology Progress Note Patient ID: 
Karolina Ochoa 
[id number] 
72 y.o. 
2/19/1928 CC: slurred speech and right sided weakness Subjective:  
  
Patient reports no recurrence. Head CT without contrast done yesterday revealed no acute abnormality. No stroke. Current Facility-Administered Medications Medication Dose Route Frequency  methylPREDNISolone acetate (DEPO-MEDROL) 40 mg/mL injection 40 mg  40 mg Intra artICUlar ONCE  
 lidocaine (PF) (XYLOCAINE) 10 mg/mL (1 %) injection 10 mL  10 mL IntraDERMal ONCE  
 aspirin tablet 325 mg  325 mg Oral DAILY  acetaminophen (TYLENOL) tablet 650 mg  650 mg Oral Q6H  
 oxyCODONE IR (ROXICODONE) tablet 5 mg  5 mg Oral Q4H PRN  
 levoFLOXacin (LEVAQUIN) tablet 750 mg  750 mg Oral Q24H  
 senna (SENOKOT) tablet 8.6 mg  1 Tab Oral DAILY  polyethylene glycol (MIRALAX) packet 17 g  17 g Oral DAILY  sodium chloride (NS) flush 5-40 mL  5-40 mL IntraVENous Q8H  
 sodium chloride (NS) flush 5-40 mL  5-40 mL IntraVENous PRN  
 atorvastatin (LIPITOR) tablet 40 mg  40 mg Oral QHS  labetalol (NORMODYNE;TRANDATE) 20 mg/4 mL (5 mg/mL) injection 5 mg  5 mg IntraVENous Q10MIN PRN  polyethylene glycol (MIRALAX) packet 17 g  17 g Oral DAILY PRN  
 heparin (porcine) injection 5,000 Units  5,000 Units SubCUTAneous Q8H  
 amLODIPine (NORVASC) tablet 5 mg  5 mg Oral DAILY  dorzolamide-timolol (COSOPT) 22.3-6.8 mg/mL ophthalmic solution 1 Drop  1 Drop Both Eyes DAILY  cyanocobalamin (VITAMIN B12) tablet 1,000 mcg  1,000 mcg Oral DAILY  gabapentin (NEURONTIN) capsule 300 mg  300 mg Oral QHS  irbesartan (AVAPRO) tablet 150 mg  150 mg Oral DAILY  latanoprost (XALATAN) 0.005 % ophthalmic solution 1 Drop  1 Drop Both Eyes QHS  meclizine (ANTIVERT) tablet 25 mg  25 mg Oral TID PRN  
 levothyroxine (SYNTHROID) tablet 125 mcg  125 mcg Oral 6am  
 tamsulosin (FLOMAX) capsule 0.4 mg  0.4 mg Oral BID  
  dutasteride (AVODART) capsule 0.5 mg  0.5 mg Oral DAILY  clopidogrel (PLAVIX) tablet 75 mg  75 mg Oral DAILY Review of Systems: 
 
Pertinent items are noted in HPI. Objective:  
 
Patient Vitals for the past 8 hrs: 
 BP Temp Pulse Resp SpO2  
05/02/19 1139 149/42 98 °F (36.7 °C) (!) 51 18 95 % 05/02/19 1052 120/60      
05/02/19 0740 128/47  77 18   
05/02/19 0737 (!) 106/39 99.3 °F (37.4 °C) 69 18 97 % 05/02 0701 - 05/02 1900 In: 5 [P.O.:420] Out: -  
04/30 1901 - 05/02 0700 In: 5 [P.O.:420] Out: 450 [Urine:450] Lab Review Recent Results (from the past 24 hour(s)) GLUCOSE, POC Collection Time: 05/01/19  4:11 PM  
Result Value Ref Range Glucose (POC) 108 (H) 65 - 100 mg/dL Performed by Julieta Gore (PCT) GLUCOSE, POC Collection Time: 05/01/19  9:18 PM  
Result Value Ref Range Glucose (POC) 127 (H) 65 - 100 mg/dL Performed by Beth Hansen (PCT) CBC WITH AUTOMATED DIFF Collection Time: 05/02/19  3:38 AM  
Result Value Ref Range WBC 7.2 4.1 - 11.1 K/uL  
 RBC 4.06 (L) 4.10 - 5.70 M/uL  
 HGB 12.4 12.1 - 17.0 g/dL HCT 38.3 36.6 - 50.3 % MCV 94.3 80.0 - 99.0 FL  
 MCH 30.5 26.0 - 34.0 PG  
 MCHC 32.4 30.0 - 36.5 g/dL  
 RDW 12.8 11.5 - 14.5 % PLATELET 363 622 - 830 K/uL MPV 9.8 8.9 - 12.9 FL  
 NRBC 0.0 0  WBC ABSOLUTE NRBC 0.00 0.00 - 0.01 K/uL NEUTROPHILS 58 32 - 75 % LYMPHOCYTES 25 12 - 49 % MONOCYTES 12 5 - 13 % EOSINOPHILS 4 0 - 7 % BASOPHILS 1 0 - 1 % IMMATURE GRANULOCYTES 0 0.0 - 0.5 % ABS. NEUTROPHILS 4.2 1.8 - 8.0 K/UL  
 ABS. LYMPHOCYTES 1.8 0.8 - 3.5 K/UL  
 ABS. MONOCYTES 0.8 0.0 - 1.0 K/UL  
 ABS. EOSINOPHILS 0.3 0.0 - 0.4 K/UL  
 ABS. BASOPHILS 0.0 0.0 - 0.1 K/UL  
 ABS. IMM. GRANS. 0.0 0.00 - 0.04 K/UL  
 DF AUTOMATED METABOLIC PANEL, COMPREHENSIVE Collection Time: 05/02/19  3:38 AM  
Result Value Ref Range Sodium 140 136 - 145 mmol/L  Potassium 4.3 3.5 - 5.1 mmol/L  
 Chloride 108 97 - 108 mmol/L  
 CO2 25 21 - 32 mmol/L Anion gap 7 5 - 15 mmol/L Glucose 112 (H) 65 - 100 mg/dL BUN 21 (H) 6 - 20 MG/DL Creatinine 0.81 0.70 - 1.30 MG/DL  
 BUN/Creatinine ratio 26 (H) 12 - 20 GFR est AA >60 >60 ml/min/1.73m2 GFR est non-AA >60 >60 ml/min/1.73m2 Calcium 8.3 (L) 8.5 - 10.1 MG/DL Bilirubin, total 0.4 0.2 - 1.0 MG/DL  
 ALT (SGPT) 16 12 - 78 U/L  
 AST (SGOT) 17 15 - 37 U/L Alk. phosphatase 62 45 - 117 U/L Protein, total 6.6 6.4 - 8.2 g/dL Albumin 2.7 (L) 3.5 - 5.0 g/dL Globulin 3.9 2.0 - 4.0 g/dL A-G Ratio 0.7 (L) 1.1 - 2.2 MAGNESIUM Collection Time: 05/02/19  3:38 AM  
Result Value Ref Range Magnesium 2.1 1.6 - 2.4 mg/dL METABOLIC PANEL, BASIC Collection Time: 05/02/19  5:44 AM  
Result Value Ref Range Sodium 141 136 - 145 mmol/L Potassium 4.4 3.5 - 5.1 mmol/L Chloride 109 (H) 97 - 108 mmol/L  
 CO2 25 21 - 32 mmol/L Anion gap 7 5 - 15 mmol/L Glucose 111 (H) 65 - 100 mg/dL BUN 20 6 - 20 MG/DL Creatinine 0.74 0.70 - 1.30 MG/DL  
 BUN/Creatinine ratio 27 (H) 12 - 20 GFR est AA >60 >60 ml/min/1.73m2 GFR est non-AA >60 >60 ml/min/1.73m2 Calcium 8.4 (L) 8.5 - 10.1 MG/DL MAGNESIUM Collection Time: 05/02/19  5:44 AM  
Result Value Ref Range Magnesium 2.3 1.6 - 2.4 mg/dL GLUCOSE, POC Collection Time: 05/02/19  6:25 AM  
Result Value Ref Range Glucose (POC) 105 (H) 65 - 100 mg/dL Performed by Shayla Son (PCT) GLUCOSE, POC Collection Time: 05/02/19 11:09 AM  
Result Value Ref Range Glucose (POC) 96 65 - 100 mg/dL Performed by Ephraim Valdez (PCT) SALICYLATE Collection Time: 05/02/19  1:25 PM  
Result Value Ref Range Salicylate level <0.7 (L) 2.8 - 20.0 MG/DL  
 
NEUROLOGICAL EXAM: 
  
Appearance: The patient is well developed, well nourished, provides a coherent history and is in no acute distress. Mental Status: Oriented to time, place and person. Fluent, no aphasia or dysarthria. Mood and affect appropriate. Cranial Nerves:   II - XII intact except decrease hearing bilaterally. Motor:  5/5 strength except favoring right leg due to recent knee aspiration. Normal bulk and tone. No pronator drift. Reflexes:   Deep tendon reflexes 1+/4 and symmetrical.  
Sensory:   Normal to cold and pinprick. Gait:  Not tested. Tremor:   No tremor noted. Cerebellar:  No cerebellar signs present. Assessment:  
 
Principal Problem: 
  TIA (transient ischemic attack) (5/1/2019) VBI Carotid stenosis Hyperlipidemia Plan:  
Neurological examination currently is nonfocal.  Consider TIA. Likely due to significant vertebral and basilar artery pathology. 
  
Head CT without contrast revealed no acute process. 
  
CBC, CMP and magnesium were unremarkable. 
  
Head/neck CTA revealed developmentally small vertebrobasilar system with superimposed significant atherosclerotic disease and stenosis involving primarily distal vertebral arteries and basilar artery. This is likely the culprit of his recurrent transient neurological deficits. It also showed 50% stenosis proximal right ICA and 40% stenosis proximal left ICA. No intervention is necessary from a carotid standpoint. 
  
Discussed case with neuro interventional specialist, Dr. Rosemarie Dubin. Plan is to have patient be seen in the clinic. They may be able to offer some intervention for the right vertebral artery but not the left. Check aspirin level and platelet function if adequate. Adjust pending results. Hold off on anticoagulation. Patient and family informed of the plan. 
    
Continue aspirin and Plavix for stroke prophylaxis. LDL is not at goal.  Patient is on statin therapy.  
 
Signed: 
Andre Coombs MD 
5/2/2019 
2:50 PM

## 2019-05-02 NOTE — ROUTINE PROCESS
* No surgery found * 
* No surgery found * Bedside shift change report given to [facility] (oncoming nurse) by Junior Echeverria (offgoing nurse). Report included the following information [facility] Phone:   [phone] Significant changes during shift:   BP medds held for condition, Seen by cardiology and ortho - steroid injection right knee Patient Information Amelia Ramachandran 
80 y.o. 
4/27/2019 10:09 AM by Nicol Neff MD. Amelia Ramachandran was admitted from [facility] 171 Problem List 
 
Patient Active Problem List  
 Diagnosis Date Noted  TIA (transient ischemic attack) 05/01/2019  Insomnia due to medical condition 05/10/2017  Stenosis of both internal carotid arteries 02/07/2017  Ataxia 02/07/2017  Diabetic peripheral neuropathy associated with type 2 diabetes mellitus ([address] 02/07/2017  Sensory ataxic gait 02/07/2017  Monocular diplopia of both eyes 02/07/2017  VBI (vertebrobasilar insufficiency) 02/07/2017  History of stroke 02/07/2017  Vitamin D deficiency 02/07/2017  BPV (benign positional vertigo) 02/07/2017  Dizziness 04/23/2013  Idiopathic small and large fiber sensory neuropathy 04/23/2013  B12 deficiency 04/23/2013  Hypothyroidism, postsurgical 03/13/2013  Thyroid mass 05/10/2011  Incisional hernia 05/10/2011  CVA (cerebral infarction) 05/10/2011  CAD (coronary artery disease) 05/10/2011 Past Medical History:  
Diagnosis Date  CAD (coronary artery disease)  Calculus of kidney  Cancer [facility]) prostate  1996  
 Hearing loss  Heart attack ([address]  Hypercholesterolemia  Hypertension  Incisional hernia 5/10/2011  Movement disorder  Other ill-defined conditions(799.89)   
 elevated cholesterol  Other ill-defined conditions(799.89)   
 glaucoma  Other ill-defined conditions(799.89)   
 abd hernia  Stroke ([address]   
 left arm tingling  Thyroid disease  Thyroid mass 5/10/2011 Core Measures: CVA: Yes Yes CHF:No No 
PNA:No No 
 
 
Activity Status: OOB to Chair Yes Ambulated this shift Yes Bed Rest No 
 
Supplemental O2: (If Applicable) NC No 
NRB No 
Venti-mask No 
On 0 Liters/min LINES AND DRAINS: 
PIV 
 
 
DVT prophylaxis: DVT prophylaxis Med- Yes DVT prophylaxis SCD or ANDREW- No  
 
Wounds: (If Applicable) Wounds- No 
 
Location Patient Safety: 
 
Falls Score Total Score: 3 Safety Level_______ Bed Alarm On? No 
Sitter? No 
 
Plan for upcoming shift:  Safety, comfort Discharge Plan: Yes Karina Reyna Active Consults: 
IP CONSULT TO HOSPITALIST 
IP CONSULT TO NEUROLOGY 
IP CONSULT TO ORTHOPEDIC SURGERY 
IP CONSULT TO CARDIOLOGY

## 2019-05-02 NOTE — PROGRESS NOTES
Pt had 5 beats of v tach. Pt sleeping during episode. Tele hospitalist paged. MD put in orders for repeat BMP and Mag labs. Will continue to monitor.

## 2019-05-02 NOTE — PROGRESS NOTES
Problem: Mobility Impaired (Adult and Pediatric) Goal: *Acute Goals and Plan of Care (Insert Text) Description Physical Therapy Goals Initiated 4/28/2019 1. Patient will perform sit to stand with modified independence within 7 day(s). 2.  Patient will ambulate with modified independence for 300 feet with the least restrictive device within 7 day(s). 3.  Patient will improve Balderas Balance score by 7 points within 7 days. Outcome: Progressing Towards Goal 
 
PHYSICAL THERAPY TREATMENT Patient: Brendon Wong (26 y.o. male) Date: 5/2/2019 Diagnosis: CVA (cerebral vascular accident) ([facility] [I63.9] TIA (transient ischemic attack) Precautions:   
Chart, physical therapy assessment, plan of care and goals were reviewed. ASSESSMENT: 
Pt was received sitting up in the chair and cleared by nursing to mobilize. Continues to have different visitors every session. Initially pt declining participation with PT, visitors not encouraging mobility. He had received an injection from ortho and had been performing LAQ in the chair. After discussion pt agreed to ambulate down the cardona with RW. Gait speed improved and pt reported pain had improved. He declined attempt to perform gait training with SPC. Ambulated down the cardona with RW and SBA, no LOB noted. He was returned to the room. Continue to recommend returning to [facility] with PT there. Progression toward goals: 
?    Improving appropriately and progressing toward goals ? Improving slowly and progressing toward goals ? Not making progress toward goals and plan of care will be adjusted PLAN: 
Patient continues to benefit from skilled intervention to address the above impairments. Continue treatment per established plan of care. Discharge Recommendations:  PT at [facility] (could do [facility]) Further Equipment Recommendations for Discharge:  none SUBJECTIVE:  
Patient stated ?i was suppose to go home to day, I hope I am tomorrow. ? OBJECTIVE DATA SUMMARY:  
Critical Behavior: 
Neurologic State: Alert Orientation Level: Oriented X4 Cognition: Appropriate decision making, Appropriate safety awareness, Follows commands Safety/Judgement: Awareness of environment Functional Mobility Training: 
Bed Mobility: 
  
  
 Session began and ended in sitting Transfers: 
Sit to Stand: Stand-by assistance Stand to Sit: Stand-by assistance Balance: 
Sitting: Intact Standing: Intact; With support Ambulation/Gait Training: 
Distance (ft): 225 Feet (ft) Assistive Device: Gait belt;Walker, rolling Ambulation - Level of Assistance: Stand-by assistance Gait Abnormalities: Decreased step clearance Speed/Margot: Slow Step Length: Left shortened;Right shortened Activity Tolerance: WFL Please refer to the flowsheet for vital signs taken during this treatment. After treatment:  
?    Patient left in no apparent distress sitting up in chair ? Patient left in no apparent distress in bed 
? Call bell left within reach ? Nursing notified ? Caregiver present ? Bed alarm activated COMMUNICATION/COLLABORATION:  
The patient?s plan of care was discussed with: Occupational Therapist and Registered Nurse Concepcion Brandt PT, DPT Time Calculation: 10 mins

## 2019-05-02 NOTE — ROUTINE PROCESS
Bedside shift change report given to Julien Mcarthur (oncoming nurse) by Cornell Edward (offgoing nurse). Report included the following information [place]. 
  
[facility] Phone:   [phone] 
  
  
Significant changes during shift:   patient had d/c orders. D/c was cancelled due to patient speaking slowly and not able to raise right arm. Neuro reconsulted and CT was done. Patient to kept overnight to monitor.  
  
Patient Information 
  
Xochitl Coy 
80 y.o. 
4/27/2019 10:09 AM by Raquel Paulino MD. Xochitl Coy was admitted from [address] [employer] 
Problem List 
  
    
Patient Active Problem List  
  Diagnosis Date Noted  TIA (transient ischemic attack) 05/01/2019  Insomnia due to medical condition 05/10/2017  Stenosis of both internal carotid arteries 02/07/2017  Ataxia 02/07/2017  Diabetic peripheral neuropathy associated with type 2 diabetes mellitus ([facility] [address] 02/07/2017  Sensory ataxic gait 02/07/2017  Monocular diplopia of both eyes 02/07/2017  VBI (vertebrobasilar insufficiency) 02/07/2017  History of stroke 02/07/2017  Vitamin D deficiency 02/07/2017  BPV (benign positional vertigo) 02/07/2017  Dizziness 04/23/2013  Idiopathic small and large fiber sensory neuropathy 04/23/2013  B12 deficiency 04/23/2013  Hypothyroidism, postsurgical 03/13/2013  Thyroid mass 05/10/2011  Incisional hernia 05/10/2011  CVA (cerebral infarction) 05/10/2011  CAD (coronary artery disease) 05/10/2011  
  
    
Past Medical History:  
Diagnosis Date  CAD (coronary artery disease)    
 Calculus of kidney    
 Cancer [facility]) prostate  1996  
 Hearing loss    
 Heart attack ([facility] [address]    
 Hypercholesterolemia    
 Hypertension    
 Incisional hernia 5/10/2011  Movement disorder    
 Other ill-defined conditions(799.89)    
  elevated cholesterol  Other ill-defined conditions(799.89)    
  glaucoma  Other ill-defined conditions(799.89)    
  abd hernia  Stroke [facility])    
  left arm tingling  Thyroid disease    
 Thyroid mass 5/10/2011  
  
  
  
Core Measures: 
  
CVA: Yes Yes CHF:No No 
PNA:No No 
  
  
Activity Status: 
  
OOB to Chair Yes Ambulated this shift Yes Bed Rest No 
  
Supplemental O2: (If Applicable) 
  
NC No 
NRB No 
Venti-mask No 
On 0 Liters/min 
  
  
LINES AND DRAINS: 
  
PIV 
  
  
DVT prophylaxis: 
  
DVT prophylaxis Med- Yes DVT prophylaxis SCD or ANDREW- No  
  
Wounds: (If Applicable) 
  
Wounds- No 
  
Location  
  
Patient Safety: 
  
Falls Score Total Score: 3 Safety Level_______ Bed Alarm On? No 
Sitter?  No 
  
Plan for upcoming shift: monitor for weakness and slowing of speech; lab draws 
  
  
  
Discharge Plan: Yes [place] 
  
Active Consults: 
IP CONSULT TO HOSPITALIST 
IP CONSULT TO NEUROLOGY 
IP CONSULT TO ORTHOPEDIC SURGERY

## 2019-05-02 NOTE — ROUTINE PROCESS
Bedside shift change report given to Montse (oncoming nurse) by Isai (offgoing nurse). Report included the following information [facility] ([place] 
  
[facility] Phone:   [phone] 
  
  
Significant changes during shift:   none 
  
Patient Information 
  
Estela Nilton 
80 y.o. 
4/27/2019 10:09 AM by Wendy Bro MD. Osmin Geiger was admitted from Assisted Living 
  
Problem List 
  
       
Patient Active Problem List  
  Diagnosis Date Noted  TIA (transient ischemic attack) 05/01/2019  Insomnia due to medical condition 05/10/2017  Stenosis of both internal carotid arteries 02/07/2017  Ataxia 02/07/2017  Diabetic peripheral neuropathy associated with type 2 diabetes mellitus ([facility] [address] 02/07/2017  Sensory ataxic gait 02/07/2017  Monocular diplopia of both eyes 02/07/2017  VBI (vertebrobasilar insufficiency) 02/07/2017  History of stroke 02/07/2017  Vitamin D deficiency 02/07/2017  BPV (benign positional vertigo) 02/07/2017  Dizziness 04/23/2013  Idiopathic small and large fiber sensory neuropathy 04/23/2013  B12 deficiency 04/23/2013  Hypothyroidism, postsurgical 03/13/2013  Thyroid mass 05/10/2011  Incisional hernia 05/10/2011  CVA (cerebral infarction) 05/10/2011  CAD (coronary artery disease) 05/10/2011  
  
       
Past Medical History:  
Diagnosis Date  CAD (coronary artery disease)    
 Calculus of kidney    
 Cancer [facility]) prostate  [id number]  
 Hearing loss    
 Heart attack ([facility] [address]    
 Hypercholesterolemia    
 Hypertension    
 Incisional hernia 5/10/2011  Movement disorder    
 Other ill-defined conditions(799.89)    
  elevated cholesterol  Other ill-defined conditions(799.89)    
  glaucoma  Other ill-defined conditions(799.89)    
  abd hernia  Stroke [facility])    
  left arm tingling  Thyroid disease    
 Thyroid mass 5/10/2011  
  
  
  
Core Measures: 
  
CVA: Yes Yes CHF:No No 
PNA:No No 
  
  
Activity Status: 
  
OOB to Chair Yes Ambulated this shift Yes Bed Rest No 
  
Supplemental C7: (MQ Applicable) 
  
NC No 
NRB No 
Venti-mask No 
On 0 Liters/min 
  
  
LINES AND DRAINS: 
  
PIV 
  
  
DVT prophylaxis: 
  
DVT prophylaxis Med- Yes DVT prophylaxis SCD or ANDREW- No  
  
Wounds: (If Applicable) 
  
Wounds- No 
  
Location  
  
Patient Safety: 
  
Falls Score Total Score: 3 Safety Level_______ Bed Alarm On? No 
Sitter?  No 
  
Plan for upcoming shift: monitor 
  
  
Discharge Plan: Yes [place] 
  
Active Consults: 
IP CONSULT TO HOSPITALIST 
IP CONSULT TO NEUROLOGY 
IP CONSULT TO ORTHOPEDIC SURGERY

## 2019-05-03 VITALS
DIASTOLIC BLOOD PRESSURE: 42 MMHG | RESPIRATION RATE: 20 BRPM | HEIGHT: 71 IN | OXYGEN SATURATION: 98 % | WEIGHT: 194.7 LBS | SYSTOLIC BLOOD PRESSURE: 158 MMHG | HEART RATE: 54 BPM | TEMPERATURE: 97.7 F | BODY MASS INDEX: 27.26 KG/M2

## 2019-05-03 PROCEDURE — 74011250636 HC RX REV CODE- 250/636: Performed by: INTERNAL MEDICINE

## 2019-05-03 PROCEDURE — 74011250637 HC RX REV CODE- 250/637: Performed by: INTERNAL MEDICINE

## 2019-05-03 PROCEDURE — 74011250637 HC RX REV CODE- 250/637: Performed by: HOSPITALIST

## 2019-05-03 PROCEDURE — 74011250637 HC RX REV CODE- 250/637: Performed by: PSYCHIATRY & NEUROLOGY

## 2019-05-03 RX ORDER — AMLODIPINE BESYLATE 2.5 MG/1
2.5 TABLET ORAL DAILY
Qty: 30 TAB | Refills: 0 | Status: SHIPPED | OUTPATIENT
Start: 2019-05-03 | End: 2021-01-01

## 2019-05-03 RX ORDER — GUAIFENESIN 100 MG/5ML
81 LIQUID (ML) ORAL DAILY
Status: DISCONTINUED | OUTPATIENT
Start: 2019-05-03 | End: 2019-05-03 | Stop reason: HOSPADM

## 2019-05-03 RX ADMIN — DUTASTERIDE 0.5 MG: 0.5 CAPSULE, LIQUID FILLED ORAL at 09:29

## 2019-05-03 RX ADMIN — TICAGRELOR 90 MG: 90 TABLET ORAL at 11:18

## 2019-05-03 RX ADMIN — POLYETHYLENE GLYCOL 3350 17 G: 17 POWDER, FOR SOLUTION ORAL at 09:20

## 2019-05-03 RX ADMIN — DORZOLAMIDE HYDROCHLORIDE AND TIMOLOL MALEATE 1 DROP: 20; 5 SOLUTION/ DROPS OPHTHALMIC at 09:31

## 2019-05-03 RX ADMIN — ASPIRIN 81 MG 81 MG: 81 TABLET ORAL at 11:19

## 2019-05-03 RX ADMIN — HEPARIN SODIUM 5000 UNITS: 5000 INJECTION INTRAVENOUS; SUBCUTANEOUS at 06:39

## 2019-05-03 RX ADMIN — ACETAMINOPHEN 650 MG: 325 TABLET ORAL at 04:15

## 2019-05-03 RX ADMIN — LEVOTHYROXINE SODIUM 125 MCG: 125 TABLET ORAL at 06:40

## 2019-05-03 RX ADMIN — TAMSULOSIN HYDROCHLORIDE 0.4 MG: 0.4 CAPSULE ORAL at 09:21

## 2019-05-03 RX ADMIN — AMLODIPINE BESYLATE 2.5 MG: 2.5 TABLET ORAL at 09:20

## 2019-05-03 RX ADMIN — Medication 10 ML: at 04:16

## 2019-05-03 RX ADMIN — SENNOSIDES 8.6 MG: 8.6 TABLET, FILM COATED ORAL at 09:21

## 2019-05-03 RX ADMIN — CYANOCOBALAMIN TAB 500 MCG 1000 MCG: 500 TAB at 09:21

## 2019-05-03 NOTE — DISCHARGE INSTRUCTIONS
Patient Discharge Instructions    Lan Willingham / [id number] : 1928    Admitted 2019 Discharged: 2019         DISCHARGE DIAGNOSIS:     TIA \" mini stroke\" - due to bilateral \" tight\" carotid artery                                - continue to take aspirin, stop Plavix, start taking Brilinta                               - follow with neuro interventional radiology at [facility]                                - keep blood pressure at 109/569 systolic to push blood through the tight carotid arteries    Sick Sinus Syndrome ( \" alternating fast and slow heart rate\") - may need pacemaker in the future. Follow up with Dr Unique Reyes        UTI ( urinary tract infection) - completed antibiotics while in the hospital     Right knee swelling/pain - follow with Dr Kae Sy as needed                                                     Take Home Medications     You completed antibiotic while in the hospital      What you should know about antibiotics:     Antibiotics are medicines that help people fight infections caused by bacteria. They work by killing bacteria that are in the body. Antibiotics can cause side effects, such as nausea, vomiting. Nausea is a common side effect of many antibiotics. It is not an allergic reaction. If you are a woman and you get a yeast infection after taking an antibiotic, that does not mean you are allergic to it. Yeast infections are a common side effect of antibiotics. One of the most important side effect to watch while taking antibiotic or after you just finished taking it is diarrhea. This type of diarrhea may be caused by an infection with bacteria called C. difficile. C. difficile normally lives in the intestines. When people are on antibiotics, the C. difficile in their intestines can overgrow and cause infection. If you develop any side effect especially diarrhea while taking antibiotics it is very important to contact your doctor.    We recommend taking probiotics while taking antibiotics. Probiotics can be bought over the counter. Allergies to antibiotics are common. You can develop an allergy to an antibiotic, even if you have not had a problem with it before. Symptoms of antibiotic allergy can be mild and include a flat rash and itching. They can also be more serious and include:      -----  Hives - are raised, red patches of skin that are usually very itchy      -----  Lip or tongue swelling     ------ Trouble swallowing or breathing  Serious allergy symptoms can start right after you start taking an antibiotic if you are very sensitive. Less serious symptoms, on the other hand, often start a day or more later. If you think you are allergic to antibiotics tell your doctor. General drug facts     If you have a very bad allergy, wear an allergy ID at all times. It is important that you take the medication exactly as they are prescribed. Keep your medication in the bottles provided by the pharmacist.  Keep a list of all your drugs (prescription, natural products, vitamins, OTC) with you. Give this list to your doctor. Do not take other medications without consulting your doctor. Do not share your drugs with others and do not take anyone else's drugs. Keep all drugs out of the reach of children and pets. Most drugs may be thrown away in household trash after mixing with coffee grounds or sushil litter and sealing in a plastic bag. Keep a list Call your doctor for help with any side effects. If in the [place] you may also call the FDA at 8-736-FDA-0518    Talk with the doctor before starting any new drug, including OTC, natural products, or vitamins. What to do at Home    1. Recommended diet: regular     2. Recommended activity: Activity as tolerated and PT/OT Eval and Treat    3.  If you experience any of the following symptoms then please call your primary care physician or return to the emergency room if you cannot get hold of your doctor:fever/chills/diarrhea/worsening right knee pain     4. Bring these papers with you to your follow up appointments. The papers will help your doctors be sure to continue the care plan from the hospital.      Follow-up with:   PCP: Manuel Farrar MD  Follow-up Information     Follow up With Specialties Details Why Contact Info    Manuel Farrar MD [facility] Practice In 1 week  [address] [address]  [phone]      Jaclyn Lund MD Orthopedic Surgery  as scheduled 1-3 days  [address] [facility]  [address] [facility],[address]  [phone]             Please call for your own appointment        Information obtained by :  I understand that if any problems occur once I am at home I am to contact my physician. I understand and acknowledge receipt of the instructions indicated above.                                                                                                                                            Physician's or R.N.'s Signature                                                                  Date/Time                                                                                                                                              Patient or Representative Signature                                                          Date/Time

## 2019-05-03 NOTE — PROGRESS NOTES
Problem: Falls - Risk of 
Goal: *Absence of Falls Description Document Edgar Brunner Fall Risk and appropriate interventions in the flowsheet. Outcome: Resolved/Met Problem: Patient Education: Go to Patient Education Activity Goal: Patient/Family Education Outcome: Resolved/Met Problem: Patient Education: Go to Patient Education Activity Goal: Patient/Family Education Outcome: Resolved/Met Problem: TIA/CVA Stroke: Day 2 Until Discharge Goal: Off Pathway (Use only if patient is Off Pathway) Outcome: Resolved/Met Goal: Activity/Safety Outcome: Resolved/Met Goal: Diagnostic Test/Procedures Outcome: Resolved/Met Goal: Nutrition/Diet Outcome: Resolved/Met Goal: Discharge Planning Outcome: Resolved/Met Goal: Medications Outcome: Resolved/Met Goal: Respiratory Outcome: Resolved/Met Goal: Treatments/Interventions/Procedures Outcome: Resolved/Met Goal: Psychosocial 
Outcome: Resolved/Met Goal: *Verbalizes anxiety and depression are reduced or absent Outcome: Resolved/Met Goal: *Absence of aspiration Outcome: Resolved/Met Goal: *Absence of deep venous thrombosis signs and symptoms(Stroke Metric) Outcome: Resolved/Met Goal: *Optimal pain control at patient's stated goal 
Outcome: Resolved/Met Goal: *Tolerating diet Outcome: Resolved/Met Goal: *Ability to perform ADLs and demonstrates progressive mobility and function Outcome: Resolved/Met Goal: *Stroke education continued(Stroke Metric) Outcome: Resolved/Met Problem: Ischemic Stroke: Discharge Outcomes Goal: *Verbalizes anxiety and depression are reduced or absent Outcome: Resolved/Met Goal: *Verbalize understanding of risk factor modification(Stroke Metric) Outcome: Resolved/Met Goal: *Hemodynamically stable Outcome: Resolved/Met Goal: *Absence of aspiration pneumonia Outcome: Resolved/Met Goal: *Aware of needed dietary changes Outcome: Resolved/Met Goal: *Verbalize understanding of prescribed medications including anti-coagulants, anti-lipid, and/or anti-platelets(Stroke Metric) Outcome: Resolved/Met Goal: *Tolerating diet Outcome: Resolved/Met Goal: *Aware of follow-up diagnostics related to anticoagulants Outcome: Resolved/Met Goal: *Ability to perform ADLs and demonstrates progressive mobility and function Outcome: Resolved/Met Goal: *Absence of DVT(Stroke Metric) Outcome: Resolved/Met Goal: *Absence of aspiration Outcome: Resolved/Met Goal: *Optimal pain control at patient's stated goal 
Outcome: Resolved/Met Goal: *Home safety concerns addressed Outcome: Resolved/Met Goal: *Describes available resources and support systems Outcome: Resolved/Met Goal: *Verbalizes understanding of activation of [id number]) for stroke symptoms(Stroke Metric) Outcome: Resolved/Met Goal: *Understands and describes signs and symptoms to report to providers(Stroke Metric) Outcome: Resolved/Met Goal: *Neurolgocially stable (absence of additional neurological deficits) Outcome: Resolved/Met Goal: *Verbalizes importance of follow-up with primary care physician(Stroke Metric) Outcome: Resolved/Met Goal: *Smoking cessation discussed,if applicable(Stroke Metric) Outcome: Resolved/Met Goal: *Depression screening completed(Stroke Metric) Outcome: Resolved/Met Problem: Patient Education: Go to Patient Education Activity Goal: Patient/Family Education Outcome: Resolved/Met Problem: Patient Education: Go to Patient Education Activity Goal: Patient/Family Education Outcome: Resolved/Met

## 2019-05-03 NOTE — PROGRESS NOTES
Neurology Progress Note Patient ID: 
Sunshine Mosley 
[id number] 
82 y.o. 
2/19/1928 CC: slurred speech and right sided weakness Subjective:  
  
Patient reports no recurrence. Aspirin level was low and platelet testing revealed patient was a none responder. Current Facility-Administered Medications Medication Dose Route Frequency  ticagrelor (BRILINTA) tablet 90 mg  90 mg Oral Q12H  aspirin chewable tablet 81 mg  81 mg Oral DAILY  amLODIPine (NORVASC) tablet 2.5 mg  2.5 mg Oral DAILY  acetaminophen (TYLENOL) tablet 650 mg  650 mg Oral Q6H  
 [START ON 5/4/2019] acetaminophen (TYLENOL) tablet 650 mg  650 mg Oral Q6H PRN  
 oxyCODONE IR (ROXICODONE) tablet 5 mg  5 mg Oral Q4H PRN  
 senna (SENOKOT) tablet 8.6 mg  1 Tab Oral DAILY  polyethylene glycol (MIRALAX) packet 17 g  17 g Oral DAILY  sodium chloride (NS) flush 5-40 mL  5-40 mL IntraVENous Q8H  
 sodium chloride (NS) flush 5-40 mL  5-40 mL IntraVENous PRN  
 atorvastatin (LIPITOR) tablet 40 mg  40 mg Oral QHS  labetalol (NORMODYNE;TRANDATE) 20 mg/4 mL (5 mg/mL) injection 5 mg  5 mg IntraVENous Q10MIN PRN  polyethylene glycol (MIRALAX) packet 17 g  17 g Oral DAILY PRN  
 heparin (porcine) injection 5,000 Units  5,000 Units SubCUTAneous Q8H  
 dorzolamide-timolol (COSOPT) 22.3-6.8 mg/mL ophthalmic solution 1 Drop  1 Drop Both Eyes DAILY  cyanocobalamin (VITAMIN B12) tablet 1,000 mcg  1,000 mcg Oral DAILY  gabapentin (NEURONTIN) capsule 300 mg  300 mg Oral QHS  latanoprost (XALATAN) 0.005 % ophthalmic solution 1 Drop  1 Drop Both Eyes QHS  meclizine (ANTIVERT) tablet 25 mg  25 mg Oral TID PRN  
 levothyroxine (SYNTHROID) tablet 125 mcg  125 mcg Oral 6am  
 tamsulosin (FLOMAX) capsule 0.4 mg  0.4 mg Oral BID  dutasteride (AVODART) capsule 0.5 mg  0.5 mg Oral DAILY Review of Systems: 
 
Pertinent items are noted in HPI. Objective:  
 
Patient Vitals for the past 8 hrs: BP Temp Pulse Resp SpO2  
05/03/19 0845 158/54  77    
05/03/19 0843 162/47  66    
05/03/19 0840 165/57 97.6 °F (36.4 °C) 74 20 99 % 05/03 0701 - 05/03 1900 In: 5 [P.O.:420] Out: -  
05/01 1901 - 05/03 0700 In: 0 [P.O.:880] Out: 450 [Urine:450] Lab Review Recent Results (from the past 24 hour(s)) SALICYLATE Collection Time: 05/02/19  1:25 PM  
Result Value Ref Range Salicylate level <0.8 (L) 2.8 - 20.0 MG/DL PLATELET FUNCTION, VERIFY NOW P2Y12 Collection Time: 05/02/19  1:25 PM  
Result Value Ref Range P2Y12 Plt response 247 194 - 418 PRU NEUROLOGICAL EXAM: 
  
Appearance: The patient is well developed, well nourished, provides a coherent history and is in no acute distress. Mental Status: Oriented to time, place and person. Fluent, no aphasia or dysarthria. Mood and affect appropriate. Cranial Nerves:   II - XII intact except decrease hearing bilaterally. Motor:  5/5 strength except favoring right leg due to recent knee aspiration. Normal bulk and tone. No pronator drift. Reflexes:   Deep tendon reflexes 1+/4 and symmetrical.  
Sensory:   Normal to cold and pinprick. Gait:  Not tested. Tremor:   No tremor noted. Cerebellar:  No cerebellar signs present. Assessment:  
 
Principal Problem: 
  TIA (transient ischemic attack) (5/1/2019) VBI Carotid stenosis Hyperlipidemia Plan:  
Neurological examination currently is nonfocal.  Consider TIA. Abdirahman Ring due to significant vertebral and basilar artery pathology. 
  
Head CT without contrast revealed no acute process. 
  
Head/neck CTA revealed developmentally small vertebrobasilar system with superimposed significant atherosclerotic disease and stenosis involving primarily distal vertebral arteries and basilar artery.  This is likely the culprit of his recurrent transient neurological deficits. Discussed case with neuro interventional specialist, Dr. Enma Shine.  Patient is Aspirin and Plavix non-responder. Trial of Brilinta 90 mg BID. Patient will be seen for outpatient evaluation at the neurointerventional clinic at [address] [place] [address]. Patient and family informed of the plan. Neck CTA also showed 50% stenosis proximal right ICA and 40% stenosis proximal left ICA.  No intervention is necessary from a carotid standpoint. Continue Aspirin 81 mg daily given cardiac issues. 
  
LDL is not at goal.  Patient is on statin therapy. Okay for discharge from a neurological standpoint. Follow-up with primary neurologist, Dr. Orly Starr 2 to 4 weeks after discharge. Signed: 
Han Amor MD 
5/3/2019 11:54 AM

## 2019-05-03 NOTE — PROGRESS NOTES
Patient and family received discharge instructions, list of medications and all questions were answered. Patient and family verbalized understanding, education was complete.

## 2019-05-03 NOTE — ROUTINE PROCESS
Bedside shift change report given to  Steve Wells RN (oncoming nurse) by Yamilka Holcomb RN (offgoing nurse). Report included the following information [facility] Phone:   [phone] 
  
  
Significant changes during shift:   none 
  
Patient Information 
  
Rosie Myers 
80 y.o. 
4/27/2019 10:09 AM by Laura Treviño MD. Osmin Geiger was admitted from Assisted Living 
  
Problem List 
  
       
Patient Active Problem List  
  Diagnosis Date Noted  TIA (transient ischemic attack) 05/01/2019  Insomnia due to medical condition 05/10/2017  Stenosis of both internal carotid arteries 02/07/2017  Ataxia 02/07/2017  Diabetic peripheral neuropathy associated with type 2 diabetes mellitus ([facility] [address] 02/07/2017  Sensory ataxic gait 02/07/2017  Monocular diplopia of both eyes 02/07/2017  VBI (vertebrobasilar insufficiency) 02/07/2017  History of stroke 02/07/2017  Vitamin D deficiency 02/07/2017  BPV (benign positional vertigo) 02/07/2017  Dizziness 04/23/2013  Idiopathic small and large fiber sensory neuropathy 04/23/2013  B12 deficiency 04/23/2013  Hypothyroidism, postsurgical 03/13/2013  Thyroid mass 05/10/2011  Incisional hernia 05/10/2011  CVA (cerebral infarction) 05/10/2011  CAD (coronary artery disease) 05/10/2011  
  
       
Past Medical History:  
Diagnosis Date  CAD (coronary artery disease)    
 Calculus of kidney    
 Cancer [facility]) prostate  4291  
 Hearing loss    
 Heart attack ([facility] [address]    
 Hypercholesterolemia    
 Hypertension    
 Incisional hernia 5/10/2011  Movement disorder    
 Other ill-defined conditions(799.89)    
  elevated cholesterol  Other ill-defined conditions(799.89)    
  glaucoma  Other ill-defined conditions(799.89)    
  abd hernia  Stroke [facility])    
  left arm tingling  Thyroid disease    
 Thyroid mass 5/10/2011  
  
  
  
Core Measures: 
  
CVA: Yes Yes CHF:No No 
PNA:No No 
  
  
Activity Status: 
  
OOB to Chair Yes Ambulated this shift Yes Bed Rest No 
  
Supplemental O4: (IO Applicable) 
  
NC No 
NRB No 
Venti-mask No 
On 0 Liters/min 
  
  
LINES AND DRAINS: 
  
PIV 
  
  
DVT prophylaxis: 
  
DVT prophylaxis Med- Yes DVT prophylaxis SCD or ANDREW- No  
  
Wounds: (If Applicable) 
  
Wounds- No 
  
Location  
  
Patient Safety: 
  
Falls Score Total Score: 3 Safety Level_______ Bed Alarm On? No 
Sitter?  No 
  
Plan for upcoming shift: orthostatics q12 hours 
  
  
Discharge Plan: Yes [facility] when cleared  
  
Active Consults: 
IP CONSULT TO HOSPITALIST 
IP CONSULT TO NEUROLOGY 
IP CONSULT TO ORTHOPEDIC SURGERY

## 2019-05-03 NOTE — PROGRESS NOTES
Patient is being discharged to [facility] independent part with [facility] seeing them. [facility] is aware patient is being discharged today. Spoke with patient and daughter and they are all in agreement. Daughter will be transporting him to [facility]. Spoke with Kim Loving at [facility] and she is aware patient will be coming today.

## 2019-05-03 NOTE — CONSULTS
[address] [facility] Name:  Gustavo Edwards 
MR#:  [id number] :  1928 ACCOUNT #:  [de-identified] DATE OF SERVICE:  2019 REQUESTING PHYSICIAN:  Madelaine Alvarez MD. 
 
REASON FOR CONSULTATION:  Evaluate bradycardia and nonsustained ventricular tachycardia. CHIEF COMPLAINT:  Vertigo. HISTORY OF PRESENT ILLNESS:  The patient is a 27-year-old man with a history of known ischemic cardiomyopathy and prior coronary artery bypass grafting as well as prior TAVR done several years ago. The patient also has a history of hypertension and dyslipidemia. He has a known history of vertebrobasilar insufficiency. He was admitted after he developed slurred speech and weakness predominantly on his right side. The symptoms resolved and the patient has undergone evaluation in the hospital.  He has apparently felt to have vertebrobasilar insufficiency. He had another episode with similar symptoms after admission. An echocardiogram showed an EF of 45-50% with concentric LVH, left atrial enlargement and stable appearing bioprosthetic aortic valve. The patient has had no recent cardiac issues. However, he had 5 beat run of nonsustained ventricular tachycardia and has had bradycardia with a heart rate as low as 39. These episodes occurred in the early morning hours. Because of this, I was asked to see the patient for evaluation. The patient denies syncope. No other shortness of breath or chest discomfort. As stated above, no other recent cardiac issues. PAST MEDICAL HISTORY:  As noted above, prior CVA, vertebrobasilar insufficiency, prostate cancer, peripheral neuropathy. PAST SURGICAL HISTORY:  Prior bypass surgery, prior thyroidectomy , prior TAVR, and prior orchiectomy. CURRENT MEDICATIONS:  Avapro, Norvasc, aspirin, Plavix, Lipitor, Neurontin, Cosopt, L-thyroxine, Tylenol, vitamin B12, Avodart, heparin 5000 units subcutaneously every 8 hours. SOCIAL HISTORY:  The patient is a former smoker. He does not drink alcohol. He lives in [place]. FAMILY HISTORY:  The patient's father had heart disease. REVIEW OF SYSTEMS:  As stated above. No fever or chills. PHYSICAL EXAMINATION: 
GENERAL:  Reveals an elderly white male in no acute distress. VITAL SIGNS:  Blood pressure 149/42, pulse 51, respirations 18, temperature 98. HEENT:  Pupils are equal and reactive. Oropharynx shows moist oral mucosa. NECK:  Supple. No masses or thyromegaly. No cervical or supraclavicular adenopathy. There is a left-sided carotid bruit. No JVD. CHEST:  Clear. No wheezes or crackles. SKIN:  Warm and dry. BACK:  No scoliosis. CARDIAC:  Regular rate and rhythm, 2/6 systolic murmur. No diastolic murmur or gallop. ABDOMEN:  Soft, nontender, no masses, organomegaly, bowel sounds positive. EXTREMITIES:  No cyanosis, clubbing or edema. Distal pulses are not well palpated. NEURO:  No obvious gross motor deficits at this time. LABORATORY DATA:  Hemoglobin 12.4, BUN 20, creatinine 0.7. EKG sinus rhythm, first-degree AV block, left bundle-branch block pattern with secondary ST-T changes. IMPRESSION: 
1. Ischemic cardiomyopathy with prior coronary artery bypass grafting. 2.  Status post prior transcatheter aortic valve replacement for severe aortic stenosis. 3.  Hypertension. 4.  Dyslipidemia. 5.  Recent transient ischemic attack with history of vertebrobasilar insufficiency. 6.  Sick sinus syndrome, mild. 7.  Nonsustained ventricular tachycardia, ejection fraction 45-50%. 8.  History of prostate cancer. 9.  Peripheral neuropathy. 8.  Advanced age. RECOMMENDATIONS:  At this point, the patient appears to have some mild sick sinus syndrome. I do not think a pacemaker is indicated at this time. I would continue to watch the patient on telemetry, however.   The nonsustained ventricular tachycardia is not particularly concerning as the patient has an ejection fraction well above 35%. I would continue with current medications, but avoid beta blockers. Thank you for this consultation. Ethan No MD 
 
 
[id number] 
D:  05/02/2019 15:04 
T:  05/02/2019 15:12 
JOB #:  [id number] CC:  MD Ben Nguyen MD

## 2019-05-03 NOTE — PROGRESS NOTES
Hospitalist Progress Note NAME: Nafisa Bell :  1928 MRN:  [id number] Assessment / Plan: 
Recurrent TIA Previous CVA after CABG in  Vertebral basilar insufficiency Bilateral carotid artery stenosis  
-Sx: weakness/garbled speech on admission - resolved : the same episode lasted ~ 2 minutes No recurrent event since that time  
-Seen by neurology:  
            Likely recurrent TIAs due to significant vertebral and basilar artery pathology. Discussed case with neuro interventional specialist, Dr. Diego Varela. Plan is to have patient be seen in the clinic. They may be able to offer some intervention for the right vertebral artery but not the left. ASA level low. Plt function test abnormal - no effective --> cont asa 81 mg ( for CAD), stop Plavix, start Brilinta -ECHO: EF 46%, mild AR, mod MR, mod TR, mild pulm HTN  
-Stroke blood work: LDL 89.6   hba1c 6.1 TSH 1.97 
-Speech:none  
-PT/OT:healthcare section of [facility] 
-Head CT:negative x 2  
-MRI:no acute stroke  
-CTA neck 1. Developmentally small vertebral basilar system was superimposed significant 
atherosclerotic disease and stenosis involving primarily distal vertebral 
arteries and basilar artery. 2. Approximately 50% stenosis proximal right internal carotid artery and 40% 
stenosis proximal left internal carotid artery by NASCET criteria. 3. Chronic cervical spondylosis and stenosis. 4. No acute arterial abnormality demonstrated R knee effusion due to DJD 
-s/p arthrocentesis , cultures negative Ortho help appreciated: 
Continue PT/OT/Rehab Fluid aspiration and exam more c/w inflammatory effusion (poss gout, although no crystals seen but high uric acid) versus infection. -s/p steroid injections  with great response UTI poa  
-UC: pseudomonas  
-empiric rocephin --> cefepime (  ) --> PO LVQ ( completed )  
  
HTN 
 NSVT/ sinus bradycardia - mild SSS 
-/160s - which will be his goal; for now with h/o BL stenosis 
orthostatics negative 
-Norvasc dose was decreased at half 2.5 mg  
DC avapro   
-5/2: had run of 7 beats VT; then HR in mid 50s  
-cardiology help appreciated: mild SSS, no indication for PPM now. Avoid BB.    
  
Idiopathic polyneuropathy, cont' gabapentin 
BPH, cont' avodart, flomax Hypothyroidism, cont' synthroid Hx of skin ca, completed chemo per pt 
  
  
Code Status: DNR Surrogate Decision Maker: pt's son DVT Prophylaxis: heparin Baseline: from [facility] independent living Recommended Disposition: back to [facility] today Subjective: Chief Complaint / Reason for Physician Visit: following TIA / uti/ R knee effusion No recurrent events R knee: mild swelling, not tender Discussed with RN events overnight. Review of Systems: 
Symptom Y/N Comments  Symptom Y/N Comments Fever/Chills n   Chest Pain n   
Poor Appetite    Edema Cough    Abdominal Pain n   
Sputum    Joint Pain SOB/ENG n   Pruritis/Rash Nausea/vomit    Tolerating PT/OT Diarrhea    Tolerating Diet Constipation    Other Could NOT obtain due to:   
 
Objective: VITALS:  
Last 24hrs VS reviewed since prior progress note. Most recent are: 
Patient Vitals for the past 24 hrs: 
 Temp Pulse Resp BP SpO2  
05/03/19 0845  77  158/54   
05/03/19 0843  66  162/47   
05/03/19 0840 97.6 °F (36.4 °C) 74 20 165/57 99 % 05/03/19 0324 97.4 °F (36.3 °C) 74 18 (!) 144/92 94 % 05/02/19 2312 97.3 °F (36.3 °C) 67 18 142/56 98 % 05/02/19 1915 97.5 °F (36.4 °C) 61 18 143/58 97 % 05/02/19 1519 97.6 °F (36.4 °C) (!) 57 18 125/40 96 % Intake/Output Summary (Last 24 hours) at 5/3/2019 1143 Last data filed at 5/3/2019 3074 Gross per 24 hour Intake 880 ml Output  Net 880 ml PHYSICAL EXAM: 
General: WD, WN.  Alert, cooperative, no acute distress   
 EENT:  EOMI. Anicteric sclerae. MMM Resp:  CTA bilaterally, no wheezing or rales. No accessory muscle use CV:  Regular  rhythm,  No edema GI:  Soft, Non distended, Non tender.  +Bowel sounds Neurologic:  Alert and oriented X 3, normal speech, Psych:   Good insight. Not anxious nor agitated Skin:  No rashes. No jaundice Extr:                 R knee - mild swelling/ not tender Reviewed most current lab test results and cultures  YES Reviewed most current radiology test results   YES Review and summation of old records today    NO Reviewed patient's current orders and MAR    YES 
PMH/SH reviewed - no change compared to H&P 
________________________________________________________________________ Care Plan discussed with: 
  Comments Patient y Family  y dtrs bedside RN y   
Care Manager y Consultant  y Ortho / neurology Multidiciplinary team rounds were held today with , nursing, pharmacist and clinical coordinator. Patient's plan of care was discussed; medications were reviewed and discharge planning was addressed. ________________________________________________________________________ Total NON critical care TIME:  35 Minutes Total CRITICAL CARE TIME Spent:   Minutes non procedure based Comments >50% of visit spent in counseling and coordination of care y Dc coordination   
________________________________________________________________________ Ben Fowler MD  
 
Procedures: see electronic medical records for all procedures/Xrays and details which were not copied into this note but were reviewed prior to creation of Plan. LABS: 
I reviewed today's most current labs and imaging studies. Pertinent labs include: 
Recent Labs 05/02/19 
1196 WBC 7.2 HGB 12.4 HCT 38.3  Recent Labs 05/02/19 
1546 05/02/19 
8361  140  
K 4.4 4.3 * 108 CO2 25 25 * 112* BUN 20 21*  
 CREA 0.74 0.81 CA 8.4* 8.3*  
MG 2.3 2.1 ALB  --  2.7* TBILI  --  0.4 SGOT  --  17 ALT  --  16 Signed: Steve Campbell MD

## 2019-05-03 NOTE — PROGRESS NOTES
Spiritual Care Assessment/Progress Note [address] 
 
 
NAME: Cristhian Rick      MRN: [id number] AGE: 80 y.o. SEX: male Episcopalian Affiliation: Restoration Language: [place] 5/3/2019     Total Time (in minutes): 11 Spiritual Assessment begun in [facility] NEUROSCIENCE TELEMETRY through conversation with: 
  
    [x]Patient        [x] Family    [] Friend(s) Reason for Consult: Initial/Spiritual assessment, patient floor Spiritual beliefs: (Please include comment if needed) [x] Identifies with a sarah tradition:     
   [x] Supported by a sarah community:  [employer]      
   [] Claims no spiritual orientation:       
   [] Seeking spiritual identity:            
   [] Adheres to an individual form of spirituality:       
   [] Not able to assess:                   
 
    
Identified resources for coping:  
   [x] Prayer                           
   [] Music                  [] Guided Imagery [x] Family/friends                 [] Pet visits [] Devotional reading                         [] Unknown 
   [] Other:                                    
 
 
Interventions offered during this visit: (See comments for more details) Patient Interventions: Affirmation of sarah, Affirmation of emotions/emotional suffering, Catharsis/review of pertinent events in supportive environment, Iconic (affirming the presence of God/Higher Power), Initial/Spiritual assessment, patient floor, Prayer (assurance of), Episcopalian beliefs/image of God discussed Family/Friend(s): Affirmation of sarah, Catharsis/review of pertinent events in supportive environment, Iconic (affirming the presence of God/Higher Power), Episcopalian beliefs/image of God discussed Plan of Care: 
 
 [] Support spiritual and/or cultural needs  
 [] Support AMD and/or advance care planning process    
 [] Support grieving process 
 [] Coordinate Rites and/or Rituals [] Coordination with community clergy 
 [x] No spiritual needs identified at this time 
 [] Detailed Plan of Care below (See Comments)  [] Make referral to Music Therapy 
[] Make referral to Pet Therapy    
[] Make referral to Addiction services 
[] Make referral to [facility] 
[] Make referral to Spiritual Care Partner 
[] No future visits requested       
[x] Follow up visits as needed Comments:   Initial visit in Neuro unit for spiritual assessment. Patient's daughter and a family friend were visiting. Patient shared he is feeling much better and is having a good morning. Daughter is visiting from [place]. Patient also has a son who has been visiting regularly. Daughter commented that patient has been especially grateful for visits by [facility]. He usually attends Buddhist Mass at this hospital twice a week. He expressed no concerns during this visit. Provided pastoral presence and assurance of prayer. He is hopeful for possible discharge today. Wilbert Rahman, MPS, [address] [facility],  [facility]  Paging Service  [phone]

## 2019-05-03 NOTE — DISCHARGE SUMMARY
Hospitalist Discharge Summary Patient ID: 
Umang Valdes 
[id number] 
16 y.o. 
2/19/1928 4/27/2019 PCP on record: Manuel Farrar MD 
 
Admit date: 4/27/2019 Discharge date and time: 5/3/2019 DISCHARGE DIAGNOSIS: 
 
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 
UTI poa HTN 
NSVT/ sinus bradycardia - mild SSS Idiopathic polyneuropathy BPH Hypothyroidism Hx of skin ca Code Status: DNR 
 
CONSULTATIONS: 
IP CONSULT TO HOSPITALIST 
IP CONSULT TO NEUROLOGY 
IP CONSULT TO ORTHOPEDIC SURGERY 
IP CONSULT TO CARDIOLOGY Excerpted HPI from H&P of Leah Emery MD: 
 
Mee Bence is a 80 y.o.  male with PMHx significant for CVA, CAD s/p CABG, HLD, HTN, vertigo with known vertebrobasilar insufficiency,CVA, present to the ER for evaluation of sudden onset of b/l upper and lower ext weakness and incomprehensible speech. Symptoms occurred at ~9AM when pt was on the phone with his friend. He started feeling weakness and was not able to control his tongue. Friend said his speech was garbled. Symptoms lasted about 5 mins per friend as pt called her back within 5 mins and speech was back to normal.  Pt then called staffs which recommended him to come to the ER for evaluation. In the ER, pt was able to move all exts. He states his lower exts feels heavy and feels off balanced. He denies association to confusion, numbness/tingling, dysphagia, dizziness. In the ER, head CT neg, CTA head/neck showed developmentally small vertebral basilar system was superimposed significant atherosclerotic disease and stenosis involving primarily distal vertebral 
arteries and basilar artery. Approximately 50% stenosis proximal right internal carotid artery and 40%.   Stenosis proximal left internal carotid artery by NASCET criteria. Chronic cervical spondylosis and stenosis. No acute arterial abnormality demonstrated. Vitals/labs reviewed with patient We were asked to admit for work up and evaluation of the above problems.  
  
 
 
 
______________________________________________________________________ DISCHARGE SUMMARY/HOSPITAL COURSE:  for full details see H&P, daily progress notes, labs, consult notes. Recurrent TIA Previous CVA after CABG in 1996 Vertebral basilar insufficiency Bilateral carotid artery stenosis  
-Sx: weakness/garbled speech on admission - resolved 5/1: the same episode lasted ~ 2 minutes No recurrent event since that time  
-Seen by neurology:  
            Likely recurrent TIAs due to significant vertebral and basilar artery pathology. Discussed case with neuro interventional specialist, Dr. Agnes Lozada is to have patient be seen in the clinic. Carrington Starks may be able to offer some intervention for the right vertebral artery but not the left.   
             ASA level low. Plt function test abnormal - no effective --> cont asa 81 mg ( for CAD), stop Plavix, start Brilinta -ECHO: EF 46%, mild AR, mod MR, mod TR, mild pulm HTN  
-Stroke blood work: LDL 89.6   hba1c 6.1 TSH 1.97 
-Speech:none  
-PT/OT:healthcare section of [facility] 
-Head CT:negative x 2  
-MRI:no acute stroke  
-CTA neck   
1. Developmentally small vertebral basilar system was superimposed significant 
atherosclerotic disease and stenosis involving primarily distal vertebral 
arteries and basilar artery. 2. Approximately 50% stenosis proximal right internal carotid artery and 40% 
stenosis proximal left internal carotid artery by NASCET criteria. 3. Chronic cervical spondylosis and stenosis. 4. No acute arterial abnormality demonstrated HTN 
NSVT/ sinus bradycardia - mild SSS 
-/160s - which will be his goal; for now with h/o BL stenosis 
orthostatics negative -Norvasc dose was decreased at half 2.5 mg  
DC avapro   
-5/2: had run of 7 beats VT; then HR in mid 50s  
-cardiology help appreciated: mild SSS, no indication for PPM now. Avoid BB 
  
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 
-s/p arthrocentesis 4/30, cultures negative Ortho help appreciated: 
Continue PT/OT/Rehab Fluid aspiration and exam more c/w inflammatory effusion (poss gout, although no crystals seen but high uric acid) versus infection. -s/p steroid injections 5/2 with great response UTI poa  
-UC: pseudomonas  
-empiric rocephin --> cefepime ( 4/28 ) --> PO LVQ ( completed 5/2)  
  Idiopathic polyneuropathy, cont' gabapentin 
BPH, cont' avodart, flomax Hypothyroidism, cont' synthroid Hx of skin ca, completed chemo per pt 
  
  
Code Status: DNR Surrogate Decision Maker: pt's son DVT Prophylaxis: heparin 
  
Baseline: from [facility] Recommended Disposition: back to [facility]  
 
 
 
_______________________________________________________________________ Patient seen and examined by me on discharge day. PHYSICAL EXAM: 
General:          WD, WN. Alert, cooperative, no acute distress   
EENT:              EOMI. Anicteric sclerae. MMM Resp:               CTA bilaterally, no wheezing or rales. No accessory muscle use CV:                  Regular  rhythm,  No edema GI:                   Soft, Non distended, Non tender.  +Bowel sounds Neurologic:      Alert and oriented X 3, normal speech, Psych:             Good insight. Not anxious nor agitated Skin:                No rashes. No jaundice Extr:                 R knee - mild swelling/ not tender  
  
 
 
_______________________________________________________________________ DISCHARGE MEDICATIONS:  
Current Discharge Medication List  
  
START taking these medications Details  
ticagrelor (BRILINTA) 90 mg tablet Take 1 Tab by mouth every twelve (12) hours every twelve (12) hours. Qty: 60 Tab, Refills: 1 CONTINUE these medications which have CHANGED Details  
amLODIPine (NORVASC) 2.5 mg tablet Take 1 Tab by mouth daily. Qty: 30 Tab, Refills: 0 CONTINUE these medications which have NOT CHANGED Details  
gabapentin (NEURONTIN) 300 mg capsule Take 1 Cap by mouth nightly. Qty: 100 Cap, Refills: 11  
 Associated Diagnoses: Stenosis of both internal carotid arteries; VBI (vertebrobasilar insufficiency); Diabetic peripheral neuropathy associated with type 2 diabetes mellitus ([facility] [address] Idiopathic small and large fiber sensory neuropathy; Sensory ataxic gait; Ataxia; Dizziness  
  
meclizine (ANTIVERT) 25 mg tablet Take 1 Tab by mouth three (3) times daily as needed for Dizziness. Qty: 20 Tab, Refills: 0  
  
lovastatin (MEVACOR) 40 mg tablet Take 40 mg by mouth nightly. brimonidine (ALPHAGAN) 0.15 % ophthalmic solution Administer 1 Drop to both eyes two (2) times a day. SYNTHROID 125 mcg tablet Take 1 Tab by mouth Daily (before breakfast). Qty: 90 Tab, Refills: 4 Associated Diagnoses: Hypothyroidism, postsurgical  
  
dutasteride (AVODART) 0.5 mg capsule Take 0.5 mg by mouth daily. cyanocobalamin (VITAMIN B-12) 1,000 mcg tablet Take 1,000 mcg by mouth daily. tamsulosin (FLOMAX) 0.4 mg capsule Take 0.4 mg by mouth two (2) times a day. aspirin 81 mg tablet Take 81 mg by mouth. dorzolamide-timolol (COSOPT) 22.3-6.8 mg/mL ophthalmic solution Administer 1 Drop to both eyes daily. latanoprost (XALATAN) 0.005 % ophthalmic solution Administer 1 Drop to both eyes nightly. [facility]) 625 mg tablet Take 625 mg by mouth two (2) times daily (with meals). STOP taking these medications  
  
 irbesartan (AVAPRO) 75 mg tablet Comments:  
Reason for Stopping:   
   
 naproxen sodium (ALEVE) 220 mg cap Comments:  
Reason for Stopping:   
   
 clopidogrel (PLAVIX) 75 mg tablet Comments:  
Reason for Stopping:   
   
  
 
 
 
Patient Follow Up Instructions: Activity: Activity as tolerated Diet: Regular Diet Wound Care: None needed Follow-up Information Follow up With Specialties Details Why Contact Info Esther Mendez MD Family Practice In 1 week  [address] [facility] [address] [facility] 
[phone] Abdoul German MD Orthopedic Surgery   As needed for arthritis  [place] [address] [facility] 
[phone] Beatriz Butler MD Neurology Go on 6/10/2019 Please follow up on Janie 10, 2019 at 9:40 arriving 15 minutes early to register [address] [facility] 
[phone] [address] [facility] will see you for physical, occupational therapy and nursing. [phone] Layla Leonardo MD Endovascular Surgical Neuroradiology In 1 week neurointerventional radiology at [facility] , 1-2 weeks  [address] [facility] [address] [place] [address] 
[phone] Laure Knowles MD Cardiology In 1 month cardiology  [address] [address] 
[phone] 
  
  
 
________________________________________________________________ Risk of deterioration: Low 
 
Condition at Discharge:  Stable 
__________________________________________________________________ Disposition Home with family and home health services 
 
____________________________________________________________________ Code Status: DNR/DNI 
___________________________________________________________________ Total time in minutes spent coordinating this discharge (includes going over instructions, follow-up, prescriptions, and preparing report for sign off to her PCP) :  > 30 minutes Signed: 
Steve Campbell MD

## 2019-05-05 ENCOUNTER — HOME CARE VISIT (OUTPATIENT)
Dept: SCHEDULING | Facility: HOME HEALTH | Age: 84
End: 2019-05-05
Payer: MEDICARE

## 2019-05-05 VITALS
TEMPERATURE: 98 F | OXYGEN SATURATION: 98 % | RESPIRATION RATE: 18 BRPM | DIASTOLIC BLOOD PRESSURE: 60 MMHG | SYSTOLIC BLOOD PRESSURE: 128 MMHG | HEART RATE: 60 BPM

## 2019-05-05 PROCEDURE — 400013 HH SOC

## 2019-05-05 PROCEDURE — G0299 HHS/HOSPICE OF RN EA 15 MIN: HCPCS

## 2019-05-06 ENCOUNTER — HOME CARE VISIT (OUTPATIENT)
Dept: SCHEDULING | Facility: HOME HEALTH | Age: 84
End: 2019-05-06
Payer: MEDICARE

## 2019-05-06 VITALS
SYSTOLIC BLOOD PRESSURE: 120 MMHG | TEMPERATURE: 97.4 F | HEART RATE: 56 BPM | DIASTOLIC BLOOD PRESSURE: 50 MMHG | OXYGEN SATURATION: 97 % | RESPIRATION RATE: 18 BRPM

## 2019-05-06 PROCEDURE — G0151 HHCP-SERV OF PT,EA 15 MIN: HCPCS

## 2019-05-07 ENCOUNTER — HOME CARE VISIT (OUTPATIENT)
Dept: SCHEDULING | Facility: HOME HEALTH | Age: 84
End: 2019-05-07
Payer: MEDICARE

## 2019-05-07 PROCEDURE — G0299 HHS/HOSPICE OF RN EA 15 MIN: HCPCS

## 2019-05-07 PROCEDURE — G0152 HHCP-SERV OF OT,EA 15 MIN: HCPCS

## 2019-05-08 ENCOUNTER — HOME CARE VISIT (OUTPATIENT)
Dept: SCHEDULING | Facility: HOME HEALTH | Age: 84
End: 2019-05-08
Payer: MEDICARE

## 2019-05-08 VITALS
DIASTOLIC BLOOD PRESSURE: 60 MMHG | SYSTOLIC BLOOD PRESSURE: 160 MMHG | RESPIRATION RATE: 18 BRPM | TEMPERATURE: 98.3 F | HEART RATE: 54 BPM | OXYGEN SATURATION: 96 %

## 2019-05-08 VITALS
TEMPERATURE: 98 F | RESPIRATION RATE: 16 BRPM | SYSTOLIC BLOOD PRESSURE: 110 MMHG | DIASTOLIC BLOOD PRESSURE: 65 MMHG | OXYGEN SATURATION: 97 % | HEART RATE: 56 BPM

## 2019-05-08 VITALS
OXYGEN SATURATION: 96 % | HEART RATE: 54 BPM | RESPIRATION RATE: 18 BRPM | DIASTOLIC BLOOD PRESSURE: 60 MMHG | TEMPERATURE: 98.4 F | SYSTOLIC BLOOD PRESSURE: 160 MMHG

## 2019-05-08 PROCEDURE — G0152 HHCP-SERV OF OT,EA 15 MIN: HCPCS

## 2019-05-08 PROCEDURE — G0151 HHCP-SERV OF PT,EA 15 MIN: HCPCS

## 2019-05-09 ENCOUNTER — HOME CARE VISIT (OUTPATIENT)
Dept: SCHEDULING | Facility: HOME HEALTH | Age: 84
End: 2019-05-09
Payer: MEDICARE

## 2019-05-09 VITALS
TEMPERATURE: 97.2 F | HEART RATE: 58 BPM | SYSTOLIC BLOOD PRESSURE: 120 MMHG | DIASTOLIC BLOOD PRESSURE: 62 MMHG | RESPIRATION RATE: 16 BRPM | OXYGEN SATURATION: 97 %

## 2019-05-09 PROCEDURE — G0299 HHS/HOSPICE OF RN EA 15 MIN: HCPCS

## 2019-05-13 ENCOUNTER — OFFICE VISIT (OUTPATIENT)
Dept: NEUROSURGERY | Age: 84
End: 2019-05-13

## 2019-05-13 ENCOUNTER — HOSPITAL ENCOUNTER (OUTPATIENT)
Dept: LAB | Age: 84
Discharge: HOME | End: 2019-05-13

## 2019-05-13 ENCOUNTER — HOSPITAL ENCOUNTER (OUTPATIENT)
Dept: LAB | Age: 84
Discharge: HOME | End: 2019-05-13
Payer: MEDICARE

## 2019-05-13 VITALS
HEART RATE: 68 BPM | RESPIRATION RATE: 16 BRPM | TEMPERATURE: 98.2 F | SYSTOLIC BLOOD PRESSURE: 140 MMHG | OXYGEN SATURATION: 97 % | DIASTOLIC BLOOD PRESSURE: 76 MMHG

## 2019-05-13 VITALS
BODY MASS INDEX: 27.3 KG/M2 | DIASTOLIC BLOOD PRESSURE: 76 MMHG | SYSTOLIC BLOOD PRESSURE: 144 MMHG | HEART RATE: 64 BPM | OXYGEN SATURATION: 99 % | WEIGHT: 195 LBS | RESPIRATION RATE: 17 BRPM | TEMPERATURE: 98.4 F | HEIGHT: 71 IN

## 2019-05-13 DIAGNOSIS — I67.2 INTRACRANIAL ATHEROSCLEROSIS: ICD-10-CM

## 2019-05-13 DIAGNOSIS — G45.0 VBI (VERTEBROBASILAR INSUFFICIENCY): ICD-10-CM

## 2019-05-13 DIAGNOSIS — I65.23 STENOSIS OF BOTH INTERNAL CAROTID ARTERIES: Primary | ICD-10-CM

## 2019-05-13 LAB
ASPIRIN TEST, ASPIRN: 398 ARU
P2Y12 PLT RESPONSE,PPPR: 71 PRU (ref 194–418)

## 2019-05-13 PROCEDURE — 85576 BLOOD PLATELET AGGREGATION: CPT

## 2019-05-13 RX ORDER — COLESEVELAM 180 1/1
1875 TABLET ORAL 2 TIMES DAILY WITH MEALS
COMMUNITY
End: 2021-01-01 | Stop reason: CLARIF

## 2019-05-13 NOTE — PATIENT INSTRUCTIONS
Today we discussed your atherosclerotic disease which causes 40-50% narrowing of your carotid arteries, occlusion of a segment of your non-dominant left vertebral artery, and areas of narrowing in your dominant right vertebral artery and congenitally small basilar artery. I do not believe that neurointervention with stenting is necessary or appropriate at this time, as the risks outweigh the potential benefits. Therefore, I recommend proceeding with medical management. Continue aspirin and Brilinta. We will check an aspirin test and P2Y12 test to make sure you are therapeutic on both of these medications. We will call you with the results. In addition, we will obtain carotid doppler in 6 months to reevaluate the carotid artery disease. If you become symptomatic at your normal blood pressure or experience a posterior circulation TIA or stroke while therapeutic on your antiplatelet medications, please make an appointment with me so we can discuss performing an intervention. A Healthy Lifestyle: Care Instructions  Your Care Instructions    A healthy lifestyle can help you feel good, stay at a healthy weight, and have plenty of energy for both work and play. A healthy lifestyle is something you can share with your whole family. A healthy lifestyle also can lower your risk for serious health problems, such as high blood pressure, heart disease, and diabetes. You can follow a few steps listed below to improve your health and the health of your family. Follow-up care is a key part of your treatment and safety. Be sure to make and go to all appointments, and call your doctor if you are having problems. It's also a good idea to know your test results and keep a list of the medicines you take. How can you care for yourself at home? · Do not eat too much sugar, fat, or fast foods. You can still have dessert and treats now and then. The goal is moderation. · Start small to improve your eating habits.  Pay attention to portion sizes, drink less juice and soda pop, and eat more fruits and vegetables. ? Eat a healthy amount of food. A 3-ounce serving of meat, for example, is about the size of a deck of cards. Fill the rest of your plate with vegetables and whole grains. ? Limit the amount of soda and sports drinks you have every day. Drink more water when you are thirsty. ? Eat at least 5 servings of fruits and vegetables every day. It may seem like a lot, but it is not hard to reach this goal. A serving or helping is 1 piece of fruit, 1 cup of vegetables, or 2 cups of leafy, raw vegetables. Have an apple or some carrot sticks as an afternoon snack instead of a candy bar. Try to have fruits and/or vegetables at every meal.  · Make exercise part of your daily routine. You may want to start with simple activities, such as walking, bicycling, or slow swimming. Try to be active 30 to 60 minutes every day. You do not need to do all 30 to 60 minutes all at once. For example, you can exercise 3 times a day for 10 or 20 minutes. Moderate exercise is safe for most people, but it is always a good idea to talk to your doctor before starting an exercise program.  · Keep moving. Maura Clarkeial the lawn, work in the garden, or [employer]. Take the stairs instead of the elevator at work. · If you smoke, quit. People who smoke have an increased risk for heart attack, stroke, cancer, and other lung illnesses. Quitting is hard, but there are ways to boost your chance of quitting tobacco for good. ? Use nicotine gum, patches, or lozenges. ? Ask your doctor about stop-smoking programs and medicines. ? Keep trying. In addition to reducing your risk of diseases in the future, you will notice some benefits soon after you stop using tobacco. If you have shortness of breath or asthma symptoms, they will likely get better within a few weeks after you quit. · Limit how much alcohol you drink.  Moderate amounts of alcohol (up to 2 drinks a day for men, 1 drink a day for women) are okay. But drinking too much can lead to liver problems, high blood pressure, and other health problems. Family health  If you have a family, there are many things you can do together to improve your health. · Eat meals together as a family as often as possible. · Eat healthy foods. This includes fruits, vegetables, lean meats and dairy, and whole grains. · Include your family in your fitness plan. Most people think of activities such as jogging or tennis as the way to fitness, but there are many ways you and your family can be more active. Anything that makes you breathe hard and gets your heart pumping is exercise. Here are some tips:  ? Walk to do errands or to take your child to school or the bus.  ? Go for a family bike ride after dinner instead of watching TV. Where can you learn more? Go to http://percy-luciana.info/. Enter B023 in the search box to learn more about \"A Healthy Lifestyle: Care Instructions. \"  Current as of: September 11, 2018  Content Version: 11.9  © 4502-2028 [employer]. Care instructions adapted under license by [employer] (which disclaims liability or warranty for this information). If you have questions about a medical condition or this instruction, always ask your healthcare professional. [address] any warranty or liability for your use of this information.

## 2019-05-13 NOTE — PROGRESS NOTES
New patient, hospital follow up presenting with TIA. Patient reports slight dizziness at times. Denies headache, visual problems, slurred speech and weakness. No acute problems reported.

## 2019-05-14 ENCOUNTER — HOME CARE VISIT (OUTPATIENT)
Dept: SCHEDULING | Facility: HOME HEALTH | Age: 84
End: 2019-05-14
Payer: MEDICARE

## 2019-05-14 ENCOUNTER — TELEPHONE (OUTPATIENT)
Dept: NEUROSURGERY | Age: 84
End: 2019-05-14

## 2019-05-14 VITALS
DIASTOLIC BLOOD PRESSURE: 60 MMHG | TEMPERATURE: 97.7 F | OXYGEN SATURATION: 98 % | HEART RATE: 56 BPM | RESPIRATION RATE: 18 BRPM | SYSTOLIC BLOOD PRESSURE: 130 MMHG

## 2019-05-14 VITALS
RESPIRATION RATE: 18 BRPM | DIASTOLIC BLOOD PRESSURE: 60 MMHG | SYSTOLIC BLOOD PRESSURE: 130 MMHG | HEART RATE: 68 BPM | TEMPERATURE: 97.8 F | OXYGEN SATURATION: 98 %

## 2019-05-14 LAB
BACTERIA SPEC CULT: NORMAL
GRAM STN SPEC: NORMAL
GRAM STN SPEC: NORMAL
SERVICE CMNT-IMP: NORMAL

## 2019-05-14 PROCEDURE — G0299 HHS/HOSPICE OF RN EA 15 MIN: HCPCS

## 2019-05-14 PROCEDURE — G0151 HHCP-SERV OF PT,EA 15 MIN: HCPCS

## 2019-05-14 NOTE — TELEPHONE ENCOUNTER
Spoke to patient to inform him per provider that his lab results were fine. Continue current treatment. Patient stated understanding.
no change in level of consciousness/no confusion/no vomiting/no blurred vision/no weakness

## 2019-05-15 ENCOUNTER — HOME CARE VISIT (OUTPATIENT)
Dept: SCHEDULING | Facility: HOME HEALTH | Age: 84
End: 2019-05-15
Payer: MEDICARE

## 2019-05-15 PROCEDURE — G0152 HHCP-SERV OF OT,EA 15 MIN: HCPCS

## 2019-05-16 ENCOUNTER — HOME CARE VISIT (OUTPATIENT)
Dept: SCHEDULING | Facility: HOME HEALTH | Age: 84
End: 2019-05-16
Payer: MEDICARE

## 2019-05-16 ENCOUNTER — TELEPHONE (OUTPATIENT)
Dept: NEUROSURGERY | Age: 84
End: 2019-05-16

## 2019-05-16 VITALS
SYSTOLIC BLOOD PRESSURE: 122 MMHG | RESPIRATION RATE: 18 BRPM | HEART RATE: 67 BPM | OXYGEN SATURATION: 96 % | DIASTOLIC BLOOD PRESSURE: 60 MMHG

## 2019-05-16 VITALS
OXYGEN SATURATION: 97 % | DIASTOLIC BLOOD PRESSURE: 60 MMHG | RESPIRATION RATE: 18 BRPM | SYSTOLIC BLOOD PRESSURE: 120 MMHG | TEMPERATURE: 97.8 F | HEART RATE: 71 BPM

## 2019-05-16 PROCEDURE — G0151 HHCP-SERV OF PT,EA 15 MIN: HCPCS

## 2019-05-16 NOTE — TELEPHONE ENCOUNTER
Received a message from patient inquiring about his lab results and new orders. Spoke to patient on 5/14/19 and informed him his labs were fine and to continue current treatment per provider. Patient informed of this again this morning and again stated understanding.

## 2019-05-17 ENCOUNTER — HOME CARE VISIT (OUTPATIENT)
Dept: SCHEDULING | Facility: HOME HEALTH | Age: 84
End: 2019-05-17
Payer: MEDICARE

## 2019-05-21 ENCOUNTER — HOME CARE VISIT (OUTPATIENT)
Dept: SCHEDULING | Facility: HOME HEALTH | Age: 84
End: 2019-05-21
Payer: MEDICARE

## 2019-05-21 VITALS
RESPIRATION RATE: 18 BRPM | HEART RATE: 85 BPM | SYSTOLIC BLOOD PRESSURE: 128 MMHG | OXYGEN SATURATION: 98 % | DIASTOLIC BLOOD PRESSURE: 64 MMHG

## 2019-05-21 VITALS
DIASTOLIC BLOOD PRESSURE: 60 MMHG | RESPIRATION RATE: 18 BRPM | SYSTOLIC BLOOD PRESSURE: 140 MMHG | OXYGEN SATURATION: 98 % | TEMPERATURE: 98.7 F | HEART RATE: 51 BPM

## 2019-05-21 VITALS
SYSTOLIC BLOOD PRESSURE: 140 MMHG | HEART RATE: 51 BPM | RESPIRATION RATE: 18 BRPM | DIASTOLIC BLOOD PRESSURE: 60 MMHG | OXYGEN SATURATION: 98 % | TEMPERATURE: 98.6 F

## 2019-05-21 PROCEDURE — G0300 HHS/HOSPICE OF LPN EA 15 MIN: HCPCS

## 2019-05-21 PROCEDURE — G0151 HHCP-SERV OF PT,EA 15 MIN: HCPCS

## 2019-05-21 PROCEDURE — G0152 HHCP-SERV OF OT,EA 15 MIN: HCPCS

## 2019-05-23 ENCOUNTER — HOME CARE VISIT (OUTPATIENT)
Dept: SCHEDULING | Facility: HOME HEALTH | Age: 84
End: 2019-05-23
Payer: MEDICARE

## 2019-05-23 VITALS
DIASTOLIC BLOOD PRESSURE: 68 MMHG | OXYGEN SATURATION: 97 % | TEMPERATURE: 97.9 F | RESPIRATION RATE: 18 BRPM | SYSTOLIC BLOOD PRESSURE: 128 MMHG | HEART RATE: 78 BPM

## 2019-05-23 VITALS
SYSTOLIC BLOOD PRESSURE: 140 MMHG | HEART RATE: 63 BPM | TEMPERATURE: 97.6 F | RESPIRATION RATE: 18 BRPM | DIASTOLIC BLOOD PRESSURE: 70 MMHG | OXYGEN SATURATION: 99 %

## 2019-05-23 PROCEDURE — G0151 HHCP-SERV OF PT,EA 15 MIN: HCPCS

## 2019-05-23 PROCEDURE — G0299 HHS/HOSPICE OF RN EA 15 MIN: HCPCS

## 2019-05-29 ENCOUNTER — HOME CARE VISIT (OUTPATIENT)
Dept: SCHEDULING | Facility: HOME HEALTH | Age: 84
End: 2019-05-29
Payer: MEDICARE

## 2019-05-29 VITALS — TEMPERATURE: 97.5 F | HEART RATE: 60 BPM | OXYGEN SATURATION: 99 %

## 2019-05-29 PROCEDURE — G0151 HHCP-SERV OF PT,EA 15 MIN: HCPCS

## 2019-05-30 ENCOUNTER — HOME CARE VISIT (OUTPATIENT)
Dept: SCHEDULING | Facility: HOME HEALTH | Age: 84
End: 2019-05-30
Payer: MEDICARE

## 2019-05-30 ENCOUNTER — HOME CARE VISIT (OUTPATIENT)
Dept: HOME HEALTH SERVICES | Facility: HOME HEALTH | Age: 84
End: 2019-05-30
Payer: MEDICARE

## 2019-05-30 VITALS
TEMPERATURE: 97 F | RESPIRATION RATE: 16 BRPM | HEART RATE: 55 BPM | OXYGEN SATURATION: 99 % | DIASTOLIC BLOOD PRESSURE: 60 MMHG | SYSTOLIC BLOOD PRESSURE: 130 MMHG

## 2019-05-30 PROCEDURE — G0300 HHS/HOSPICE OF LPN EA 15 MIN: HCPCS

## 2019-05-30 PROCEDURE — G0151 HHCP-SERV OF PT,EA 15 MIN: HCPCS

## 2019-05-31 VITALS
HEART RATE: 67 BPM | SYSTOLIC BLOOD PRESSURE: 152 MMHG | DIASTOLIC BLOOD PRESSURE: 68 MMHG | OXYGEN SATURATION: 97 % | TEMPERATURE: 97.5 F

## 2019-06-05 ENCOUNTER — HOME CARE VISIT (OUTPATIENT)
Dept: SCHEDULING | Facility: HOME HEALTH | Age: 84
End: 2019-06-05
Payer: MEDICARE

## 2019-06-05 VITALS
HEART RATE: 59 BPM | OXYGEN SATURATION: 98 % | RESPIRATION RATE: 20 BRPM | DIASTOLIC BLOOD PRESSURE: 78 MMHG | TEMPERATURE: 97.9 F | SYSTOLIC BLOOD PRESSURE: 157 MMHG

## 2019-06-05 PROCEDURE — G0299 HHS/HOSPICE OF RN EA 15 MIN: HCPCS

## 2019-06-10 ENCOUNTER — OFFICE VISIT (OUTPATIENT)
Dept: NEUROLOGY | Age: 84
End: 2019-06-10

## 2019-06-10 VITALS
SYSTOLIC BLOOD PRESSURE: 108 MMHG | DIASTOLIC BLOOD PRESSURE: 52 MMHG | BODY MASS INDEX: 27.27 KG/M2 | HEART RATE: 52 BPM | HEIGHT: 71 IN | OXYGEN SATURATION: 98 % | WEIGHT: 194.8 LBS

## 2019-06-10 DIAGNOSIS — F41.9 ANXIETY: ICD-10-CM

## 2019-06-10 DIAGNOSIS — I65.23 STENOSIS OF BOTH INTERNAL CAROTID ARTERIES: ICD-10-CM

## 2019-06-10 DIAGNOSIS — G45.9 TIA (TRANSIENT ISCHEMIC ATTACK): Primary | ICD-10-CM

## 2019-06-10 DIAGNOSIS — G45.0 VBI (VERTEBROBASILAR INSUFFICIENCY): ICD-10-CM

## 2019-06-10 DIAGNOSIS — E78.2 MIXED HYPERLIPIDEMIA: ICD-10-CM

## 2019-06-10 RX ORDER — CITALOPRAM 10 MG/1
10 TABLET ORAL DAILY
Qty: 30 TAB | Refills: 1 | Status: SHIPPED | OUTPATIENT
Start: 2019-06-10 | End: 2019-07-03 | Stop reason: SDUPTHER

## 2019-06-10 NOTE — LETTER
6/10/19 Patient: Rosanna Meadows YOB: 1928 Date of Visit: 6/10/2019 Jerry Krishnamurthy MD 
[id number] [address] [address] VIA Facsimile: [phone] Dear Jerry Krishnamurthy MD, Thank you for referring Mr. Stiven Ervin to [address] [place] [address] for evaluation. My notes for this consultation are attached. If you have questions, please do not hesitate to call me. I look forward to following your patient along with you. Sincerely, Ángel Steward MD

## 2019-06-10 NOTE — PROGRESS NOTES
NEUROLOGY CLINIC NOTE Patient ID: 
Glenn Vincent 
[id number] 
77 y.o. 
2/19/1928 Date of Consultation:  Janie 10, 2019 Reason for Consultation:  Hospital follow up for TIA and VBI Chief Complaint Patient presents with  
[facility] Follow Up  
  TIA History of Present Illness:  
 
Patient Active Problem List  
 Diagnosis Date Noted  TIA (transient ischemic attack) 05/01/2019  Insomnia due to medical condition 05/10/2017  Stenosis of both internal carotid arteries 02/07/2017  Ataxia 02/07/2017  Diabetic peripheral neuropathy associated with type 2 diabetes mellitus ([address] 02/07/2017  Sensory ataxic gait 02/07/2017  Monocular diplopia of both eyes 02/07/2017  VBI (vertebrobasilar insufficiency) 02/07/2017  History of stroke 02/07/2017  Vitamin D deficiency 02/07/2017  BPV (benign positional vertigo) 02/07/2017  Dizziness 04/23/2013  Idiopathic small and large fiber sensory neuropathy 04/23/2013  B12 deficiency 04/23/2013  Hypothyroidism, postsurgical 03/13/2013  Thyroid mass 05/10/2011  Incisional hernia 05/10/2011  CVA (cerebral infarction) 05/10/2011  CAD (coronary artery disease) 05/10/2011 Past Medical History:  
Diagnosis Date  Anxiety disorder  Arthritis  CAD (coronary artery disease)  Calculus of kidney  Cancer [facility]) prostate  1996  Depression  Hearing loss  Heart attack ([address]  Hypercholesterolemia  Hypertension  Incisional hernia 5/10/2011  Movement disorder  Neuropathy  Other ill-defined conditions(799.89)   
 elevated cholesterol  Other ill-defined conditions(799.89)   
 glaucoma  Other ill-defined conditions(799.89)   
 abd hernia  
 Skipped beats  Stroke ([address]   
 left arm tingling  Thyroid disease  Thyroid mass 5/10/2011  Vertigo Past Surgical History:  
Procedure Laterality Date [address]  
 cabg x3  
  HX AORTIC VALVE REPLACEMENT  2015  HX HEENT    
 thyroid biopsy  HX HEENT    
 thyroidectomy 2013  HX ORCHIECTOMY  01/2017  HX ORTHOPAEDIC  2/2011  
 compression fracture d.t. fall (back),no surgery  HX THYROIDECTOMY  1/3/2013  VA PROSTATE BIOPSY, NEEDLE, SATURATION SAMPLING Prior to Admission medications Medication Sig Start Date End Date Taking? Authorizing Provider  
aspirin delayed-release 81 mg tablet Take 81 mg by mouth daily. Yes Provider, Historical  
senna-docusate (COLACE 2-IN-1) 8.6-50 mg per tablet Take 1 Tab by mouth daily as needed for Constipation. Yes Provider, Historical  
amLODIPine (NORVASC) 2.5 mg tablet Take 1 Tab by mouth daily. 5/3/19  Yes Jackie Christianson MD  
ticagrelor (BRILINTA) 90 mg tablet Take 1 Tab by mouth every twelve (12) hours every twelve (12) hours. 5/3/19  Yes Jackie Christianson MD  
gabapentin (NEURONTIN) 300 mg capsule Take 1 Cap by mouth nightly. 1/11/19  Yes Sharon Hanson MD  
meclizine (ANTIVERT) 25 mg tablet Take 1 Tab by mouth three (3) times daily as needed for Dizziness. 12/11/18  Yes Pretty Kyle,   
lovastatin (MEVACOR) 40 mg tablet Take 40 mg by mouth nightly. Yes Provider, Historical  
brimonidine (ALPHAGAN) 0.15 % ophthalmic solution Administer 1 Drop to both eyes two (2) times a day. Yes Provider, Historical  
SYNTHROID 125 mcg tablet Take 1 Tab by mouth Daily (before breakfast). 3/13/13  Yes Dl Salinas MD  
dutasteride (AVODART) 0.5 mg capsule Take 0.5 mg by mouth daily. Yes Provider, Historical  
cyanocobalamin (VITAMIN B-12) 1,000 mcg tablet Take 1,000 mcg by mouth daily. Yes Provider, Historical  
tamsulosin (FLOMAX) 0.4 mg capsule Take 0.4 mg by mouth two (2) times a day. Yes Provider, Historical  
aspirin 81 mg tablet Take 81 mg by mouth.    Yes Provider, Historical  
dorzolamide-timolol (COSOPT) 22.3-6.8 mg/mL ophthalmic solution Administer 1 Drop to both eyes daily. Yes Provider, Historical  
latanoprost (XALATAN) 0.005 % ophthalmic solution Administer 1 Drop to both eyes nightly. Yes Provider, Historical  
colesevelam (WELCHOL) 625 mg tablet Take 1,875 mg by mouth two (2) times daily (with meals). Provider, Historical  
 
No Known Allergies Social History Tobacco Use  Smoking status: Former Smoker Packs/day: 0.50 Years: 3.00 Pack years: 1.50 Last attempt to quit: 1958 Years since quittin.4  Smokeless tobacco: Never Used Substance Use Topics  Alcohol use: No  
  
Family History Problem Relation Age of Onset  Cancer Mother   
     stomach  
 Heart Disease Sister  Heart Disease Father  Cancer Brother  Diabetes Son   
 Heart Disease Son  Anesth Problems Neg Hx Subjective:  
  
Roseline Barnett is a 80 y.o. RHWM with history of TIA, CVA, s/p CABG, HLD, HTN, vertigo with known vertebrobasilar insufficiency is here for follow up after a recent hospitalization. Patient was admitted to [facility] last 2019 for sudden onset of generalized weakness and incomprehensible speech. Lasted 5 minutes. Head CT without contrast done 2019 revealed no acute findings. Head and neck CTA done with contrast 2019 revealed developmentally small vertebrobasilar system with superimposed significant atherosclerotic disease and stenosis involving primarily distal vertebral arteries and basilar artery. Approximately 50% stenosis proximal right ICA and 40% stenosis proximal left ICA. Brain MRI without contrast done 2019 revealed no acute stroke but limited due to motion. LDL was 89.6. Patient was getting dressed to go home 2019 and when he stood up he developed slurred speech and right sided weakness. He sat back down and it resolved after 5 minutes or so. Reports similar episodes before. Repeat head CT did not reveal any acute stroke.  Head/neck CTA revealed developmentally small vertebrobasilar system with superimposed significant atherosclerotic disease and stenosis involving primarily distal vertebral arteries and basilar artery.  This is likely the culprit of his recurrent transient neurological deficits. Discussed case with neuro interventional specialist, Dr. Mayra Candelaria is Aspirin and Plavix non-responder. Trial of Brilinta 90 mg BID. Patient will be seen for outpatient evaluation at the neurointerventional clinic at [facility]. Patient was discharged 5/3/2019. Since then, patient has been seen by Dr. Vero Heath 5/13/2019. Note mentions slight dizziness at times but no recurrence of the symptoms. Reports some unsteadiness of his legs but no focal weakness. Patient was to continue aspirin and Brilinta. Repeat CTA in 1 year. Carotid Doppler in 6 months. Aspirin test was done and patient showing expected antiplatelet effect. Platelet function testing was done and shows expected inhibitory effect. Patient reports no recurrence of the transient slurred speech and right-sided weakness. He continues to take aspirin 81 mg daily and Brilinta twice a day. Denies any side effects. Daughter who accompanies the patient mentions issues with mild depression and anxiety and inquiring about trial of medication. Patient denies any falls. Does well with his walker. He currently has issues with worsening vision of the right eye due to glaucoma. He also has baseline problems with the left eye. Patient loves to read. He is being followed by ophthalmology. Outside reports reviewed: office notes, ER records, radiology reports, lab reports, historical medical records. Review of Systems: A comprehensive review of systems was negative except for:  
Blurring of vision, gait instability, anxiety, depression Objective:  
 
Visit Vitals /52 Pulse (!) 52 Ht 5' 11\" (1.803 m) Wt 194 lb 12.8 oz (88.4 kg) SpO2 98% BMI 27.17 kg/m² PHYSICAL EXAM: 
 
 NEUROLOGICAL EXAM: 
  
Appearance: The patient is well developed, well nourished, provides a coherent history and is in no acute distress. Mental Status: Oriented to time, place and person. Fluent, no aphasia or dysarthria. Mood and affect appropriate. Cranial Nerves:   Intact visual fields. LAMAR, EOM's full, no nystagmus, no ptosis. Facial sensation is normal. Corneal reflexes are intact. Facial movement is symmetric. Decrease hearing bilaterally. Palate is midline with normal sternocleidomastoid and trapezius muscles are normal. Tongue is midline. Motor:  5/5 strength. Normal bulk and tone. No pronator drift. Reflexes:   Deep tendon reflexes 1+/4 and symmetrical.  
Sensory:   Normal to cold and pinprick. Gait:  No Romberg. Steady with a walker. Tremor:   No tremor noted. Cerebellar:  No cerebellar signs present. Imaging CT Head, head/neck CTA, brain MRI: reviewed Labs Reviewed Assessment:  
TIA Vertebrobasilar insufficiency Bilateral carotid stenosis Hyperlipidemia Anxiety Plan:  
Neurological examination currently is nonfocal.  S/p TIA. Likely due to significant vertebral and basilar artery pathology. Previous head CT's without contrast were unremarkable. No acute stroke. Brain MRI was negative. Continue Aspirin and Brilinta for stroke prophylaxis. Advised strict compliance with dietary modifications and medications for hyperlipidemia and hypertension. Call 911 if new deficits occur. 
  
Head/neck CTA revealed developmentally small vertebrobasilar system with superimposed significant atherosclerotic disease and stenosis involving primarily distal vertebral arteries and basilar artery. This is likely the culprit of his recurrent transient neurological deficits. Patient has been seen by neurointerventional specialist. Recent Aspirin and platelet function testing reveal expected suppression.  Continue Aspirin 81 mg daily and Brilinta 90 mg every 12 hours. Repeat CTA in 1 year. Head/neck CTA revealed 50% stenosis proximal right ICA and 40% stenosis proximal left ICA. No intervention is necessary at this time. Repeat carotid doppler in 6 months was ordered by neurointerventional specialist.  
  
LDL should be <70. Continue statin therapy. Recheck care of PCP. Advised compliance with dietary modification. 
  
Patient having issues mainly anxiety and mild depression. Trial of Celexa 10 mg daily. Prescriptions provided. Further titration will depend upon response and tolerability. All questions and concerns were answered. Visit lasted 45 minutes. Greater than 50% was spent reviewing his medical records as summarized above, discussion about his condition, etiology, prognosis, stroke prevention and prophylaxis, compliance with dietary modifications and medications for hypertension hyperlipidemia, treatment options, medication This note was created using voice recognition software. Despite editing, there may be syntax errors.

## 2019-06-10 NOTE — PATIENT INSTRUCTIONS
Transient Ischemic Attack: Care Instructions Your Care Instructions A transient ischemic attack (TIA) is when blood flow to a part of your brain is blocked for a short time. A TIA is like a stroke but usually lasts only a few minutes. A TIA does not cause lasting brain damage. Any vision problems, slurred speech, or other symptoms usually go away in 10 to 20 minutes. But they may last for up to 24 hours. TIAs are often warning signs of a stroke. Some people who have a TIA may have a stroke in the future. A stroke can cause symptoms like those of a TIA. But a stroke causes lasting damage to your brain. You can take steps to help prevent a stroke. One thing you can do is get early treatment. If you have other new symptoms, or if your symptoms do not get better, go back to the emergency room or call your doctor right away. Getting treatment right away may prevent long-term brain damage caused by a stroke. The doctor has checked you carefully, but problems can develop later. If you notice any problems or new symptoms, get medical treatment right away. Follow-up care is a key part of your treatment and safety. Be sure to make and go to all appointments, and call your doctor if you are having problems. It's also a good idea to know your test results and keep a list of the medicines you take. How can you care for yourself at home? Medicines 
  · Be safe with medicines. Take your medicines exactly as prescribed. Call your doctor if you think you are having a problem with your medicine.  
  · If you take a blood thinner, such as aspirin, be sure you get instructions about how to take your medicine safely.  Blood thinners can cause serious bleeding problems.  
  · Call your doctor if you are not able to take your medicines for any reason.  
  · Do not take any over-the-counter medicines or herbal products without talking to your doctor first.  
  · If you take birth control pills or hormone therapy, talk to your doctor. Ask if these treatments are right for you.  
 Lifestyle changes 
  · Do not smoke. If you need help quitting, talk to your doctor about stop-smoking programs and medicines.  
  · Be active. If your doctor recommends it, get more exercise. Walking is a good choice. Bit by bit, increase the amount you walk every day. Try for at least 30 minutes on most days of the week. You also may want to swim, bike, or do other activities.  
  · Eat heart-healthy foods. These include fruits, vegetables, high-fiber foods, fish, and foods that are low in sodium, saturated fat, and trans fat.  
  · Stay at a healthy weight. Lose weight if you need to.  
  · Limit alcohol to 2 drinks a day for men and 1 drink a day for women.  
 Staying healthy 
  · Manage other health problems such as diabetes, high blood pressure, and high cholesterol.  
  · Get the flu vaccine every year. When should you call for help? Call 911 anytime you think you may need emergency care. For example, call if: 
  · You have new or worse symptoms of a stroke. These may include: 
? Sudden numbness, tingling, weakness, or loss of movement in your face, arm, or leg, especially on only one side of your body. ? Sudden vision changes. ? Sudden trouble speaking. ? Sudden confusion or trouble understanding simple statements. ? Sudden problems with walking or balance. ? A sudden, severe headache that is different from past headaches. Call 911 even if these symptoms go away in a few minutes.  
  · You feel like you are having another TIA.  
 Watch closely for changes in your health, and be sure to contact your doctor if you have any problems. Where can you learn more? Go to http://percy-luciana.info/. Enter [phone] in the search box to learn more about \"Transient Ischemic Attack: Care Instructions. \" Current as of: September 26, 2018 Content Version: 11.9 © 9943-2065 [employer].  Care instructions adapted under license by [address] (which disclaims liability or warranty for this information). If you have questions about a medical condition or this instruction, always ask your healthcare professional. [address] any warranty or liability for your use of this information. Learning About How to Prevent a Stroke What is a stroke? A stroke occurs when a blood vessel in the brain bursts or is blocked by a blood clot. Without blood and the oxygen it carries, part of the brain starts to die. The part of the body controlled by the damaged area of the brain can't work properly. But there are many things you can do to help lower your stroke risk. What increases your risk for stroke? A risk factor is anything that makes you more likely to have a particular health problem. Risk factors for stroke that you can treat or change include: 
· Health problems like high blood pressure, atrial fibrillation, diabetes, and high cholesterol. · Smoking. · Heavy use of alcohol. · Being overweight. · Not getting enough physical activity. Risk factors you can't change include: · Age. The risk of stroke goes up as you get older. · Race.  Americans, Native Americans, and [place] Natives have a higher risk than those of other races. · Being female. Women have a higher risk of stroke than men. · Family history of stroke. Your doctor can help you know your risk. Then you and your can doctor talk about whether you need to lower it. What can you do to prevent a stroke? · Treat any health problems you have that raise your risk. · Adopt a heart-healthy lifestyle: ? Don't smoke. If you need help quitting, talk to your doctor about stop-smoking programs and medicines. These can increase your chances of quitting for good. ? Limit alcohol to 2 drinks a day for men and 1 drink a day for women. ? Stay at a healthy weight. Lose weight if you need to. ? If your doctor recommends it, get more exercise. Walking is a good choice. Bit by bit, increase the amount you walk every day. Try for at least 30 minutes on most days of the week. ? Eat heart-healthy foods. These include fruits, vegetables, high-fiber foods, and fish. Choose foods that are low in sodium, saturated fat, and trans fat. · Decide with your doctor whether you will also take medicines to help lower your risk. For example, you and your doctor may decide you will take a medicine that prevents blood clots. What are the symptoms of a stroke? The brain damage from a stroke starts within minutes. That's why it's so important to know the symptoms of stroke and to act fast. Quick treatment can help limit damage to the brain so that you have fewer problems after the stroke. FAST is a simple way to remember the main symptoms of stroke. Recognizing these symptoms helps you know when to call for medical help. FAST stands for: 
· Face drooping. · Arm weakness. · Speech difficulty. · Time to call 911. Follow-up care is a key part of your treatment and safety. Be sure to make and go to all appointments, and call your doctor if you are having problems. It's also a good idea to know your test results and keep a list of the medicines you take. Where can you learn more? Go to http://percy-luciana.info/. Enter G757 in the search box to learn more about \"Learning About How to Prevent a Stroke. \" Current as of: September 26, 2018 Content Version: 11.9 © [id number] [employer]. Care instructions adapted under license by [employer] (which disclaims liability or warranty for this information). If you have questions about a medical condition or this instruction, always ask your healthcare professional. [address] any warranty or liability for your use of this information. Learning About How to Prevent Another Stroke What can you do to prevent another stroke? After a stroke, people feel lots of different emotions. Some people are worried that they could have another stroke. Or they may feel overwhelmed by how much there is to learn and do. Some people feel sad or depressed. No matter what emotions you are feeling, you can give yourself some control and peace of mind by making a plan to lower your risk of having another stroke. Take your medicines You'll need to take medicines to help prevent another stroke. Be sure to take your medicines exactly as prescribed. And don't stop taking them unless your doctor tells you to. If you stop taking your medicines, you can increase your risk of having another stroke. Some of the medicines your doctor may prescribe include: · Aspirin or some other blood thinner to prevent blood clots. · Statins to lower cholesterol. · Blood pressure medicines to lower blood pressure. Manage other health problems You can help lower your chance of having another stroke by managing certain other health problems. Problems that increase your risk of having another stroke include: · High blood pressure. · High cholesterol. · Atrial fibrillation. · Diabetes. If you have any of these health problems, you can manage them with healthy lifestyle changes along with medicine. Adopt a healthy lifestyle · Do not smoke or allow others to smoke around you. If you need help quitting, talk to your doctor about stop-smoking programs and medicines. These can increase your chances of quitting for good. Smoking makes a stroke more likely. · Limit alcohol to 2 drinks a day for men and 1 drink a day for women. · Lose weight if you need to. Controlling your weight will help you keep your heart and body healthy. · Be active. Ask your doctor what type and level of activity is safe for you. · Eat heart-healthy foods, like fruits, vegetables, and high-fiber foods. It's also important to: · Get a flu shot every year. · Ask for help if you think you are depressed. Do stroke rehab Taking part in a stroke rehabilitation (rehab) program will help you to regain skills you lost or make the most of your abilities after a stroke. It also helps you take steps to prevent another stroke. Your rehab team will give you education and support to help you build new, healthy habits. You'll learn how to manage any other health problems that you might have. Stacy Meraz also learn how to exercise safely, eat a healthy diet, and quit smoking if you smoke. Stacy Meraz work with your team to decide what lifestyle choices are best for you. If your doctor hasn't already suggested it, ask him or her if stroke rehab is right for you. Know stroke symptoms Make sure you know the symptoms of stroke. FAST is a simple way to remember. Recognizing these symptoms helps you know when to call for medical help. FAST stands for: 
· Face drooping. · Arm weakness. · Speech difficulty. · Time to call 911. Follow-up care is a key part of your treatment and safety. Be sure to make and go to all appointments, and call your doctor if you are having problems. It's also a good idea to know your test results and keep a list of the medicines you take. Where can you learn more? Go to http://percy-luciana.info/. Enter C749 in the search box to learn more about \"Learning About How to Prevent Another Stroke. \" Current as of: September 26, 2018 Content Version: 11.9 © 2532-6174 [employer]. Care instructions adapted under license by [employer] (which disclaims liability or warranty for this information). If you have questions about a medical condition or this instruction, always ask your healthcare professional. [address] any warranty or liability for your use of this information. High Cholesterol: Care Instructions Your Care Instructions Cholesterol is a type of fat in your blood. It is needed for many body functions, such as making new cells. Cholesterol is made by your body. It also comes from food you eat. High cholesterol means that you have too much of the fat in your blood. This raises your risk of a heart attack and stroke. LDL and HDL are part of your total cholesterol. LDL is the \"bad\" cholesterol. High LDL can raise your risk for heart disease, heart attack, and stroke. HDL is the \"good\" cholesterol. It helps clear bad cholesterol from the body. High HDL is linked with a lower risk of heart disease, heart attack, and stroke. Your cholesterol levels help your doctor find out your risk for having a heart attack or stroke. You and your doctor can talk about whether you need to lower your risk and what treatment is best for you. A heart-healthy lifestyle along with medicines can help lower your cholesterol and your risk. The way you choose to lower your risk will depend on how high your risk is for heart attack and stroke. It will also depend on how you feel about taking medicines. Follow-up care is a key part of your treatment and safety. Be sure to make and go to all appointments, and call your doctor if you are having problems. It's also a good idea to know your test results and keep a list of the medicines you take. How can you care for yourself at home? · Eat a variety of foods every day. Good choices include fruits, vegetables, whole grains (like oatmeal), dried beans and peas, nuts and seeds, soy products (like tofu), and fat-free or low-fat dairy products. · Replace butter, margarine, and hydrogenated or partially hydrogenated oils with olive and canola oils. (Canola oil margarine without trans fat is fine.) · Replace red meat with fish, poultry, and soy protein (like tofu). · Limit processed and packaged foods like chips, crackers, and cookies. · Bake, broil, or steam foods. Don't zamudio them. · Be physically active. Get at least 30 minutes of exercise on most days of the week. Walking is a good choice. You also may want to do other activities, such as running, swimming, cycling, or playing tennis or team sports. · Stay at a healthy weight or lose weight by making the changes in eating and physical activity listed above. Losing just a small amount of weight, even 5 to 10 pounds, can reduce your risk for having a heart attack or stroke. · Do not smoke. When should you call for help? Watch closely for changes in your health, and be sure to contact your doctor if: 
  · You need help making lifestyle changes.  
  · You have questions about your medicine. Where can you learn more? Go to http://percy-luciana.info/. Enter E361 in the search box to learn more about \"High Cholesterol: Care Instructions. \" Current as of: July 22, 2018 Content Version: 11.9 © 3312-5533 [employer]. Care instructions adapted under license by [employer] (which disclaims liability or warranty for this information). If you have questions about a medical condition or this instruction, always ask your healthcare professional. [place] [address] any warranty or liability for your use of this information. Learning About Generalized Anxiety Disorder What is generalized anxiety disorder? We all worry. It's a normal part of life. But when you have generalized anxiety disorder, you worry about lots of things and have a hard time stopping your worry. This worry or anxiety interferes with your relationships, work, and life. What causes it? The cause is not known. But it may be passed down through families. What are the symptoms? You may feel anxious or worry most days about things like work, relationships, health, or money. You may worry about things that are unlikely to happen. You find it hard to stop or control the worry.  Because you worry a lot and try hard to stop worrying, you may feel restless, tired, tense, or cranky. You may also find it hard to think or sleep. And you may have headaches or an upset stomach. How is it diagnosed? Your doctor will ask about your health and how often you worry or feel anxious. He or she may ask about other symptoms, like whether you: · Feel restless. · Feel tired. · Have a hard time thinking or feel that your mind goes blank. · Feel cranky. · Have tense muscles. · Have sleep problems. A physical exam and tests can help make sure that your symptoms aren't caused by a different condition, such as a thyroid problem. How is it treated? Counseling and medicine can both work to treat anxiety. The two are often used along with lifestyle changes. Cognitive-behavioral therapy (CBT) is a type of counseling that's used to help treat anxiety. In CBT, you learn how to notice and replace thoughts that make you feel worried. It also can help you learn how to relax when you worry. Medicines can help. These medicines are often also used for depression. Selective serotonin reuptake inhibitors (SSRIs) are often tried first. But there are other medicines that your doctor may use. You may need to try a few medicines to find one that works well. Many people feel better by getting regular exercise, eating healthy meals, and getting good sleep. Mindfulnessfocusing on things that happen in the present momentalso can help reduce your anxiety. What can you expect when you have it? Having anxiety can be upsetting. Some people might feel less worried and stressed after a couple of months of treatment. But for other people, it might take longer to feel better. Reaching out to people for help is important. Try not to isolate yourself. Let your family and friends help you. Find someone you can trust and confide in. Talk to that person.  
When you know what anxiety isand how you can get help for ityou can start to learn new ways of thinking. This can help you cope and work through your anxiety. Follow-up care is a key part of your treatment and safety. Be sure to make and go to all appointments, and call your doctor if you are having problems. It's also a good idea to know your test results and keep a list of the medicines you take. Where can you learn more? Go to http://percy-luciana.info/. Enter G110 in the search box to learn more about \"Learning About Generalized Anxiety Disorder. \" Current as of: September 11, 2018 Content Version: 11.9 © 6086-0432 [employer]. Care instructions adapted under license by [employer] (which disclaims liability or warranty for this information). If you have questions about a medical condition or this instruction, always ask your healthcare professional. [address] any warranty or liability for your use of this information. Carotid Stenosis: Care Instructions Your Care Instructions Carotid stenosis is narrowing of one or both of the carotid arteries. These arteries take blood from the heart to the brain. There is one on each side of the neck. A substance called plaque builds up inside an artery. This makes it too narrow. Plaque comes from damage to the artery over time. This damage may be caused by high blood pressure, high cholesterol, diabetes, or smoking. Sometimes plaque can break loose from the carotid artery and move to the brain. This can cause a stroke or transient ischemic attack (TIA). The goal of treatment is to lower your risk of having a stroke or TIA. You can lower your risk by making healthy lifestyle changes and taking medicine. Sometimes a surgery or procedure is done. Follow-up care is a key part of your treatment and safety. Be sure to make and go to all appointments, and call your doctor if you are having problems.  It's also a good idea to know your test results and keep a list of the medicines you take. How can you care for yourself at home? · Take your medicines exactly as prescribed. Call your doctor if you think you are having a problem with your medicine. You may take medicine to lower your blood pressure, to lower your cholesterol, or to prevent blood clots. · If you take a blood thinner, such as aspirin, be sure to get instructions about how to take your medicine safely. Blood thinners can cause serious bleeding problems. · Do not smoke. People who smoke have a higher chance of stroke than those who quit. If you need help quitting, talk to your doctor about stop-smoking programs and medicines. These can increase your chances of quitting for good. · Eat a healthy diet that is low in saturated fat and salt. Eat lots of fresh fruits and vegetables and foods high in fiber. · Stay at a healthy weight. Lose weight if you need to. · Talk to your doctor about starting an exercise program. Regular exercise lowers your chance of stroke. · Limit alcohol to 2 drinks a day for men and 1 drink a day for women. Too much alcohol can cause health problems. · Work with your doctor to control high blood pressure, high cholesterol, diabetes, and other conditions that increase your chance of a stroke. A healthy diet, exercise, weight loss (if needed), and medicines can help. · Avoid colds and flu. Get the flu vaccine every year. When should you call for help? Call 911 anytime you think you may need emergency care. For example, call if: 
  · You passed out (lost consciousness).  
  · You have symptoms of a stroke. These may include: 
? Sudden numbness, tingling, weakness, or loss of movement in your face, arm, or leg, especially on only one side of your body. ? Sudden vision changes. ? Sudden trouble speaking. ? Sudden confusion or trouble understanding simple statements. ? Sudden problems with walking or balance. ? A sudden, severe headache that is different from past headaches.  Call your doctor now or seek immediate medical care if: 
  · You are dizzy or lightheaded, or you feel like you may faint.  
 Watch closely for changes in your health, and be sure to contact your doctor if you have any problems. Where can you learn more? Go to http://percy-lucaina.info/. Enter G354 in the search box to learn more about \"Carotid Stenosis: Care Instructions. \" Current as of: July 22, 2018 Content Version: 11.9 © 8774-5213 [employer]. Care instructions adapted under license by [employer] (which disclaims liability or warranty for this information). If you have questions about a medical condition or this instruction, always ask your healthcare professional. [address] any warranty or liability for your use of this information.

## 2019-06-11 ENCOUNTER — HOME CARE VISIT (OUTPATIENT)
Dept: HOME HEALTH SERVICES | Facility: HOME HEALTH | Age: 84
End: 2019-06-11
Payer: MEDICARE

## 2019-06-12 ENCOUNTER — HOME CARE VISIT (OUTPATIENT)
Dept: SCHEDULING | Facility: HOME HEALTH | Age: 84
End: 2019-06-12
Payer: MEDICARE

## 2019-06-12 VITALS
DIASTOLIC BLOOD PRESSURE: 58 MMHG | SYSTOLIC BLOOD PRESSURE: 126 MMHG | HEART RATE: 57 BPM | RESPIRATION RATE: 20 BRPM | TEMPERATURE: 98 F | OXYGEN SATURATION: 97 %

## 2019-06-12 PROCEDURE — G0299 HHS/HOSPICE OF RN EA 15 MIN: HCPCS

## 2019-06-13 ENCOUNTER — TELEPHONE (OUTPATIENT)
Dept: NEUROLOGY | Age: 84
End: 2019-06-13

## 2019-06-26 ENCOUNTER — HOME CARE VISIT (OUTPATIENT)
Dept: SCHEDULING | Facility: HOME HEALTH | Age: 84
End: 2019-06-26
Payer: MEDICARE

## 2019-06-26 VITALS
RESPIRATION RATE: 20 BRPM | SYSTOLIC BLOOD PRESSURE: 110 MMHG | OXYGEN SATURATION: 96 % | TEMPERATURE: 98.2 F | DIASTOLIC BLOOD PRESSURE: 60 MMHG | HEART RATE: 50 BPM

## 2019-06-26 PROCEDURE — G0299 HHS/HOSPICE OF RN EA 15 MIN: HCPCS

## 2019-07-03 ENCOUNTER — TELEPHONE (OUTPATIENT)
Dept: NEUROLOGY | Age: 84
End: 2019-07-03

## 2019-07-03 DIAGNOSIS — F41.9 ANXIETY: ICD-10-CM

## 2019-07-03 RX ORDER — CITALOPRAM 20 MG/1
20 TABLET, FILM COATED ORAL DAILY
Qty: 30 TAB | Refills: 1 | Status: SHIPPED | OUTPATIENT
Start: 2019-07-03 | End: 2019-08-22 | Stop reason: SDUPTHER

## 2019-07-09 ENCOUNTER — HOSPITAL ENCOUNTER (OUTPATIENT)
Dept: VASCULAR SURGERY | Age: 84
Discharge: HOME | End: 2019-07-09
Attending: RADIOLOGY
Payer: MEDICARE

## 2019-07-09 DIAGNOSIS — I65.23 STENOSIS OF BOTH INTERNAL CAROTID ARTERIES: ICD-10-CM

## 2019-07-09 PROCEDURE — 93880 EXTRACRANIAL BILAT STUDY: CPT

## 2019-07-11 LAB
LEFT CCA DIST DIAS: 0 CM/S
LEFT CCA DIST SYS: 86.9 CM/S
LEFT CCA PROX DIAS: 0 CM/S
LEFT CCA PROX SYS: 115.4 CM/S
LEFT ECA DIAS: 0 CM/S
LEFT ECA SYS: 148.3 CM/S
LEFT ICA DIST DIAS: 14.5 CM/S
LEFT ICA DIST SYS: 89.1 CM/S
LEFT ICA MID DIAS: 16.7 CM/S
LEFT ICA MID SYS: 108.8 CM/S
LEFT ICA PROX DIAS: 5.7 CM/S
LEFT ICA PROX SYS: 67.2 CM/S
LEFT ICA/CCA SYS: 1.3
LEFT SUBCLAVIAN DIAS: 0 CM/S
LEFT SUBCLAVIAN SYS: 111 CM/S
LEFT VERTEBRAL DIAS: 0 CM/S
LEFT VERTEBRAL SYS: 32.5 CM/S
RIGHT CCA DIST DIAS: 0 CM/S
RIGHT CCA DIST SYS: 71.7 CM/S
RIGHT CCA PROX DIAS: 0 CM/S
RIGHT CCA PROX SYS: 87.9 CM/S
RIGHT ECA DIAS: 0 CM/S
RIGHT ECA SYS: 148.3 CM/S
RIGHT ICA DIST DIAS: 10.1 CM/S
RIGHT ICA DIST SYS: 62.8 CM/S
RIGHT ICA MID DIAS: 14.5 CM/S
RIGHT ICA MID SYS: 95.7 CM/S
RIGHT ICA PROX DIAS: 10.1 CM/S
RIGHT ICA PROX SYS: 104.5 CM/S
RIGHT ICA/CCA SYS: 1.5
RIGHT SUBCLAVIAN DIAS: 0 CM/S
RIGHT SUBCLAVIAN SYS: 175.2 CM/S
RIGHT VERTEBRAL DIAS: 5.7 CM/S
RIGHT VERTEBRAL SYS: 58.4 CM/S

## 2019-07-15 ENCOUNTER — TELEPHONE (OUTPATIENT)
Dept: NEUROSURGERY | Age: 84
End: 2019-07-15

## 2019-07-15 NOTE — TELEPHONE ENCOUNTER
I called Yane José Bill today to discuss the results of his carotid duplex. There is less than 50% stenosis bilaterally. He is due for a CTA in 6 months. We can reevaluate the carotid stenoses on that exam and then thereafter with carotid duplex exams yearly.

## 2019-08-22 DIAGNOSIS — F41.9 ANXIETY: ICD-10-CM

## 2019-08-22 RX ORDER — CITALOPRAM 20 MG/1
TABLET, FILM COATED ORAL
Qty: 30 TAB | Refills: 1 | Status: SHIPPED | OUTPATIENT
Start: 2019-08-22 | End: 2021-01-01

## 2019-09-09 ENCOUNTER — TELEPHONE (OUTPATIENT)
Dept: NEUROLOGY | Age: 84
End: 2019-09-09

## 2019-09-09 NOTE — TELEPHONE ENCOUNTER
----- Message from Rashaun Hanley sent at 9/9/2019  3:29 PM EDT -----  Regarding: Dr Manny Dozier Message/    Caller's first and last name: Joelle Heaton, pt's daughter n       Reason for call: f/u on message last week regarding stopping his blood thinner medication for a dental procedure       Callback required yes/no and why:yes      Best contact number(s):  [phone]    Details to clarify the request:  Returning call regarding her father n law getting off of the   medication  Arloa [place]  in order to get his teeth pulled  His dentist has left several request also today, she said he had a bx on his leg and he  Has  squamous cell carcinoma  And will also need approval to get off of the same medication for plastic surgeon to remove the rest    Rashaun Hanley

## 2019-09-09 NOTE — TELEPHONE ENCOUNTER
Please inform the daughter that I have spoken to Dr. Jane Adhikari about patient being okay to be off of Alexi Lowry.

## 2019-09-09 NOTE — TELEPHONE ENCOUNTER
Spoke with daughter in law and let her know that Dr. Lei Donohue spoke with Dr. Blake Grace and let him know it's okay to be off of Thang Knight.

## 2019-10-17 NOTE — ROUTINE PROCESS
* No surgery found * 
* No surgery found * Bedside shift change report given to Ally Bauer RN(oncoming nurse) by Desirae Schumacher RN(offgoing nurse). Report included the following information [facility] Phone:   [phone] Significant changes during shift:  NOne Patient Information Nafisa Bell 
80 y.o. 
4/27/2019 10:09 AM by Kylie Starr MD. Nafisa Bell was admitted from [place] [address] Problem List 
 
Patient Active Problem List  
 Diagnosis Date Noted  CVA (cerebral vascular accident) ([facility] [address] 04/27/2019  Insomnia due to medical condition 05/10/2017  Stenosis of both internal carotid arteries 02/07/2017  Ataxia 02/07/2017  Diabetic peripheral neuropathy associated with type 2 diabetes mellitus ([facility] [address] 02/07/2017  Sensory ataxic gait 02/07/2017  Monocular diplopia of both eyes 02/07/2017  VBI (vertebrobasilar insufficiency) 02/07/2017  History of stroke 02/07/2017  Vitamin D deficiency 02/07/2017  BPV (benign positional vertigo) 02/07/2017  Dizziness 04/23/2013  Idiopathic small and large fiber sensory neuropathy 04/23/2013  B12 deficiency 04/23/2013  Hypothyroidism, postsurgical 03/13/2013  Thyroid mass 05/10/2011  Incisional hernia 05/10/2011  CVA (cerebral infarction) 05/10/2011  CAD (coronary artery disease) 05/10/2011 Past Medical History:  
Diagnosis Date  CAD (coronary artery disease)  Calculus of kidney  Cancer [facility]) prostate  1996  
 Hearing loss  Heart attack ([facility] [address]  Hypercholesterolemia  Hypertension  Incisional hernia 5/10/2011  Movement disorder  Other ill-defined conditions(799.89)   
 elevated cholesterol  Other ill-defined conditions(799.89)   
 glaucoma  Other ill-defined conditions(799.89)   
 abd hernia  Stroke ([facility] [address]   
 left arm tingling  Thyroid disease  Thyroid mass 5/10/2011 Core Measures: CVA: Yes Yes CHF:No No 
PNA:No No 
 
 
Activity Status: OOB to Chair Yes 17-Oct-2019 06:09 Ambulated this shift Yes Bed Rest No 
 
Supplemental O2: (If Applicable) NC No 
NRB No 
Venti-mask No 
On 0 Liters/min LINES AND DRAINS: 
PIV 
 
 
DVT prophylaxis: DVT prophylaxis Med- Yes DVT prophylaxis SCD or ANDREW- No  
 
Wounds: (If Applicable) Wounds- No 
 
Location Patient Safety: 
 
Falls Score Total Score: 3 Safety Level_______ Bed Alarm On? No 
Sitter? No 
 
Plan for upcoming shift:  Safety, IV antibiotics, pain medication Discharge Plan: Yes Mario Bazan Active Consults: 
IP CONSULT TO HOSPITALIST 
IP CONSULT TO NEUROLOGY 
IP CONSULT TO ORTHOPEDIC SURGERY 17-Oct-2019 06:10

## 2019-10-30 ENCOUNTER — TELEPHONE (OUTPATIENT)
Dept: NEUROSURGERY | Age: 84
End: 2019-10-30

## 2019-10-30 NOTE — TELEPHONE ENCOUNTER
TERESA.... Becka Danielson [phone] (daughter in law)  Called in stating that patient hurt his back so he may not be able to have CTA done. It is scheduled for 11/7/2019 she stated that she will wait closer to the date to see how he feel to decide if he can have it done or not.

## 2019-11-04 ENCOUNTER — TELEPHONE (OUTPATIENT)
Dept: NEUROSURGERY | Age: 84
End: 2019-11-04

## 2019-11-04 NOTE — TELEPHONE ENCOUNTER
Returned call to Amparo García @ [phone], daughter in law. Sheng Ford stated due to patient's recent back pain and other personal issues, she needs to cancel his appointment for 11/14/19. CTA could not be done due to back pain. Patient is very stressed about personal issues. Pat asked if there was any other test that could be done due to patient's back pain. Informed her I would send a message to provider and if something else could be done I would return call. Stated understanding. Sheng Ford stated she would call back at a future date to reschedule appointment.

## 2019-11-22 ENCOUNTER — HOSPITAL ENCOUNTER (OUTPATIENT)
Dept: CT IMAGING | Age: 84
Discharge: HOME | End: 2019-11-22
Attending: RADIOLOGY
Payer: MEDICARE

## 2019-11-22 DIAGNOSIS — I67.2 INTRACRANIAL ATHEROSCLEROSIS: ICD-10-CM

## 2019-11-22 DIAGNOSIS — I65.23 STENOSIS OF BOTH INTERNAL CAROTID ARTERIES: ICD-10-CM

## 2019-11-22 DIAGNOSIS — G45.0 VBI (VERTEBROBASILAR INSUFFICIENCY): ICD-10-CM

## 2019-11-22 LAB — CREAT BLD-MCNC: 0.8 MG/DL (ref 0.6–1.3)

## 2019-11-22 PROCEDURE — 82565 ASSAY OF CREATININE: CPT

## 2019-11-22 PROCEDURE — 70498 CT ANGIOGRAPHY NECK: CPT

## 2019-11-22 PROCEDURE — 74011636320 HC RX REV CODE- 636/320: Performed by: RADIOLOGY

## 2019-11-22 RX ORDER — SODIUM CHLORIDE 0.9 % (FLUSH) 0.9 %
10 SYRINGE (ML) INJECTION
Status: COMPLETED | OUTPATIENT
Start: 2019-11-22 | End: 2019-11-22

## 2019-11-22 RX ADMIN — Medication 10 ML: at 14:38

## 2019-11-22 RX ADMIN — IOPAMIDOL 100 ML: 755 INJECTION, SOLUTION INTRAVENOUS at 14:38

## 2019-12-13 ENCOUNTER — OFFICE VISIT (OUTPATIENT)
Dept: NEUROSURGERY | Age: 84
End: 2019-12-13

## 2019-12-13 VITALS
HEART RATE: 66 BPM | HEIGHT: 71 IN | SYSTOLIC BLOOD PRESSURE: 142 MMHG | RESPIRATION RATE: 16 BRPM | TEMPERATURE: 98 F | WEIGHT: 189 LBS | BODY MASS INDEX: 26.46 KG/M2 | OXYGEN SATURATION: 98 % | DIASTOLIC BLOOD PRESSURE: 78 MMHG

## 2019-12-13 DIAGNOSIS — G45.0 VBI (VERTEBROBASILAR INSUFFICIENCY): Primary | ICD-10-CM

## 2019-12-13 DIAGNOSIS — I65.23 STENOSIS OF BOTH INTERNAL CAROTID ARTERIES: ICD-10-CM

## 2019-12-13 DIAGNOSIS — I67.1 CEREBRAL ANEURYSM: ICD-10-CM

## 2019-12-13 NOTE — PROGRESS NOTES
Follow up for carotid stenosis. Reports dizziness when he leans forward. Denies headaches and falls. Reports no vision in right eye due to stroke and glaucoma and minimal vision in left eye due to glaucoma. No acute problems reported.

## 2019-12-13 NOTE — PATIENT INSTRUCTIONS
Today we discussed your intracranial atherosclerotic disease and small left internal carotid artery aneurysm. Please continue taking the aspirin and Brilinta as you have been doing. We will obtain a CTA head in 1 year to reevaluate the aneurysm. We will also obtain a carotid duplex in 1 year to reevaluate your carotid stenosis. Please seek emergent medical care if you experience the worst headache of your life or recurrent neurological symptoms. A Healthy Lifestyle: Care Instructions Your Care Instructions A healthy lifestyle can help you feel good, stay at a healthy weight, and have plenty of energy for both work and play. A healthy lifestyle is something you can share with your whole family. A healthy lifestyle also can lower your risk for serious health problems, such as high blood pressure, heart disease, and diabetes. You can follow a few steps listed below to improve your health and the health of your family. Follow-up care is a key part of your treatment and safety. Be sure to make and go to all appointments, and call your doctor if you are having problems. It's also a good idea to know your test results and keep a list of the medicines you take. How can you care for yourself at home? · Do not eat too much sugar, fat, or fast foods. You can still have dessert and treats now and then. The goal is moderation. · Start small to improve your eating habits. Pay attention to portion sizes, drink less juice and soda pop, and eat more fruits and vegetables. ? Eat a healthy amount of food. A 3-ounce serving of meat, for example, is about the size of a deck of cards. Fill the rest of your plate with vegetables and whole grains. ? Limit the amount of soda and sports drinks you have every day. Drink more water when you are thirsty. ? Eat at least 5 servings of fruits and vegetables every day.  It may seem like a lot, but it is not hard to reach this goal. A serving or helping is 1 piece of fruit, 1 cup of vegetables, or 2 cups of leafy, raw vegetables. Have an apple or some carrot sticks as an afternoon snack instead of a candy bar. Try to have fruits and/or vegetables at every meal. 
· Make exercise part of your daily routine. You may want to start with simple activities, such as walking, bicycling, or slow swimming. Try to be active 30 to 60 minutes every day. You do not need to do all 30 to 60 minutes all at once. For example, you can exercise 3 times a day for 10 or 20 minutes. Moderate exercise is safe for most people, but it is always a good idea to talk to your doctor before starting an exercise program. 
· Keep moving. Verta Rm the lawn, work in the garden, or [employer]. Take the stairs instead of the elevator at work. · If you smoke, quit. People who smoke have an increased risk for heart attack, stroke, cancer, and other lung illnesses. Quitting is hard, but there are ways to boost your chance of quitting tobacco for good. ? Use nicotine gum, patches, or lozenges. ? Ask your doctor about stop-smoking programs and medicines. ? Keep trying. In addition to reducing your risk of diseases in the future, you will notice some benefits soon after you stop using tobacco. If you have shortness of breath or asthma symptoms, they will likely get better within a few weeks after you quit. · Limit how much alcohol you drink. Moderate amounts of alcohol (up to 2 drinks a day for men, 1 drink a day for women) are okay. But drinking too much can lead to liver problems, high blood pressure, and other health problems. Family health If you have a family, there are many things you can do together to improve your health. · Eat meals together as a family as often as possible. · Eat healthy foods. This includes fruits, vegetables, lean meats and dairy, and whole grains. · Include your family in your fitness plan.  Most people think of activities such as jogging or tennis as the way to fitness, but there are many ways you and your family can be more active. Anything that makes you breathe hard and gets your heart pumping is exercise. Here are some tips: 
? Walk to do errands or to take your child to school or the bus. 
? Go for a family bike ride after dinner instead of watching TV. Where can you learn more? Go to http://percy-luciana.info/. Enter P993 in the search box to learn more about \"A Healthy Lifestyle: Care Instructions. \" Current as of: May 28, 2019 Content Version: 12.2 © 2969-1819 [employer], [employer]. Care instructions adapted under license by [employer] (which disclaims liability or warranty for this information). If you have questions about a medical condition or this instruction, always ask your healthcare professional. [address] any warranty or liability for your use of this information.

## 2019-12-13 NOTE — PROGRESS NOTES
Neurointerventional Surgery Ambulatory Progress Note Patient: Maggy Ribeiro MRN: [id number]  SSN: [id number] YOB: 1928  Age: 80 y.o. Sex: male History of Present Illness:  
  
Osmin Geiger is a 80year old man with PMHx significant for ischemic cardiomyopathy, CAD s/p CABG, s/p TAVR, HLD, HTN, hypothyroidism, BPH, skin CA, idiopathic polyneuropathy on gabapentin, recurrent vertigo with known vertebrobasilar insufficiency, and CVA who presented to the ER on 4/27/2019 with sudden onset of weakness and slurred speech that spontaneously resolved. Patient had difficulty moving his bilateral arms and legs and trouble forming words. Patient was diagnosed with a TIA, likely due to being subtherapeutic on Plavix. Plavix was then switched to Brilinta, and the patient was discharged. Imaging revealed no evidence of acute stroke at that time. However, the CTA showed carotid atherosclerosis and intracranial atherosclerotic disease characterized by occlusion of the V4 segment of the left vertebral artery, irregularity and stenoses of the V4 segment of the right vertebral artery, atherosclerotic disease with stenoses superimposed upon a congenitally small basilar artery, and large bilateral posterior communicating arteries. There is also a 2-3 mm left ICA aneurysm. 
  
At our last clinic visit, patient was not found to be pressure dependent. Subsequent labs showed he was therapeutic on the aspirin and Brilinta. Carotid duplex scan on 7/9/2019 showed mild stenosis of the bilateral carotid bifurcations, but no hemodynamically significant stenosis. CTA Head & Neck on 11/22/2019 showed no significant interval change, accounting for differences in imaging acquisition. Patient presents today with his daughter Diane Ritchie to review imaging and follow up clinically. He denies recurrent vertigo, but states that he does experience some dizziness when he bends forward.  He has not had any recurrent episodes of slurred speech or weakness. He denies headaches. Patient was maintaining a blood pressure log for a couple months but stopped and did not bring the results. He stopped Brilinta for a few days for a dental extraction and restarted the medication. He did not experience any TIA or stroke-like symptoms. He reports decreased vision in his right eye due to \"stroke and glaucoma\" and decreased but better vision in his left eye due to glaucoma. 
  
 
Review of Systems Pertinent review of systems included in the HPI. Objective:  
 
Visit Vitals /78 (BP 1 Location: Left arm) Pulse 66 Temp 98 °F (36.7 °C) Resp 16 Ht 5' 11\" (1.803 m) Wt 189 lb (85.7 kg) SpO2 98% BMI 26.36 kg/m² Physical Exam: 
General: NAD Eyes: negative Neck: Mild left carotid bruit Lungs: clear to auscultation bilaterally Heart: regular rate and rhythm Abdomen: Right abdominal hernia Extremities: extremities normal, atraumatic, no cyanosis or edema Neurologic Exam: 
Mental Status:  Alert and oriented x 4. Appropriate affect, mood and behavior. Language:    Normal fluency, repetition, comprehension and naming. Cranial Nerves:   Pupils equal, round and reactive to light. Visual fields full to confrontation, though decreased vision in right eye Extraocular movements intact. Facial sensation intact V1 - V3. Full facial strength, no asymmetry. Hearing intact bilaterally, but decreased on the right No dysarthria. Tongue protrudes to midline, palate elevates symmetrically. Shoulder shrug 5/5 bilaterally. Motor:    No pronator drift. Bulk and tone normal.  
   5/5 power in all extremities proximally and distally. No involuntary movements. Sensation:    Sensation intact throughout to light touch. Reflexes:    Deferred. Coordination & Gait: FTN intact. Mild dysdiadochokinesia on right. Patient walks with a walker. Labs: Aspirin test 5/13/2019: 398 ARU 
P2Y12 5/13/2019: 71 PRU Imaging: 
I personally reviewed the following imaging studies. The impressions listed below are those of the interpreting radiologist(s). Carotid duplex 7/9/2019: There is mild stenosis in the right ICA (<50%). There is mild stenosis in the left ICA (<50%). The right vertebral is antegrade. The left vertebral is antegrade. CTA Head & Neck 11/22/2019: 
Stable exam. Unchanged severe basilar artery atherosclerosis. Unchanged questionable 2 to 3 mm left cavernous internal carotid artery aneurysm Assessment/Plan:  
 
80year old man with PMHx significant for ischemic cardiomyopathy, CAD s/p CABG, s/p TAVR, HLD, HTN, hypothyroidism, BPH, skin CA, idiopathic polyneuropathy on gabapentin, recurrent vertigo with known vertebrobasilar insufficiency, and CVA who presented to the ER on 4/27/2019 with sudden onset of weakness and slurred speech that spontaneously resolved. Patient had difficulty moving his bilateral arms and legs and trouble forming words. Patient was diagnosed with TIA.  
  
CTA showed 40-50% narrowing of the carotid arteries, occlusion of a segment of the non-dominant left vertebral artery, areas of narrowing in the dominant right vertebral artery and congenitally small basilar artery, and small left ICA aneurysm. Patient had been on dual anti-platelet therapy with 81 mg aspirin and 75 mg Plavix daily, however, P2Y12 during hospital stay showed that he was non-therapeutic on Plavix. Patient is now therapeutic on Brilinta. 
  
Patient has known VBI, however he remains without clinical evidence of pressure dependence (asymptomatic at 142/78 today). As previously mentioned, I do not believe that VBI alone could explain his symptoms. It is possible, however, that superimposed cardiac abnormality could compound the patient's VBI causing these episodes. Indeed, he was diagnosed with mild sick sinus syndrome during his hospital stay. Patient seems to be doing well on medical management so far. Although patient has a mild left carotid bruit, duplex exam in July 2019 and recent CTA show no evidence of hemodynamically significant stenosis. The left ICA aneurysm is stable, and the intracranial atherosclerotic disease is also stable. We discussed the characteristics and natural history of cerebral aneurysms, including the probability of intracranial hemorrhage related to rupture of this aneurysm as estimated by available data from the International Study of Unruptured Intracranial Aneurysms (ISUIA) study. We relayed that a non-enlarging aneurysm of this size in this location would carry an approximately <1% risk of hemorrhage over five years. If the aneurysm is enlarging, the risk of rupture may be considerably higher. We also described the potential consequences of intracranial hemorrhage. I explained that the risks of endovascular treatment of this small aneurysm outweigh the benefits given his age and degree of atherosclerotic disease. I recommend imaging surveillance. 
  
I also recommend continuing medical management for the ICAD. I do not see a need to follow the ICAD with imaging, however, we will obtain a CTA head in 1 year to re-evaluate the left ICA aneurysm. In addition, we will obtain a carotid duplex in 1 year. I advised that patient that if he becomes symptomatic at his normal blood pressure or experiences a posterior circulation TIA or stroke while therapeutic on his dual antiplatelet therapy, he should make an appointment with me so we can discuss performing an intervention. Any neurointervention will be high risk. We discussed that he should seek emergent medical care if he experiences the worst headache of his life or new/recurrent neurological symptoms. We also discussed keeping a BP log and staying well hydrated. 
  
Plan: 
- Continue aspirin and Brilinta - Carotid duplex in 1 year to re-evaluate carotid stenoses - CTA head in 1 year to re-evaluate left ICA aneurysm - Keep BP log 
- Stay well hydrated 
  
  
Thank you for allowing me to participate in the care of this patient. 
  
Greater than 45 minutes were spent in patient management, greater than half of which was spent in counseling and coordination of care.  
 
 
Nighat Ordonez MD

## 2021-01-01 ENCOUNTER — HOSPITAL ENCOUNTER (OUTPATIENT)
Age: 86
Discharge: HOME | End: 2021-05-07
Attending: INTERNAL MEDICINE | Admitting: INTERNAL MEDICINE
Payer: MEDICARE

## 2021-01-01 ENCOUNTER — APPOINTMENT (OUTPATIENT)
Dept: CT IMAGING | Age: 86
End: 2021-01-01
Attending: STUDENT IN AN ORGANIZED HEALTH CARE EDUCATION/TRAINING PROGRAM
Payer: MEDICARE

## 2021-01-01 ENCOUNTER — APPOINTMENT (OUTPATIENT)
Dept: GENERAL RADIOLOGY | Age: 86
End: 2021-01-01
Attending: INTERNAL MEDICINE
Payer: MEDICARE

## 2021-01-01 ENCOUNTER — APPOINTMENT (OUTPATIENT)
Dept: GENERAL RADIOLOGY | Age: 86
DRG: 643 | End: 2021-01-01
Attending: STUDENT IN AN ORGANIZED HEALTH CARE EDUCATION/TRAINING PROGRAM
Payer: MEDICARE

## 2021-01-01 ENCOUNTER — APPOINTMENT (OUTPATIENT)
Dept: GENERAL RADIOLOGY | Age: 86
End: 2021-01-01
Attending: ORTHOPAEDIC SURGERY
Payer: MEDICARE

## 2021-01-01 ENCOUNTER — HOME CARE VISIT (OUTPATIENT)
Dept: HOSPICE | Facility: HOSPICE | Age: 86
End: 2021-01-01
Payer: MEDICARE

## 2021-01-01 ENCOUNTER — APPOINTMENT (OUTPATIENT)
Dept: GENERAL RADIOLOGY | Age: 86
End: 2021-01-01
Attending: STUDENT IN AN ORGANIZED HEALTH CARE EDUCATION/TRAINING PROGRAM
Payer: MEDICARE

## 2021-01-01 ENCOUNTER — HOSPITAL ENCOUNTER (OUTPATIENT)
Age: 86
Setting detail: OUTPATIENT SURGERY
Discharge: HOME | End: 2021-01-20
Attending: SPECIALIST | Admitting: SPECIALIST
Payer: MEDICARE

## 2021-01-01 ENCOUNTER — HOSPITAL ENCOUNTER (OUTPATIENT)
Dept: ULTRASOUND IMAGING | Age: 86
Discharge: HOME | End: 2021-04-19
Attending: FAMILY MEDICINE

## 2021-01-01 ENCOUNTER — APPOINTMENT (OUTPATIENT)
Dept: CT IMAGING | Age: 86
DRG: 643 | End: 2021-01-01
Attending: STUDENT IN AN ORGANIZED HEALTH CARE EDUCATION/TRAINING PROGRAM
Payer: MEDICARE

## 2021-01-01 ENCOUNTER — HOSPITAL ENCOUNTER (OUTPATIENT)
Age: 86
Setting detail: OBSERVATION
Discharge: HOME HEALTH | End: 2021-04-22
Attending: EMERGENCY MEDICINE | Admitting: HOSPITALIST
Payer: MEDICARE

## 2021-01-01 ENCOUNTER — HOME CARE VISIT (OUTPATIENT)
Dept: SCHEDULING | Facility: HOME HEALTH | Age: 86
End: 2021-01-01
Payer: MEDICARE

## 2021-01-01 ENCOUNTER — HOSPITAL ENCOUNTER (OUTPATIENT)
Dept: PREADMISSION TESTING | Age: 86
Discharge: HOME | End: 2021-05-02
Payer: MEDICARE

## 2021-01-01 ENCOUNTER — HOSPITAL ENCOUNTER (INPATIENT)
Age: 86
LOS: 8 days | Discharge: NURSING, REHAB OR LONG-TERM CARE | DRG: 643 | End: 2021-08-03
Attending: STUDENT IN AN ORGANIZED HEALTH CARE EDUCATION/TRAINING PROGRAM | Admitting: STUDENT IN AN ORGANIZED HEALTH CARE EDUCATION/TRAINING PROGRAM
Payer: MEDICARE

## 2021-01-01 ENCOUNTER — HOSPITAL ENCOUNTER (OUTPATIENT)
Dept: PREADMISSION TESTING | Age: 86
Discharge: HOME | End: 2021-01-16
Payer: MEDICARE

## 2021-01-01 ENCOUNTER — TRANSCRIBE ORDER (OUTPATIENT)
Dept: SCHEDULING | Age: 86
End: 2021-01-01

## 2021-01-01 ENCOUNTER — VIRTUAL VISIT (OUTPATIENT)
Dept: ORTHOPEDIC SURGERY | Age: 86
End: 2021-01-01
Payer: MEDICARE

## 2021-01-01 ENCOUNTER — HOSPICE ADMISSION (OUTPATIENT)
Dept: HOSPICE | Facility: HOSPICE | Age: 86
End: 2021-01-01
Payer: MEDICARE

## 2021-01-01 ENCOUNTER — ANESTHESIA (OUTPATIENT)
Dept: ENDOSCOPY | Age: 86
End: 2021-01-01
Payer: MEDICARE

## 2021-01-01 ENCOUNTER — ANESTHESIA EVENT (OUTPATIENT)
Dept: ENDOSCOPY | Age: 86
End: 2021-01-01
Payer: MEDICARE

## 2021-01-01 ENCOUNTER — ANESTHESIA (OUTPATIENT)
Dept: CARDIAC CATH/INVASIVE PROCEDURES | Age: 86
End: 2021-01-01
Payer: MEDICARE

## 2021-01-01 ENCOUNTER — ANESTHESIA EVENT (OUTPATIENT)
Dept: CARDIAC CATH/INVASIVE PROCEDURES | Age: 86
End: 2021-01-01
Payer: MEDICARE

## 2021-01-01 ENCOUNTER — DOCUMENTATION ONLY (OUTPATIENT)
Dept: ORTHOPEDIC SURGERY | Age: 86
End: 2021-01-01

## 2021-01-01 ENCOUNTER — APPOINTMENT (OUTPATIENT)
Dept: ULTRASOUND IMAGING | Age: 86
End: 2021-01-01
Attending: STUDENT IN AN ORGANIZED HEALTH CARE EDUCATION/TRAINING PROGRAM
Payer: MEDICARE

## 2021-01-01 VITALS
DIASTOLIC BLOOD PRESSURE: 46 MMHG | OXYGEN SATURATION: 96 % | SYSTOLIC BLOOD PRESSURE: 125 MMHG | HEART RATE: 70 BPM | WEIGHT: 200 LBS | RESPIRATION RATE: 17 BRPM | TEMPERATURE: 98.1 F | BODY MASS INDEX: 28 KG/M2 | HEIGHT: 71 IN

## 2021-01-01 VITALS
OXYGEN SATURATION: 96 % | SYSTOLIC BLOOD PRESSURE: 105 MMHG | HEART RATE: 51 BPM | RESPIRATION RATE: 18 BRPM | TEMPERATURE: 97.7 F | WEIGHT: 213.85 LBS | HEIGHT: 70 IN | BODY MASS INDEX: 30.61 KG/M2 | DIASTOLIC BLOOD PRESSURE: 44 MMHG

## 2021-01-01 VITALS
SYSTOLIC BLOOD PRESSURE: 145 MMHG | RESPIRATION RATE: 16 BRPM | WEIGHT: 198 LBS | TEMPERATURE: 97 F | DIASTOLIC BLOOD PRESSURE: 79 MMHG | OXYGEN SATURATION: 91 % | BODY MASS INDEX: 28.01 KG/M2 | HEART RATE: 86 BPM

## 2021-01-01 VITALS
WEIGHT: 198 LBS | OXYGEN SATURATION: 95 % | DIASTOLIC BLOOD PRESSURE: 60 MMHG | BODY MASS INDEX: 27.72 KG/M2 | SYSTOLIC BLOOD PRESSURE: 120 MMHG | RESPIRATION RATE: 18 BRPM | HEART RATE: 73 BPM | HEIGHT: 71 IN | TEMPERATURE: 97.6 F

## 2021-01-01 VITALS
BODY MASS INDEX: 30.6 KG/M2 | OXYGEN SATURATION: 93 % | DIASTOLIC BLOOD PRESSURE: 60 MMHG | HEIGHT: 68 IN | TEMPERATURE: 97.8 F | HEART RATE: 77 BPM | RESPIRATION RATE: 13 BRPM | SYSTOLIC BLOOD PRESSURE: 178 MMHG | WEIGHT: 201.9 LBS

## 2021-01-01 VITALS
TEMPERATURE: 97.9 F | SYSTOLIC BLOOD PRESSURE: 112 MMHG | HEART RATE: 121 BPM | RESPIRATION RATE: 14 BRPM | DIASTOLIC BLOOD PRESSURE: 68 MMHG

## 2021-01-01 DIAGNOSIS — I42.9 CARDIOMYOPATHY, UNSPECIFIED TYPE (HCC): ICD-10-CM

## 2021-01-01 DIAGNOSIS — M79.89 SWELLING OF LIMB: Primary | ICD-10-CM

## 2021-01-01 DIAGNOSIS — S42.114A CLOSED NONDISPLACED FRACTURE OF BODY OF RIGHT SCAPULA, INITIAL ENCOUNTER: Primary | ICD-10-CM

## 2021-01-01 DIAGNOSIS — E87.1 HYPONATREMIA: Primary | ICD-10-CM

## 2021-01-01 DIAGNOSIS — S42.111D CLOSED DISPLACED FRACTURE OF BODY OF RIGHT SCAPULA WITH ROUTINE HEALING, SUBSEQUENT ENCOUNTER: Primary | ICD-10-CM

## 2021-01-01 LAB
ALBUMIN SERPL-MCNC: 2.7 G/DL (ref 3.5–5)
ALBUMIN SERPL-MCNC: 2.8 G/DL (ref 3.5–5)
ALBUMIN SERPL-MCNC: 3.2 G/DL (ref 3.5–5)
ALBUMIN SERPL-MCNC: 3.2 G/DL (ref 3.5–5)
ALBUMIN/GLOB SERPL: 0.9 {RATIO} (ref 1.1–2.2)
ALBUMIN/GLOB SERPL: 1 {RATIO} (ref 1.1–2.2)
ALP SERPL-CCNC: 58 U/L (ref 45–117)
ALP SERPL-CCNC: 62 U/L (ref 45–117)
ALT SERPL-CCNC: 16 U/L (ref 12–78)
ALT SERPL-CCNC: 28 U/L (ref 12–78)
ANION GAP SERPL CALC-SCNC: 1 MMOL/L (ref 5–15)
ANION GAP SERPL CALC-SCNC: 2 MMOL/L (ref 5–15)
ANION GAP SERPL CALC-SCNC: 3 MMOL/L (ref 5–15)
ANION GAP SERPL CALC-SCNC: 4 MMOL/L (ref 5–15)
ANION GAP SERPL CALC-SCNC: 5 MMOL/L (ref 5–15)
ANION GAP SERPL CALC-SCNC: 6 MMOL/L (ref 5–15)
APPEARANCE UR: ABNORMAL
AST SERPL-CCNC: 14 U/L (ref 15–37)
AST SERPL-CCNC: 24 U/L (ref 15–37)
ATRIAL RATE: 51 BPM
ATRIAL RATE: 71 BPM
ATRIAL RATE: 72 BPM
ATRIAL RATE: 76 BPM
ATRIAL RATE: 81 BPM
B PERT DNA SPEC QL NAA+PROBE: NOT DETECTED
BACTERIA SPEC CULT: NORMAL
BACTERIA URNS QL MICRO: NEGATIVE /HPF
BASE EXCESS BLDV CALC-SCNC: 1.4 MMOL/L
BASOPHILS # BLD: 0 K/UL (ref 0–0.1)
BASOPHILS NFR BLD: 0 % (ref 0–1)
BILIRUB SERPL-MCNC: 0.8 MG/DL (ref 0.2–1)
BILIRUB SERPL-MCNC: 1.2 MG/DL (ref 0.2–1)
BILIRUB UR QL: NEGATIVE
BORDETELLA PARAPERTUSSIS PCR, BORPAR: NOT DETECTED
BUN SERPL-MCNC: 10 MG/DL (ref 6–20)
BUN SERPL-MCNC: 11 MG/DL (ref 6–20)
BUN SERPL-MCNC: 11 MG/DL (ref 6–20)
BUN SERPL-MCNC: 13 MG/DL (ref 6–20)
BUN SERPL-MCNC: 14 MG/DL (ref 6–20)
BUN SERPL-MCNC: 16 MG/DL (ref 6–20)
BUN SERPL-MCNC: 16 MG/DL (ref 6–20)
BUN SERPL-MCNC: 20 MG/DL (ref 6–20)
BUN SERPL-MCNC: 6 MG/DL (ref 6–20)
BUN SERPL-MCNC: 6 MG/DL (ref 6–20)
BUN SERPL-MCNC: 7 MG/DL (ref 6–20)
BUN SERPL-MCNC: 8 MG/DL (ref 6–20)
BUN/CREAT SERPL: 12 (ref 12–20)
BUN/CREAT SERPL: 13 (ref 12–20)
BUN/CREAT SERPL: 13 (ref 12–20)
BUN/CREAT SERPL: 14 (ref 12–20)
BUN/CREAT SERPL: 14 (ref 12–20)
BUN/CREAT SERPL: 15 (ref 12–20)
BUN/CREAT SERPL: 15 (ref 12–20)
BUN/CREAT SERPL: 17 (ref 12–20)
BUN/CREAT SERPL: 21 (ref 12–20)
BUN/CREAT SERPL: 24 (ref 12–20)
BUN/CREAT SERPL: 26 (ref 12–20)
BUN/CREAT SERPL: 29 (ref 12–20)
BUN/CREAT SERPL: 29 (ref 12–20)
BUN/CREAT SERPL: 32 (ref 12–20)
BUN/CREAT SERPL: 34 (ref 12–20)
BUN/CREAT SERPL: 39 (ref 12–20)
C PNEUM DNA SPEC QL NAA+PROBE: NOT DETECTED
CALCIUM SERPL-MCNC: 7.5 MG/DL (ref 8.5–10.1)
CALCIUM SERPL-MCNC: 7.6 MG/DL (ref 8.5–10.1)
CALCIUM SERPL-MCNC: 7.7 MG/DL (ref 8.5–10.1)
CALCIUM SERPL-MCNC: 8 MG/DL (ref 8.5–10.1)
CALCIUM SERPL-MCNC: 8 MG/DL (ref 8.5–10.1)
CALCIUM SERPL-MCNC: 8.1 MG/DL (ref 8.5–10.1)
CALCIUM SERPL-MCNC: 8.3 MG/DL (ref 8.5–10.1)
CALCIUM SERPL-MCNC: 8.3 MG/DL (ref 8.5–10.1)
CALCIUM SERPL-MCNC: 8.4 MG/DL (ref 8.5–10.1)
CALCIUM SERPL-MCNC: 8.5 MG/DL (ref 8.5–10.1)
CALCIUM SERPL-MCNC: 8.6 MG/DL (ref 8.5–10.1)
CALCIUM SERPL-MCNC: 8.7 MG/DL (ref 8.5–10.1)
CALCIUM SERPL-MCNC: 8.8 MG/DL (ref 8.5–10.1)
CALCULATED P AXIS, ECG09: -4 DEGREES
CALCULATED P AXIS, ECG09: 26 DEGREES
CALCULATED P AXIS, ECG09: 56 DEGREES
CALCULATED R AXIS, ECG10: -21 DEGREES
CALCULATED R AXIS, ECG10: -29 DEGREES
CALCULATED R AXIS, ECG10: -40 DEGREES
CALCULATED R AXIS, ECG10: 144 DEGREES
CALCULATED R AXIS, ECG10: 160 DEGREES
CALCULATED T AXIS, ECG11: -45 DEGREES
CALCULATED T AXIS, ECG11: -55 DEGREES
CALCULATED T AXIS, ECG11: 129 DEGREES
CALCULATED T AXIS, ECG11: 140 DEGREES
CALCULATED T AXIS, ECG11: 164 DEGREES
CHLORIDE SERPL-SCNC: 100 MMOL/L (ref 97–108)
CHLORIDE SERPL-SCNC: 101 MMOL/L (ref 97–108)
CHLORIDE SERPL-SCNC: 102 MMOL/L (ref 97–108)
CHLORIDE SERPL-SCNC: 103 MMOL/L (ref 97–108)
CHLORIDE SERPL-SCNC: 105 MMOL/L (ref 97–108)
CHLORIDE SERPL-SCNC: 106 MMOL/L (ref 97–108)
CHLORIDE SERPL-SCNC: 107 MMOL/L (ref 97–108)
CHLORIDE SERPL-SCNC: 89 MMOL/L (ref 97–108)
CHLORIDE SERPL-SCNC: 91 MMOL/L (ref 97–108)
CHLORIDE SERPL-SCNC: 92 MMOL/L (ref 97–108)
CHLORIDE SERPL-SCNC: 92 MMOL/L (ref 97–108)
CHLORIDE SERPL-SCNC: 93 MMOL/L (ref 97–108)
CHLORIDE SERPL-SCNC: 94 MMOL/L (ref 97–108)
CHLORIDE SERPL-SCNC: 95 MMOL/L (ref 97–108)
CO2 SERPL-SCNC: 26 MMOL/L (ref 21–32)
CO2 SERPL-SCNC: 27 MMOL/L (ref 21–32)
CO2 SERPL-SCNC: 28 MMOL/L (ref 21–32)
CO2 SERPL-SCNC: 28 MMOL/L (ref 21–32)
CO2 SERPL-SCNC: 29 MMOL/L (ref 21–32)
CO2 SERPL-SCNC: 30 MMOL/L (ref 21–32)
CO2 SERPL-SCNC: 30 MMOL/L (ref 21–32)
CO2 SERPL-SCNC: 31 MMOL/L (ref 21–32)
CO2 SERPL-SCNC: 31 MMOL/L (ref 21–32)
CO2 SERPL-SCNC: 32 MMOL/L (ref 21–32)
CO2 SERPL-SCNC: 33 MMOL/L (ref 21–32)
CO2 SERPL-SCNC: 35 MMOL/L (ref 21–32)
CO2 SERPL-SCNC: 35 MMOL/L (ref 21–32)
CO2 SERPL-SCNC: 36 MMOL/L (ref 21–32)
CO2 SERPL-SCNC: 36 MMOL/L (ref 21–32)
CO2 SERPL-SCNC: 37 MMOL/L (ref 21–32)
COLOR UR: ABNORMAL
COMMENT, HOLDF: NORMAL
CORTIS AM PEAK SERPL-MCNC: 30 UG/DL (ref 4.3–22.45)
COVID-19 RAPID TEST, COVR: NOT DETECTED
CREAT SERPL-MCNC: 0.38 MG/DL (ref 0.7–1.3)
CREAT SERPL-MCNC: 0.41 MG/DL (ref 0.7–1.3)
CREAT SERPL-MCNC: 0.43 MG/DL (ref 0.7–1.3)
CREAT SERPL-MCNC: 0.47 MG/DL (ref 0.7–1.3)
CREAT SERPL-MCNC: 0.48 MG/DL (ref 0.7–1.3)
CREAT SERPL-MCNC: 0.49 MG/DL (ref 0.7–1.3)
CREAT SERPL-MCNC: 0.51 MG/DL (ref 0.7–1.3)
CREAT SERPL-MCNC: 0.51 MG/DL (ref 0.7–1.3)
CREAT SERPL-MCNC: 0.53 MG/DL (ref 0.7–1.3)
CREAT SERPL-MCNC: 0.58 MG/DL (ref 0.7–1.3)
CREAT SERPL-MCNC: 0.65 MG/DL (ref 0.7–1.3)
CREAT SERPL-MCNC: 0.75 MG/DL (ref 0.7–1.3)
CREAT SERPL-MCNC: 0.76 MG/DL (ref 0.7–1.3)
DIAGNOSIS, 93000: NORMAL
DIFFERENTIAL METHOD BLD: ABNORMAL
EOSINOPHIL # BLD: 0.1 K/UL (ref 0–0.4)
EOSINOPHIL NFR BLD: 1 % (ref 0–7)
EPITH CASTS URNS QL MICRO: ABNORMAL /LPF
ERYTHROCYTE [DISTWIDTH] IN BLOOD BY AUTOMATED COUNT: 12.6 % (ref 11.5–14.5)
ERYTHROCYTE [DISTWIDTH] IN BLOOD BY AUTOMATED COUNT: 12.7 % (ref 11.5–14.5)
ERYTHROCYTE [DISTWIDTH] IN BLOOD BY AUTOMATED COUNT: 14.6 % (ref 11.5–14.5)
ERYTHROCYTE [DISTWIDTH] IN BLOOD BY AUTOMATED COUNT: 14.7 % (ref 11.5–14.5)
ERYTHROCYTE [DISTWIDTH] IN BLOOD BY AUTOMATED COUNT: 15.1 % (ref 11.5–14.5)
FLUAV H1 2009 PAND RNA SPEC QL NAA+PROBE: NOT DETECTED
FLUAV H1 RNA SPEC QL NAA+PROBE: NOT DETECTED
FLUAV H3 RNA SPEC QL NAA+PROBE: NOT DETECTED
FLUAV SUBTYP SPEC NAA+PROBE: NOT DETECTED
FLUBV RNA SPEC QL NAA+PROBE: NOT DETECTED
GLOBULIN SER CALC-MCNC: 3.3 G/DL (ref 2–4)
GLOBULIN SER CALC-MCNC: 3.6 G/DL (ref 2–4)
GLUCOSE SERPL-MCNC: 109 MG/DL (ref 65–100)
GLUCOSE SERPL-MCNC: 110 MG/DL (ref 65–100)
GLUCOSE SERPL-MCNC: 118 MG/DL (ref 65–100)
GLUCOSE SERPL-MCNC: 119 MG/DL (ref 65–100)
GLUCOSE SERPL-MCNC: 121 MG/DL (ref 65–100)
GLUCOSE SERPL-MCNC: 122 MG/DL (ref 65–100)
GLUCOSE SERPL-MCNC: 122 MG/DL (ref 65–100)
GLUCOSE SERPL-MCNC: 126 MG/DL (ref 65–100)
GLUCOSE SERPL-MCNC: 130 MG/DL (ref 65–100)
GLUCOSE SERPL-MCNC: 131 MG/DL (ref 65–100)
GLUCOSE SERPL-MCNC: 140 MG/DL (ref 65–100)
GLUCOSE SERPL-MCNC: 173 MG/DL (ref 65–100)
GLUCOSE UR STRIP.AUTO-MCNC: NEGATIVE MG/DL
HADV DNA SPEC QL NAA+PROBE: NOT DETECTED
HCO3 BLDV-SCNC: 27.7 MMOL/L (ref 23–28)
HCOV 229E RNA SPEC QL NAA+PROBE: NOT DETECTED
HCOV HKU1 RNA SPEC QL NAA+PROBE: NOT DETECTED
HCOV NL63 RNA SPEC QL NAA+PROBE: NOT DETECTED
HCOV OC43 RNA SPEC QL NAA+PROBE: NOT DETECTED
HCT VFR BLD AUTO: 37.1 % (ref 36.6–50.3)
HCT VFR BLD AUTO: 37.5 % (ref 36.6–50.3)
HCT VFR BLD AUTO: 39.2 % (ref 36.6–50.3)
HCT VFR BLD AUTO: 40.2 % (ref 36.6–50.3)
HCT VFR BLD AUTO: 42.8 % (ref 36.6–50.3)
HEALTH STATUS, XMCV2T: NORMAL
HGB BLD-MCNC: 12.3 G/DL (ref 12.1–17)
HGB BLD-MCNC: 12.3 G/DL (ref 12.1–17)
HGB BLD-MCNC: 13.1 G/DL (ref 12.1–17)
HGB BLD-MCNC: 13.2 G/DL (ref 12.1–17)
HGB BLD-MCNC: 13.9 G/DL (ref 12.1–17)
HGB UR QL STRIP: NEGATIVE
HMPV RNA SPEC QL NAA+PROBE: NOT DETECTED
HPIV1 RNA SPEC QL NAA+PROBE: NOT DETECTED
HPIV2 RNA SPEC QL NAA+PROBE: NOT DETECTED
HPIV3 RNA SPEC QL NAA+PROBE: NOT DETECTED
HPIV4 RNA SPEC QL NAA+PROBE: NOT DETECTED
IMM GRANULOCYTES # BLD AUTO: 0.1 K/UL (ref 0–0.04)
IMM GRANULOCYTES NFR BLD AUTO: 1 % (ref 0–0.5)
KETONES UR QL STRIP.AUTO: NEGATIVE MG/DL
LEUKOCYTE ESTERASE UR QL STRIP.AUTO: ABNORMAL
LYMPHOCYTES # BLD: 0.7 K/UL (ref 0.8–3.5)
LYMPHOCYTES # BLD: 0.8 K/UL (ref 0.8–3.5)
LYMPHOCYTES # BLD: 1.2 K/UL (ref 0.8–3.5)
LYMPHOCYTES NFR BLD: 13 % (ref 12–49)
LYMPHOCYTES NFR BLD: 8 % (ref 12–49)
LYMPHOCYTES NFR BLD: 9 % (ref 12–49)
M PNEUMO DNA SPEC QL NAA+PROBE: NOT DETECTED
MAGNESIUM SERPL-MCNC: 2.2 MG/DL (ref 1.6–2.4)
MAGNESIUM SERPL-MCNC: 2.2 MG/DL (ref 1.6–2.4)
MAGNESIUM SERPL-MCNC: 2.3 MG/DL (ref 1.6–2.4)
MAGNESIUM SERPL-MCNC: 2.3 MG/DL (ref 1.6–2.4)
MCH RBC QN AUTO: 29.7 PG (ref 26–34)
MCH RBC QN AUTO: 29.8 PG (ref 26–34)
MCH RBC QN AUTO: 30.1 PG (ref 26–34)
MCH RBC QN AUTO: 30.2 PG (ref 26–34)
MCH RBC QN AUTO: 30.6 PG (ref 26–34)
MCHC RBC AUTO-ENTMCNC: 32.5 G/DL (ref 30–36.5)
MCHC RBC AUTO-ENTMCNC: 32.8 G/DL (ref 30–36.5)
MCHC RBC AUTO-ENTMCNC: 32.8 G/DL (ref 30–36.5)
MCHC RBC AUTO-ENTMCNC: 33.2 G/DL (ref 30–36.5)
MCHC RBC AUTO-ENTMCNC: 33.4 G/DL (ref 30–36.5)
MCV RBC AUTO: 90.3 FL (ref 80–99)
MCV RBC AUTO: 90.5 FL (ref 80–99)
MCV RBC AUTO: 91.6 FL (ref 80–99)
MCV RBC AUTO: 91.7 FL (ref 80–99)
MCV RBC AUTO: 92.3 FL (ref 80–99)
MONOCYTES # BLD: 0.9 K/UL (ref 0–1)
MONOCYTES # BLD: 1 K/UL (ref 0–1)
MONOCYTES # BLD: 1.1 K/UL (ref 0–1)
MONOCYTES NFR BLD: 10 % (ref 5–13)
MONOCYTES NFR BLD: 11 % (ref 5–13)
MONOCYTES NFR BLD: 11 % (ref 5–13)
NEUTS SEG # BLD: 7 K/UL (ref 1.8–8)
NEUTS SEG # BLD: 7.3 K/UL (ref 1.8–8)
NEUTS SEG # BLD: 7.4 K/UL (ref 1.8–8)
NEUTS SEG NFR BLD: 74 % (ref 32–75)
NEUTS SEG NFR BLD: 79 % (ref 32–75)
NEUTS SEG NFR BLD: 80 % (ref 32–75)
NITRITE UR QL STRIP.AUTO: NEGATIVE
NRBC # BLD: 0 K/UL (ref 0–0.01)
NRBC BLD-RTO: 0 PER 100 WBC
OSMOLALITY SERPL: 264 MOSM/KG H2O
OSMOLALITY UR: 592 MOSM/KG H2O
OSMOLALITY UR: 611 MOSM/KG H2O
P-R INTERVAL, ECG05: 154 MS
P-R INTERVAL, ECG05: 184 MS
P-R INTERVAL, ECG05: 308 MS
P-R INTERVAL, ECG05: 312 MS
PCO2 BLDV: 49 MMHG (ref 41–51)
PH BLDV: 7.36 [PH] (ref 7.32–7.42)
PH UR STRIP: 6 [PH] (ref 5–8)
PHOSPHATE SERPL-MCNC: 2.5 MG/DL (ref 2.6–4.7)
PHOSPHATE SERPL-MCNC: 2.8 MG/DL (ref 2.6–4.7)
PHOSPHATE SERPL-MCNC: 3 MG/DL (ref 2.6–4.7)
PLATELET # BLD AUTO: 272 K/UL (ref 150–400)
PLATELET # BLD AUTO: 275 K/UL (ref 150–400)
PLATELET # BLD AUTO: 297 K/UL (ref 150–400)
PLATELET # BLD AUTO: 316 K/UL (ref 150–400)
PLATELET # BLD AUTO: 364 K/UL (ref 150–400)
PMV BLD AUTO: 8.5 FL (ref 8.9–12.9)
PMV BLD AUTO: 9 FL (ref 8.9–12.9)
PMV BLD AUTO: 9.1 FL (ref 8.9–12.9)
PMV BLD AUTO: 9.2 FL (ref 8.9–12.9)
PMV BLD AUTO: 9.2 FL (ref 8.9–12.9)
PO2 BLDV: 38 MMHG (ref 25–40)
POTASSIUM SERPL-SCNC: 3.6 MMOL/L (ref 3.5–5.1)
POTASSIUM SERPL-SCNC: 3.6 MMOL/L (ref 3.5–5.1)
POTASSIUM SERPL-SCNC: 3.7 MMOL/L (ref 3.5–5.1)
POTASSIUM SERPL-SCNC: 3.7 MMOL/L (ref 3.5–5.1)
POTASSIUM SERPL-SCNC: 3.8 MMOL/L (ref 3.5–5.1)
POTASSIUM SERPL-SCNC: 3.9 MMOL/L (ref 3.5–5.1)
POTASSIUM SERPL-SCNC: 4.1 MMOL/L (ref 3.5–5.1)
POTASSIUM SERPL-SCNC: 4.1 MMOL/L (ref 3.5–5.1)
POTASSIUM SERPL-SCNC: 4.2 MMOL/L (ref 3.5–5.1)
POTASSIUM SERPL-SCNC: 4.3 MMOL/L (ref 3.5–5.1)
POTASSIUM SERPL-SCNC: 4.3 MMOL/L (ref 3.5–5.1)
POTASSIUM SERPL-SCNC: 4.4 MMOL/L (ref 3.5–5.1)
PROT SERPL-MCNC: 6.5 G/DL (ref 6.4–8.2)
PROT SERPL-MCNC: 6.8 G/DL (ref 6.4–8.2)
PROT UR STRIP-MCNC: ABNORMAL MG/DL
Q-T INTERVAL, ECG07: 446 MS
Q-T INTERVAL, ECG07: 458 MS
Q-T INTERVAL, ECG07: 498 MS
Q-T INTERVAL, ECG07: 512 MS
Q-T INTERVAL, ECG07: 542 MS
QRS DURATION, ECG06: 160 MS
QRS DURATION, ECG06: 164 MS
QRS DURATION, ECG06: 164 MS
QRS DURATION, ECG06: 168 MS
QRS DURATION, ECG06: 172 MS
QTC CALCULATION (BEZET), ECG08: 499 MS
QTC CALCULATION (BEZET), ECG08: 501 MS
QTC CALCULATION (BEZET), ECG08: 541 MS
QTC CALCULATION (BEZET), ECG08: 544 MS
QTC CALCULATION (BEZET), ECG08: 545 MS
RBC # BLD AUTO: 4.02 M/UL (ref 4.1–5.7)
RBC # BLD AUTO: 4.09 M/UL (ref 4.1–5.7)
RBC # BLD AUTO: 4.34 M/UL (ref 4.1–5.7)
RBC # BLD AUTO: 4.44 M/UL (ref 4.1–5.7)
RBC # BLD AUTO: 4.67 M/UL (ref 4.1–5.7)
RBC #/AREA URNS HPF: ABNORMAL /HPF (ref 0–5)
RSV RNA SPEC QL NAA+PROBE: NOT DETECTED
RV+EV RNA SPEC QL NAA+PROBE: NOT DETECTED
SAMPLES BEING HELD,HOLD: NORMAL
SAO2 % BLDV: 69.6 % (ref 65–88)
SARS-COV-2 PCR, COVPCR: NOT DETECTED
SARS-COV-2, COV2: NORMAL
SARS-COV-2, COV2NT: NOT DETECTED
SARS-COV-2, COV2NT: NOT DETECTED
SARS-COV-2, XPLCVT: NOT DETECTED
SERVICE CMNT-IMP: NORMAL
SODIUM SERPL-SCNC: 125 MMOL/L (ref 136–145)
SODIUM SERPL-SCNC: 126 MMOL/L (ref 136–145)
SODIUM SERPL-SCNC: 128 MMOL/L (ref 136–145)
SODIUM SERPL-SCNC: 135 MMOL/L (ref 136–145)
SODIUM SERPL-SCNC: 137 MMOL/L (ref 136–145)
SODIUM SERPL-SCNC: 138 MMOL/L (ref 136–145)
SODIUM SERPL-SCNC: 139 MMOL/L (ref 136–145)
SODIUM SERPL-SCNC: 139 MMOL/L (ref 136–145)
SODIUM SERPL-SCNC: 140 MMOL/L (ref 136–145)
SODIUM SERPL-SCNC: 142 MMOL/L (ref 136–145)
SODIUM UR-SCNC: 23 MMOL/L
SODIUM UR-SCNC: <5 MMOL/L
SOURCE, COVRS: NORMAL
SP GR UR REFRACTOMETRY: 1.02 (ref 1–1.03)
SPECIMEN SOURCE, FCOV2M: NORMAL
SPECIMEN TYPE, XMCV1T: NORMAL
SPECIMEN TYPE: NORMAL
TRI-PHOS CRY URNS QL MICRO: ABNORMAL
TROPONIN I SERPL-MCNC: 0.09 NG/ML
TROPONIN I SERPL-MCNC: 0.11 NG/ML
TSH SERPL DL<=0.05 MIU/L-ACNC: 0.58 UIU/ML (ref 0.36–3.74)
TSH SERPL DL<=0.05 MIU/L-ACNC: 1.8 UIU/ML (ref 0.36–3.74)
UA: UC IF INDICATED,UAUC: ABNORMAL
URATE SERPL-MCNC: 4.3 MG/DL (ref 3.5–7.2)
UROBILINOGEN UR QL STRIP.AUTO: 0.2 EU/DL (ref 0.2–1)
VENTRICULAR RATE, ECG03: 51 BPM
VENTRICULAR RATE, ECG03: 68 BPM
VENTRICULAR RATE, ECG03: 71 BPM
VENTRICULAR RATE, ECG03: 76 BPM
VENTRICULAR RATE, ECG03: 85 BPM
WBC # BLD AUTO: 10.3 K/UL (ref 4.1–11.1)
WBC # BLD AUTO: 7.5 K/UL (ref 4.1–11.1)
WBC # BLD AUTO: 8.9 K/UL (ref 4.1–11.1)
WBC # BLD AUTO: 9.2 K/UL (ref 4.1–11.1)
WBC # BLD AUTO: 9.7 K/UL (ref 4.1–11.1)
WBC URNS QL MICRO: ABNORMAL /HPF (ref 0–4)

## 2021-01-01 PROCEDURE — 97116 GAIT TRAINING THERAPY: CPT

## 2021-01-01 PROCEDURE — 94761 N-INVAS EAR/PLS OXIMETRY MLT: CPT

## 2021-01-01 PROCEDURE — 74011250636 HC RX REV CODE- 250/636: Performed by: NURSE ANESTHETIST, CERTIFIED REGISTERED

## 2021-01-01 PROCEDURE — 97535 SELF CARE MNGMENT TRAINING: CPT | Performed by: OCCUPATIONAL THERAPIST

## 2021-01-01 PROCEDURE — 71045 X-RAY EXAM CHEST 1 VIEW: CPT

## 2021-01-01 PROCEDURE — 97165 OT EVAL LOW COMPLEX 30 MIN: CPT

## 2021-01-01 PROCEDURE — 74011000250 HC RX REV CODE- 250: Performed by: STUDENT IN AN ORGANIZED HEALTH CARE EDUCATION/TRAINING PROGRAM

## 2021-01-01 PROCEDURE — 97535 SELF CARE MNGMENT TRAINING: CPT

## 2021-01-01 PROCEDURE — 93005 ELECTROCARDIOGRAM TRACING: CPT

## 2021-01-01 PROCEDURE — 36415 COLL VENOUS BLD VENIPUNCTURE: CPT

## 2021-01-01 PROCEDURE — 74011250636 HC RX REV CODE- 250/636: Performed by: STUDENT IN AN ORGANIZED HEALTH CARE EDUCATION/TRAINING PROGRAM

## 2021-01-01 PROCEDURE — 99285 EMERGENCY DEPT VISIT HI MDM: CPT

## 2021-01-01 PROCEDURE — 97530 THERAPEUTIC ACTIVITIES: CPT

## 2021-01-01 PROCEDURE — 20610 DRAIN/INJ JOINT/BURSA W/O US: CPT | Performed by: ORTHOPAEDIC SURGERY

## 2021-01-01 PROCEDURE — 80048 BASIC METABOLIC PNL TOTAL CA: CPT

## 2021-01-01 PROCEDURE — 93971 EXTREMITY STUDY: CPT

## 2021-01-01 PROCEDURE — 71250 CT THORAX DX C-: CPT

## 2021-01-01 PROCEDURE — 0651 HSPC ROUTINE HOME CARE

## 2021-01-01 PROCEDURE — 74011250637 HC RX REV CODE- 250/637: Performed by: STUDENT IN AN ORGANIZED HEALTH CARE EDUCATION/TRAINING PROGRAM

## 2021-01-01 PROCEDURE — 2709999900 HC NON-CHARGEABLE SUPPLY: Performed by: SPECIALIST

## 2021-01-01 PROCEDURE — 87635 SARS-COV-2 COVID-19 AMP PRB: CPT

## 2021-01-01 PROCEDURE — 97162 PT EVAL MOD COMPLEX 30 MIN: CPT

## 2021-01-01 PROCEDURE — 65660000000 HC RM CCU STEPDOWN

## 2021-01-01 PROCEDURE — 80053 COMPREHEN METABOLIC PANEL: CPT

## 2021-01-01 PROCEDURE — 96372 THER/PROPH/DIAG INJ SC/IM: CPT

## 2021-01-01 PROCEDURE — 77010033678 HC OXYGEN DAILY

## 2021-01-01 PROCEDURE — 3336500001 HSPC ELECTION

## 2021-01-01 PROCEDURE — 70450 CT HEAD/BRAIN W/O DYE: CPT

## 2021-01-01 PROCEDURE — 74011250636 HC RX REV CODE- 250/636: Performed by: INTERNAL MEDICINE

## 2021-01-01 PROCEDURE — 84300 ASSAY OF URINE SODIUM: CPT

## 2021-01-01 PROCEDURE — 94760 N-INVAS EAR/PLS OXIMETRY 1: CPT

## 2021-01-01 PROCEDURE — U0003 INFECTIOUS AGENT DETECTION BY NUCLEIC ACID (DNA OR RNA); SEVERE ACUTE RESPIRATORY SYNDROME CORONAVIRUS 2 (SARS-COV-2) (CORONAVIRUS DISEASE [COVID-19]), AMPLIFIED PROBE TECHNIQUE, MAKING USE OF HIGH THROUGHPUT TECHNOLOGIES AS DESCRIBED BY CMS-2020-01-R: HCPCS

## 2021-01-01 PROCEDURE — 74011250637 HC RX REV CODE- 250/637: Performed by: INTERNAL MEDICINE

## 2021-01-01 PROCEDURE — 80069 RENAL FUNCTION PANEL: CPT

## 2021-01-01 PROCEDURE — 99218 HC RM OBSERVATION: CPT

## 2021-01-01 PROCEDURE — 74011000250 HC RX REV CODE- 250: Performed by: INTERNAL MEDICINE

## 2021-01-01 PROCEDURE — 83935 ASSAY OF URINE OSMOLALITY: CPT

## 2021-01-01 PROCEDURE — 84443 ASSAY THYROID STIM HORMONE: CPT

## 2021-01-01 PROCEDURE — G0299 HHS/HOSPICE OF RN EA 15 MIN: HCPCS

## 2021-01-01 PROCEDURE — U0005 INFEC AGEN DETEC AMPLI PROBE: HCPCS

## 2021-01-01 PROCEDURE — 74011000250 HC RX REV CODE- 250: Performed by: REGISTERED NURSE

## 2021-01-01 PROCEDURE — 97116 GAIT TRAINING THERAPY: CPT | Performed by: PHYSICAL THERAPIST

## 2021-01-01 PROCEDURE — 83930 ASSAY OF BLOOD OSMOLALITY: CPT

## 2021-01-01 PROCEDURE — C1900 LEAD, CORONARY VENOUS: HCPCS | Performed by: INTERNAL MEDICINE

## 2021-01-01 PROCEDURE — 76060000031 HC ANESTHESIA FIRST 0.5 HR: Performed by: SPECIALIST

## 2021-01-01 PROCEDURE — 74011250637 HC RX REV CODE- 250/637: Performed by: NURSE PRACTITIONER

## 2021-01-01 PROCEDURE — 77030039825 HC MSK NSL PAP SUPERNO2VA VYRM -B: Performed by: REGISTERED NURSE

## 2021-01-01 PROCEDURE — C1893 INTRO/SHEATH, FIXED,NON-PEEL: HCPCS | Performed by: INTERNAL MEDICINE

## 2021-01-01 PROCEDURE — 74011250636 HC RX REV CODE- 250/636: Performed by: REGISTERED NURSE

## 2021-01-01 PROCEDURE — 76040000019: Performed by: SPECIALIST

## 2021-01-01 PROCEDURE — 82533 TOTAL CORTISOL: CPT

## 2021-01-01 PROCEDURE — 74011250637 HC RX REV CODE- 250/637: Performed by: HOSPITALIST

## 2021-01-01 PROCEDURE — 97530 THERAPEUTIC ACTIVITIES: CPT | Performed by: PHYSICAL THERAPIST

## 2021-01-01 PROCEDURE — 87086 URINE CULTURE/COLONY COUNT: CPT

## 2021-01-01 PROCEDURE — 77030018712 HC DEV BLLN INFL BSC -B: Performed by: SPECIALIST

## 2021-01-01 PROCEDURE — C1769 GUIDE WIRE: HCPCS | Performed by: INTERNAL MEDICINE

## 2021-01-01 PROCEDURE — 76060000034 HC ANESTHESIA 1.5 TO 2 HR: Performed by: INTERNAL MEDICINE

## 2021-01-01 PROCEDURE — 74011000258 HC RX REV CODE- 258: Performed by: STUDENT IN AN ORGANIZED HEALTH CARE EDUCATION/TRAINING PROGRAM

## 2021-01-01 PROCEDURE — 99221 1ST HOSP IP/OBS SF/LOW 40: CPT | Performed by: ORTHOPAEDIC SURGERY

## 2021-01-01 PROCEDURE — 85025 COMPLETE CBC W/AUTO DIFF WBC: CPT

## 2021-01-01 PROCEDURE — 74011250636 HC RX REV CODE- 250/636: Performed by: HOSPITALIST

## 2021-01-01 PROCEDURE — 74011000250 HC RX REV CODE- 250: Performed by: HOSPITALIST

## 2021-01-01 PROCEDURE — 3331090004 HSPC SERVICE INTENSITY ADD-ON

## 2021-01-01 PROCEDURE — 97166 OT EVAL MOD COMPLEX 45 MIN: CPT | Performed by: OCCUPATIONAL THERAPIST

## 2021-01-01 PROCEDURE — 74011250636 HC RX REV CODE- 250/636

## 2021-01-01 PROCEDURE — 51701 INSERT BLADDER CATHETER: CPT

## 2021-01-01 PROCEDURE — C2621 PMKR, OTHER THAN SING/DUAL: HCPCS | Performed by: INTERNAL MEDICINE

## 2021-01-01 PROCEDURE — 73010 X-RAY EXAM OF SHOULDER BLADE: CPT

## 2021-01-01 PROCEDURE — 87040 BLOOD CULTURE FOR BACTERIA: CPT

## 2021-01-01 PROCEDURE — 83735 ASSAY OF MAGNESIUM: CPT

## 2021-01-01 PROCEDURE — 77030031139 HC SUT VCRL2 J&J -A: Performed by: INTERNAL MEDICINE

## 2021-01-01 PROCEDURE — 97110 THERAPEUTIC EXERCISES: CPT

## 2021-01-01 PROCEDURE — 97530 THERAPEUTIC ACTIVITIES: CPT | Performed by: OCCUPATIONAL THERAPIST

## 2021-01-01 PROCEDURE — G0155 HHCP-SVS OF CSW,EA 15 MIN: HCPCS

## 2021-01-01 PROCEDURE — 99441 PR PHYS/QHP TELEPHONE EVALUATION 5-10 MIN: CPT | Performed by: ORTHOPAEDIC SURGERY

## 2021-01-01 PROCEDURE — C1751 CATH, INF, PER/CENT/MIDLINE: HCPCS | Performed by: INTERNAL MEDICINE

## 2021-01-01 PROCEDURE — C1894 INTRO/SHEATH, NON-LASER: HCPCS | Performed by: INTERNAL MEDICINE

## 2021-01-01 PROCEDURE — 88305 TISSUE EXAM BY PATHOLOGIST: CPT

## 2021-01-01 PROCEDURE — 82803 BLOOD GASES ANY COMBINATION: CPT

## 2021-01-01 PROCEDURE — 74011000272 HC RX REV CODE- 272: Performed by: INTERNAL MEDICINE

## 2021-01-01 PROCEDURE — 84295 ASSAY OF SERUM SODIUM: CPT

## 2021-01-01 PROCEDURE — 51798 US URINE CAPACITY MEASURE: CPT

## 2021-01-01 PROCEDURE — 74011250636 HC RX REV CODE- 250/636: Performed by: SPECIALIST

## 2021-01-01 PROCEDURE — 77030002996 HC SUT SLK J&J -A: Performed by: INTERNAL MEDICINE

## 2021-01-01 PROCEDURE — 77030019988 HC FCPS ENDOSC DISP BSC -B: Performed by: SPECIALIST

## 2021-01-01 PROCEDURE — 74011000258 HC RX REV CODE- 258: Performed by: INTERNAL MEDICINE

## 2021-01-01 PROCEDURE — 84550 ASSAY OF BLOOD/URIC ACID: CPT

## 2021-01-01 PROCEDURE — 74011250636 HC RX REV CODE- 250/636: Performed by: ORTHOPAEDIC SURGERY

## 2021-01-01 PROCEDURE — 77030010507 HC ADH SKN DERMBND J&J -B: Performed by: INTERNAL MEDICINE

## 2021-01-01 PROCEDURE — 73030 X-RAY EXAM OF SHOULDER: CPT

## 2021-01-01 PROCEDURE — 97161 PT EVAL LOW COMPLEX 20 MIN: CPT | Performed by: PHYSICAL THERAPIST

## 2021-01-01 PROCEDURE — 81001 URINALYSIS AUTO W/SCOPE: CPT

## 2021-01-01 PROCEDURE — 77030018729 HC ELECTRD DEFIB PAD CARD -B: Performed by: INTERNAL MEDICINE

## 2021-01-01 PROCEDURE — 73562 X-RAY EXAM OF KNEE 3: CPT

## 2021-01-01 PROCEDURE — 2709999900 HC NON-CHARGEABLE SUPPLY

## 2021-01-01 PROCEDURE — 0202U NFCT DS 22 TRGT SARS-COV-2: CPT

## 2021-01-01 PROCEDURE — 84484 ASSAY OF TROPONIN QUANT: CPT

## 2021-01-01 PROCEDURE — 84100 ASSAY OF PHOSPHORUS: CPT

## 2021-01-01 PROCEDURE — 85027 COMPLETE CBC AUTOMATED: CPT

## 2021-01-01 PROCEDURE — 33208 INSRT HEART PM ATRIAL & VENT: CPT | Performed by: INTERNAL MEDICINE

## 2021-01-01 PROCEDURE — C1898 LEAD, PMKR, OTHER THAN TRANS: HCPCS | Performed by: INTERNAL MEDICINE

## 2021-01-01 PROCEDURE — 77030037400 HC ADH TISS HI VISC EXOFIN CHMP -B: Performed by: INTERNAL MEDICINE

## 2021-01-01 PROCEDURE — 74011000636 HC RX REV CODE- 636: Performed by: INTERNAL MEDICINE

## 2021-01-01 DEVICE — PACING LEAD
Type: IMPLANTABLE DEVICE | Status: FUNCTIONAL
Brand: TENDRIL™

## 2021-01-01 DEVICE — LEFT HEART LEAD
Type: IMPLANTABLE DEVICE | Status: FUNCTIONAL
Brand: QUARTET™

## 2021-01-01 DEVICE — CRT CARDIAC RESYNCHRONIZATION THERAPY PACEMAKER DDDRV
Type: IMPLANTABLE DEVICE | Status: FUNCTIONAL
Brand: QUADRA ALLURE MP™

## 2021-01-01 RX ORDER — ASPIRIN 81 MG/1
81 TABLET ORAL DAILY
Status: DISCONTINUED | OUTPATIENT
Start: 2021-01-01 | End: 2021-01-01 | Stop reason: HOSPADM

## 2021-01-01 RX ORDER — ONDANSETRON 4 MG/1
4 TABLET, ORALLY DISINTEGRATING ORAL
Status: DISCONTINUED | OUTPATIENT
Start: 2021-01-01 | End: 2021-01-01 | Stop reason: HOSPADM

## 2021-01-01 RX ORDER — 3% SODIUM CHLORIDE 3 G/100ML
50 INJECTION, SOLUTION INTRAVENOUS CONTINUOUS
Status: DISCONTINUED | OUTPATIENT
Start: 2021-01-01 | End: 2021-01-01

## 2021-01-01 RX ORDER — FINASTERIDE 5 MG/1
5 TABLET, FILM COATED ORAL DAILY
Status: DISCONTINUED | OUTPATIENT
Start: 2021-01-01 | End: 2021-01-01 | Stop reason: HOSPADM

## 2021-01-01 RX ORDER — ZIPRASIDONE HYDROCHLORIDE 20 MG/1
20 CAPSULE ORAL 2 TIMES DAILY WITH MEALS
Status: DISCONTINUED | OUTPATIENT
Start: 2021-01-01 | End: 2021-01-01 | Stop reason: HOSPADM

## 2021-01-01 RX ORDER — FINASTERIDE 5 MG/1
5 TABLET, FILM COATED ORAL DAILY
Qty: 30 TABLET | Refills: 0 | Status: SHIPPED | OUTPATIENT
Start: 2021-01-01 | End: 2021-01-01 | Stop reason: ALTCHOICE

## 2021-01-01 RX ORDER — SODIUM CHLORIDE 9 MG/ML
100 INJECTION, SOLUTION INTRAVENOUS CONTINUOUS
Status: DISCONTINUED | OUTPATIENT
Start: 2021-01-01 | End: 2021-01-01

## 2021-01-01 RX ORDER — PANTOPRAZOLE SODIUM 40 MG/1
40 TABLET, DELAYED RELEASE ORAL
Status: DISCONTINUED | OUTPATIENT
Start: 2021-01-01 | End: 2021-01-01 | Stop reason: HOSPADM

## 2021-01-01 RX ORDER — SODIUM CHLORIDE 0.9 % (FLUSH) 0.9 %
5-40 SYRINGE (ML) INJECTION AS NEEDED
Status: DISCONTINUED | OUTPATIENT
Start: 2021-01-01 | End: 2021-01-01 | Stop reason: HOSPADM

## 2021-01-01 RX ORDER — METOPROLOL TARTRATE 25 MG/1
12.5 TABLET, FILM COATED ORAL EVERY 12 HOURS
Status: DISCONTINUED | OUTPATIENT
Start: 2021-01-01 | End: 2021-01-01

## 2021-01-01 RX ORDER — ACETAMINOPHEN 325 MG/1
650 TABLET ORAL
Status: DISCONTINUED | OUTPATIENT
Start: 2021-01-01 | End: 2021-01-01 | Stop reason: HOSPADM

## 2021-01-01 RX ORDER — POTASSIUM CHLORIDE 750 MG/1
40 TABLET, FILM COATED, EXTENDED RELEASE ORAL
Status: COMPLETED | OUTPATIENT
Start: 2021-01-01 | End: 2021-01-01

## 2021-01-01 RX ORDER — PROMETHAZINE HYDROCHLORIDE 25 MG/1
12.5 TABLET ORAL
Status: DISCONTINUED | OUTPATIENT
Start: 2021-01-01 | End: 2021-01-01 | Stop reason: HOSPADM

## 2021-01-01 RX ORDER — BRIMONIDINE TARTRATE 2 MG/ML
1 SOLUTION/ DROPS OPHTHALMIC 2 TIMES DAILY
Status: DISCONTINUED | OUTPATIENT
Start: 2021-01-01 | End: 2021-01-01 | Stop reason: HOSPADM

## 2021-01-01 RX ORDER — ERGOCALCIFEROL 1.25 MG/1
50000 CAPSULE ORAL
COMMUNITY

## 2021-01-01 RX ORDER — DESMOPRESSIN ACETATE 4 UG/ML
2 INJECTION, SOLUTION INTRAVENOUS; SUBCUTANEOUS ONCE
Status: COMPLETED | OUTPATIENT
Start: 2021-01-01 | End: 2021-01-01

## 2021-01-01 RX ORDER — POLYETHYLENE GLYCOL 3350 17 G/17G
17 POWDER, FOR SOLUTION ORAL DAILY
Status: DISCONTINUED | OUTPATIENT
Start: 2021-01-01 | End: 2021-01-01

## 2021-01-01 RX ORDER — CITALOPRAM 20 MG/1
40 TABLET, FILM COATED ORAL DAILY
Status: DISCONTINUED | OUTPATIENT
Start: 2021-01-01 | End: 2021-01-01 | Stop reason: HOSPADM

## 2021-01-01 RX ORDER — ACETAMINOPHEN 650 MG/1
650 SUPPOSITORY RECTAL
Status: DISCONTINUED | OUTPATIENT
Start: 2021-01-01 | End: 2021-01-01 | Stop reason: HOSPADM

## 2021-01-01 RX ORDER — LIDOCAINE HYDROCHLORIDE 20 MG/ML
INJECTION, SOLUTION EPIDURAL; INFILTRATION; INTRACAUDAL; PERINEURAL AS NEEDED
Status: DISCONTINUED | OUTPATIENT
Start: 2021-01-01 | End: 2021-01-01 | Stop reason: HOSPADM

## 2021-01-01 RX ORDER — METOPROLOL SUCCINATE 25 MG/1
25 TABLET, EXTENDED RELEASE ORAL DAILY
Qty: 30 TABLET | Refills: 0 | Status: SHIPPED | OUTPATIENT
Start: 2021-01-01

## 2021-01-01 RX ORDER — SODIUM CHLORIDE, SODIUM LACTATE, POTASSIUM CHLORIDE, CALCIUM CHLORIDE 600; 310; 30; 20 MG/100ML; MG/100ML; MG/100ML; MG/100ML
INJECTION, SOLUTION INTRAVENOUS
Status: DISCONTINUED | OUTPATIENT
Start: 2021-01-01 | End: 2021-01-01 | Stop reason: HOSPADM

## 2021-01-01 RX ORDER — HEPARIN SODIUM 200 [USP'U]/100ML
INJECTION, SOLUTION INTRAVENOUS
Status: COMPLETED | OUTPATIENT
Start: 2021-01-01 | End: 2021-01-01

## 2021-01-01 RX ORDER — IBUPROFEN 400 MG/1
400 TABLET ORAL
Status: DISCONTINUED | OUTPATIENT
Start: 2021-01-01 | End: 2021-01-01 | Stop reason: HOSPADM

## 2021-01-01 RX ORDER — DORZOLAMIDE HYDROCHLORIDE AND TIMOLOL MALEATE 20; 5 MG/ML; MG/ML
1 SOLUTION/ DROPS OPHTHALMIC DAILY
Status: DISCONTINUED | OUTPATIENT
Start: 2021-01-01 | End: 2021-01-01 | Stop reason: HOSPADM

## 2021-01-01 RX ORDER — DEXTROMETHORPHAN/PSEUDOEPHED 2.5-7.5/.8
1.2 DROPS ORAL
Status: DISCONTINUED | OUTPATIENT
Start: 2021-01-01 | End: 2021-01-01 | Stop reason: HOSPADM

## 2021-01-01 RX ORDER — OXYCODONE HYDROCHLORIDE 5 MG/1
2.5-5 TABLET ORAL
Status: DISCONTINUED | OUTPATIENT
Start: 2021-01-01 | End: 2021-01-01

## 2021-01-01 RX ORDER — ATORVASTATIN CALCIUM 10 MG/1
10 TABLET, FILM COATED ORAL
Status: DISCONTINUED | OUTPATIENT
Start: 2021-01-01 | End: 2021-01-01 | Stop reason: HOSPADM

## 2021-01-01 RX ORDER — AMOXICILLIN 250 MG
1 CAPSULE ORAL
Status: DISCONTINUED | OUTPATIENT
Start: 2021-01-01 | End: 2021-01-01 | Stop reason: HOSPADM

## 2021-01-01 RX ORDER — MAGNESIUM SULFATE HEPTAHYDRATE 40 MG/ML
2 INJECTION, SOLUTION INTRAVENOUS ONCE
Status: COMPLETED | OUTPATIENT
Start: 2021-01-01 | End: 2021-01-01

## 2021-01-01 RX ORDER — CITALOPRAM 20 MG/1
20 TABLET, FILM COATED ORAL DAILY
COMMUNITY
End: 2021-01-01 | Stop reason: SDUPTHER

## 2021-01-01 RX ORDER — TRAMADOL HYDROCHLORIDE 50 MG/1
50 TABLET ORAL AS NEEDED
COMMUNITY
End: 2021-01-01 | Stop reason: SDUPTHER

## 2021-01-01 RX ORDER — TRAZODONE HYDROCHLORIDE 50 MG/1
50 TABLET ORAL
Status: DISCONTINUED | OUTPATIENT
Start: 2021-01-01 | End: 2021-01-01 | Stop reason: HOSPADM

## 2021-01-01 RX ORDER — METOPROLOL SUCCINATE 25 MG/1
25 TABLET, EXTENDED RELEASE ORAL DAILY
Status: DISCONTINUED | OUTPATIENT
Start: 2021-01-01 | End: 2021-01-01 | Stop reason: HOSPADM

## 2021-01-01 RX ORDER — BETAMETHASONE SODIUM PHOSPHATE AND BETAMETHASONE ACETATE 3; 3 MG/ML; MG/ML
6 INJECTION, SUSPENSION INTRA-ARTICULAR; INTRALESIONAL; INTRAMUSCULAR; SOFT TISSUE ONCE
Status: COMPLETED | OUTPATIENT
Start: 2021-01-01 | End: 2021-01-01

## 2021-01-01 RX ORDER — ONDANSETRON 2 MG/ML
4 INJECTION INTRAMUSCULAR; INTRAVENOUS
Status: DISCONTINUED | OUTPATIENT
Start: 2021-01-01 | End: 2021-01-01 | Stop reason: HOSPADM

## 2021-01-01 RX ORDER — PROPOFOL 10 MG/ML
INJECTION, EMULSION INTRAVENOUS AS NEEDED
Status: DISCONTINUED | OUTPATIENT
Start: 2021-01-01 | End: 2021-01-01 | Stop reason: HOSPADM

## 2021-01-01 RX ORDER — FUROSEMIDE 20 MG/1
20 TABLET ORAL DAILY
Status: DISCONTINUED | OUTPATIENT
Start: 2021-01-01 | End: 2021-01-01 | Stop reason: HOSPADM

## 2021-01-01 RX ORDER — DEXTROSE MONOHYDRATE 50 MG/ML
150 INJECTION, SOLUTION INTRAVENOUS CONTINUOUS
Status: DISPENSED | OUTPATIENT
Start: 2021-01-01 | End: 2021-01-01

## 2021-01-01 RX ORDER — 3% SODIUM CHLORIDE 3 G/100ML
50 INJECTION, SOLUTION INTRAVENOUS CONTINUOUS
Status: DISPENSED | OUTPATIENT
Start: 2021-01-01 | End: 2021-01-01

## 2021-01-01 RX ORDER — ERGOCALCIFEROL 1.25 MG/1
50000 CAPSULE ORAL
Status: DISCONTINUED | OUTPATIENT
Start: 2021-01-01 | End: 2021-01-01 | Stop reason: HOSPADM

## 2021-01-01 RX ORDER — SODIUM CHLORIDE 0.9 % (FLUSH) 0.9 %
5-40 SYRINGE (ML) INJECTION EVERY 8 HOURS
Status: DISCONTINUED | OUTPATIENT
Start: 2021-01-01 | End: 2021-01-01 | Stop reason: HOSPADM

## 2021-01-01 RX ORDER — TORSEMIDE 20 MG/1
20 TABLET ORAL DAILY
COMMUNITY
End: 2021-01-01 | Stop reason: DRUGHIGH

## 2021-01-01 RX ORDER — POLYETHYLENE GLYCOL 3350 17 G/17G
17 POWDER, FOR SOLUTION ORAL DAILY PRN
Status: DISCONTINUED | OUTPATIENT
Start: 2021-01-01 | End: 2021-01-01

## 2021-01-01 RX ORDER — ACETAMINOPHEN 325 MG/1
325 TABLET ORAL
Status: DISCONTINUED | OUTPATIENT
Start: 2021-01-01 | End: 2021-01-01 | Stop reason: HOSPADM

## 2021-01-01 RX ORDER — PROPOFOL 10 MG/ML
INJECTION, EMULSION INTRAVENOUS
Status: DISCONTINUED | OUTPATIENT
Start: 2021-01-01 | End: 2021-01-01 | Stop reason: HOSPADM

## 2021-01-01 RX ORDER — LATANOPROST 50 UG/ML
1 SOLUTION/ DROPS OPHTHALMIC
Status: DISCONTINUED | OUTPATIENT
Start: 2021-01-01 | End: 2021-01-01 | Stop reason: HOSPADM

## 2021-01-01 RX ORDER — DOXYCYCLINE 100 MG/1
100 CAPSULE ORAL 2 TIMES DAILY
COMMUNITY
End: 2021-01-01

## 2021-01-01 RX ORDER — AMLODIPINE BESYLATE 2.5 MG/1
2.5 TABLET ORAL DAILY
Status: ON HOLD | COMMUNITY
End: 2021-01-01 | Stop reason: SDUPTHER

## 2021-01-01 RX ORDER — MIDODRINE HYDROCHLORIDE 5 MG/1
5 TABLET ORAL
Status: DISCONTINUED | OUTPATIENT
Start: 2021-01-01 | End: 2021-01-01 | Stop reason: HOSPADM

## 2021-01-01 RX ORDER — ACETAMINOPHEN 325 MG/1
650 TABLET ORAL
Status: DISCONTINUED | OUTPATIENT
Start: 2021-01-01 | End: 2021-01-01

## 2021-01-01 RX ORDER — TAMSULOSIN HYDROCHLORIDE 0.4 MG/1
0.4 CAPSULE ORAL 2 TIMES DAILY
Status: DISCONTINUED | OUTPATIENT
Start: 2021-01-01 | End: 2021-01-01 | Stop reason: HOSPADM

## 2021-01-01 RX ORDER — SODIUM CHLORIDE 9 MG/ML
50 INJECTION, SOLUTION INTRAVENOUS CONTINUOUS
Status: DISCONTINUED | OUTPATIENT
Start: 2021-01-01 | End: 2021-01-01 | Stop reason: HOSPADM

## 2021-01-01 RX ORDER — LEVOTHYROXINE SODIUM 125 UG/1
125 TABLET ORAL
Status: DISCONTINUED | OUTPATIENT
Start: 2021-01-01 | End: 2021-01-01 | Stop reason: HOSPADM

## 2021-01-01 RX ORDER — DOXYCYCLINE HYCLATE 100 MG
100 TABLET ORAL EVERY 12 HOURS
Status: DISCONTINUED | OUTPATIENT
Start: 2021-01-01 | End: 2021-01-01 | Stop reason: HOSPADM

## 2021-01-01 RX ORDER — AMOXICILLIN 250 MG
1 CAPSULE ORAL
Qty: 30 TAB | Refills: 0 | Status: SHIPPED | OUTPATIENT
Start: 2021-01-01 | End: 2021-01-01 | Stop reason: ALTCHOICE

## 2021-01-01 RX ORDER — FUROSEMIDE 20 MG/1
20 TABLET ORAL DAILY
COMMUNITY
End: 2021-01-01

## 2021-01-01 RX ORDER — CEFAZOLIN SODIUM 1 G/3ML
INJECTION, POWDER, FOR SOLUTION INTRAMUSCULAR; INTRAVENOUS AS NEEDED
Status: DISCONTINUED | OUTPATIENT
Start: 2021-01-01 | End: 2021-01-01 | Stop reason: HOSPADM

## 2021-01-01 RX ORDER — OMEPRAZOLE 40 MG/1
40 CAPSULE, DELAYED RELEASE ORAL DAILY
COMMUNITY
End: 2021-01-01 | Stop reason: SDUPTHER

## 2021-01-01 RX ORDER — LANOLIN ALCOHOL/MO/W.PET/CERES
1000 CREAM (GRAM) TOPICAL DAILY
Status: DISCONTINUED | OUTPATIENT
Start: 2021-01-01 | End: 2021-01-01 | Stop reason: HOSPADM

## 2021-01-01 RX ORDER — MORPHINE SULFATE 2 MG/ML
1 INJECTION, SOLUTION INTRAMUSCULAR; INTRAVENOUS
Status: DISCONTINUED | OUTPATIENT
Start: 2021-01-01 | End: 2021-01-01 | Stop reason: HOSPADM

## 2021-01-01 RX ORDER — ONDANSETRON 2 MG/ML
INJECTION INTRAMUSCULAR; INTRAVENOUS
Status: COMPLETED
Start: 2021-01-01 | End: 2021-01-01

## 2021-01-01 RX ORDER — ENOXAPARIN SODIUM 100 MG/ML
40 INJECTION SUBCUTANEOUS DAILY
Status: DISCONTINUED | OUTPATIENT
Start: 2021-01-01 | End: 2021-01-01 | Stop reason: HOSPADM

## 2021-01-01 RX ORDER — TRAZODONE HYDROCHLORIDE 50 MG/1
50 TABLET ORAL
COMMUNITY
End: 2021-01-01

## 2021-01-01 RX ORDER — AMLODIPINE BESYLATE 2.5 MG/1
2.5 TABLET ORAL DAILY
Status: DISCONTINUED | OUTPATIENT
Start: 2021-01-01 | End: 2021-01-01

## 2021-01-01 RX ORDER — POLYETHYLENE GLYCOL 3350 17 G/17G
17 POWDER, FOR SOLUTION ORAL
Status: DISCONTINUED | OUTPATIENT
Start: 2021-01-01 | End: 2021-01-01 | Stop reason: HOSPADM

## 2021-01-01 RX ORDER — ACETAMINOPHEN 325 MG/1
650 TABLET ORAL EVERY 6 HOURS
Status: DISCONTINUED | OUTPATIENT
Start: 2021-01-01 | End: 2021-01-01 | Stop reason: HOSPADM

## 2021-01-01 RX ORDER — ACETAMINOPHEN 325 MG/1
975 TABLET ORAL ONCE
Status: COMPLETED | OUTPATIENT
Start: 2021-01-01 | End: 2021-01-01

## 2021-01-01 RX ORDER — MIDODRINE HYDROCHLORIDE 5 MG/1
5 TABLET ORAL
Qty: 90 TAB | Refills: 5 | Status: SHIPPED | OUTPATIENT
Start: 2021-01-01 | End: 2021-01-01

## 2021-01-01 RX ORDER — DUTASTERIDE 0.5 MG/1
0.5 CAPSULE, LIQUID FILLED ORAL DAILY
Status: DISCONTINUED | OUTPATIENT
Start: 2021-01-01 | End: 2021-01-01 | Stop reason: HOSPADM

## 2021-01-01 RX ORDER — DEXTROSE MONOHYDRATE 50 MG/ML
100 INJECTION, SOLUTION INTRAVENOUS CONTINUOUS
Status: DISPENSED | OUTPATIENT
Start: 2021-01-01 | End: 2021-01-01

## 2021-01-01 RX ORDER — DICLOFENAC SODIUM 10 MG/G
2 GEL TOPICAL
COMMUNITY
End: 2021-01-01 | Stop reason: SDUPTHER

## 2021-01-01 RX ORDER — ATROPINE SULFATE 0.4 MG/ML
INJECTION, SOLUTION ENDOTRACHEAL; INTRAMEDULLARY; INTRAMUSCULAR; INTRAVENOUS; SUBCUTANEOUS AS NEEDED
Status: DISCONTINUED | OUTPATIENT
Start: 2021-01-01 | End: 2021-01-01 | Stop reason: HOSPADM

## 2021-01-01 RX ORDER — CIPROFLOXACIN 500 MG/1
500 TABLET ORAL 2 TIMES DAILY
COMMUNITY
Start: 2021-01-01 | End: 2021-01-01

## 2021-01-01 RX ORDER — AMLODIPINE BESYLATE 2.5 MG/1
2.5 TABLET ORAL DAILY
Status: DISCONTINUED | OUTPATIENT
Start: 2021-01-01 | End: 2021-01-01 | Stop reason: HOSPADM

## 2021-01-01 RX ORDER — ACETAMINOPHEN 650 MG/1
650 SUPPOSITORY RECTAL
Status: DISCONTINUED | OUTPATIENT
Start: 2021-01-01 | End: 2021-01-01

## 2021-01-01 RX ORDER — DEXTROSE MONOHYDRATE 50 MG/ML
150 INJECTION, SOLUTION INTRAVENOUS CONTINUOUS
Status: DISCONTINUED | OUTPATIENT
Start: 2021-01-01 | End: 2021-01-01

## 2021-01-01 RX ORDER — POLYETHYLENE GLYCOL 3350 17 G/17G
17 POWDER, FOR SOLUTION ORAL DAILY PRN
Status: DISCONTINUED | OUTPATIENT
Start: 2021-01-01 | End: 2021-01-01 | Stop reason: HOSPADM

## 2021-01-01 RX ORDER — AMOXICILLIN 250 MG
1 CAPSULE ORAL 2 TIMES DAILY
Status: DISCONTINUED | OUTPATIENT
Start: 2021-01-01 | End: 2021-01-01

## 2021-01-01 RX ORDER — LIDOCAINE HYDROCHLORIDE AND EPINEPHRINE 10; 10 MG/ML; UG/ML
INJECTION, SOLUTION INFILTRATION; PERINEURAL AS NEEDED
Status: DISCONTINUED | OUTPATIENT
Start: 2021-01-01 | End: 2021-01-01 | Stop reason: HOSPADM

## 2021-01-01 RX ORDER — AMLODIPINE BESYLATE 2.5 MG/1
10 TABLET ORAL DAILY
Qty: 30 TABLET | Refills: 0 | Status: SHIPPED | OUTPATIENT
Start: 2021-01-01

## 2021-01-01 RX ADMIN — DOXYCYCLINE HYCLATE 100 MG: 100 TABLET, COATED ORAL at 21:16

## 2021-01-01 RX ADMIN — ASPIRIN 81 MG: 81 TABLET, COATED ORAL at 08:49

## 2021-01-01 RX ADMIN — TICAGRELOR 90 MG: 90 TABLET ORAL at 22:01

## 2021-01-01 RX ADMIN — ACETAMINOPHEN 650 MG: 325 TABLET ORAL at 13:14

## 2021-01-01 RX ADMIN — ASPIRIN 81 MG: 81 TABLET, COATED ORAL at 10:17

## 2021-01-01 RX ADMIN — FINASTERIDE 5 MG: 5 TABLET, FILM COATED ORAL at 08:31

## 2021-01-01 RX ADMIN — ENOXAPARIN SODIUM 40 MG: 40 INJECTION SUBCUTANEOUS at 09:10

## 2021-01-01 RX ADMIN — BRIMONIDINE TARTRATE 1 DROP: 2 SOLUTION OPHTHALMIC at 17:18

## 2021-01-01 RX ADMIN — DUTASTERIDE 0.5 MG: 0.5 CAPSULE, LIQUID FILLED ORAL at 09:22

## 2021-01-01 RX ADMIN — ASPIRIN 81 MG: 81 TABLET, COATED ORAL at 08:52

## 2021-01-01 RX ADMIN — BRIMONIDINE TARTRATE 1 DROP: 2 SOLUTION OPHTHALMIC at 09:15

## 2021-01-01 RX ADMIN — CITALOPRAM HYDROBROMIDE 40 MG: 20 TABLET ORAL at 14:45

## 2021-01-01 RX ADMIN — TICAGRELOR 90 MG: 90 TABLET ORAL at 09:03

## 2021-01-01 RX ADMIN — FINASTERIDE 5 MG: 5 TABLET, FILM COATED ORAL at 12:58

## 2021-01-01 RX ADMIN — PIPERACILLIN AND TAZOBACTAM 3.38 G: 3; .375 INJECTION, POWDER, LYOPHILIZED, FOR SOLUTION INTRAVENOUS at 14:23

## 2021-01-01 RX ADMIN — PIPERACILLIN AND TAZOBACTAM 3.38 G: 3; .375 INJECTION, POWDER, LYOPHILIZED, FOR SOLUTION INTRAVENOUS at 05:10

## 2021-01-01 RX ADMIN — BRIMONIDINE TARTRATE 1 DROP: 2 SOLUTION OPHTHALMIC at 08:35

## 2021-01-01 RX ADMIN — ENOXAPARIN SODIUM 40 MG: 40 INJECTION SUBCUTANEOUS at 08:28

## 2021-01-01 RX ADMIN — Medication 10 ML: at 14:23

## 2021-01-01 RX ADMIN — ASPIRIN 81 MG: 81 TABLET, COATED ORAL at 09:02

## 2021-01-01 RX ADMIN — TICAGRELOR 90 MG: 90 TABLET ORAL at 08:45

## 2021-01-01 RX ADMIN — ONDANSETRON 4 MG: 2 INJECTION INTRAMUSCULAR; INTRAVENOUS at 10:40

## 2021-01-01 RX ADMIN — LEVOTHYROXINE SODIUM 125 MCG: 0.12 TABLET ORAL at 08:21

## 2021-01-01 RX ADMIN — ENOXAPARIN SODIUM 40 MG: 40 INJECTION SUBCUTANEOUS at 09:22

## 2021-01-01 RX ADMIN — MIDODRINE HYDROCHLORIDE 5 MG: 5 TABLET ORAL at 01:50

## 2021-01-01 RX ADMIN — Medication 10 ML: at 14:13

## 2021-01-01 RX ADMIN — DUTASTERIDE 0.5 MG: 0.5 CAPSULE, LIQUID FILLED ORAL at 09:01

## 2021-01-01 RX ADMIN — ENOXAPARIN SODIUM 40 MG: 40 INJECTION SUBCUTANEOUS at 08:53

## 2021-01-01 RX ADMIN — DORZOLAMIDE HYDROCHLORIDE AND TIMOLOL MALEATE 1 DROP: 20; 5 SOLUTION/ DROPS OPHTHALMIC at 09:25

## 2021-01-01 RX ADMIN — PROPOFOL 50 MG: 10 INJECTION, EMULSION INTRAVENOUS at 08:48

## 2021-01-01 RX ADMIN — DEXTROSE MONOHYDRATE 150 ML/HR: 50 INJECTION, SOLUTION INTRAVENOUS at 02:47

## 2021-01-01 RX ADMIN — ASPIRIN 81 MG: 81 TABLET, COATED ORAL at 09:10

## 2021-01-01 RX ADMIN — TAMSULOSIN HYDROCHLORIDE 0.4 MG: 0.4 CAPSULE ORAL at 17:12

## 2021-01-01 RX ADMIN — TAMSULOSIN HYDROCHLORIDE 0.4 MG: 0.4 CAPSULE ORAL at 08:27

## 2021-01-01 RX ADMIN — ENOXAPARIN SODIUM 40 MG: 40 INJECTION SUBCUTANEOUS at 08:34

## 2021-01-01 RX ADMIN — FUROSEMIDE 20 MG: 20 TABLET ORAL at 08:46

## 2021-01-01 RX ADMIN — DOXYCYCLINE HYCLATE 100 MG: 100 TABLET, COATED ORAL at 08:46

## 2021-01-01 RX ADMIN — Medication 10 ML: at 21:22

## 2021-01-01 RX ADMIN — BRIMONIDINE TARTRATE 1 DROP: 2 SOLUTION OPHTHALMIC at 20:48

## 2021-01-01 RX ADMIN — ERGOCALCIFEROL 50000 UNITS: 1.25 CAPSULE ORAL at 08:46

## 2021-01-01 RX ADMIN — ATROPINE SULFATE 0.2 MG: 0.4 INJECTION, SOLUTION INTRAMUSCULAR; INTRAVENOUS; SUBCUTANEOUS at 08:59

## 2021-01-01 RX ADMIN — ATORVASTATIN CALCIUM 10 MG: 10 TABLET, FILM COATED ORAL at 21:57

## 2021-01-01 RX ADMIN — ATORVASTATIN CALCIUM 10 MG: 10 TABLET, FILM COATED ORAL at 21:04

## 2021-01-01 RX ADMIN — ZIPRASIDONE HYDROCHLORIDE 20 MG: 20 CAPSULE ORAL at 18:22

## 2021-01-01 RX ADMIN — TRAZODONE HYDROCHLORIDE 50 MG: 50 TABLET ORAL at 21:43

## 2021-01-01 RX ADMIN — DORZOLAMIDE HYDROCHLORIDE AND TIMOLOL MALEATE 1 DROP: 20; 5 SOLUTION/ DROPS OPHTHALMIC at 08:36

## 2021-01-01 RX ADMIN — LEVOTHYROXINE SODIUM 125 MCG: 0.12 TABLET ORAL at 05:12

## 2021-01-01 RX ADMIN — BRIMONIDINE TARTRATE 1 DROP: 2 SOLUTION OPHTHALMIC at 17:27

## 2021-01-01 RX ADMIN — Medication 10 ML: at 13:36

## 2021-01-01 RX ADMIN — ACETAMINOPHEN 650 MG: 325 TABLET ORAL at 04:46

## 2021-01-01 RX ADMIN — Medication 10 ML: at 13:14

## 2021-01-01 RX ADMIN — ENOXAPARIN SODIUM 40 MG: 40 INJECTION SUBCUTANEOUS at 09:26

## 2021-01-01 RX ADMIN — ACETAMINOPHEN 650 MG: 325 TABLET ORAL at 05:46

## 2021-01-01 RX ADMIN — SODIUM CHLORIDE, POTASSIUM CHLORIDE, SODIUM LACTATE AND CALCIUM CHLORIDE: 600; 310; 30; 20 INJECTION, SOLUTION INTRAVENOUS at 08:32

## 2021-01-01 RX ADMIN — FINASTERIDE 5 MG: 5 TABLET, FILM COATED ORAL at 08:49

## 2021-01-01 RX ADMIN — Medication 10 ML: at 22:22

## 2021-01-01 RX ADMIN — LATANOPROST 1 DROP: 50 SOLUTION OPHTHALMIC at 21:57

## 2021-01-01 RX ADMIN — TAMSULOSIN HYDROCHLORIDE 0.4 MG: 0.4 CAPSULE ORAL at 08:00

## 2021-01-01 RX ADMIN — LEVOTHYROXINE SODIUM 125 MCG: 0.12 TABLET ORAL at 05:02

## 2021-01-01 RX ADMIN — CYANOCOBALAMIN TAB 500 MCG 1000 MCG: 500 TAB at 10:18

## 2021-01-01 RX ADMIN — LATANOPROST 1 DROP: 50 SOLUTION OPHTHALMIC at 20:48

## 2021-01-01 RX ADMIN — BRIMONIDINE TARTRATE 1 DROP: 2 SOLUTION OPHTHALMIC at 08:33

## 2021-01-01 RX ADMIN — TOLVAPTAN 15 MG: 30 TABLET ORAL at 22:14

## 2021-01-01 RX ADMIN — CYANOCOBALAMIN TAB 500 MCG 1000 MCG: 500 TAB at 09:25

## 2021-01-01 RX ADMIN — ZIPRASIDONE HYDROCHLORIDE 20 MG: 20 CAPSULE ORAL at 09:06

## 2021-01-01 RX ADMIN — ZIPRASIDONE HYDROCHLORIDE 20 MG: 20 CAPSULE ORAL at 09:25

## 2021-01-01 RX ADMIN — ACETAMINOPHEN 650 MG: 325 TABLET ORAL at 18:48

## 2021-01-01 RX ADMIN — BRIMONIDINE TARTRATE 1 DROP: 2 SOLUTION OPHTHALMIC at 10:19

## 2021-01-01 RX ADMIN — Medication 10 ML: at 12:24

## 2021-01-01 RX ADMIN — IBUPROFEN 400 MG: 400 TABLET, FILM COATED ORAL at 11:48

## 2021-01-01 RX ADMIN — TAMSULOSIN HYDROCHLORIDE 0.4 MG: 0.4 CAPSULE ORAL at 08:49

## 2021-01-01 RX ADMIN — TAMSULOSIN HYDROCHLORIDE 0.4 MG: 0.4 CAPSULE ORAL at 09:07

## 2021-01-01 RX ADMIN — ZIPRASIDONE HYDROCHLORIDE 20 MG: 20 CAPSULE ORAL at 17:14

## 2021-01-01 RX ADMIN — AMLODIPINE BESYLATE 2.5 MG: 2.5 TABLET ORAL at 09:01

## 2021-01-01 RX ADMIN — TAMSULOSIN HYDROCHLORIDE 0.4 MG: 0.4 CAPSULE ORAL at 09:25

## 2021-01-01 RX ADMIN — LATANOPROST 1 DROP: 50 SOLUTION OPHTHALMIC at 21:38

## 2021-01-01 RX ADMIN — PANTOPRAZOLE SODIUM 40 MG: 40 TABLET, DELAYED RELEASE ORAL at 08:28

## 2021-01-01 RX ADMIN — Medication 10 ML: at 06:37

## 2021-01-01 RX ADMIN — ASPIRIN 81 MG: 81 TABLET, COATED ORAL at 12:24

## 2021-01-01 RX ADMIN — ACETAMINOPHEN 650 MG: 325 TABLET ORAL at 17:14

## 2021-01-01 RX ADMIN — ACETAMINOPHEN 650 MG: 325 TABLET ORAL at 10:40

## 2021-01-01 RX ADMIN — ASPIRIN 81 MG: 81 TABLET, COATED ORAL at 09:23

## 2021-01-01 RX ADMIN — ACETAMINOPHEN 650 MG: 325 TABLET ORAL at 18:22

## 2021-01-01 RX ADMIN — FINASTERIDE 5 MG: 5 TABLET, FILM COATED ORAL at 09:00

## 2021-01-01 RX ADMIN — ACETAMINOPHEN 650 MG: 325 TABLET ORAL at 06:37

## 2021-01-01 RX ADMIN — ACETAMINOPHEN 650 MG: 325 TABLET ORAL at 14:45

## 2021-01-01 RX ADMIN — Medication 10 ML: at 22:11

## 2021-01-01 RX ADMIN — TICAGRELOR 90 MG: 90 TABLET ORAL at 21:37

## 2021-01-01 RX ADMIN — ASPIRIN 81 MG: 81 TABLET, COATED ORAL at 08:46

## 2021-01-01 RX ADMIN — FUROSEMIDE 20 MG: 20 TABLET ORAL at 09:25

## 2021-01-01 RX ADMIN — DORZOLAMIDE HYDROCHLORIDE AND TIMOLOL MALEATE 1 DROP: 20; 5 SOLUTION/ DROPS OPHTHALMIC at 09:29

## 2021-01-01 RX ADMIN — ACETAMINOPHEN 650 MG: 325 TABLET ORAL at 17:43

## 2021-01-01 RX ADMIN — TICAGRELOR 90 MG: 90 TABLET ORAL at 21:02

## 2021-01-01 RX ADMIN — ENOXAPARIN SODIUM 40 MG: 40 INJECTION SUBCUTANEOUS at 09:06

## 2021-01-01 RX ADMIN — Medication 10 ML: at 14:00

## 2021-01-01 RX ADMIN — PIPERACILLIN AND TAZOBACTAM 3.38 G: 3; .375 INJECTION, POWDER, LYOPHILIZED, FOR SOLUTION INTRAVENOUS at 14:04

## 2021-01-01 RX ADMIN — ATORVASTATIN CALCIUM 10 MG: 10 TABLET, FILM COATED ORAL at 22:01

## 2021-01-01 RX ADMIN — ACETAMINOPHEN 650 MG: 325 TABLET ORAL at 00:20

## 2021-01-01 RX ADMIN — ENOXAPARIN SODIUM 40 MG: 40 INJECTION SUBCUTANEOUS at 10:20

## 2021-01-01 RX ADMIN — CYANOCOBALAMIN TAB 500 MCG 1000 MCG: 500 TAB at 09:07

## 2021-01-01 RX ADMIN — TICAGRELOR 90 MG: 90 TABLET ORAL at 19:46

## 2021-01-01 RX ADMIN — SODIUM CHLORIDE 250 ML: 9 INJECTION, SOLUTION INTRAVENOUS at 21:08

## 2021-01-01 RX ADMIN — PIPERACILLIN AND TAZOBACTAM 3.38 G: 3; .375 INJECTION, POWDER, LYOPHILIZED, FOR SOLUTION INTRAVENOUS at 21:58

## 2021-01-01 RX ADMIN — PANTOPRAZOLE SODIUM 40 MG: 40 TABLET, DELAYED RELEASE ORAL at 09:23

## 2021-01-01 RX ADMIN — BRIMONIDINE TARTRATE 1 DROP: 2 SOLUTION OPHTHALMIC at 21:48

## 2021-01-01 RX ADMIN — Medication 10 ML: at 05:42

## 2021-01-01 RX ADMIN — Medication 10 ML: at 07:03

## 2021-01-01 RX ADMIN — ACETAMINOPHEN 650 MG: 325 TABLET ORAL at 16:41

## 2021-01-01 RX ADMIN — TICAGRELOR 90 MG: 90 TABLET ORAL at 09:22

## 2021-01-01 RX ADMIN — PANTOPRAZOLE SODIUM 40 MG: 40 TABLET, DELAYED RELEASE ORAL at 09:24

## 2021-01-01 RX ADMIN — TICAGRELOR 90 MG: 90 TABLET ORAL at 09:06

## 2021-01-01 RX ADMIN — PANTOPRAZOLE SODIUM 40 MG: 40 TABLET, DELAYED RELEASE ORAL at 09:07

## 2021-01-01 RX ADMIN — Medication 10 ML: at 21:44

## 2021-01-01 RX ADMIN — LEVOTHYROXINE SODIUM 125 MCG: 0.12 TABLET ORAL at 05:26

## 2021-01-01 RX ADMIN — SODIUM CHLORIDE 50 ML/HR: 9 INJECTION, SOLUTION INTRAVENOUS at 08:39

## 2021-01-01 RX ADMIN — DUTASTERIDE 0.5 MG: 0.5 CAPSULE, LIQUID FILLED ORAL at 08:46

## 2021-01-01 RX ADMIN — Medication 10 ML: at 21:20

## 2021-01-01 RX ADMIN — TICAGRELOR 90 MG: 90 TABLET ORAL at 08:49

## 2021-01-01 RX ADMIN — Medication 10 ML: at 13:09

## 2021-01-01 RX ADMIN — TAMSULOSIN HYDROCHLORIDE 0.4 MG: 0.4 CAPSULE ORAL at 17:48

## 2021-01-01 RX ADMIN — LATANOPROST 1 DROP: 50 SOLUTION OPHTHALMIC at 21:02

## 2021-01-01 RX ADMIN — ACETAMINOPHEN 650 MG: 325 TABLET ORAL at 05:26

## 2021-01-01 RX ADMIN — SODIUM CHLORIDE 50 ML/HR: 9 INJECTION, SOLUTION INTRAVENOUS at 21:42

## 2021-01-01 RX ADMIN — LEVOTHYROXINE SODIUM 125 MCG: 0.12 TABLET ORAL at 06:17

## 2021-01-01 RX ADMIN — ATORVASTATIN CALCIUM 10 MG: 10 TABLET, FILM COATED ORAL at 21:03

## 2021-01-01 RX ADMIN — TICAGRELOR 90 MG: 90 TABLET ORAL at 20:39

## 2021-01-01 RX ADMIN — CEFAZOLIN 2 G: 1 INJECTION, POWDER, FOR SOLUTION INTRAMUSCULAR; INTRAVENOUS; PARENTERAL at 08:42

## 2021-01-01 RX ADMIN — FINASTERIDE 5 MG: 5 TABLET, FILM COATED ORAL at 09:07

## 2021-01-01 RX ADMIN — PANTOPRAZOLE SODIUM 40 MG: 40 TABLET, DELAYED RELEASE ORAL at 08:45

## 2021-01-01 RX ADMIN — TAMSULOSIN HYDROCHLORIDE 0.4 MG: 0.4 CAPSULE ORAL at 09:03

## 2021-01-01 RX ADMIN — PIPERACILLIN AND TAZOBACTAM 3.38 G: 3; .375 INJECTION, POWDER, LYOPHILIZED, FOR SOLUTION INTRAVENOUS at 22:15

## 2021-01-01 RX ADMIN — PANTOPRAZOLE SODIUM 40 MG: 40 TABLET, DELAYED RELEASE ORAL at 08:34

## 2021-01-01 RX ADMIN — Medication 10 ML: at 21:15

## 2021-01-01 RX ADMIN — TAMSULOSIN HYDROCHLORIDE 0.4 MG: 0.4 CAPSULE ORAL at 09:10

## 2021-01-01 RX ADMIN — SODIUM CHLORIDE 50 ML/HR: 3 INJECTION, SOLUTION INTRAVENOUS at 10:22

## 2021-01-01 RX ADMIN — Medication 10 ML: at 00:52

## 2021-01-01 RX ADMIN — FINASTERIDE 5 MG: 5 TABLET, FILM COATED ORAL at 08:27

## 2021-01-01 RX ADMIN — BRIMONIDINE TARTRATE 1 DROP: 2 SOLUTION OPHTHALMIC at 08:47

## 2021-01-01 RX ADMIN — ASPIRIN 81 MG: 81 TABLET, COATED ORAL at 09:25

## 2021-01-01 RX ADMIN — Medication 10 ML: at 06:18

## 2021-01-01 RX ADMIN — PIPERACILLIN AND TAZOBACTAM 3.38 G: 3; .375 INJECTION, POWDER, LYOPHILIZED, FOR SOLUTION INTRAVENOUS at 05:44

## 2021-01-01 RX ADMIN — TICAGRELOR 90 MG: 90 TABLET ORAL at 21:04

## 2021-01-01 RX ADMIN — LATANOPROST 1 DROP: 50 SOLUTION OPHTHALMIC at 21:15

## 2021-01-01 RX ADMIN — Medication 10 ML: at 21:03

## 2021-01-01 RX ADMIN — ATORVASTATIN CALCIUM 10 MG: 10 TABLET, FILM COATED ORAL at 22:10

## 2021-01-01 RX ADMIN — ASPIRIN 81 MG: 81 TABLET, COATED ORAL at 08:28

## 2021-01-01 RX ADMIN — BRIMONIDINE TARTRATE 1 DROP: 2 SOLUTION OPHTHALMIC at 09:23

## 2021-01-01 RX ADMIN — Medication 10 ML: at 21:17

## 2021-01-01 RX ADMIN — DOXYCYCLINE HYCLATE 100 MG: 100 TABLET, COATED ORAL at 21:04

## 2021-01-01 RX ADMIN — BRIMONIDINE TARTRATE 1 DROP: 2 SOLUTION OPHTHALMIC at 17:50

## 2021-01-01 RX ADMIN — LEVOTHYROXINE SODIUM 125 MCG: 0.12 TABLET ORAL at 05:24

## 2021-01-01 RX ADMIN — DORZOLAMIDE HYDROCHLORIDE AND TIMOLOL MALEATE 1 DROP: 20; 5 SOLUTION/ DROPS OPHTHALMIC at 09:11

## 2021-01-01 RX ADMIN — CITALOPRAM HYDROBROMIDE 40 MG: 20 TABLET ORAL at 09:21

## 2021-01-01 RX ADMIN — BRIMONIDINE TARTRATE 1 DROP: 2 SOLUTION OPHTHALMIC at 08:01

## 2021-01-01 RX ADMIN — ATORVASTATIN CALCIUM 10 MG: 10 TABLET, FILM COATED ORAL at 21:37

## 2021-01-01 RX ADMIN — DEXTROSE MONOHYDRATE 150 ML/HR: 50 INJECTION, SOLUTION INTRAVENOUS at 10:46

## 2021-01-01 RX ADMIN — CITALOPRAM HYDROBROMIDE 40 MG: 20 TABLET ORAL at 08:46

## 2021-01-01 RX ADMIN — DOXYCYCLINE HYCLATE 100 MG: 100 TABLET, COATED ORAL at 09:26

## 2021-01-01 RX ADMIN — TICAGRELOR 90 MG: 90 TABLET ORAL at 08:52

## 2021-01-01 RX ADMIN — LEVOTHYROXINE SODIUM 125 MCG: 0.12 TABLET ORAL at 05:46

## 2021-01-01 RX ADMIN — CYANOCOBALAMIN TAB 500 MCG 1000 MCG: 500 TAB at 09:10

## 2021-01-01 RX ADMIN — OXYCODONE 5 MG: 5 TABLET ORAL at 00:20

## 2021-01-01 RX ADMIN — ASPIRIN 81 MG: 81 TABLET, COATED ORAL at 09:21

## 2021-01-01 RX ADMIN — SODIUM CHLORIDE 50 ML/HR: 3 INJECTION, SOLUTION INTRAVENOUS at 13:24

## 2021-01-01 RX ADMIN — PIPERACILLIN AND TAZOBACTAM 3.38 G: 3; .375 INJECTION, POWDER, LYOPHILIZED, FOR SOLUTION INTRAVENOUS at 05:24

## 2021-01-01 RX ADMIN — ENOXAPARIN SODIUM 40 MG: 40 INJECTION SUBCUTANEOUS at 08:52

## 2021-01-01 RX ADMIN — LEVOTHYROXINE SODIUM 125 MCG: 0.12 TABLET ORAL at 06:28

## 2021-01-01 RX ADMIN — ASPIRIN 81 MG: 81 TABLET, COATED ORAL at 14:45

## 2021-01-01 RX ADMIN — LEVOTHYROXINE SODIUM 125 MCG: 0.12 TABLET ORAL at 06:36

## 2021-01-01 RX ADMIN — DOXYCYCLINE HYCLATE 100 MG: 100 TABLET, COATED ORAL at 09:03

## 2021-01-01 RX ADMIN — DOXYCYCLINE HYCLATE 100 MG: 100 TABLET, COATED ORAL at 09:23

## 2021-01-01 RX ADMIN — TICAGRELOR 90 MG: 90 TABLET ORAL at 20:32

## 2021-01-01 RX ADMIN — PIPERACILLIN AND TAZOBACTAM 3.38 G: 3; .375 INJECTION, POWDER, LYOPHILIZED, FOR SOLUTION INTRAVENOUS at 13:36

## 2021-01-01 RX ADMIN — METOPROLOL SUCCINATE 25 MG: 25 TABLET, EXTENDED RELEASE ORAL at 08:49

## 2021-01-01 RX ADMIN — CITALOPRAM HYDROBROMIDE 40 MG: 20 TABLET ORAL at 12:24

## 2021-01-01 RX ADMIN — TAMSULOSIN HYDROCHLORIDE 0.4 MG: 0.4 CAPSULE ORAL at 08:31

## 2021-01-01 RX ADMIN — DOXYCYCLINE HYCLATE 100 MG: 100 TABLET, COATED ORAL at 14:45

## 2021-01-01 RX ADMIN — TICAGRELOR 90 MG: 90 TABLET ORAL at 10:18

## 2021-01-01 RX ADMIN — DORZOLAMIDE HYDROCHLORIDE AND TIMOLOL MALEATE 1 DROP: 20; 5 SOLUTION/ DROPS OPHTHALMIC at 08:52

## 2021-01-01 RX ADMIN — LEVOTHYROXINE SODIUM 125 MCG: 0.12 TABLET ORAL at 06:23

## 2021-01-01 RX ADMIN — CITALOPRAM HYDROBROMIDE 40 MG: 20 TABLET ORAL at 09:25

## 2021-01-01 RX ADMIN — ACETAMINOPHEN 650 MG: 325 TABLET ORAL at 18:08

## 2021-01-01 RX ADMIN — METOPROLOL SUCCINATE 25 MG: 25 TABLET, EXTENDED RELEASE ORAL at 09:10

## 2021-01-01 RX ADMIN — Medication 10 ML: at 06:28

## 2021-01-01 RX ADMIN — SODIUM CHLORIDE 250 ML: 9 INJECTION, SOLUTION INTRAVENOUS at 00:25

## 2021-01-01 RX ADMIN — Medication 10 ML: at 06:00

## 2021-01-01 RX ADMIN — PANTOPRAZOLE SODIUM 40 MG: 40 TABLET, DELAYED RELEASE ORAL at 07:36

## 2021-01-01 RX ADMIN — CITALOPRAM HYDROBROMIDE 40 MG: 20 TABLET ORAL at 08:00

## 2021-01-01 RX ADMIN — TAMSULOSIN HYDROCHLORIDE 0.4 MG: 0.4 CAPSULE ORAL at 17:44

## 2021-01-01 RX ADMIN — ACETAMINOPHEN 650 MG: 325 TABLET ORAL at 07:36

## 2021-01-01 RX ADMIN — Medication 10 ML: at 06:23

## 2021-01-01 RX ADMIN — BRIMONIDINE TARTRATE 1 DROP: 2 SOLUTION OPHTHALMIC at 08:53

## 2021-01-01 RX ADMIN — TAMSULOSIN HYDROCHLORIDE 0.4 MG: 0.4 CAPSULE ORAL at 17:43

## 2021-01-01 RX ADMIN — TAMSULOSIN HYDROCHLORIDE 0.4 MG: 0.4 CAPSULE ORAL at 08:52

## 2021-01-01 RX ADMIN — CITALOPRAM HYDROBROMIDE 40 MG: 20 TABLET ORAL at 09:00

## 2021-01-01 RX ADMIN — ATORVASTATIN CALCIUM 10 MG: 10 TABLET, FILM COATED ORAL at 21:17

## 2021-01-01 RX ADMIN — CYANOCOBALAMIN TAB 500 MCG 1000 MCG: 500 TAB at 08:00

## 2021-01-01 RX ADMIN — BRIMONIDINE TARTRATE 1 DROP: 2 SOLUTION OPHTHALMIC at 09:24

## 2021-01-01 RX ADMIN — DOXYCYCLINE HYCLATE 100 MG: 100 TABLET, COATED ORAL at 21:43

## 2021-01-01 RX ADMIN — MAGNESIUM SULFATE HEPTAHYDRATE 2 G: 40 INJECTION, SOLUTION INTRAVENOUS at 20:32

## 2021-01-01 RX ADMIN — FUROSEMIDE 20 MG: 20 TABLET ORAL at 09:22

## 2021-01-01 RX ADMIN — Medication 10 ML: at 21:05

## 2021-01-01 RX ADMIN — TAMSULOSIN HYDROCHLORIDE 0.4 MG: 0.4 CAPSULE ORAL at 17:04

## 2021-01-01 RX ADMIN — ACETAMINOPHEN 650 MG: 325 TABLET ORAL at 00:51

## 2021-01-01 RX ADMIN — TAMSULOSIN HYDROCHLORIDE 0.4 MG: 0.4 CAPSULE ORAL at 21:37

## 2021-01-01 RX ADMIN — TICAGRELOR 90 MG: 90 TABLET ORAL at 09:26

## 2021-01-01 RX ADMIN — SODIUM CHLORIDE 100 ML/HR: 9 INJECTION, SOLUTION INTRAVENOUS at 08:52

## 2021-01-01 RX ADMIN — TICAGRELOR 90 MG: 90 TABLET ORAL at 08:31

## 2021-01-01 RX ADMIN — DORZOLAMIDE HYDROCHLORIDE AND TIMOLOL MALEATE 1 DROP: 20; 5 SOLUTION/ DROPS OPHTHALMIC at 09:23

## 2021-01-01 RX ADMIN — LATANOPROST 1 DROP: 50 SOLUTION OPHTHALMIC at 21:48

## 2021-01-01 RX ADMIN — LEVOTHYROXINE SODIUM 125 MCG: 0.12 TABLET ORAL at 05:43

## 2021-01-01 RX ADMIN — BRIMONIDINE TARTRATE 1 DROP: 2 SOLUTION OPHTHALMIC at 09:27

## 2021-01-01 RX ADMIN — BRIMONIDINE TARTRATE 1 DROP: 2 SOLUTION OPHTHALMIC at 09:29

## 2021-01-01 RX ADMIN — PANTOPRAZOLE SODIUM 40 MG: 40 TABLET, DELAYED RELEASE ORAL at 06:36

## 2021-01-01 RX ADMIN — BRIMONIDINE TARTRATE 1 DROP: 2 SOLUTION OPHTHALMIC at 17:13

## 2021-01-01 RX ADMIN — AMLODIPINE BESYLATE 2.5 MG: 2.5 TABLET ORAL at 14:45

## 2021-01-01 RX ADMIN — PHENYLEPHRINE HYDROCHLORIDE 40 MCG/MIN: 10 INJECTION INTRAVENOUS at 08:49

## 2021-01-01 RX ADMIN — TICAGRELOR 90 MG: 90 TABLET ORAL at 09:10

## 2021-01-01 RX ADMIN — FUROSEMIDE 20 MG: 20 TABLET ORAL at 09:23

## 2021-01-01 RX ADMIN — PROPOFOL 40 MG: 10 INJECTION, EMULSION INTRAVENOUS at 08:38

## 2021-01-01 RX ADMIN — TAMSULOSIN HYDROCHLORIDE 0.4 MG: 0.4 CAPSULE ORAL at 17:26

## 2021-01-01 RX ADMIN — ASPIRIN 81 MG: 81 TABLET, COATED ORAL at 08:31

## 2021-01-01 RX ADMIN — BRIMONIDINE TARTRATE 1 DROP: 2 SOLUTION OPHTHALMIC at 17:39

## 2021-01-01 RX ADMIN — PANTOPRAZOLE SODIUM 40 MG: 40 TABLET, DELAYED RELEASE ORAL at 10:18

## 2021-01-01 RX ADMIN — MIDODRINE HYDROCHLORIDE 5 MG: 5 TABLET ORAL at 09:02

## 2021-01-01 RX ADMIN — ACETAMINOPHEN 650 MG: 325 TABLET ORAL at 00:06

## 2021-01-01 RX ADMIN — TICAGRELOR 90 MG: 90 TABLET ORAL at 21:22

## 2021-01-01 RX ADMIN — ACETAMINOPHEN 650 MG: 325 TABLET ORAL at 00:57

## 2021-01-01 RX ADMIN — CYANOCOBALAMIN TAB 500 MCG 1000 MCG: 500 TAB at 08:45

## 2021-01-01 RX ADMIN — BRIMONIDINE TARTRATE 1 DROP: 2 SOLUTION OPHTHALMIC at 17:44

## 2021-01-01 RX ADMIN — FINASTERIDE 5 MG: 5 TABLET, FILM COATED ORAL at 10:17

## 2021-01-01 RX ADMIN — ACETAMINOPHEN 650 MG: 325 TABLET ORAL at 11:43

## 2021-01-01 RX ADMIN — CYANOCOBALAMIN TAB 500 MCG 1000 MCG: 500 TAB at 08:34

## 2021-01-01 RX ADMIN — POTASSIUM CHLORIDE 40 MEQ: 750 TABLET, FILM COATED, EXTENDED RELEASE ORAL at 12:58

## 2021-01-01 RX ADMIN — ATORVASTATIN CALCIUM 10 MG: 10 TABLET, FILM COATED ORAL at 21:15

## 2021-01-01 RX ADMIN — PIPERACILLIN AND TAZOBACTAM 3.38 G: 3; .375 INJECTION, POWDER, LYOPHILIZED, FOR SOLUTION INTRAVENOUS at 21:52

## 2021-01-01 RX ADMIN — PANTOPRAZOLE SODIUM 40 MG: 40 TABLET, DELAYED RELEASE ORAL at 09:25

## 2021-01-01 RX ADMIN — BRIMONIDINE TARTRATE 1 DROP: 2 SOLUTION OPHTHALMIC at 18:44

## 2021-01-01 RX ADMIN — BRIMONIDINE TARTRATE 1 DROP: 2 SOLUTION OPHTHALMIC at 12:24

## 2021-01-01 RX ADMIN — Medication 10 ML: at 05:46

## 2021-01-01 RX ADMIN — LEVOTHYROXINE SODIUM 125 MCG: 0.12 TABLET ORAL at 06:00

## 2021-01-01 RX ADMIN — SODIUM CHLORIDE 500 ML: 9 INJECTION, SOLUTION INTRAVENOUS at 20:32

## 2021-01-01 RX ADMIN — METOPROLOL SUCCINATE 25 MG: 25 TABLET, EXTENDED RELEASE ORAL at 08:28

## 2021-01-01 RX ADMIN — CYANOCOBALAMIN TAB 500 MCG 1000 MCG: 500 TAB at 08:31

## 2021-01-01 RX ADMIN — LATANOPROST 1 DROP: 50 SOLUTION OPHTHALMIC at 21:05

## 2021-01-01 RX ADMIN — Medication 10 ML: at 14:06

## 2021-01-01 RX ADMIN — ATORVASTATIN CALCIUM 10 MG: 10 TABLET, FILM COATED ORAL at 21:16

## 2021-01-01 RX ADMIN — Medication 10 ML: at 22:01

## 2021-01-01 RX ADMIN — DORZOLAMIDE HYDROCHLORIDE AND TIMOLOL MALEATE 1 DROP: 20; 5 SOLUTION/ DROPS OPHTHALMIC at 08:28

## 2021-01-01 RX ADMIN — ENOXAPARIN SODIUM 40 MG: 40 INJECTION SUBCUTANEOUS at 08:31

## 2021-01-01 RX ADMIN — CYANOCOBALAMIN TAB 500 MCG 1000 MCG: 500 TAB at 09:05

## 2021-01-01 RX ADMIN — PANTOPRAZOLE SODIUM 40 MG: 40 TABLET, DELAYED RELEASE ORAL at 09:10

## 2021-01-01 RX ADMIN — PIPERACILLIN AND TAZOBACTAM 3.38 G: 3; .375 INJECTION, POWDER, LYOPHILIZED, FOR SOLUTION INTRAVENOUS at 20:20

## 2021-01-01 RX ADMIN — FUROSEMIDE 20 MG: 20 TABLET ORAL at 09:05

## 2021-01-01 RX ADMIN — DORZOLAMIDE HYDROCHLORIDE AND TIMOLOL MALEATE 1 DROP: 20; 5 SOLUTION/ DROPS OPHTHALMIC at 08:46

## 2021-01-01 RX ADMIN — Medication 10 ML: at 05:34

## 2021-01-01 RX ADMIN — ATORVASTATIN CALCIUM 10 MG: 10 TABLET, FILM COATED ORAL at 21:43

## 2021-01-01 RX ADMIN — LEVOTHYROXINE SODIUM 125 MCG: 0.12 TABLET ORAL at 05:34

## 2021-01-01 RX ADMIN — DORZOLAMIDE HYDROCHLORIDE AND TIMOLOL MALEATE 1 DROP: 20; 5 SOLUTION/ DROPS OPHTHALMIC at 08:32

## 2021-01-01 RX ADMIN — BRIMONIDINE TARTRATE 1 DROP: 2 SOLUTION OPHTHALMIC at 21:05

## 2021-01-01 RX ADMIN — ASPIRIN 81 MG: 81 TABLET, COATED ORAL at 08:00

## 2021-01-01 RX ADMIN — DESMOPRESSIN ACETATE 2 MCG: 4 SOLUTION INTRAVENOUS at 02:47

## 2021-01-01 RX ADMIN — TAMSULOSIN HYDROCHLORIDE 0.4 MG: 0.4 CAPSULE ORAL at 10:19

## 2021-01-01 RX ADMIN — TAMSULOSIN HYDROCHLORIDE 0.4 MG: 0.4 CAPSULE ORAL at 09:21

## 2021-01-01 RX ADMIN — DORZOLAMIDE HYDROCHLORIDE AND TIMOLOL MALEATE 1 DROP: 20; 5 SOLUTION/ DROPS OPHTHALMIC at 09:15

## 2021-01-01 RX ADMIN — Medication 10 ML: at 13:32

## 2021-01-01 RX ADMIN — AMLODIPINE BESYLATE 2.5 MG: 2.5 TABLET ORAL at 12:24

## 2021-01-01 RX ADMIN — BRIMONIDINE TARTRATE 1 DROP: 2 SOLUTION OPHTHALMIC at 17:05

## 2021-01-01 RX ADMIN — LATANOPROST 1 DROP: 50 SOLUTION OPHTHALMIC at 21:22

## 2021-01-01 RX ADMIN — DUTASTERIDE 0.5 MG: 0.5 CAPSULE, LIQUID FILLED ORAL at 08:00

## 2021-01-01 RX ADMIN — Medication 10 ML: at 14:46

## 2021-01-01 RX ADMIN — TICAGRELOR 90 MG: 90 TABLET ORAL at 09:23

## 2021-01-01 RX ADMIN — ENOXAPARIN SODIUM 40 MG: 40 INJECTION SUBCUTANEOUS at 09:20

## 2021-01-01 RX ADMIN — TICAGRELOR 90 MG: 90 TABLET ORAL at 08:34

## 2021-01-01 RX ADMIN — ATORVASTATIN CALCIUM 10 MG: 10 TABLET, FILM COATED ORAL at 21:22

## 2021-01-01 RX ADMIN — CYANOCOBALAMIN TAB 500 MCG 1000 MCG: 500 TAB at 08:52

## 2021-01-01 RX ADMIN — TICAGRELOR 90 MG: 90 TABLET ORAL at 20:21

## 2021-01-01 RX ADMIN — DOXYCYCLINE HYCLATE 100 MG: 100 TABLET, COATED ORAL at 20:49

## 2021-01-01 RX ADMIN — DUTASTERIDE 0.5 MG: 0.5 CAPSULE, LIQUID FILLED ORAL at 14:45

## 2021-01-01 RX ADMIN — TICAGRELOR 90 MG: 90 TABLET ORAL at 12:24

## 2021-01-01 RX ADMIN — PIPERACILLIN AND TAZOBACTAM 3.38 G: 3; .375 INJECTION, POWDER, LYOPHILIZED, FOR SOLUTION INTRAVENOUS at 21:19

## 2021-01-01 RX ADMIN — PIPERACILLIN AND TAZOBACTAM 3.38 G: 3; .375 INJECTION, POWDER, LYOPHILIZED, FOR SOLUTION INTRAVENOUS at 12:53

## 2021-01-01 RX ADMIN — PANTOPRAZOLE SODIUM 40 MG: 40 TABLET, DELAYED RELEASE ORAL at 08:31

## 2021-01-01 RX ADMIN — CYANOCOBALAMIN TAB 500 MCG 1000 MCG: 500 TAB at 08:49

## 2021-01-01 RX ADMIN — CYANOCOBALAMIN TAB 500 MCG 1000 MCG: 500 TAB at 08:27

## 2021-01-01 RX ADMIN — TAMSULOSIN HYDROCHLORIDE 0.4 MG: 0.4 CAPSULE ORAL at 08:46

## 2021-01-01 RX ADMIN — CYANOCOBALAMIN TAB 500 MCG 1000 MCG: 500 TAB at 09:21

## 2021-01-01 RX ADMIN — AMLODIPINE BESYLATE 2.5 MG: 2.5 TABLET ORAL at 09:24

## 2021-01-01 RX ADMIN — TICAGRELOR 90 MG: 90 TABLET ORAL at 21:17

## 2021-01-01 RX ADMIN — METOPROLOL SUCCINATE 25 MG: 25 TABLET, EXTENDED RELEASE ORAL at 10:18

## 2021-01-01 RX ADMIN — ACETAMINOPHEN 650 MG: 325 TABLET ORAL at 17:01

## 2021-01-01 RX ADMIN — ATORVASTATIN CALCIUM 10 MG: 10 TABLET, FILM COATED ORAL at 21:02

## 2021-01-01 RX ADMIN — DUTASTERIDE 0.5 MG: 0.5 CAPSULE, LIQUID FILLED ORAL at 09:23

## 2021-01-01 RX ADMIN — TAMSULOSIN HYDROCHLORIDE 0.4 MG: 0.4 CAPSULE ORAL at 17:14

## 2021-01-01 RX ADMIN — TAMSULOSIN HYDROCHLORIDE 0.4 MG: 0.4 CAPSULE ORAL at 12:24

## 2021-01-01 RX ADMIN — TAMSULOSIN HYDROCHLORIDE 0.4 MG: 0.4 CAPSULE ORAL at 17:16

## 2021-01-01 RX ADMIN — PIPERACILLIN AND TAZOBACTAM 3.38 G: 3; .375 INJECTION, POWDER, LYOPHILIZED, FOR SOLUTION INTRAVENOUS at 12:56

## 2021-01-01 RX ADMIN — BRIMONIDINE TARTRATE 1 DROP: 2 SOLUTION OPHTHALMIC at 08:28

## 2021-01-01 RX ADMIN — LATANOPROST 1 DROP: 50 SOLUTION OPHTHALMIC at 22:10

## 2021-01-01 RX ADMIN — LATANOPROST 1 DROP: 50 SOLUTION OPHTHALMIC at 22:16

## 2021-01-01 RX ADMIN — TRAZODONE HYDROCHLORIDE 50 MG: 50 TABLET ORAL at 20:46

## 2021-01-01 RX ADMIN — ACETAMINOPHEN 650 MG: 325 TABLET ORAL at 11:48

## 2021-01-01 RX ADMIN — TAMSULOSIN HYDROCHLORIDE 0.4 MG: 0.4 CAPSULE ORAL at 17:10

## 2021-01-01 RX ADMIN — ATORVASTATIN CALCIUM 10 MG: 10 TABLET, FILM COATED ORAL at 22:14

## 2021-01-01 RX ADMIN — CITALOPRAM HYDROBROMIDE 40 MG: 20 TABLET ORAL at 09:22

## 2021-01-01 RX ADMIN — DESMOPRESSIN ACETATE 1 MCG: 4 SOLUTION INTRAVENOUS at 10:46

## 2021-01-01 RX ADMIN — BRIMONIDINE TARTRATE 1 DROP: 2 SOLUTION OPHTHALMIC at 09:11

## 2021-01-01 RX ADMIN — CYANOCOBALAMIN TAB 500 MCG 1000 MCG: 500 TAB at 12:24

## 2021-01-01 RX ADMIN — Medication 10 ML: at 13:17

## 2021-01-01 RX ADMIN — BRIMONIDINE TARTRATE 1 DROP: 2 SOLUTION OPHTHALMIC at 21:19

## 2021-01-01 RX ADMIN — ACETAMINOPHEN 650 MG: 325 TABLET ORAL at 23:58

## 2021-01-01 RX ADMIN — TAMSULOSIN HYDROCHLORIDE 0.4 MG: 0.4 CAPSULE ORAL at 09:23

## 2021-01-01 RX ADMIN — LATANOPROST 1 DROP: 50 SOLUTION OPHTHALMIC at 22:11

## 2021-01-01 RX ADMIN — PANTOPRAZOLE SODIUM 40 MG: 40 TABLET, DELAYED RELEASE ORAL at 14:45

## 2021-01-01 RX ADMIN — TICAGRELOR 90 MG: 90 TABLET ORAL at 08:27

## 2021-01-01 RX ADMIN — Medication 10 ML: at 21:40

## 2021-01-01 RX ADMIN — DORZOLAMIDE HYDROCHLORIDE AND TIMOLOL MALEATE 1 DROP: 20; 5 SOLUTION/ DROPS OPHTHALMIC at 14:22

## 2021-01-01 RX ADMIN — DORZOLAMIDE HYDROCHLORIDE AND TIMOLOL MALEATE 1 DROP: 20; 5 SOLUTION/ DROPS OPHTHALMIC at 09:12

## 2021-01-01 RX ADMIN — MIDODRINE HYDROCHLORIDE 5 MG: 5 TABLET ORAL at 12:16

## 2021-01-01 RX ADMIN — POTASSIUM BICARBONATE 40 MEQ: 782 TABLET, EFFERVESCENT ORAL at 17:04

## 2021-01-01 RX ADMIN — DUTASTERIDE 0.5 MG: 0.5 CAPSULE, LIQUID FILLED ORAL at 17:48

## 2021-01-01 RX ADMIN — LIDOCAINE HYDROCHLORIDE 40 MG: 20 INJECTION, SOLUTION EPIDURAL; INFILTRATION; INTRACAUDAL; PERINEURAL at 08:48

## 2021-01-01 RX ADMIN — Medication 10 ML: at 22:14

## 2021-01-01 RX ADMIN — TICAGRELOR 90 MG: 90 TABLET ORAL at 09:00

## 2021-01-01 RX ADMIN — Medication 10 ML: at 05:02

## 2021-01-01 RX ADMIN — BETAMETHASONE SODIUM PHOSPHATE AND BETAMETHASONE ACETATE 6 MG: 3; 3 INJECTION, SUSPENSION INTRA-ARTICULAR; INTRALESIONAL; INTRAMUSCULAR at 12:30

## 2021-01-01 RX ADMIN — TAMSULOSIN HYDROCHLORIDE 0.4 MG: 0.4 CAPSULE ORAL at 17:38

## 2021-01-01 RX ADMIN — ASPIRIN 81 MG: 81 TABLET, COATED ORAL at 08:34

## 2021-01-01 RX ADMIN — ASPIRIN 81 MG: 81 TABLET, COATED ORAL at 09:06

## 2021-01-01 RX ADMIN — LATANOPROST 1 DROP: 50 SOLUTION OPHTHALMIC at 21:47

## 2021-01-01 RX ADMIN — Medication 10 ML: at 21:58

## 2021-01-01 RX ADMIN — Medication 10 ML: at 21:01

## 2021-01-01 RX ADMIN — TICAGRELOR 90 MG: 90 TABLET ORAL at 21:43

## 2021-01-01 RX ADMIN — AMLODIPINE BESYLATE 2.5 MG: 2.5 TABLET ORAL at 08:46

## 2021-01-01 RX ADMIN — ATORVASTATIN CALCIUM 10 MG: 10 TABLET, FILM COATED ORAL at 21:46

## 2021-01-01 RX ADMIN — ACETAMINOPHEN 650 MG: 325 TABLET ORAL at 06:28

## 2021-01-01 RX ADMIN — TRAZODONE HYDROCHLORIDE 50 MG: 50 TABLET ORAL at 22:01

## 2021-01-01 RX ADMIN — DORZOLAMIDE HYDROCHLORIDE AND TIMOLOL MALEATE 1 DROP: 20; 5 SOLUTION/ DROPS OPHTHALMIC at 08:01

## 2021-01-01 RX ADMIN — BRIMONIDINE TARTRATE 1 DROP: 2 SOLUTION OPHTHALMIC at 09:12

## 2021-01-01 RX ADMIN — CYANOCOBALAMIN TAB 500 MCG 1000 MCG: 500 TAB at 09:23

## 2021-01-01 RX ADMIN — TICAGRELOR 90 MG: 90 TABLET ORAL at 19:21

## 2021-01-01 RX ADMIN — ZIPRASIDONE HYDROCHLORIDE 20 MG: 20 CAPSULE ORAL at 17:43

## 2021-01-01 RX ADMIN — Medication 10 ML: at 05:11

## 2021-01-01 RX ADMIN — PROPOFOL 50 MCG/KG/MIN: 10 INJECTION, EMULSION INTRAVENOUS at 08:38

## 2021-01-01 RX ADMIN — TICAGRELOR 90 MG: 90 TABLET ORAL at 21:16

## 2021-01-01 RX ADMIN — DORZOLAMIDE HYDROCHLORIDE AND TIMOLOL MALEATE 1 DROP: 20; 5 SOLUTION/ DROPS OPHTHALMIC at 12:24

## 2021-01-01 RX ADMIN — DUTASTERIDE 0.5 MG: 0.5 CAPSULE, LIQUID FILLED ORAL at 09:26

## 2021-01-01 RX ADMIN — DOXYCYCLINE HYCLATE 100 MG: 100 TABLET, COATED ORAL at 21:17

## 2021-01-01 RX ADMIN — FUROSEMIDE 20 MG: 20 TABLET ORAL at 08:00

## 2021-01-01 RX ADMIN — PIPERACILLIN AND TAZOBACTAM 3.38 G: 3; .375 INJECTION, POWDER, LYOPHILIZED, FOR SOLUTION INTRAVENOUS at 05:02

## 2021-01-01 RX ADMIN — ACETAMINOPHEN 650 MG: 325 TABLET ORAL at 17:10

## 2021-01-01 RX ADMIN — TAMSULOSIN HYDROCHLORIDE 0.4 MG: 0.4 CAPSULE ORAL at 17:01

## 2021-01-01 RX ADMIN — TAMSULOSIN HYDROCHLORIDE 0.4 MG: 0.4 CAPSULE ORAL at 08:34

## 2021-01-01 RX ADMIN — ACETAMINOPHEN 975 MG: 325 TABLET ORAL at 10:06

## 2021-01-01 RX ADMIN — ACETAMINOPHEN 650 MG: 325 TABLET ORAL at 13:31

## 2021-01-01 RX ADMIN — ZIPRASIDONE HYDROCHLORIDE 20 MG: 20 CAPSULE ORAL at 14:13

## 2021-01-01 RX ADMIN — DORZOLAMIDE HYDROCHLORIDE AND TIMOLOL MALEATE 1 DROP: 20; 5 SOLUTION/ DROPS OPHTHALMIC at 09:26

## 2021-01-01 RX ADMIN — PANTOPRAZOLE SODIUM 40 MG: 40 TABLET, DELAYED RELEASE ORAL at 08:52

## 2021-01-01 RX ADMIN — TAMSULOSIN HYDROCHLORIDE 0.4 MG: 0.4 CAPSULE ORAL at 18:22

## 2021-01-01 RX ADMIN — PIPERACILLIN AND TAZOBACTAM 3.38 G: 3; .375 INJECTION, POWDER, LYOPHILIZED, FOR SOLUTION INTRAVENOUS at 06:23

## 2021-01-01 RX ADMIN — LATANOPROST 1 DROP: 50 SOLUTION OPHTHALMIC at 21:19

## 2021-01-01 RX ADMIN — FUROSEMIDE 20 MG: 20 TABLET ORAL at 14:45

## 2021-01-01 RX ADMIN — ACETAMINOPHEN 650 MG: 325 TABLET ORAL at 06:17

## 2021-01-01 RX ADMIN — ENOXAPARIN SODIUM 40 MG: 40 INJECTION SUBCUTANEOUS at 08:46

## 2021-01-01 RX ADMIN — Medication 10 ML: at 11:43

## 2021-01-01 RX ADMIN — TAMSULOSIN HYDROCHLORIDE 0.4 MG: 0.4 CAPSULE ORAL at 18:43

## 2021-01-01 RX ADMIN — PHENOL 1 SPRAY: 1.5 LIQUID ORAL at 11:43

## 2021-01-01 RX ADMIN — BRIMONIDINE TARTRATE 1 DROP: 2 SOLUTION OPHTHALMIC at 21:38

## 2021-01-01 RX ADMIN — FUROSEMIDE 20 MG: 20 TABLET ORAL at 12:24

## 2021-01-01 RX ADMIN — PANTOPRAZOLE SODIUM 40 MG: 40 TABLET, DELAYED RELEASE ORAL at 08:49

## 2021-01-20 NOTE — ANESTHESIA PREPROCEDURE EVALUATION
Relevant Problems NEUROLOGY  
(+) TIA (transient ischemic attack) CARDIOVASCULAR  
(+) CAD (coronary artery disease) ENDOCRINE  
(+) Hypothyroidism, postsurgical  
 
 
Anesthetic History No history of anesthetic complications Review of Systems / Medical History Patient summary reviewed, nursing notes reviewed and pertinent labs reviewed Pulmonary Within defined limits Neuro/Psych CVA Psychiatric history Comments: Anxiety disorder Depression Neuropathy Cardiovascular Hypertension Dysrhythmias : PVC 
CAD, CABG and hyperlipidemia Exercise tolerance: <4 METS Comments: \"Skipped beats\" CABG x 3 (1996) AVR 2015 Echo 4/28/19: Estimated left ventricular ejection fraction is 46 - 50% LBBB 1st degree AV block GI/Hepatic/Renal 
  
 
 
Renal disease: stones Endo/Other Diabetes Hypothyroidism Arthritis and cancer Other Findings Comments: Hearing loss  
prostate ca Movement disorder Vertigo Physical Exam 
 
Airway Mallampati: II 
 
Neck ROM: normal range of motion Mouth opening: Normal 
 
 Cardiovascular Regular rate and rhythm,  S1 and S2 normal,  no murmur, click, rub, or gallop Dental 
 
Dentition: Full upper dentures and Lower partial plate Pulmonary Breath sounds clear to auscultation Abdominal 
GI exam deferred Other Findings Anesthetic Plan ASA: 3 Anesthesia type: total IV anesthesia Induction: Intravenous Anesthetic plan and risks discussed with: Patient

## 2021-01-20 NOTE — ANESTHESIA POSTPROCEDURE EVALUATION
Procedure(s): ESOPHAGOGASTRODUODENOSCOPY (EGD) ESOPHAGOGASTRODUODENAL (EGD) BIOPSY 
ESOPHAGEAL DILATION. total IV anesthesia Anesthesia Post Evaluation Patient location during evaluation: PACU Note status: Adequate. Level of consciousness: responsive to verbal stimuli and sleepy but conscious Pain management: satisfactory to patient Airway patency: patent Anesthetic complications: no 
Cardiovascular status: acceptable Respiratory status: acceptable Hydration status: acceptable Comments: +Post-Anesthesia Evaluation and Assessment Patient: Nitesh Cronin MRN: [id number]  SSN: [id number] YOB: 1928  Age: 80 y.o. Sex: male Cardiovascular Function/Vital Signs BP (!) 188/58   Pulse 65   Temp 37.1 °C (98.8 °F)   Resp 18   Ht 5' 8\" (1.727 m)   Wt 91.6 kg (201 lb 14.4 oz)   SpO2 97%   BMI 30.70 kg/m² Patient is status post Procedure(s): ESOPHAGOGASTRODUODENOSCOPY (EGD) ESOPHAGOGASTRODUODENAL (EGD) BIOPSY 
ESOPHAGEAL DILATION. Nausea/Vomiting: Controlled. Postoperative hydration reviewed and adequate. Pain: 
Pain Scale 1: Numeric (0 - 10) (01/20/21 0831) Pain Intensity 1: 0 (01/20/21 0831) Managed. Neurological Status: At baseline. Mental Status and Level of Consciousness: Arousable. Pulmonary Status:  
O2 Device: Room air (01/20/21 0908) Adequate oxygenation and airway patent. Complications related to anesthesia: None Post-anesthesia assessment completed. No concerns. Signed By: Elsi Hill MD  
 1/20/2021 Post anesthesia nausea and vomiting:  controlled INITIAL Post-op Vital signs:  
Vitals Value Taken Time /58 01/20/21 0908 Temp Pulse 65 01/20/21 0908 Resp 18 01/20/21 0908 SpO2 97 % 01/20/21 0908

## 2021-01-20 NOTE — DISCHARGE INSTRUCTIONS
Sharyle Mais  [id number]  2/19/1928    EGD DISCHARGE INSTRUCTIONS  Discomfort:  Sore throat- throat lozenges or warm salt water gargle  redness at IV site- apply warm compress to area; if redness or soreness persist- contact your physician  Gaseous discomfort- walking, belching will help relieve any discomfort  You may not operate a vehicle for 12 hours  You may not engage in an occupation involving machinery or appliances for rest of today. You may not drink alcoholic beverages for at least 12 hours  Avoid making any critical decisions for at least 24 hour  DIET  You may resume your regular diet - however -  remember your colon is empty and a heavy meal will produce gas. Avoid these foods:  vegetables, fried / greasy foods, carbonated drinks  MEDICATIONS        Regarding Aspirin or Nonsteroidal medications specifically, please see below. ACTIVITY  You may resume your normal daily activities. Spend the remainder of the day resting -  avoid any strenuous activity. CALL M.D. ANY SIGN OF   Increasing pain, nausea, vomiting  Abdominal distension (swelling)  New increased bleeding (oral or rectal)  Fever (chills)  Pain in chest area  Bloody discharge from nose or mouth  Shortness of breath    ONLY  Tylenol as needed for pain. Follow-up Instructions:   Call Dr. Lola Mosley for results of procedure / biopsy in 4-5 days at telephone #  [phone]              Start on Omeprazole 20 mg Twice Daily for erosive reflux.                Make a follow up visit with Dr. Lola Mosley' office              Ephraim Wagner in 3 days

## 2021-01-20 NOTE — PROCEDURES
Esophagogastroduodenoscopy Procedure Note Bonnie Valverde 2/19/1928 
[id number] Indication:  Dysphagia Endoscopist: Robbin Hall MD 
 
Referring Provider:  Storm Orona MD 
 
Sedation:  MAC anesthesia Propofol Procedure Details: After infomed consent was obtained for the procedure, with all risks and benefits of procedure explained the patient was taken to the endoscopy suite and placed in the left lateral decubitus position. Following sequential administration of sedation as per above, the endoscope was inserted into the mouth and advanced under direct vision to second portion of the duodenum. A careful inspection was made as the gastroscope was withdrawn, including a retroflexed view of the proximal stomach; findings and interventions are described below. Findings:  
 
Esophagus:  
+ There was a focal narrowing at the UES / esophageal inlet c/w Esophageal Web. + Linear ulceration extending proximally from the EG junction for 6-7 cm, 2 ulcers c/w LA Grade B/C Erosive Esophagitis s/p Bx of mid and distal esophagus + Friability with oozing at the EG junction at 36 cm from incisors s/p Bx 
+ S/P Dilation with 54 FR Savary over wire 
+ Medium sized 5 cm Hiatal Hernia Stomach: + Mild antral erythema s/p Bx c/w nonerosive gastritis Duodenum: - The bulb and post bulbar mucosa is normal in appearance to the second portion. The duodenal folds appeared normal.  Cold forceps biopsies to r/o celiac. Therapies:   
esophageal dilation with savary sizes 54 FR 
biopsy of esophagus 
biopsy of stomach antrum 
biopsy of duodenal distal bulb, second portion Specimen: Specimens were collected as described and send to the laboratory. Complications:   None were encountered during the procedure. EBL: < 10 ml. Recommendations:  
-f/u path 
-start omeprazole 20 mg BID 
-elevate HOB, GERD diet 
-f/u with Dr. Jayy Hall MD 
 1/20/2021  9:10 AM

## 2021-01-20 NOTE — H&P
Gastroenterology Outpatient History and Physical 
 
Patient: Anabella Smith Physician: Davon Joseph MD 
 
Vital Signs: Blood pressure (!) 145/69, pulse (!) 102, temperature 98.8 °F (37.1 °C), resp. rate 14, height 5' 8\" (1.727 m), weight 91.6 kg (201 lb 14.4 oz), SpO2 97 %. Allergies: No Known Allergies Chief Complaint: Dysphagia History of Present Illness: 81 yo WM with chronic GERD and dysphagia for EGD with possible dilation. Justification for Procedure: above History: 
Past Medical History:  
Diagnosis Date  Anxiety disorder  Arthritis  CAD (coronary artery disease)  Calculus of kidney  Cancer [facility]) prostate    Depression  Hearing loss  Heart attack ([address]  Hypercholesterolemia  Hypertension  Incisional hernia 5/10/2011  Movement disorder  Neuropathy  Other ill-defined conditions(799.89)   
 elevated cholesterol  Other ill-defined conditions(799.89)   
 glaucoma  Other ill-defined conditions(799.89)   
 abd hernia  
 Skipped beats  Stroke ([address]   
 left arm tingling  Thyroid disease  Thyroid mass 5/10/2011  Vertigo Past Surgical History:  
Procedure Laterality Date  HX AORTIC VALVE REPLACEMENT    HX HEENT    
 thyroid biopsy  HX HEENT    
 thyroidectomy   HX ORCHIECTOMY  2017  HX ORTHOPAEDIC  2011  
 compression fracture d.t. fall (back),no surgery  HX THYROIDECTOMY  1/3/2013  [place] CARDIAC SURG PROCEDURE UNLIST    
 cabg x3  [place] PROSTATE BIOPSY, NEEDLE, SATURATION SAMPLING Social History Socioeconomic History  Marital status:  Spouse name: Not on file  Number of children: Not on file  Years of education: Not on file  Highest education level: Not on file Tobacco Use  Smoking status: Former Smoker Packs/day: 0.50 Years: 3.00 Pack years: 1.50 Quit date: 1958 Years since quittin.0  Smokeless tobacco: Never Used Substance and Sexual Activity  Alcohol use: No  
 Drug use: No  
  
Family History Problem Relation Age of Onset  Cancer Mother   
     stomach  
 Heart Disease Sister  Heart Disease Father  Cancer Brother  Diabetes Son   
 Heart Disease Son  Anesth Problems Neg Hx Medications:  
Prior to Admission medications Medication Sig Start Date End Date Taking? Authorizing Provider  
ergocalciferol (Vitamin D2) 1,250 mcg (50,000 unit) capsule Take 50,000 Units by mouth every seven (7) days. Yes Provider, Historical  
citalopram (CELEXA) 20 mg tablet TAKE 1 TABLET BY MOUTH EVERY DAY Patient taking differently: Take 40 mg by mouth daily. 8/22/19  Yes Skyler Fitzgerald MD  
aspirin delayed-release 81 mg tablet Take 81 mg by mouth daily. Yes Provider, Historical  
amLODIPine (NORVASC) 2.5 mg tablet Take 1 Tab by mouth daily. 5/3/19  Yes Mackenzie Jiménez MD  
ticagrelor (BRILINTA) 90 mg tablet Take 1 Tab by mouth every twelve (12) hours every twelve (12) hours. 5/3/19  Yes Mackenzie Jiménez MD  
gabapentin (NEURONTIN) 300 mg capsule Take 1 Cap by mouth nightly. 1/11/19  Yes Alicia Thao MD  
lovastatin (MEVACOR) 40 mg tablet Take 40 mg by mouth nightly. Yes Provider, Historical  
brimonidine (ALPHAGAN) 0.15 % ophthalmic solution Administer 1 Drop to both eyes two (2) times a day. Yes Provider, Historical  
SYNTHROID 125 mcg tablet Take 1 Tab by mouth Daily (before breakfast). 3/13/13  Yes Crystal Otero MD  
dutasteride (AVODART) 0.5 mg capsule Take 0.5 mg by mouth daily. Yes Provider, Historical  
cyanocobalamin (VITAMIN B-12) 1,000 mcg tablet Take 1,000 mcg by mouth daily. Yes Provider, Historical  
tamsulosin (FLOMAX) 0.4 mg capsule Take 0.4 mg by mouth two (2) times a day.    Yes Provider, Historical  
 dorzolamide-timolol (COSOPT) 22.3-6.8 mg/mL ophthalmic solution Administer 1 Drop to both eyes daily. Yes Provider, Historical  
latanoprost (XALATAN) 0.005 % ophthalmic solution Administer 1 Drop to both eyes nightly. Yes Provider, Historical  
senna-docusate (COLACE 2-IN-1) 8.6-50 mg per tablet Take 1 Tab by mouth daily as needed for Constipation. Provider, Historical  
meclizine (ANTIVERT) 25 mg tablet Take 1 Tab by mouth three (3) times daily as needed for Dizziness. 12/11/18   Meena Rey,  Physical Exam:  
General: alert, no distress HEENT: Head: Normocephalic, no lesions, without obvious abnormality. Heart: regular rate and rhythm, S1, S2 normal, no murmur, click, rub or gallop Lungs: chest clear, no wheezing, rales, normal symmetric air entry Abdominal: soft, NT/ND+ BS Neurological: Grossly normal  
Extremities: extremities normal, atraumatic, no cyanosis or edema Findings/Diagnosis: Dysphagia, GERD Plan of Care/Planned Procedure: EGD with possible dilation

## 2021-01-20 NOTE — PERIOP NOTES
Stefanie Griggs 2/19/1928 
[id number] Situation: 
Verbal report received from: Chetmiriam Kwon Procedure: Procedure(s): ESOPHAGOGASTRODUODENOSCOPY (EGD) ESOPHAGOGASTRODUODENAL (EGD) BIOPSY 
ESOPHAGEAL DILATION Background: 
 
Preoperative diagnosis: ACID REFLUX 
DYSPHAGIA 
LONG TERM USE OF ANTICOAGULANTS Postoperative diagnosis: erosive esophagitis, hiatal hernia, strictured esophagus :  Dr. Arlis Opitz Assistant(s): Endoscopy RN-1: Leonid Medellin RN Endoscopy RN-2: Arabella Simmons Specimens:  
ID Type Source Tests Collected by Time Destination 1 : stomach bx Preservative Stomach  Hiram Wu MD 1/20/2021 2030 Pathology 2 : duodenum bx Preservative Duodenum  Hiram Wu MD 1/20/2021 5071 Pathology 3 : lower esophagus bx Preservative Esophagus  Hiram Wu MD 1/20/2021 0372 Pathology 5 : mid esophagus bx Preservative Esophagus, Mid  Hiram Wu MD 1/20/2021 1431 Pathology H. Pylori  no Assessment: 
Intra-procedure medications Anesthesia gave intra-procedure sedation and medications, see anesthesia flow sheet yes Intravenous fluids: NS@ [facility] Vital signs stable Abdominal assessment: round and soft Recommendation: 
Discharge patient per MD order. Family or Friend Permission to share finding with family or friend yes

## 2021-04-17 PROBLEM — S42.101A CLOSED RIGHT SCAPULAR FRACTURE: Status: ACTIVE | Noted: 2021-01-01

## 2021-04-17 NOTE — ED PROVIDER NOTES
EMERGENCY DEPARTMENT HISTORY AND PHYSICAL EXAM 
     
 
Date: 4/17/2021 Patient Name: Suzi Godoy Attending of Record: Kennedi Whatley History of Presenting Illness Chief Complaint Patient presents with  Fall Pt arrives via EMS and is ambulatory to [facility]. Pt reports a GLF this morning, his shoes \"stuck\" and he to his left side and struck his should on the corner of the wall. Pt reporting 10/10 R shoulder pain w/ movement. Pt has skin tear to R calf w/ bandage in place from EMS. Pt  denies hitting his head during the incident, dizziness or any other sx at this time. Pt is on a blood thinner. History Provided By: Patient HPI: Suzi Godoy is a 80 y.o. male, history of CAD status post CABG, thyroid disease, right lower extremity skin cancer status post radiation presenting with ground-level fall which occurred this morning. Patient states that he tripped and had a mechanical fall onto his right side, hit his right shoulder on the wall and scraped his right lower extremity. Patient states that he not hit his head is currently taking Brilinta and aspirin daily. Patient main complaint is of right shoulder pain today, states it is located mostly in the posterior region, is not painful when not moving but is having difficulty moving the right arm secondary to pain. Patient denies any weakness, headache, abdominal pain, chest pain or shortness of breath. Patient has no other complaints or problems today. PCP: Zina Brooks MD 
 
There are no other complaints, changes, or physical findings at this time. Current Facility-Administered Medications Medication Dose Route Frequency Provider Last Rate Last Admin  acetaminophen (TYLENOL) tablet 975 mg  975 mg Oral ONCE Eddi Norris MD      
 
Current Outpatient Medications Medication Sig Dispense Refill  ergocalciferol (Vitamin D2) 1,250 mcg (50,000 unit) capsule Take 50,000 Units by mouth every seven (7) days.     
 citalopram (CELEXA) 20 mg tablet TAKE 1 TABLET BY MOUTH EVERY DAY (Patient taking differently: Take 40 mg by mouth daily.) 30 Tab 1  
 aspirin delayed-release 81 mg tablet Take 81 mg by mouth daily.  senna-docusate (COLACE 2-IN-1) 8.6-50 mg per tablet Take 1 Tab by mouth daily as needed for Constipation.  amLODIPine (NORVASC) 2.5 mg tablet Take 1 Tab by mouth daily. 30 Tab 0  
 ticagrelor (BRILINTA) 90 mg tablet Take 1 Tab by mouth every twelve (12) hours every twelve (12) hours. 60 Tab 1  
 gabapentin (NEURONTIN) 300 mg capsule Take 1 Cap by mouth nightly. 100 Cap 11  
 meclizine (ANTIVERT) 25 mg tablet Take 1 Tab by mouth three (3) times daily as needed for Dizziness. 20 Tab 0  
 lovastatin (MEVACOR) 40 mg tablet Take 40 mg by mouth nightly.  brimonidine (ALPHAGAN) 0.15 % ophthalmic solution Administer 1 Drop to both eyes two (2) times a day.  SYNTHROID 125 mcg tablet Take 1 Tab by mouth Daily (before breakfast). 90 Tab 4  
 dutasteride (AVODART) 0.5 mg capsule Take 0.5 mg by mouth daily.  cyanocobalamin (VITAMIN B-12) 1,000 mcg tablet Take 1,000 mcg by mouth daily.  tamsulosin (FLOMAX) 0.4 mg capsule Take 0.4 mg by mouth two (2) times a day.  dorzolamide-timolol (COSOPT) 22.3-6.8 mg/mL ophthalmic solution Administer 1 Drop to both eyes daily.  latanoprost (XALATAN) 0.005 % ophthalmic solution Administer 1 Drop to both eyes nightly. Past History Past Medical History: 
Past Medical History:  
Diagnosis Date  Anxiety disorder  Arthritis  CAD (coronary artery disease)  Calculus of kidney  Cancer [facility]) prostate  1996  
 skin  Depression  Hearing loss  Heart [address] [address]  Hypercholesterolemia  Hypertension  Incisional hernia 5/10/2011  Movement disorder  Neuropathy  Other ill-defined conditions(799.89)   
 elevated cholesterol  Other ill-defined conditions(799.89)   
 glaucoma  Other ill-defined conditions(799.89)   
 abd hernia  
 Skipped beats  Stroke ([address]   
 left arm tingling  Thyroid disease  Thyroid mass 5/10/2011  Vertigo Past Surgical History: 
Past Surgical History:  
Procedure Laterality Date  HX AORTIC VALVE REPLACEMENT    HX HEENT    
 thyroid biopsy  HX HEENT    
 thyroidectomy   HX ORCHIECTOMY  2017  HX ORTHOPAEDIC  2011  
 compression fracture d.t. fall (back),no surgery  HX THYROIDECTOMY  1/3/2013  [place] CARDIAC SURG PROCEDURE UNLIST    
 cabg x3  [place] PROSTATE BIOPSY, NEEDLE, SATURATION SAMPLING Family History: 
Family History Problem Relation Age of Onset  Cancer Mother   
     stomach  
 Heart Disease Sister  Heart Disease Father  Cancer Brother  Diabetes Son   
 Heart Disease Son  Anesth Problems Neg Hx Social History: 
Social History Tobacco Use  Smoking status: Former Smoker Packs/day: 0.50 Years: 3.00 Pack years: 1.50 Quit date: 1958 Years since quittin.3  Smokeless tobacco: Never Used Substance Use Topics  Alcohol use: No  
 Drug use: No  
 
 
Allergies: 
No Known Allergies Review of Systems Review of Systems Constitutional: Negative for chills, fatigue and fever. HENT: Negative for ear pain, sinus pain and sore throat. Eyes: Negative for pain. Respiratory: Negative for cough, chest tightness and shortness of breath. Cardiovascular: Positive for leg swelling. Negative for chest pain. Patient is undergoing DVT work-up with imaging scheduled for tomorrow of right lower extremity Gastrointestinal: Negative for abdominal pain, constipation, diarrhea, nausea and vomiting. Genitourinary: Negative for difficulty urinating and flank pain. Musculoskeletal: Positive for back pain. Negative for arthralgias, gait problem, joint swelling, myalgias, neck pain and neck stiffness. Right shoulder pain Skin: Negative for rash.  
Neurological: Negative for dizziness, syncope, weakness, light-headedness, numbness and headaches. Physical Exam  
Physical Exam 
Vitals signs reviewed. Constitutional:   
   General: He is not in acute distress. Appearance: He is not ill-appearing. HENT:  
   Head: Normocephalic and atraumatic. Mouth/Throat:  
   Mouth: Mucous membranes are moist.  
Eyes:  
   Conjunctiva/sclera: Conjunctivae normal.  
   Pupils: Pupils are equal, round, and reactive to light. Neck: Musculoskeletal: Normal range of motion and neck supple. Cardiovascular:  
   Rate and Rhythm: Normal rate. Pulmonary:  
   Effort: Pulmonary effort is normal.  
Abdominal:  
   Palpations: Abdomen is soft. Tenderness: There is no abdominal tenderness. There is no guarding or rebound. Comments: Large nontender hernia Musculoskeletal:     
   General: Tenderness present. No swelling or deformity. Comments: Tenderness to posterior aspect of right shoulder, bruising and small skin abrasion noted, motion of right shoulder difficult secondary to pain Skin: 
   General: Skin is warm and dry. Neurological:  
   General: No focal deficit present. Mental Status: He is alert and oriented to person, place, and time. Cranial Nerves: No cranial nerve deficit. Sensory: No sensory deficit. Motor: No weakness. Psychiatric:     
   Mood and Affect: Mood normal.     
   Behavior: Behavior normal.  
 
  
 
Diagnostic Study Results Labs - No results found for this or any previous visit (from the past 12 hour(s)). Radiologic Studies -  
CT CHEST WO CONT Final Result Acute right scapular wing fracture. Otherwise no acute abnormality is  
identified. XR SHOULDER RT AP/LAT MIN 2 V Final Result Possible nondisplaced scapular fracture. XR SCAPULA RT Final Result Cortical irregularity is noted involving the scapular wing  
concerning for acute nondisplaced fracture. XR CHEST PORT Final Result Right basilar atelectasis. CT HEAD WO CONT Final Result No acute process or change compared to the prior exam. CT Results  (Last 48 hours) 04/17/21 1049  CT CHEST WO CONT Final result Impression:  Acute right scapular wing fracture. Otherwise no acute abnormality is  
identified. Narrative:  INDICATION: Status post fall with right shoulder pain COMPARISON: None CONTRAST: None. TECHNIQUE:  5 mm axial images were obtained through the chest. Coronal and  
sagittal reformats were generated. CT dose reduction was achieved through use  
of a standardized protocol tailored for this examination and automatic exposure  
control for dose modulation. The absence of intravenous contrast reduces the sensitivity for evaluation of  
the mediastinum, farrukh, vasculature, and upper abdominal organs. FINDINGS:  
   
CHEST WALL: Incompletely visualized soft tissue abnormality right chest is  
compatible with previously described ventral hernia. THYROID: Status post thyroidectomy. MEDIASTINUM: No mass or lymphadenopathy. FARRUKH: No mass or lymphadenopathy. THORACIC AORTA: No aneurysm. MAIN PULMONARY ARTERY: Normal in caliber. TRACHEA/BRONCHI: Patent. ESOPHAGUS: Small hiatal hernia. HEART: Dilatation of the left atrium and left ventricle. Aortic valve  
replacement. Status post CABG. PLEURA: No effusion or pneumothorax. LUNGS: No nodule, mass, or airspace disease. INCIDENTALLY IMAGED UPPER ABDOMEN: Subcentimeter hepatic cyst.  
BONES: Acute nondisplaced right scapular wing fracture. Degenerative changes are  
seen in the thoracic spine. 04/17/21 0938  CT HEAD WO CONT Final result Impression:  No acute process or change compared to the prior exam.  
   
   
  
 Narrative:  EXAM: CT HEAD WO CONT INDICATION: GLF  
   
COMPARISON: 5/1/2019. CONTRAST: None.   
   
TECHNIQUE: Unenhanced CT of the head was performed using 5 mm images. Brain and  
bone windows were generated. Coronal and sagittal reformats. CT dose reduction  
was achieved through use of a standardized protocol tailored for this  
examination and automatic exposure control for dose modulation. FINDINGS:  
The ventricles and sulci are normal in size, shape and configuration. . There is  
no significant white matter disease. There is no intracranial hemorrhage,  
extra-axial collection, or mass effect. The basilar cisterns are open. No CT  
evidence of acute infarct. The bone windows demonstrate no abnormalities. Mucoperiosteal thickening is  
noted in the maxillary sinuses bilaterally. CXR Results  (Last 48 hours) None Medical Decision Making I am the first provider for this patient. I reviewed the vital signs, available nursing notes, past medical history, past surgical history, family history and social history. Vital Signs-Reviewed the patient's vital signs. Patient Vitals for the past 12 hrs: 
 Temp Pulse Resp BP SpO2  
04/17/21 0854 98.4 °F (36.9 °C) (!) 58 16 (!) 174/64 97 % Records Reviewed: Nursing Notes and Old Medical Records Provider Notes (Medical Decision Making): DDx: Patient is a 80year-old presenting after mechanical fall onto right side earlier this morning. Patient states that he lost his footing and fell onto right side, hitting posterior shoulder on the wall as he fell, not hit his head, denies blood thinners but is on Brilinta and aspirin daily. Patient denies any syncopal episodes or cardiac symptoms prior to falling. Patient is stable on arrival but complains of right posterior pain, does have notable bruising over posterior aspect of right shoulder as well as difficulty range of motion secondary to pain. Patient has no other obvious deformities, alert and oriented x3 and has no focal neurologic deficits.   Patient has small skin tear on right lower extremity otherwise exhibits no pain. We will plan on CT head given history of mechanical fall on antiplatelet as well as age, will obtain imaging of right shoulder, scapula and chest.  Will provide Tylenol for pain relief and reassess patient ED Course and Progress Notes:  
Initial assessment performed. The patients presenting problems have been discussed, and they are in agreement with the care plan formulated and outlined with them. I have encouraged them to ask questions as they arise throughout their visit. ED Course as of Apr 17 1210 Sat Apr 17, 2021  
1025 Patient with concerning findings of possible isolated scapular fracture, will obtain CT chest to evaluate further, CT head negative. Patient's pain is controlled while resting [RN] 1103 Isolated scapula confirmed on CT, no other rib fractures identified. Patient walks with a walker at home and will be unable to ambulate with this injury, plan on discussion with hospitalist for admission and physical therapy. Also ordered DVT Doppler which patient was supposed to have completed on Monday for right lower extremity swelling  
 [RN] 1145 Attempting to reach out to facility to see if higher level of care can be provided to avoid admission. [RN] 200 [facility] unable to provide nursing care until t patient could possibly be transferred on Monday. Plan on admission at this time [RN]  
  
ED Course User Index [RN] Gabby Godfrey MD  
 
 
 
 
 
 
Diagnosis Clinical Impression: 1. Closed nondisplaced fracture of body of right scapula, initial encounter Disposition: 
Admit Resident Signature:  
Signed By: Jamee Mac MD   
 April 17, 2021

## 2021-04-17 NOTE — H&P
Hospitalist Admission Note NAME: Brandon Buerger :  1928 MRN:  [id number] Date/Time:  2021 1:24 PM 
 
Patient PCP: Jorge Gunn MD 
 
Please note that this dictation was completed with [employer], the computer voice recognition software. Quite often unanticipated grammatical, syntax, homophones, and other interpretive errors are inadvertently transcribed by the computer software. Please disregard these errors. Please excuse any errors that have escaped final proofreading 
______________________________________________________________________ Assessment & Plan: 
Acute right scapula wing fracture, POA 
--ortho consult to Dr. Savanna Lawson, outpatient fu and right arm sling immobilizer --tylenol q6h, add prn oxycodone and morphine severe pain 
--consult ot/pt, case management for discharge planning, needs placement as cannot take care of self right now 
--observation status Difficulty walking, POA 
--due to dependent on rollator and now difficulty using due to right arm in shoulder splint from scapular fracture RLE cellulitis vs. Venous stasis Bilateral leg edema, POA R>L   
--continue doxycycline prescribed by pcp yesterday for RLE cellulitis --doppler US right lower leg negative DVT 
--start lasix 20mg daily and monitor weight, I/O, BMP 
 
BPH 
--monitor for urinary retention Insomnia 
--retry melatonin 
--was prescribed trazodone 50mg qhs by pcp but cautioned patient and daughter in law to use sparingly prn as benzodiazepine is associated with increased fall risk in elderly. HTN 
--continue amlodipine Hyperlipidemia 
--continue lipitor Macular degeneration Legally blind in right eye 
--continue eye drops Check labs:  CMP, CBC Body mass index is 30.68 kg/m². Code:  DDNR 
DVT prophylaxis: lovenox Surrogate decision maker:   
  
  
 
Subjective: CHIEF COMPLAINT:  Right shoulder pain after falling HISTORY OF PRESENT ILLNESS:    
Sai Angeles SAVANNA Jada Cortes is a 80 y.o. male with CAD, HTN, hyperlipidemia, macular degeneration, presents from [facility] independent living after a fall earlier this morning. Patient uses rollator at baseline. Was in kitchen and turned around and one of his shoes \"stuck\" and he fell against wall hitting right shoulder and slid down onto floor. Denies any head injury or dizziness or passing out. He pressed alert necklace to get help. In ER, xray right shoulder and CT chest shows acute fracture of right scapula wing. CT head negative. Patient with severe pain when he is moving, especially moving right arm. Minimal pain at rest.  Admission referred as patient is now unable to walk using rollator due to right arm needing to be in sling and unable to be placed into [address] [place] [address] section over weekend. Per daughter in law, saw pcp yesterday and given doxycycline for RLE cellulitis, to have doppler US right leg this Monday at [facility] to eval for DVT, started on lasix 20mg for b/l leg edema and trazodone 50mg qhs for insomnia. He has not yet started lasix or trazodone. We were asked to admit for work up and evaluation of the above problems. Past Medical History:  
Diagnosis Date  Anxiety disorder  Arthritis  CAD (coronary artery disease)  Calculus of kidney  Cancer [facility]) prostate  1996  
 skin  Depression  Hearing loss  Heart attack ([address]  Hypercholesterolemia  Hypertension  Incisional hernia 5/10/2011  Movement disorder  Neuropathy  Other ill-defined conditions(799.89)   
 elevated cholesterol  Other ill-defined conditions(799.89)   
 glaucoma  Other ill-defined conditions(799.89)   
 abd hernia  
 Skipped beats  Stroke ([address]   
 left arm tingling  Thyroid disease  Thyroid mass 5/10/2011  Vertigo Past Surgical History:  
Procedure Laterality Date  HX AORTIC VALVE REPLACEMENT  2015  HX HEENT    
 thyroid biopsy  HX HEENT    
 thyroidectomy   HX ORCHIECTOMY  2017  HX ORTHOPAEDIC  2011  
 compression fracture d.t. fall (back),no surgery  HX THYROIDECTOMY  1/3/2013  [place] CARDIAC SURG PROCEDURE UNLIST    
 cabg x3  [place] PROSTATE BIOPSY, NEEDLE, SATURATION SAMPLING Social History Tobacco Use  Smoking status: Former Smoker Packs/day: 0.50 Years: 3.00 Pack years: 1.50 Quit date: 1958 Years since quittin.3  Smokeless tobacco: Never Used Substance Use Topics  Alcohol use: No  
  
Drug use:   
 
 
Family History Problem Relation Age of Onset  Cancer Mother   
     stomach  
 Heart Disease Sister  Heart Disease Father  Cancer Brother  Diabetes Son   
 Heart Disease Son  Anesth Problems Neg Hx No Known Allergies Prior to Admission medications Medication Sig Start Date End Date Taking? Authorizing Provider  
doxycycline (MONODOX) 100 mg capsule Take 100 mg by mouth two (2) times a day. X 10 days   Yes Provider, Historical  
furosemide (Lasix) 20 mg tablet Take 20 mg by mouth daily. Yes Provider, Historical  
traZODone (DESYREL) 50 mg tablet Take 50 mg by mouth nightly. Yes Provider, Historical  
omeprazole (PRILOSEC) 40 mg capsule Take 40 mg by mouth daily. Yes Provider, Historical  
ergocalciferol (Vitamin D2) 1,250 mcg (50,000 unit) capsule Take 50,000 Units by mouth every . Yes Provider, Historical  
citalopram (CELEXA) 20 mg tablet TAKE 1 TABLET BY MOUTH EVERY DAY Patient taking differently: Take 40 mg by mouth daily. 19  Yes Konrad Colin MD  
aspirin delayed-release 81 mg tablet Take 81 mg by mouth daily. Yes Provider, Historical  
amLODIPine (NORVASC) 2.5 mg tablet Take 1 Tab by mouth daily. 5/3/19  Yes Zahraa Hernandez MD  
ticagrelor (BRILINTA) 90 mg tablet Take 1 Tab by mouth every twelve (12) hours every twelve (12) hours.  5/3/19  Yes Zahraa Hernandez MD  
lovastatin (MEVACOR) 40 mg tablet Take 40 mg by mouth nightly. Yes Provider, Historical  
brimonidine (ALPHAGAN) 0.15 % ophthalmic solution Administer 1 Drop to both eyes two (2) times a day. Yes Provider, Historical  
SYNTHROID 125 mcg tablet Take 1 Tab by mouth Daily (before breakfast). 3/13/13  Yes Bess Ford MD  
dutasteride (AVODART) 0.5 mg capsule Take 0.5 mg by mouth daily. Yes Provider, Historical  
cyanocobalamin (VITAMIN B-12) 1,000 mcg tablet Take 1,000 mcg by mouth daily. Yes Provider, Historical  
tamsulosin (FLOMAX) 0.4 mg capsule Take 0.4 mg by mouth two (2) times a day. Yes Provider, Historical  
dorzolamide-timolol (COSOPT) 22.3-6.8 mg/mL ophthalmic solution Administer 1 Drop to both eyes daily. Yes Provider, Historical  
latanoprost (XALATAN) 0.005 % ophthalmic solution Administer 1 Drop to both eyes nightly. Yes Provider, Historical  
 
REVIEW OF SYSTEMS:  POSITIVE= Bold. Negative = normal text General:  fever, chills, sweats, generalized weakness, weight loss/gain, loss of appetite Eyes:  blurred vision, eye pain, loss of vision, diplopia Ear Nose and Throat:  rhinorrhea, pharyngitis Respiratory:   cough, sputum production, SOB, wheezing, ENG, pleuritic pain 
Cardiology:  chest pain, palpitations, orthopnea, PND, edema, syncope Gastrointestinal:  abdominal pain, N/V, dysphagia, diarrhea, constipation, bleeding Genitourinary:  frequency, urgency, dysuria, hematuria, incontinence Muskuloskeletal :  arthralgia, myalgia Hematology:  easy bruising, bleeding, lymphadenopathy Dermatological:  Rash--redness right lower leg, ulceration, pruritis Endocrine:  hot flashes or polydipsia Neurological:  headache, dizziness, confusion, focal weakness, paresthesia, memory loss, gait disturbance Psychological: anxiety, depression, agitation Objective: VITALS:   
Visit Vitals BP (!) 167/75 Pulse (!) 58 Temp 98.4 °F (36.9 °C) Resp 16 Ht 5' 10\" (1.778 m) Wt 97 kg (213 lb 13.5 oz) SpO2 95% BMI 30.68 kg/m² Temp (24hrs), Av.4 °F (36.9 °C), Min:98.4 °F (36.9 °C), Max:98.4 °F (36.9 °C) Body mass index is 30.68 kg/m². PHYSICAL EXAM: 
 
General:    Alert, cooperative, no distress, appears stated age. HEENT: Atraumatic, anicteric sclerae, pink conjunctivae No oral ulcers, mucosa moist, throat clear. Hearing intact. Neck:  Supple, symmetrical,  thyroid: non tender Lungs:   Clear to auscultation bilaterally. No Wheezing or Rhonchi. No rales. Chest wall:  No tenderness  No Accessory muscle use. Heart:   Regular  rhythm,  No  murmur   No gallop. 2+ pitting edema lower legs and 1+ in feet b/l. Abdomen:   Soft, non-tender. Not distended. Bowel sounds normal. No masses Extremities: No cyanosis. No clubbing. Right arm in sling. Skin:     Not pale Not Jaundiced  Mild b/l lower leg red (R>L) Psych:  Good insight. Not depressed. Not anxious or agitated. Neurologic: EOMs intact. No facial asymmetry. No aphasia or slurred speech. Symmetrical strength legs 5/5, Alert and oriented X 3. IMAGING RESULTS: 
 []       I have personally reviewed the actual   []     CXR  []     CT scan CXR: 
CT : 
EKG: 
 ________________________________________________________________________ Care Plan discussed with: 
  Comments Patient y Family  y Daughter in law Umesh  bedside RN Care Manager Consultant:     
________________________________________________________________________ Prophylaxis: 
GI PPI  
DVT lovenox  
________________________________________________________________________ Recommended Disposition:  
Home with Family HH/PT/OT/RN   
SNF/LTC y  
[place]   
________________________________________________________________________ Code Status: 
Full Code DNR/DNI y  
________________________________________________________________________ TOTAL TIME:  50 minutes Comments   Reviewed previous records  
>50% of visit spent in counseling and coordination of care  Discussion with patient and/or family and questions answered 
  
 
 
______________________________________________________________________ Marion Hunter MD 
 
 
Procedures: see electronic medical records for all procedures/Xrays and details which were not copied into this note but were reviewed prior to creation of Plan. LAB DATA REVIEWED:   
No results found for this or any previous visit (from the past 24 hour(s)).

## 2021-04-17 NOTE — ED NOTES
Pt arrives via EMS from [facility] and is ambulatory to [facility] (formerly [facility]). Pt reports a GLF this morning, his shoes \"stuck\" and he to his right side and struck his should on the corner of the wall. Pt reporting 10/10 R shoulder pain w/ movement. Pt has skin tear to R calf w/ bandage in place from EMS. Pt  denies hitting his head during the incident, dizziness or any other sx at this time. Pt is on a blood thinner. 0900 Resident at bedside evaluating pt. Pt family member at bedside  
 
0954 Xray at bedside. [address] at bedside. 1232 TRANSFER - OUT REPORT: 
 
Verbal report given to Kelli Degroot (name) on Render Canard  being transferred to ortho (unit) for routine progression of care Report consisted of patients Situation, Background, Assessment and  
Recommendations(SBAR). Information from the following report(s) SBAR, ED Summary and Recent Results was reviewed with the receiving nurse. Lines:    
 
Opportunity for questions and clarification was provided. Patient to be transported with: 
 Josetta Mems Dr Laveta Gosselin at bedside evaluating pt. Yusefing/Renee applied.

## 2021-04-17 NOTE — PROGRESS NOTES
End of Shift Note Bedside shift change report given to [facility] (oncoming nurse) by Brandan Burch (offgoing nurse). Report included the following information SBAR, Kardex, ED Summary, Intake/Output, MAR, Recent Results and Cardiac Rhythm Sinus Peace Osman Shift worked:  Day Shift summary and any significant changes:  
  Patient still having pain in shoulder. Offered pain medication but does not want opioids at this point. Patient ate well for dinner. Scattered bruising. Vitals stable Concerns for physician to address:  Discharge Monday when facility is accepting Zone phone for onccarlos eduardo shift:   [id number] Activity: 
Activity Level: Up with Assistance Number times ambulated in hallways past shift: 0 Number of times OOB to chair past shift: 2 Cardiac:  
Cardiac Monitoring: Yes Access:  
Current line(s): PIV Genitourinary:  
Urinary status: voiding Respiratory:  
O2 Device: None (Room air) Chronic home O2 use?: NO Incentive spirometer at bedside: NO 
  
GI: 
Last Bowel Movement Date: 04/16/21 Current diet:  DIET CARDIAC Regular Passing flatus: YES Tolerating current diet: YES Pain Management:  
Patient states pain is manageable on current regimen: YES Skin: 
Gumaro Score: 19 Interventions: increase time out of bed and limit briefs Patient Safety: 
Fall Score: Total Score: 4 Interventions: bed/chair alarm, assistive device (walker, cane, etc), gripper socks, pt to call before getting OOB and stay with me (per policy) High Fall Risk: Yes Length of Stay: 
Expected LOS: - - - Actual LOS: 1 Brandan Burch

## 2021-04-18 NOTE — PROGRESS NOTES
End of Shift Note Bedside shift change report given to Natalie Mast7 (oncoming nurse) by Colletta Antonio (offgoing nurse). Report included the following information SBAR, Kardex, Intake/Output, MAR, Accordion and Recent Results Shift worked:  [id number] Shift summary and any significant changes:  
   
  
Concerns for physician to address:   
  
Zone phone for oncoming shift:    
  
 
Activity: 
Activity Level: Up with Assistance Number times ambulated in hallways past shift: 0 Number of times OOB to chair past shift: 0 Cardiac:  
Cardiac Monitoring: Yes     
Cardiac Rhythm: Normal sinus rhythm Access:  
Current line(s): PIV Genitourinary:  
Urinary status: voiding Respiratory:  
O2 Device: None (Room air) Chronic home O2 use?: YES Incentive spirometer at bedside: YES 
  
GI: 
Last Bowel Movement Date: 04/16/21 Current diet:  DIET CARDIAC Regular Passing flatus: YES Tolerating current diet: YES Pain Management:  
Patient states pain is manageable on current regimen: YES Skin: 
Gumaro Score: 19 Interventions: float heels and increase time out of bed Patient Safety: 
Fall Score: Total Score: 4 Interventions: bed/chair alarm, assistive device (walker, cane, etc), gripper socks and pt to call before getting OOB High Fall Risk: Yes Length of Stay: 
Expected LOS: - - - Actual LOS: 1 Colletta Antonio

## 2021-04-18 NOTE — PROGRESS NOTES
Patient seen by OT and PT. Discussed with hi and son recommendation for him to go to healthcare portion (SNF) at [facility] before transitioning back to his ILF apartment. He is requiring physical assistance for mobility and ADLS at this time. Note to follow.  
 
Estela Talamantes MS, OTR/L, CSRS

## 2021-04-18 NOTE — PROGRESS NOTES
Problem: Mobility Impaired (Adult and Pediatric) Goal: *Acute Goals and Plan of Care (Insert Text) Description: FUNCTIONAL STATUS PRIOR TO ADMISSION: Patient was modified independent using a rolling walker for functional mobility. HOME SUPPORT PRIOR TO ADMISSION: The patient lived alone in independent living with family and staff to provide assistance. Physical Therapy Goals Initiated 4/18/2021 1. Patient will move from supine to sit and sit to supine , scoot up and down, and roll side to side in bed with minimal assistance/contact guard assist within 7 day(s). 2.  Patient will transfer from bed to chair and chair to bed with minimal assistance/contact guard assist using the least restrictive device within 7 day(s). 3.  Patient will perform sit to stand with minimal assistance/contact guard assist within 7 day(s). 4.  Patient will ambulate with minimal assistance/contact guard assist for 100 feet with the least restrictive device within 7 day(s). Outcome: Not Met PHYSICAL THERAPY EVALUATION Patient: Olvin Stewart (79 y.o. male) Date: 4/18/2021 Primary Diagnosis: Closed right scapular fracture [S42.101A] Precautions: falls; R UE sling ASSESSMENT Based on the objective data described below, the patient presents with generalized weakness and impaired functional mobility, balance and endurance following admission for GLF with subsequent R scapular fx. R UE is now immobilized in sling. Patient requiring min A of 2 for overall mobiltiy. Gait is unsteady using cane for support. Balance is fair using quad cane for support. He was only able to take a few steps from bed to chair today. At baseline patient uses a RW for ambulation but he is not able to use walker at this time due to scapula fx. He lives in an [address] living facility in which he is mod I for overall mobility.  He is currently below his functional baseline and would benefit from further therapy at rehab prior to returning to independent living. Current Level of Function Impacting Discharge (mobility/balance): min  A of 2 Functional Outcome Measure: The patient scored 2/20 on the Elderly Mobility scale outcome measure which is indicative of 90% functional impairment. Patient will benefit from skilled therapy intervention to address the above noted impairments. PLAN : 
Recommendations and Planned Interventions: bed mobility training, transfer training, gait training, therapeutic exercises, patient and family training/education and therapeutic activities Frequency/Duration: Patient will be followed by physical therapy:  5 times a week to address goals. Recommendation for discharge: (in order for the patient to meet his/her long term goals) Therapy up to 5 days/week in SNF setting This discharge recommendation: 
Has not yet been discussed the attending provider and/or case management IF patient discharges home will need the following DME: to be determined (TBD)- will need cane until able to weight bear through R UE  
 
 
 
SUBJECTIVE:  
Patient stated My arm feels much better since you all adjusted the sling.  OBJECTIVE DATA SUMMARY:  
HISTORY:   
Past Medical History:  
Diagnosis Date  Anxiety disorder  Arthritis  CAD (coronary artery disease)  Calculus of kidney  Cancer [facility]) prostate  1996  
 skin  Depression  Hearing loss  [address] [facility] ([facility]  Hypercholesterolemia  Hypertension  Incisional hernia 5/10/2011  Movement disorder  Neuropathy  Other ill-defined conditions(799.89)   
 elevated cholesterol  Other ill-defined conditions(799.89)   
 glaucoma  Other ill-defined conditions(799.89)   
 abd hernia  
 Skipped beats  Stroke ([facility]   
 left arm tingling  Thyroid disease  Thyroid mass 5/10/2011  Vertigo Past Surgical History:  
Procedure Laterality Date  HX AORTIC VALVE REPLACEMENT  2015  HX HEENT    
 thyroid biopsy  HX HEENT    
 thyroidectomy 2013  HX ORCHIECTOMY  01/2017  HX ORTHOPAEDIC  2/2011  
 compression fracture d.t. fall (back),no surgery  HX THYROIDECTOMY  1/3/2013  [place] CARDIAC SURG PROCEDURE UNLIST  1996  
 cabg x3  [place] PROSTATE BIOPSY, NEEDLE, SATURATION SAMPLING Home Situation Home Environment: Independent living Care [facility]: [facility] # Steps to Enter: 0 One/Two Story Residence: Other (Comment)(3 stories with elevator) Living Alone: Yes Support Systems: Child(yaquelin) Patient Expects to be Discharged to[de-identified] Assisted living Current DME Used/Available at Home: Walker, rolling Tub or Shower Type: Shower EXAMINATION/PRESENTATION/DECISION MAKING:  
Critical Behavior: 
Neurologic State: Alert, Appropriate for age Orientation Level: Oriented X4 Cognition: Follows commands Hearing: Auditory Auditory Impairment: Hard of hearing, bilateral 
Hearing Aids/Status: Bilateral, With patient Range Of Motion: 
AROM: Generally decreased, functional(excpet R UE in sling) Strength:   
Strength: Generally decreased, functional 
  
  
  
  
  
  
Tone & Sensation:  
Tone: Normal 
  
  
  
  
  
  
  
  
   
Coordination: 
Coordination: Generally decreased, functional 
Vision:  
Acuity: Impaired near vision; Impaired far vision(glaucoma, R eye blind) Functional Mobility: 
Bed Mobility: 
  
Supine to Sit: Minimum assistance;Assist x2 Scooting: Moderate assistance Transfers: 
Sit to Stand: Minimum assistance; Moderate assistance;Assist x2(decreased anterior weight shift) Stand to Sit: Minimum assistance;Contact guard assistance;Assist x2 Bed to Chair: Minimum assistance;Assist x2 Balance:  
Sitting: Intact Standing: Impaired Standing - Static: Fair;Constant support Standing - Dynamic : Fair;Constant support Ambulation/Gait Training: 
Distance (ft): 4 Feet (ft) Assistive Device: Gait belt;Cane, quad Ambulation - Level of Assistance: Minimal assistance;Assist x2 Gait Abnormalities: Decreased step clearance;Shuffling gait Base of Support: Widened Speed/Margot: Pace decreased (<100 feet/min); Shuffled; Slow Step Length: Right shortened;Left shortened 
  
 gait is unsteady demonstrating widened base of support and short shuffled steps Functional Measure: 
 
Elder Mobility Scale 2/20 Scores under 10  generally these patients are dependent in mobility maneuvers; require help with 
basic ADL, such as transfers, toileting and dressing. Scores between 10  13  generally these patients are borderline in terms of safe mobility and 
independence in ADL i.e. they require some help with some mobility maneuvers. Scores over 14  Generally these patients are able to perform mobility maneuvers alone and safely 
and are independent in basic ADL. Activity Tolerance:  
Fair After treatment patient left in no apparent distress:  
Sitting in chair, Call bell within reach and Caregiver / family present COMMUNICATION/EDUCATION:  
The patients plan of care was discussed with: Occupational therapist, Registered nurse and Physician. Fall prevention education was provided and the patient/caregiver indicated understanding., Patient/family have participated as able in goal setting and plan of care. and Patient/family agree to work toward stated goals and plan of care. Thank you for this referral. 
Cristina Van, PT Time Calculation: 29 mins

## 2021-04-18 NOTE — CONSULTS
CARDIOLOGY CONSULT Patient ID: 
Patient: Bernadine Styles  MRN: [id number] Age: 80 y.o.  : 1928 Date of  Admission: 2021  8:50 AM  
PCP:  Trang Lucas MD  
Usual cardiologist:  Nithya Jane MD 
 
Assessment: 1. Probably sick sinus syndrome with sinus bradycardia, nocturnal sinus bradycardia and junctional rhythm with bradycardia. 2. First degree AV block in addition to LBBB at baseline, nocturnal second degree AV block with bradycardia. 3. Nonsustained VT seen on a prior admission. 4. Admitted with difficulty walking s/p fall and R shoulder fracture also with lower extremity cellulitis. 5. Ischemic cardiomyopathy with prior coronary artery bypass grafting. EF 45-50% on last echo 2019. 
6. Status post prior transcatheter aortic valve replacement (TAVR) for severe aortic stenosis. 7. Hypertension. 8. Dyslipidemia. 9. Prior transient ischemic attack with history of vertebrobasilar insufficiency. 10. History of prostate cancer. 11. Peripheral neuropathy. 15. Advanced age. DNR. Plan: 1. He is a potential pacemaker candidate based on the extent of electrical disease, clearly at risk for wakeful symptomatic bradycardia at some point. His comorbidities and age need to be factored into whether an implant is best for him, the preferred device would be a biventricular pacemaker. I discussed the evaluation and option of a pacemaker with him. I am comfortable NOT pursuing it at this time and having Dr. Wallace Espinoza, his cardiologist, follow-up on his progress as an OP. He does not endorse symptomatic bradycardia. [x]       High complexity decision making was performed in this patient at high risk for decompensation with multiple organ involvement. Bernadine Styles is a 80 y.o. male with a history of the above. I was asked to see him regarding his bradycardia.   He has seen Dr. Wallace Espinoza in the past.  Evaluated by Dr. Wallace Espinoza for his bradycardia 5/2019, no decision to pursue a pacemaker implant was made at that time. He was admitted after a fall and see the hospitalist notes for details. A remote cath from 2015 prior to his TAVR: 
1. Severe native 3-vessel coronary artery disease. 2.  Patent left internal mammary to the LAD with severe disease distal to 
the graft insertion. 3.  Patent saphenous vein graft to a marginal branch without significant 
disease. 4.  Two vein grafts were occluded at the ostia as described above. 5.  Preserved overall left ventricular systolic function without wall 
motion abnormalities. 6.  Severe aortic stenosis. 7.  Normal-appearing ascending aorta with mild aortic insufficiency. 8.  Mildly elevated right-sided filling pressures with relatively normal 
left-sided pressure. Past Medical History:  
Diagnosis Date  Anxiety disorder  Arthritis  CAD (coronary artery disease)  Calculus of kidney  Cancer [facility]) prostate  1996  
 skin  Depression  Hearing loss  Heart attack ([address]  Hypercholesterolemia  Hypertension  Incisional hernia 5/10/2011  Movement disorder  Neuropathy  Other ill-defined conditions(799.89)   
 elevated cholesterol  Other ill-defined conditions(799.89)   
 glaucoma  Other ill-defined conditions(799.89)   
 abd hernia  
 Skipped beats  Stroke ([address]   
 left arm tingling  Thyroid disease  Thyroid mass 5/10/2011  Vertigo Past Surgical History:  
Procedure Laterality Date  HX AORTIC VALVE REPLACEMENT  2015  HX HEENT    
 thyroid biopsy  HX HEENT    
 thyroidectomy 2013  HX ORCHIECTOMY  01/2017  HX ORTHOPAEDIC  2/2011  
 compression fracture d.t. fall (back),no surgery  HX THYROIDECTOMY  1/3/2013  [place] CARDIAC SURG PROCEDURE UNLIST  1996  
 cabg x3  [place] PROSTATE BIOPSY, NEEDLE, SATURATION SAMPLING Social History Tobacco Use  Smoking status: Former Smoker Packs/day: 0.50   Years: 3.00 Pack years: 1.50 Quit date: 1958 Years since quittin.3  Smokeless tobacco: Never Used Substance Use Topics  Alcohol use: No  
  
 
Family History Problem Relation Age of Onset  Cancer Mother   
     stomach  
 Heart Disease Sister  Heart Disease Father  Cancer Brother  Diabetes Son   
 Heart Disease Son  Anesth Problems Neg Hx No Known Allergies Current Facility-Administered Medications Medication Dose Route Frequency  phenol throat spray (CHLORASEPTIC) 1 Spray  1 Spray Oral PRN  
 senna-docusate (PERICOLACE) 8.6-50 mg per tablet 1 Tab  1 Tab Oral BID PRN  polyethylene glycol (MIRALAX) packet 17 g  17 g Oral DAILY PRN  
 sodium chloride (NS) flush 5-40 mL  5-40 mL IntraVENous Q8H  
 sodium chloride (NS) flush 5-40 mL  5-40 mL IntraVENous PRN  promethazine (PHENERGAN) tablet 12.5 mg  12.5 mg Oral Q6H PRN Or  
 ondansetron (ZOFRAN) injection 4 mg  4 mg IntraVENous Q6H PRN  
 enoxaparin (LOVENOX) injection 40 mg  40 mg SubCUTAneous DAILY  amLODIPine (NORVASC) tablet 2.5 mg  2.5 mg Oral DAILY  aspirin delayed-release tablet 81 mg  81 mg Oral DAILY  brimonidine (ALPHAGAN) 0.2 % ophthalmic solution 1 Drop  1 Drop Both Eyes BID  citalopram (CELEXA) tablet 40 mg  40 mg Oral DAILY  cyanocobalamin (VITAMIN B12) tablet 1,000 mcg  1,000 mcg Oral DAILY  dorzolamide-timoloL (COSOPT) 22.3-6.8 mg/mL ophthalmic solution 1 Drop  1 Drop Both Eyes DAILY  doxycycline (VIBRA-TABS) tablet 100 mg  100 mg Oral Q12H  dutasteride (AVODART) capsule 0.5 mg  0.5 mg Oral DAILY  ergocalciferol capsule 50,000 Units  50,000 Units Oral every   furosemide (LASIX) tablet 20 mg  20 mg Oral DAILY  latanoprost (XALATAN) 0.005 % ophthalmic solution 1 Drop  1 Drop Both Eyes QHS  atorvastatin (LIPITOR) tablet 10 mg  10 mg Oral QHS  pantoprazole (PROTONIX) tablet 40 mg  40 mg Oral ACB  levothyroxine (SYNTHROID) tablet 125 mcg 125 mcg Oral ACB  tamsulosin (FLOMAX) capsule 0.4 mg  0.4 mg Oral BID  ticagrelor (BRILINTA) tablet 90 mg  90 mg Oral Q12H  
 traZODone (DESYREL) tablet 50 mg  50 mg Oral QHS PRN  
 acetaminophen (TYLENOL) tablet 650 mg  650 mg Oral Q6H  
 oxyCODONE IR (ROXICODONE) tablet 2.5-5 mg  2.5-5 mg Oral Q6H PRN  
 morphine injection 1 mg  1 mg IntraVENous Q6H PRN Review of Symptoms:  See HPI as well. General: negative for fever, chills, sweats, weakness, weight loss Eyes: negative for blurred vision, eye pain, loss of vision, diplopia Ear Nose and Throat: negative for rhinorrhea, pharyngitis, otalgia, tinnitus, speech or swallowing difficulties Respiratory: negative for SOB, cough, sputum production, wheezing, ENG, pleuritic pain  
Cardiology: negative for chest pain, palpitations, orthopnea, PND, edema, syncope Gastrointestinal: negative for abdominal pain, N/V, dysphagia, change in bowel habits, bleeding Genitourinary: negative for frequency, urgency, dysuria, hematuria, incontinence Muskuloskeletal : negative for arthralgia, myalgia Hematology: negative for easy bruising, bleeding, lymphadenopathy Dermatological: negative for rash, ulceration, mole change, new lesion Endocrine: negative for hot flashes or polydipsia Neurological: negative for headache, dizziness, confusion, focal weakness, paresthesia, memory loss, gait disturbance Psychological: negative for anxiety, depression, agitation Objective:  
  
Physical Exam: 
Temp (24hrs), Av.9 °F (36.6 °C), Min:97.5 °F (36.4 °C), Max:98.3 °F (36.8 °C) Patient Vitals for the past 8 hrs: 
 Pulse 21 1151 62  
21 0923 75  
21 0918 72  
21 0754 65 Patient Vitals for the past 8 hrs: 
 Resp  
21 1151 18  
21 0754 16  Patient Vitals for the past 8 hrs: 
 BP  
21 1151 (!) 147/54  
21 0923 (!) 175/71  
21 0918 (!) 200/70  
21 0916 (!) 181/113  
21 0754 (!) 153/61 Intake/Output Summary (Last 24 hours) at 4/18/2021 1444 Last data filed at 4/18/2021 2752 Gross per 24 hour Intake  Output 3000 ml Net -3000 ml Nondiaphoretic, not in acute distress, elderly male with a sling on his R arm. Supple, no palpable thyromegaly. No scleral icterus, mucous membranes moist, conjuctivae pink, no xanthelasma. Unlabored, clear to auscultation bilaterally, symmetric air movement. Regular rate and rhythm, + systolic murmur, pericardial rub, knock, or gallop. No JVD or peripheral edema. Palpable radial pulses bilaterally. Abdomen, soft, nontender, nondistended. Extremities without cyanosis or clubbing. Muscle tone and bulk normal for age. Skin warm and dry. No rashes or ulcers. Neuro grossly nonfocal.  No tremor. Awake and appropriate. CARDIOGRAPHICS and STUDIES, I reviewed: 
 
Telemetry:  SR with first degree AV block and HR 60-70's. ECG:   Reviewed. Labs: 
No results for input(s): CPK, CKMB, CKNDX, TROIQ in the last 72 hours. No lab exists for component: CPKMB Lab Results Component Value Date/Time Cholesterol, total 180 04/28/2019 04:12 AM  
 HDL Cholesterol 47 04/28/2019 04:12 AM  
 LDL, calculated 89.6 04/28/2019 04:12 AM  
 Triglyceride 217 (H) 04/28/2019 04:12 AM  
 CHOL/HDL Ratio 3.8 04/28/2019 04:12 AM  
 
No results for input(s): INR, PTP, APTT, INREXT in the last 72 hours. Recent Labs 04/18/21 
0506 04/17/21 
1429  137  
K 3.6 3.9  105 CO2 27 26 BUN 10 10 CREA 0.75 0.65* * 119* CA 8.7 8.6 ALB  --  3.2* WBC 7.5 10.3 HGB 12.3 12.3 HCT 37.1 37.5  297 Recent Labs 04/17/21 
1429 AP 62  
TP 6.8 ALB 3.2*  
GLOB 3.6 No components found for: [place] No results for input(s): PH, PCO2, PO2 in the last 72 hours. Rafael Rivero MD 
4/18/2021

## 2021-04-18 NOTE — PROGRESS NOTES
Informed dr Samara Diehl pt HR drop to 40's 1 time earlier , he asked to put back cardiology consult, informed Dr Blanca Jones , he stated he will see the pt

## 2021-04-18 NOTE — PROGRESS NOTES
2222  Patient heart rate has been Deeping down between 30-40s and sustaining. Tele also noted a rhythm Change. Patients says he feel fine. Patient also has an extensive cardiac history. Performed an EKG. Sent a message to the on call provider. 2328 Patient is heart rate is bradycardic in between  30-40's and is doing so more frequently. Alerted the RR nurse to assess patient. Its noted that the patient has a hx of SSS.

## 2021-04-18 NOTE — PROGRESS NOTES
Received notification from bedside RN about patient with regards to: episode of bradycardia down to 30's, asymptomatic VS: /64, RR 16, O2 sat 96% on RA Intervention given: EKG, AM labs ordered, cardiology consult (CBC, BMP, Magnesium, TSH)

## 2021-04-18 NOTE — PROGRESS NOTES
Transition of Care Plan: 
 
RUR: 12% Disposition: transition to healthcare at [facility]. Referral sent via [facility] 21 Unit CM please follow up with [facility] on Monday Follow up appointments: PCP 
DME needed: patient has a rollator Transportation at Discharge: 
[address] or means to access home:       
IM Medicare letter: 2nd IM needed before Caregiver Contact: Daughter in law Roel Montes [phone] Discharge Caregiver contacted prior to discharge? Unit CM to follow up before discharge Reason for Admission:  Acute right scapula fracture RUR Score:     12% low risk for readmission Plan for utilizing home health: transition to healthcare at [facility] PCP: First and Last name:  Leann Mejia MD 
 
 Name of Practice:  
 Are you a current patient: Yes/No:  Yes Approximate date of last visit: 21 Can you participate in a virtual visit with your PCP: Prefers in person visits Current Advanced Directive/Advance Care Plan: DNR Advance Care Planning General Advance Care Planning (ACP) Conversation Date of Conversation: 2021 Conducted with: Patient with Decision Making Capacity Healthcare Decision Maker:  
  Primary Decision Maker: Cora Chen - Daughter - [phone] Content/Action Overview: Has ACP document(s) on file - reflects the patient's care preferences Reviewed DNR/DNI and patient confirms current DNR status - completed forms on file (place new order if needed) Length of Voluntary ACP Conversation in minutes:  <16 minutes (Non-Billable) Isaac Gómez RN Transition of Care Plan:                   
CM met with patient at the bedside to discuss discharge planning. Patient name, , and demographics all verified in chart. Patient lives in [address] [place] [address]. Patient had a GLF resulting in a right scapula fall.  Patient ambulates with a rollator at baseline. Patient recommended to transition to healthcare for rehab. Referral sent via [address]. Patient at baseline is independent at baseline. Pt's preferred pharmacy is [facility] as well as [facility] [address]. Patient's daughter in law Jojo Sanches provides transport. Care Management Interventions PCP Verified by CM: Yes Last Visit to PCP: 04/16/21 Mode of Transport at Discharge: BLS Transition of Care Consult (CM Consult): Discharge Planning, SNF Physical Therapy Consult: Yes Occupational Therapy Consult: Yes Current Support Network: Assisted Living(Patient lives in independent living at [place]) Confirm Follow Up Transport: Family [facility] [employer] Information Provided?: No 
Discharge Location Discharge Placement: Skilled nursing facility Unit CM to continue to follow for discharge needs and planning. KARIE ChavezN, RN, H. JOSE E Villalobos Care Manager

## 2021-04-18 NOTE — ROUTINE PROCESS
End of Shift Note Bedside shift change report given to Ashkan High (oncoming nurse) by Robin Najjar, RN (offgoing nurse). Report included the following information SBAR, Kardex, Intake/Output, MAR and Accordion Shift worked:  7-7 Shift summary and any significant changes:  
 Worked with PT, did well, up in chair for couple hr, CArdiology seen him today, VSS Concerns for physician to address:   
  
Zone phone for oncoming shift:   [phone] Activity: 
Activity Level: Up with Assistance Number times ambulated in hallways past shift: 0 Number of times OOB to chair past shift: 1 Cardiac:  
Cardiac Monitoring: Yes     
Cardiac Rhythm: Normal sinus rhythm Access:  
Current line(s): PIV Genitourinary:  
Urinary status: voiding Respiratory:  
O2 Device: None (Room air) Chronic home O2 use?: NO Incentive spirometer at bedside: NO 
  
GI: 
Last Bowel Movement Date: 04/16/21 Current diet:  DIET CARDIAC Regular Passing flatus: YES Tolerating current diet: YES Pain Management:  
Patient states pain is manageable on current regimen: YES Skin: 
Gumaro Score: 20 Interventions: increase time out of bed Patient Safety: 
Fall Score: Total Score: 4 Interventions: bed/chair alarm, assistive device (walker, cane, etc), gripper socks and pt to call before getting OOB High Fall Risk: Yes Length of Stay: 
Expected LOS: - - - Actual LOS: 1 Robin Najjar, RN

## 2021-04-18 NOTE — PROGRESS NOTES
Hospitalist Progress Note NAME: Tu Garcia :  1928 MRN:  [id number] Assessment / Plan: 
Acute right scapula wing fracture, POA 
S/p Mechanical Fall at [facility] POA 
--ortho consult to Dr. Alejandro Varela, outpatient fu and right arm sling immobilizer --tylenol q6h, add prn oxycodone and morphine severe pain IP PT/OT eval noted- SNF recommended IP case management for discharge planning- will need Health care section at [facility] placement 
  
Difficulty walking/ambulating due to above, POA 
--due to dependent on rollator and now difficulty using due to right arm in shoulder splint from scapular fracture 
  
RLE cellulitis vs. Venous stasis Bilateral leg edema, POA R>L   
--continue doxycycline prescribed by pcp yesterday for RLE cellulitis --doppler US right lower leg negative DVT 
--start lasix 20mg daily and monitor weight, I/O, BMP 
  
BPH 
--monitor for urinary retention 
  
Insomnia 
--retry melatonin 
--was prescribed trazodone 50mg qhs by pcp but cautioned patient and daughter in law to use sparingly prn as benzodiazepine is associated with increased fall risk in elderly. 
  
  
HTN 
--continue amlodipine 
  
Hyperlipidemia 
--continue lipitor 
  
Macular degeneration Legally blind in right eye 
--continue eye drops 
  
Check labs:  CMP, CBC 
  
  
Body mass index is 30.68 kg/m². 
  
Code:  DDNR in chart DVT prophylaxis: lovenox Recommended Disposition: SAH/Rehab at [facility] Subjective: Chief Complaint / Reason for Physician Visit :F/U R Scapular wing fracture, R LE cellulitis, FAll at [facility] \"I am ok\". Discussed with RN events overnight. Review of Systems: 
Symptom Y/N Comments  Symptom Y/N Comments Fever/Chills n   Chest Pain n   
Poor Appetite n   Edema n   
Cough n   Abdominal Pain n   
Sputum n   Joint Pain y R Shoulder pain SOB/ENG    Pruritis/Rash Nausea/vomit n   Tolerating PT/OT y Diarrhea n   Tolerating Diet y Constipation y   Other Could NOT obtain due to:   
 
Objective: VITALS:  
Last 24hrs VS reviewed since prior progress note. Most recent are: 
Patient Vitals for the past 24 hrs: 
 Temp Pulse Resp BP SpO2  
04/18/21 1151 97.6 °F (36.4 °C) 62 18 (!) 147/54 98 % 04/18/21 0923  75  (!) 175/71   
04/18/21 0918  72  (!) 200/70   
04/18/21 0916    (!) 181/113   
04/18/21 0754 98 °F (36.7 °C) 65 16 (!) 153/61 96 % 04/18/21 0455 98.2 °F (36.8 °C) 71 16 (!) 161/61 96 % 04/17/21 2214 98.3 °F (36.8 °C) (!) 56 16 (!) 153/64 96 % 04/17/21 2000 97.5 °F (36.4 °C) 60 16 (!) 146/60 98 % 04/17/21 1408 97.4 °F (36.3 °C) 65 16 (!) 158/77 97 % Intake/Output Summary (Last 24 hours) at 4/18/2021 1303 Last data filed at 4/18/2021 1773 Gross per 24 hour Intake  Output 3000 ml Net -3000 ml I had a face to face encounter and independently examined this patient on 4/18/2021, as outlined below: PHYSICAL EXAM: 
General: WD, WN. Alert, cooperative, no acute distress EENT:  EOMI. Anicteric sclerae. MMM Resp:  CTA bilaterally, no wheezing or rales. No accessory muscle use CV:  Regular  rhythm,  LE edema noted + GI:  Soft, Non distended, Non tender. +Bowel sounds Neurologic:  Alert and oriented X 3, normal speech, Psych:   Good insight. Not anxious nor agitated Skin:  B/L R>L LE erythema. rash noted +  No jaundice Extremities: R arm in sling noted + Reviewed most current lab test results and cultures  YES Reviewed most current radiology test results   YES Review and summation of old records today    NO Reviewed patient's current orders and MAR    YES 
PMH/SH reviewed - no change compared to H&P 
________________________________________________________________________ Care Plan discussed with: 
  Comments Patient x Family  x Son at bedside RN x Care Manager Consultant                   Multidiciplinary team rounds were held today with , nursing, pharmacist and clinical coordinator. Patient's plan of care was discussed; medications were reviewed and discharge planning was addressed. ________________________________________________________________________ Total NON critical care TIME:  36   Minutes Total CRITICAL CARE TIME Spent:   Minutes non procedure based Comments >50% of visit spent in counseling and coordination of care    
________________________________________________________________________ Quyen Wahl MD  
 
Procedures: see electronic medical records for all procedures/Xrays and details which were not copied into this note but were reviewed prior to creation of Plan. LABS: 
I reviewed today's most current labs and imaging studies. Pertinent labs include: 
Recent Labs 04/18/21 
0506 04/17/21 
1429 WBC 7.5 10.3 HGB 12.3 12.3 HCT 37.1 37.5  297 Recent Labs 04/18/21 
0506 04/17/21 
1429  137  
K 3.6 3.9  105 CO2 27 26 * 119* BUN 10 10 CREA 0.75 0.65* CA 8.7 8.6 MG 2.2  --   
ALB  --  3.2* TBILI  --  0.8 ALT  --  16 Signed: Quyen Wahl MD

## 2021-04-18 NOTE — PROGRESS NOTES
Problem: Self Care Deficits Care Plan (Adult) Goal: *Acute Goals and Plan of Care (Insert Text) Description:  
FUNCTIONAL STATUS PRIOR TO ADMISSION: Patient was modified independent using a rolling walker for functional mobility. HOME SUPPORT: Patient lives in independent living at [facility]. Occupational Therapy Goals Initiated 4/18/2021 1. Patient will perform upper body dressing with supervision/set-up within 7 day(s). 2.  Patient will perform bathing with minimal assistance/contact guard assist within 7 day(s). 3.  Patient will perform lower body dressing with minimal assistance/contact guard assist within 7 day(s). 4.  Patient will perform toilet transfers with minimal assistance/contact guard assist within 7 day(s). 5.  Patient will perform all aspects of toileting with minimal assistance/contact guard assist within 7 day(s). Outcome: Not Met OCCUPATIONAL THERAPY EVALUATION Patient: Michael Hidalgo (53 y.o. male) Date: 4/18/2021 Primary Diagnosis: Closed right scapular fracture [S42.101A] Precautions: fall, as sling ASSESSMENT Based on the objective data described below, the patient presents with pain, impaired mobility and ADL. He has not yet sen ortho, but was given sling for pain management of scapular fracture. He had impaired balance using quad cane and needed assistance of two to get to the chair. He agrees with and requests short skilled stay at [facility] as he is unsafe to be alone duue to need for assistance. Current Level of Function Impacting Discharge (ADLs/self-care): Mod A Functional Outcome Measure: The patient scored Total: 45/100 on the Barthel Index outcome measure which is indicative of 55% impaired ability to care for basic self needs/dependency on others;. Other factors to consider for discharge: none Patient will benefit from skilled therapy intervention to address the above noted impairments. PLAN : 
Recommendations and Planned Interventions: self care training, functional mobility training, therapeutic exercise, balance training, therapeutic activities, endurance activities, patient education, home safety training, and family training/education Frequency/Duration: Patient will be followed by occupational therapy 5 times a week to address goals. Recommendation for discharge: (in order for the patient to meet his/her long term goals) Therapy up to 5 days/week in SNF setting This discharge recommendation: A follow-up discussion with the attending provider and/or case management is planned IF patient discharges home will need the following DME: mobility device to be determined SUBJECTIVE:  
Patient stated Conradoancelmo Potterjose dee all for your help.  OBJECTIVE DATA SUMMARY:  
HISTORY:  
Past Medical History:  
Diagnosis Date Anxiety disorder Arthritis CAD (coronary artery disease) Calculus of kidney Cancer [facility]) prostate  1996  
 skin Depression Hearing loss Heart attack ([facility] [address] Hypercholesterolemia Hypertension Incisional hernia 5/10/2011 Movement disorder Neuropathy Other ill-defined conditions(799.89)   
 elevated cholesterol Other ill-defined conditions(799.89)   
 glaucoma Other ill-defined conditions(799.89)   
 abd hernia Skipped beats Stroke [facility])   
 left arm tingling Thyroid disease Thyroid mass 5/10/2011 Vertigo Past Surgical History:  
Procedure Laterality Date HX AORTIC VALVE REPLACEMENT  2015 HX HEENT    
 thyroid biopsy HX HEENT    
 thyroidectomy 2013 HX ORCHIECTOMY  01/2017 HX ORTHOPAEDIC  2/2011  
 compression fracture d.t. fall (back),no surgery HX THYROIDECTOMY  1/3/2013 [place] CARDIAC SURG PROCEDURE UNLIST  1996  
 cabg x3 IA PROSTATE BIOPSY, NEEDLE, SATURATION SAMPLING Expanded or extensive additional review of patient history:  
 
Home Situation Home Environment: Independent living Care Facility Name: [facility] # Steps to Enter: 0 One/Two Story Residence: Other (Comment)(3 stories with elevator) Living Alone: Yes Support Systems: Child(yaquelin) Patient Expects to be Discharged to[de-identified] Assisted living Current DME Used/Available at Home: Walker, rolling Tub or Shower Type: Shower Hand dominance: Right EXAMINATION OF PERFORMANCE DEFICITS: 
Cognitive/Behavioral Status: 
Neurologic State: Alert; Appropriate for age Orientation Level: Oriented X4 Cognition: Follows commands Skin: bruising Edema: none Hearing: Auditory Auditory Impairment: Hard of hearing, bilateral 
Hearing Aids/Status: Bilateral, With patient Vision/Perceptual:   
    
    
    
  
    
Acuity: Impaired near vision; Impaired far vision(glaucoma, R eye blind) Range of Motion: 
 
AROM: Generally decreased, functional(excpet R UE in sling) Strength: 
 
Strength: Generally decreased, functional 
  
  
  
  
 
Coordination: 
Coordination: Generally decreased, functional 
    
  
 
Tone & Sensation: 
 
Tone: Normal 
  
  
  
  
  
  
  
 
Balance: 
Sitting: Intact Standing: Impaired Standing - Static: Fair;Constant support Standing - Dynamic : Fair;Constant support Functional Mobility and Transfers for ADLs: 
Bed Mobility: 
Supine to Sit: Minimum assistance;Assist x2 Scooting: Moderate assistance Transfers: 
Sit to Stand: Minimum assistance; Moderate assistance;Assist x2(decreased anterior weight shift) Stand to Sit: Minimum assistance;Contact guard assistance;Assist x2 Bed to Chair: Minimum assistance;Assist x2 ADL Assessment: 
Feeding: Setup;Stand-by assistance Oral Facial Hygiene/Grooming: Minimum assistance Bathing: Moderate assistance Upper Body Dressing: Moderate assistance Lower Body Dressing: Moderate assistance Toileting: Moderate assistance ADL Intervention and task modifications: Upper Body Dressing Assistance Orthotics(Brace): Moderate assistance Functional Measure: 
Barthel Index: 
 
Bathin Bladder: 5 Bowels: 10 
Groomin Dressin Feedin Mobility: 5 Stairs: 0 Toilet Use: 5 Transfer (Bed to Chair and Back): 10 Total: 45/100 The Barthel ADL Index: Guidelines 1. The index should be used as a record of what a patient does, not as a record of what a patient could do. 2. The main aim is to establish degree of independence from any help, physical or verbal, however minor and for whatever reason. 3. The need for supervision renders the patient not independent. 4. A patient's performance should be established using the best available evidence. Asking the patient, friends/relatives and nurses are the usual sources, but direct observation and common sense are also important. However direct testing is not needed. 5. Usually the patient's performance over the preceding 24-48 hours is important, but occasionally longer periods will be relevant. 6. Middle categories imply that the patient supplies over 50 per cent of the effort. 7. Use of aids to be independent is allowed. Kanwal Chen., Barthel, D.W. (9200). Functional evaluation: the Barthel Index. [facility] (14)2. Fariha Armenta, JACQUEF, Salvador Dill., Keerthi Cronin., Orla, [address] [place] [address] (). Measuring the change indisability after inpatient rehabilitation; comparison of the responsiveness of the Barthel Index and Functional [place] Measure. Journal of Neurology, Neurosurgery, and Psychiatry, 66(4), 583-198. HAIDER Manzanares.MARII, IVETH Cotto, & Mima Soto MYaneA. (2004.) Assessment of post-stroke quality of life in cost-effectiveness studies: The usefulness of the Barthel Index and the EuroQoL-5D. [facility], 13, 149-71 Occupational Therapy Evaluation Charge Determination History Examination Decision-Making LOW Complexity : Brief history review LOW Complexity : 1-3 performance deficits relating to physical, cognitive , or psychosocial skils that result in activity limitations and / or participation restrictions  LOW Complexity : No comorbidities that affect functional and no verbal or physical assistance needed to complete eval tasks Based on the above components, the patient evaluation is determined to be of the following complexity level: LOW Pain Rating: More with mobility Activity Tolerance:  
Fair After treatment patient left in no apparent distress:   
Sitting in chair, Call bell within reach, and Caregiver / family present COMMUNICATION/EDUCATION:  
The patients plan of care was discussed with: Physical therapist and Registered nurse. Home safety education was provided and the patient/caregiver indicated understanding., Patient/family have participated as able in goal setting and plan of care. , and Patient/family agree to work toward stated goals and plan of care. This patients plan of care is appropriate for delegation to [facility]. Thank you for this referral. 
Skip Stauffer Time Calculation: 31 mins

## 2021-04-18 NOTE — PROGRESS NOTES
Problem: Falls - Risk of 
Goal: *Absence of Falls Description: Document Jordan Degroot Fall Risk and appropriate interventions in the flowsheet. Outcome: Progressing Towards Goal 
Note: Fall Risk Interventions: 
Mobility Interventions: Utilize walker, cane, or other assistive device, PT Consult for assist device competence, PT Consult for mobility concerns, Patient to call before getting OOB Medication Interventions: Patient to call before getting OOB, Teach patient to arise slowly Elimination Interventions: Elevated toilet seat, Call light in reach History of Falls Interventions: Bed/chair exit alarm, Door open when patient unattended Problem: Patient Education: Go to Patient Education Activity Goal: Patient/Family Education Outcome: Progressing Towards Goal

## 2021-04-19 PROBLEM — I49.8 BRADYARRHYTHMIA: Status: ACTIVE | Noted: 2021-01-01
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
## 2021-04-19 NOTE — PROGRESS NOTES
Hospitalist Progress Note NAME: Wendi Hernandez :  1928 MRN:  [id number] Assessment / Plan: 
Acute right scapula wing fracture, POA 
S/p Mechanical Fall at [facility] POA 
--ortho consult to Dr. Jossy Schwarz, outpatient fu and right arm sling immobilizer --tylenol q6h, add prn oxycodone and morphine severe pain IP PT/OT eval noted- SNF recommended IP case management for discharge planning- will need Health care section at [facility] placement-- asked today for PCR COVID test- ordered Also will need insurance authorization per CM 
DC delayed for now 
  Difficulty walking/ambulating due to above, POA 
--due to dependent on rollator and now difficulty using due to right arm in shoulder splint from scapular fracture 
  
RLE cellulitis vs. Venous stasis Bilateral leg edema, POA R>L   
--continue doxycycline prescribed by pcp yesterday for RLE cellulitis --doppler US right lower leg negative DVT 
--start lasix 20mg daily and monitor weight, I/O, BMP 
  
BPH 
--monitor for urinary retention 
  
Insomnia 
--retry melatonin 
--was prescribed trazodone 50mg qhs by pcp but cautioned patient and daughter in law to use sparingly prn as benzodiazepine is associated with increased fall risk in elderly. 
  
  
HTN 
--continue amlodipine 
  
Hyperlipidemia 
--continue lipitor 
  
Macular degeneration Legally blind in right eye 
--continue eye drops 
  
Check labs:  CMP, CBC 
  
  
Body mass index is 30.68 kg/m². 
  
Code:  DDNR in chart DVT prophylaxis: lovenox Recommended Disposition: Rehab at [facility] section - pending PCR COVID test & insurance authorization-- DC delayed Subjective: Chief Complaint / Reason for Physician Visit :F/U R Scapular wing fracture, R LE cellulitis, FAll at [facility] \"I am ok\". Discussed with RN events overnight. Review of Systems: 
Symptom Y/N Comments  Symptom Y/N Comments Fever/Chills n   Chest Pain n   
Poor Appetite n   Edema n   
Cough n Abdominal Pain n   
Sputum n   Joint Pain y R Shoulder pain SOB/ENG    Pruritis/Rash Nausea/vomit n   Tolerating PT/OT y Diarrhea n   Tolerating Diet y Constipation y   Other Could NOT obtain due to:   
 
Objective: VITALS:  
Last 24hrs VS reviewed since prior progress note. Most recent are: 
Patient Vitals for the past 24 hrs: 
 Temp Pulse Resp BP SpO2  
04/19/21 0734 98.1 °F (36.7 °C) 77 18 (!) 168/80 93 % 04/19/21 0302 97 °F (36.1 °C) 60 18 (!) 143/51 94 % 04/18/21 2323 97.7 °F (36.5 °C) 63 18 (!) 135/54 96 % 04/18/21 1931 98.1 °F (36.7 °C) 63 18 (!) 126/52 93 % 04/18/21 1539 97.8 °F (36.6 °C) 63 16 (!) 139/53 99 % 04/18/21 1151 97.6 °F (36.4 °C) 62 18 (!) 147/54 98 % Intake/Output Summary (Last 24 hours) at 4/19/2021 1058 Last data filed at 4/19/2021 0450 Gross per 24 hour Intake 480 ml Output 700 ml Net -220 ml I had a face to face encounter and independently examined this patient on 4/19/2021, as outlined below: PHYSICAL EXAM: 
General: WD, WN. Alert, cooperative, no acute distress EENT:  EOMI. Anicteric sclerae. MMM Resp:  CTA bilaterally, no wheezing or rales. No accessory muscle use CV:  Regular  rhythm,  LE edema noted + GI:  Soft, Non distended, Non tender. +Bowel sounds Neurologic:  Alert and oriented X 3, normal speech, Psych:   Good insight. Not anxious nor agitated Skin:  B/L R>L LE erythema. rash noted +  No jaundice Extremities: R arm in sling noted + Reviewed most current lab test results and cultures  YES Reviewed most current radiology test results   YES Review and summation of old records today    NO Reviewed patient's current orders and MAR    YES 
PMH/SH reviewed - no change compared to H&P 
________________________________________________________________________ Care Plan discussed with: 
  Comments Patient x Family  x Son at bedside RN x Care Manager x Kassie Guaman Consultant Multidiciplinary team rounds were held today with , nursing, pharmacist and clinical coordinator. Patient's plan of care was discussed; medications were reviewed and discharge planning was addressed. ________________________________________________________________________ Total NON critical care TIME:  26   Minutes Total CRITICAL CARE TIME Spent:   Minutes non procedure based Comments >50% of visit spent in counseling and coordination of care    
________________________________________________________________________ Joli Kanner, MD  
 
Procedures: see electronic medical records for all procedures/Xrays and details which were not copied into this note but were reviewed prior to creation of Plan. LABS: 
I reviewed today's most current labs and imaging studies. Pertinent labs include: 
Recent Labs 04/18/21 
0506 04/17/21 
1429 WBC 7.5 10.3 HGB 12.3 12.3 HCT 37.1 37.5  297 Recent Labs 04/18/21 
0506 04/17/21 
1429  137  
K 3.6 3.9  105 CO2 27 26 * 119* BUN 10 10 CREA 0.75 0.65* CA 8.7 8.6 MG 2.2  --   
ALB  --  3.2* TBILI  --  0.8 ALT  --  16 Signed: Joli Kanner, MD

## 2021-04-19 NOTE — PROGRESS NOTES
Rt venous doppler exam ordered on 4-16 to be done on 4-19 as an outpatient, was completed on 4-17 as an Inpatient.    Current exam will be canceled

## 2021-04-19 NOTE — PROGRESS NOTES
Pt having acute onset of visual hallucinations Pt increasingly agitated and becoming more difficult to re direct, to prevent fall

## 2021-04-19 NOTE — PROGRESS NOTES
Problem: Self Care Deficits Care Plan (Adult) Goal: *Acute Goals and Plan of Care (Insert Text) Description:  
FUNCTIONAL STATUS PRIOR TO ADMISSION: Patient was modified independent using a rolling walker for functional mobility. HOME SUPPORT: Patient lives in independent living at [facility]. Occupational Therapy Goals Initiated 4/18/2021 1. Patient will perform upper body dressing with supervision/set-up within 7 day(s). 2.  Patient will perform bathing with minimal assistance/contact guard assist within 7 day(s). 3.  Patient will perform lower body dressing with minimal assistance/contact guard assist within 7 day(s). 4.  Patient will perform toilet transfers with minimal assistance/contact guard assist within 7 day(s). 5.  Patient will perform all aspects of toileting with minimal assistance/contact guard assist within 7 day(s). Outcome: Progressing Towards Goal 
 OCCUPATIONAL THERAPY TREATMENT Patient: Aurea Hanley (27 y.o. male) Date: 4/19/2021 Diagnosis: Closed right scapular fracture [S42.101A] <principal problem not specified> Precautions: Fall, Skin, NWB(NWB RUE in sling) Chart, occupational therapy assessment, plan of care, and goals were reviewed. ASSESSMENT Patient continues with skilled OT services and is progressing towards goals. Pt was supine in bed and was reminded that he was not able to put weight on his left UE. He was max of 2 for supine to sit and appears very stiff and rigid at first.  Sling was adjusted and pt was able to scoot to EOB and reminded to keep his feet on the floor. Pt was mod of 2 to stand and once he stood he had a very strong posterior lean and was not able to correct. Pt was able to take a few steps forward, with use of cane and was mod to max of 2 to do so. Pt was leaving his cane behind him and was continuing to loose his balance in standing. Pt was max to mod assist for transfer to chair.   Pt was left sitting up in chair with family in room. Current Level of Function Impacting Discharge (ADLs): max for ADLs PLAN : 
Patient continues to benefit from skilled intervention to address the above impairments. Continue treatment per established plan of care to address goals. Recommend with staff: OOB for meals Recommend next OT session: work on standing for prep for ADLs Recommendation for discharge: (in order for the patient to meet his/her long term goals) Therapy up to 5 days/week in SNF setting This discharge recommendation: 
Has been made in collaboration with the attending provider and/or case management IF patient discharges home will need the following DME: tbd SUBJECTIVE:  
Patient stated So I need to be on my toes? Margarita Bryan OBJECTIVE DATA SUMMARY:  
Cognitive/Behavioral Status: 
Neurologic State: Alert(Simultaneous filing. User may not have seen previous data.) Orientation Level: Oriented to person;Oriented to place;Oriented to time(Simultaneous filing. User may not have seen previous data.) Cognition: Follows commands(Simultaneous filing. User may not have seen previous data.) Perception: Appears intact(Simultaneous filing. User may not have seen previous data.) Perseveration: No perseveration noted(Simultaneous filing. User may not have seen previous data.) Safety/Judgement: Fall prevention(Simultaneous filing. User may not have seen previous data.) Functional Mobility and Transfers for ADLs: 
Bed Mobility: 
 Max of 2 Transfers: 
  
Functional Transfers Toilet Transfer : Moderate assistance;Assist x2(BSC) Balance: 
 impaired ADL Intervention: 
Feeding Feeding Assistance: Set-up Grooming Grooming Assistance: Set-up Position Performed: Seated in chair Washing Face: Set-up Washing Hands: Set-up Brushing Teeth: Set-up Upper Body Bathing Bathing Assistance: Maximum assistance; Moderate assistance Position Performed: Seated in chair Lower Body Bathing Bathing Assistance: Maximum assistance Perineal  : Maximum assistance Position Performed: Supine;Standing Toileting Bladder Hygiene: Moderate assistance;Minimum assistance Bowel Hygiene: Maximum assistance;Minimum assistance Cognitive Retraining Safety/Judgement: Fall prevention(Simultaneous filing. User may not have seen previous data.) Activity Tolerance:  
Fair After treatment patient left in no apparent distress:  
Sitting in chair, Call bell within reach, and Bed / chair alarm activated COMMUNICATION/COLLABORATION:  
The patients plan of care was discussed with: Physical therapist and Registered nurse. MONICA Driscoll Time Calculation: 16 mins

## 2021-04-19 NOTE — DISCHARGE INSTRUCTIONS
HOSPITALIST DISCHARGE INSTRUCTIONS    NAME: Blake Barajas   :  1928   MRN:  [id number]     Date/Time:  2021 8:29 AM    ADMIT DATE: 2021     DISCHARGE DATE: 2021     DISCHARGE DIAGNOSIS:  Acute right scapula wing fracture, POA- pain management, Sling, non operative management, Outpatient follow up with Dr Erica Claros (ortho)  S/p Mechanical Fall at [facility] POA- SNF at [facility] healthcare section as recommendations  Difficulty walking/ambulating due to above, POA--due to dependent on rollator and now difficulty using due to right arm in shoulder splint from scapular fracture  RLE cellulitis vs. Venous stasis POA- cont doxycycline for now  Bilateral leg edema, POA R>L  - LE doppler neg for DVT  Intermittent Bradycardia, likely SSS- seen by Cardiology Dr Dorene Alcala, deferred PPM placement as pt asymptomatic & Pt not interested at this time, outpatient follow up with Dr Anju Argueta in 2 weeks as recommended if wants to consider it  HTN  Hyperlipidemia  Insomnia  BPH  Macular degeneration  Legally blind in right eye  DNR/DNI    Active Problems:    CAD (coronary artery disease) (5/10/2011)      Ataxia (2017)      Closed right scapular fracture (2021)      Bradyarrhythmia (2021)      Fall at home (2021)         MEDICATIONS:  As per medication reconciliation  list  · It is important that you take the medication exactly as they are prescribed. · Keep your medication in the bottles provided by the pharmacist and keep a list of the medication names, dosages, and times to be taken in your wallet. · Do not take other medications without consulting your doctor. Pain Management: per above medications    What to do at Home    Recommended diet:  Cardiac Diet    Recommended activity: Activity as tolerated    If you have questions regarding the hospital related prescriptions or hospital related issues [facility] [phone].     If you experience any of the following symptoms then please call your primary care physician or return to the emergency room if you cannot get hold of your doctor:  Fever, chills, nausea, vomiting, diarrhea, change in mentation, falling, bleeding, shortness of breath,     Follow Up:  Dr. Piotr Bruno MD  you are to call and set up an appointment to see them in 7-10 days. Dr Patrice Jenkins ([address] [place] [address]) in 1-2 weeks for follow up on Scapula fracture  Dr Hope Chowdary ([facility] cardiology) in 1-2 weeks for SSS/bradycardia (asymptomatic) follow up    Information obtained by :  I understand that if any problems occur once I am at home I am to contact my physician. I understand and acknowledge receipt of the instructions indicated above.                                                                                                                                            Physician's or R.N.'s Signature                                                                  Date/Time                                                                                                                                              Patient or Representative Signature                                                          Date/Time

## 2021-04-19 NOTE — PROGRESS NOTES
Problem: Mobility Impaired (Adult and Pediatric) Goal: *Acute Goals and Plan of Care (Insert Text) Description: FUNCTIONAL STATUS PRIOR TO ADMISSION: Patient was modified independent using a rolling walker for functional mobility. HOME SUPPORT PRIOR TO ADMISSION: The patient lived alone in independent living with family and staff to provide assistance. Physical Therapy Goals Initiated 4/18/2021 1. Patient will move from supine to sit and sit to supine , scoot up and down, and roll side to side in bed with minimal assistance/contact guard assist within 7 day(s). 2.  Patient will transfer from bed to chair and chair to bed with minimal assistance/contact guard assist using the least restrictive device within 7 day(s). 3.  Patient will perform sit to stand with minimal assistance/contact guard assist within 7 day(s). 4.  Patient will ambulate with minimal assistance/contact guard assist for 100 feet with the least restrictive device within 7 day(s). Outcome: Progressing Towards Goal 
 PHYSICAL THERAPY TREATMENT Patient: Michael Hidalgo (60 y.o. male) Date: 4/19/2021 Diagnosis: Closed right scapular fracture [S42.101A] <principal problem not specified> Precautions: Fall, Skin, NWB(NWB RUE in sling) Chart, physical therapy assessment, plan of care and goals were reviewed. ASSESSMENT Patient continues with skilled PT services and is progressing towards goals. Needed increased assistance today for transfers and standing activities due to increased balance impairment with strong posterior trunk lean in standing and initial sitting. Gait was 2 person assist with patient exhibiting very narrow step width, impaired weight shifting, with small step lengths and frequent lob. He poorly sequenced with quad cane dragging in behind most of the time. Remains a high fall risk and will need SNF level rehab upon discharge.   
 
Current Level of Function Impacting Discharge (mobility/balance): min/mod a x 2 supine to sit, sit to stand and bed to chair transfers. Min/mod a x 2 ambulation 10 feet with quad cane. Other factors to consider for discharge: lives alone in [place] with family nearby. PLAN : 
Patient continues to benefit from skilled intervention to address the above impairments. Continue treatment per established plan of care. to address goals. Recommendation for discharge: (in order for the patient to meet his/her long term goals) Therapy up to 5 days/week in SNF setting This discharge recommendation: 
Has been made in collaboration with the attending provider and/or case management IF patient discharges home will need the following DME: bedside commode and wheelchair SUBJECTIVE:  
Patient stated when do I get to leave.  OBJECTIVE DATA SUMMARY:  
Critical Behavior: 
Neurologic State: Alert(Simultaneous filing. User may not have seen previous data.) Orientation Level: Oriented to person, Oriented to place, Oriented to time(Simultaneous filing. User may not have seen previous data.) Cognition: Follows commands(Simultaneous filing. User may not have seen previous data.) Safety/Judgement: Fall prevention(Simultaneous filing. User may not have seen previous data.) Functional Mobility Training: 
Bed Mobility: 
Rolling: Moderate assistance Supine to Sit: Moderate assistance Scooting: Contact guard assistance Transfers: 
Sit to Stand: Minimum assistance;Assist x2; Moderate assistance Stand to Sit: Moderate assistance Bed to Chair: Minimum assistance;Assist x2 Balance: 
Sitting: Intact Standing: Impaired Standing - Static: Fair;Constant support(posterior lean) Standing - Dynamic : Poor;Constant support(posterior lean) Ambulation/Gait Training: 
Distance (ft): 10 Feet (ft) Assistive Device: Gait belt;Cane, quad(very poor/absent sequencing with cane) Ambulation - Level of Assistance: Minimal assistance; Moderate assistance;Assist x2 
  
 Gait Abnormalities: Decreased step clearance; Step to gait; Path deviations; Altered arm swing(very narrow evy) Base of Support: Narrowed; Other (comment)(posterior lean) Speed/Margot: Slow;Fluctuations Step Length: Right shortened;Left shortened Swing Pattern: Right asymmetrical 
  
Interventions: Safety awareness training;Manual cues; Tactile cues; Verbal cues; Visual/Demos Pain Rating: 
R upper back 4/10 Activity Tolerance:  
Fair After treatment patient left in no apparent distress:  
Sitting in chair, Call bell within reach, and Caregiver / family present COMMUNICATION/COLLABORATION:  
The patients plan of care was discussed with: Occupational therapist and Registered nurse. Luz Maria Marmolejo, PT Time Calculation: 16 mins

## 2021-04-19 NOTE — PROGRESS NOTES
Transition of Care Plan: 
RUR: 12% Disposition: transition to healthcare at [facility] initiated 4/19 Follow up appointments: PCP 
DME needed: patient has a rollator Transportation at Discharge: BLS [place] or means to access home: n/a IM Medicare letter: 2nd IM needed before Caregiver Contact: Daughter in law Austyn Worley [phone] Discharge Caregiver contacted prior to discharge? to be contacted prior to d/c 
 
10:30am 
CM acknowledged d/c order. CM contacted Salma Dempsey ([phone]) at [facility] to see if they could accept patient to healthcare side today. Per Salma Dempsey patient will need insurance authorization and a PCR COVID test prior to being admitted to their healthcare side. CM informed MD who will be canceling d/c order and ordering PCR test.  
 
Prabhjot Hudson, [address], [address]

## 2021-04-19 NOTE — PROGRESS NOTES
Cardiology Progress Note 4/19/2021 2:02 PM 
 
Admit Date: 4/17/2021 Subjective:  Events noted. No new c/o. Rhythm appears stable Visit Vitals BP (!) 127/59 (BP 1 Location: Left arm, BP Patient Position: At rest) Pulse 65 Temp 98.1 °F (36.7 °C) Resp 18 Ht 5' 10\" (1.778 m) Wt 97 kg (213 lb 13.5 oz) SpO2 91% BMI 30.68 kg/m² 04/17 1901 - 04/19 0700 In: 800 [P.O.:800] Out: 2400 [Urine:2400] Objective:  
  
Physical Exam: 
VS as above Data Review:  
Labs: BUN 16 
Creat 0.8 Telemetry: SB 1st deg AVB Assessment: 1. Probably sick sinus syndrome with sinus bradycardia, nocturnal sinus bradycardia and junctional rhythm with bradycardia. 2. First degree AV block in addition to LBBB at baseline, nocturnal second degree AV block with bradycardia. 3. Nonsustained VT seen on a prior admission. 4. Admitted with difficulty walking s/p fall and R shoulder fracture also with lower extremity cellulitis. 5. Ischemic cardiomyopathy with prior coronary artery bypass grafting. EF 45-50% on last echo 4/2019. 
6. Status post prior transcatheter aortic valve replacement (TAVR) for severe aortic stenosis. 7. Hypertension. 8. Dyslipidemia. 9. Prior transient ischemic attack with history of vertebrobasilar insufficiency. 10. History of prostate cancer. 11. Peripheral neuropathy. 15. Advanced age. DNR. 
  
 
Plan: Agree with d/c plans. Will see in office at next scheduled appt

## 2021-04-19 NOTE — PROGRESS NOTES
Bedside and Verbal shift change report given to [facility] [employer] (oncoming nurse) by Tamela Barnett (offgoing nurse). Report included the following information SBAR, Kardex, Intake/Output, MAR and Recent Results.

## 2021-04-19 NOTE — DISCHARGE SUMMARY
Hospitalist Discharge Summary Patient ID: 
Jamaal Ambriz 
[id number] 
62 y.o. 
2/19/1928 4/17/2021 PCP on record: Piotr Bruno MD 
 
Admit date: 4/17/2021 Discharge date and time: 4/19/2021 DISCHARGE DIAGNOSIS: 
 
Acute right scapula wing fracture, POA- pain management, Sling, non operative management, Outpatient follow up with Dr Patrice Jenkins (ortho) S/p Mechanical Fall at [facility] POA- SNF at [facility] healthcare section as recommendations Difficulty walking/ambulating due to above, POA--due to dependent on rollator and now difficulty using due to right arm in shoulder splint from scapular fracture RLE cellulitis vs. Venous stasis POA- cont doxycycline for now Bilateral leg edema, POA R>L  - LE doppler neg for DVT Intermittent Bradycardia, likely SSS- seen by Cardiology Dr Leon Ramos, deferred PPM placement as pt asymptomatic & Pt not interested at this time, outpatient follow up with Dr Kathy Martines in 2 weeks as recommended if wants to consider it HTN Hyperlipidemia Insomnia BPH Macular degeneration Legally blind in right eye DNR/DNI 
 
 
CONSULTATIONS: 
IP CONSULT TO ORTHOPEDIC SURGERY 
IP CONSULT TO CARDIOLOGY Excerpted HPI from H&P of Nanette De La O MD: 
Hector.[age] y.o. male with CAD, HTN, hyperlipidemia, macular degeneration, presents from [place] independent living after a fall earlier this morning. Patient uses rollator at baseline. Was in kitchen and turned around and one of his shoes \"stuck\" and he fell against wall hitting right shoulder and slid down onto floor. Denies any head injury or dizziness or passing out. He pressed alert necklace to get help.   
  
In ER, xray right shoulder and CT chest shows acute fracture of right scapula wing. CT head negative. Patient with severe pain when he is moving, especially moving right arm.   Minimal pain at rest.  Admission referred as patient is now unable to walk using rollator due to right arm needing to be in sling and unable to be placed into [address] [place] [address] section over weekend. 
  
Per daughter in law, saw pcp yesterday and given doxycycline for RLE cellulitis, to have doppler US right leg this Monday at [facility] to eval for DVT, started on lasix 20mg for b/l leg edema and trazodone 50mg qhs for insomnia. He has not yet started lasix or trazodone. 
  
We were asked to admit for work up and evaluation of the above problems\" ______________________________________________________________________ DISCHARGE SUMMARY/HOSPITAL COURSE:  for full details see H&P, daily progress notes, labs, consult notes. Acute right scapula wing fracture, POA 
S/p Mechanical Fall at [address] 
--ortho consult to Dr. Elbert Butt, outpatient fu and right arm sling immobilizer --tylenol q6h, add prn oxycodone and morphine severe pain IP PT/OT eval noted- SNF recommended IP case management for discharge planning- will need Health care section at [facility] placement 
  
Difficulty walking/ambulating due to above, POA 
--due to dependent on rollator and now difficulty using due to right arm in shoulder splint from scapular fracture 
  
RLE cellulitis vs. Venous stasis Bilateral leg edema, POA R>L   
--continue doxycycline prescribed by pcp yesterday for RLE cellulitis --doppler US right lower leg negative DVT 
--start lasix 20mg daily and monitor weight, I/O, BMP 
  
BPH 
--monitor for urinary retention 
  
Insomnia 
--retry melatonin 
--was prescribed trazodone 50mg qhs by pcp but cautioned patient and daughter in law to use sparingly prn as benzodiazepine is associated with increased fall risk in elderly. 
  
  
HTN 
--continue amlodipine 
  
Hyperlipidemia 
--continue lipitor 
  
Macular degeneration Legally blind in right eye 
--continue eye drops 
  
Check labs:  CMP, CBC 
  
  
Body mass index is 30.68 kg/m². 
  
Code:  DDNR in chart 
 
 
 
_______________________________________________________________________ Patient seen and examined by me on discharge day. Pertinent Findings: 
Gen:    Not in distress Chest: Clear lungs CVS:   Regular rhythm. No edema Abd:  Soft, not distended, not tender Neuro:  Alert, oriented  x3 
_______________________________________________________________________ DISCHARGE MEDICATIONS:  
Current Discharge Medication List  
  
START taking these medications Details  
senna-docusate (PERICOLACE) 8.6-50 mg per tablet Take 1 Tab by mouth two (2) times daily as needed for Constipation. Qty: 30 Tab, Refills: 0 CONTINUE these medications which have NOT CHANGED Details  
doxycycline (MONODOX) 100 mg capsule Take 100 mg by mouth two (2) times a day. X 10 days  
  
furosemide (Lasix) 20 mg tablet Take 20 mg by mouth daily. traZODone (DESYREL) 50 mg tablet Take 50 mg by mouth nightly. omeprazole (PRILOSEC) 40 mg capsule Take 40 mg by mouth daily. ergocalciferol (Vitamin D2) 1,250 mcg (50,000 unit) capsule Take 50,000 Units by mouth every Sunday. citalopram (CELEXA) 20 mg tablet TAKE 1 TABLET BY MOUTH EVERY DAY Qty: 30 Tab, Refills: 1 Associated Diagnoses: Anxiety  
  
aspirin delayed-release 81 mg tablet Take 81 mg by mouth daily. amLODIPine (NORVASC) 2.5 mg tablet Take 1 Tab by mouth daily. Qty: 30 Tab, Refills: 0  
  
ticagrelor (BRILINTA) 90 mg tablet Take 1 Tab by mouth every twelve (12) hours every twelve (12) hours. Qty: 60 Tab, Refills: 1  
  
lovastatin (MEVACOR) 40 mg tablet Take 40 mg by mouth nightly. brimonidine (ALPHAGAN) 0.15 % ophthalmic solution Administer 1 Drop to both eyes two (2) times a day. SYNTHROID 125 mcg tablet Take 1 Tab by mouth Daily (before breakfast). Qty: 90 Tab, Refills: 4 Associated Diagnoses: Hypothyroidism, postsurgical  
  
dutasteride (AVODART) 0.5 mg capsule Take 0.5 mg by mouth daily. cyanocobalamin (VITAMIN B-12) 1,000 mcg tablet Take 1,000 mcg by mouth daily.     
  
tamsulosin (FLOMAX) 0.4 mg capsule Take 0.4 mg by mouth two (2) times a day. dorzolamide-timolol (COSOPT) 22.3-6.8 mg/mL ophthalmic solution Administer 1 Drop to both eyes daily. latanoprost (XALATAN) 0.005 % ophthalmic solution Administer 1 Drop to both eyes nightly. Patient Follow Up Instructions: Activity: Activity as tolerated Diet: Cardiac Diet and Low fat, Low cholesterol Follow-up with PCP, Dr Danilo Zavala (orthopedic surgeon)   in 2 weeks. Follow-up Information Follow up With Specialties Details Why Contact Info Mayelin Flores, [address] [facility] [place] [address] [address] [phone] 
  
  
 
________________________________________________________________ Risk of deterioration: Low 
 
Condition at Discharge:  Stable 
__________________________________________________________________ Disposition SNF/LTC Healthcare section at [facility] 
 
____________________________________________________________________ Code Status: DNR/DNI 
___________________________________________________________________ Total time in minutes spent coordinating this discharge (includes going over instructions, follow-up, prescriptions, and preparing report for sign off to her PCP) :  >30 minutes Signed: 
Minesh Butler MD

## 2021-04-20 NOTE — PROGRESS NOTES
Bedside and Verbal shift change report given to [facility], RN (oncoming nurse) by Ritesh Blackman RN (offgoing nurse). Report included the following information SBAR, Kardex, MAR and Recent Results.

## 2021-04-20 NOTE — PROGRESS NOTES
Problem: Self Care Deficits Care Plan (Adult) Goal: *Acute Goals and Plan of Care (Insert Text) Description:  
FUNCTIONAL STATUS PRIOR TO ADMISSION: Patient was modified independent using a rolling walker for functional mobility. HOME SUPPORT: Patient lives in independent living at [facility]. Occupational Therapy Goals Initiated 4/18/2021 1. Patient will perform upper body dressing with supervision/set-up within 7 day(s). 2.  Patient will perform bathing with minimal assistance/contact guard assist within 7 day(s). 3.  Patient will perform lower body dressing with minimal assistance/contact guard assist within 7 day(s). 4.  Patient will perform toilet transfers with minimal assistance/contact guard assist within 7 day(s). 5.  Patient will perform all aspects of toileting with minimal assistance/contact guard assist within 7 day(s). Outcome: Progressing Towards Goal 
 OCCUPATIONAL THERAPY TREATMENT Patient: Aleksandar Pride (56 y.o. male) Date: 4/20/2021 Diagnosis: Closed right scapular fracture [S42.101A] <principal problem not specified> Precautions: Fall, Skin, NWB(NWB RUE in sling) Chart, occupational therapy assessment, plan of care, and goals were reviewed. ASSESSMENT Patient continues with skilled OT services and is progressing towards goals. Pt was sitting up in chair and per family he had been having some SOB and stomach upset. Nursing had come to see pt prior to therapy and felt it may be anxiety per family. BP was 102/49, sitting and pt was pale , and BP had dropped while pt was sitting up in chair. He was able to stand with min of 2 from chair and with use of gary walker pt was min of 1 for transfer to bed from chair. Pt sat on bed and started to have SOB and was feeling sweaty. HE was mod to get legs in bed, and once he was supine, pt was either asleep or passed out for short time.   BP in supine was 143/64 and pt was more alert after he was laying down in bed. Pt is moving much better today and did not have posterior lean at all when he stood. Current Level of Function Impacting Discharge (ADLs): max to mod for ADLs PLAN : 
Patient continues to benefit from skilled intervention to address the above impairments. Continue treatment per established plan of care to address goals. Recommend with staff: OOB for meals Recommend next OT session: work on standing at sink for grooming Recommendation for discharge: (in order for the patient to meet his/her long term goals) Therapy up to 5 days/week in SNF setting This discharge recommendation: 
Has been made in collaboration with the attending provider and/or case management IF patient discharges home will need the following DME: tbd SUBJECTIVE:  
Patient stated I did better with the help of you ladies.  OBJECTIVE DATA SUMMARY:  
Cognitive/Behavioral Status: 
Neurologic State: Alert Orientation Level: Oriented to person;Oriented to place Cognition: Follows commands Perception: Appears intact Perseveration: No perseveration noted Safety/Judgement: Fall prevention Functional Mobility and Transfers for ADLs: 
Bed Mobility: 
Rolling: Moderate assistance Sit to Supine: Moderate assistance(for legs amd min to CGA for UB) Transfers: 
  
Functional Transfers Toilet Transfer : Minimum assistance;Assist x1(gary walker) Balance: 
Sitting: Intact Standing: Impaired; Without support Standing - Static: Fair;Constant support Standing - Dynamic : Fair;Constant support ADL Intervention: 
Feeding Feeding Assistance: Set-up Grooming Grooming Assistance: Set-up Position Performed: Seated in chair Washing Face: Set-up Washing Hands: Set-up Brushing Teeth: Set-up Upper Body Bathing Bathing Assistance: Minimum assistance;Set-up Position Performed: Seated in chair Lower Body Bathing Bathing Assistance: Maximum assistance; Moderate assistance Perineal  : Maximum assistance; Moderate assistance Position Performed: Standing Toileting Bladder Hygiene: Moderate assistance;Minimum assistance Bowel Hygiene: Moderate assistance Cognitive Retraining Safety/Judgement: Fall prevention Therapeutic Exercises:  
 
 
Pain: 
 
 
Activity Tolerance:  
Fair After treatment patient left in no apparent distress:  
Supine in bed, Call bell within reach, and Caregiver / family present COMMUNICATION/COLLABORATION:  
The patients plan of care was discussed with: Physical therapist and Registered nurse. MONICA Driscoll Time Calculation: 15 mins

## 2021-04-20 NOTE — PROGRESS NOTES
Hospitalist Progress Note NAME: Marybel Wright :  1928 MRN:  [id number] Assessment / Plan: 
Acute right scapula wing fracture, POA 
S/p Mechanical Fall at [facility] POA 
--ortho consult to Dr. Nikolai Munroe, outpatient fu and right arm sling immobilizer --tylenol q6h, add prn oxycodone and morphine severe pain IP PT/OT eval noted- SNF recommended IP case management for discharge planning- will need Health care section at [facility] placement-- asked today for PCR COVID test- ordered Also will need insurance authorization per CM 
DC delayed for now 
  Difficulty walking/ambulating due to above, POA 
--due to dependent on rollator and now difficulty using due to right arm in shoulder splint from scapular fracture Asymptomatic sinus bradycardia Heart rate around 30 this a.m. Cardiology discussed with patient and he is not interested in pacemaker 
  
RLE cellulitis vs. Venous stasis Bilateral leg edema, POA R>L   
--continue doxycycline prescribed by pcp yesterday for RLE cellulitis --doppler US right lower leg negative DVT 
--Started on lasix 20mg daily and monitor weight, I/O, BMP 
  
BPH 
--monitor for urinary retention 
  
Insomnia -- melatonin 
--was prescribed trazodone 50mg qhs by pcp but cautioned patient and daughter in law to use sparingly prn as benzodiazepine is associated with increased fall risk in elderly. Environmental changes discussed with nurse and patient's daughter-in-law 
  
HTN 
--Hold amlodipine given bradycardia 
  
Hyperlipidemia 
--continue lipitor 
  
Macular degeneration Legally blind in right eye 
--continue eye drops 
  
Check labs:  CMP, CBC 
  
  
Body mass index is 30.68 kg/m². 
  
Code:  DDNR in chart DVT prophylaxis: lovenox Recommended Disposition: Rehab at [facility] section - pending PCR COVID test & insurance authorization--continue to be medically stable for discharge once facility is available Subjective: Chief Complaint / Reason for Physician Visit :F/U R Scapular wing fracture, R LE cellulitis, FAll at [facility] Discussed with RN events overnight. Patient was seen and examined. No acute events overnight. Patient's daughter-in-law was at bedside, all questions were answered. Review of Systems: 
Symptom Y/N Comments  Symptom Y/N Comments Fever/Chills n   Chest Pain n   
Poor Appetite    Edema Cough n   Abdominal Pain n   
Sputum    Joint Pain y  r shoulder pain SOB/ENG n   Pruritis/Rash Nausea/vomit n   Tolerating PT/OT Diarrhea    Tolerating Diet y Constipation    Other Could NOT obtain due to:   
 
 
 
Objective: VITALS:  
Last 24hrs VS reviewed since prior progress note. Most recent are: 
Patient Vitals for the past 24 hrs: 
 Temp Pulse Resp BP SpO2  
04/20/21 1128 98.2 °F (36.8 °C) 82 18 132/89 97 % 04/20/21 0733 97.9 °F (36.6 °C) (!) 58 18 (!) 151/65 95 % 04/20/21 0305 98 °F (36.7 °C) (!) 38 18 136/66 92 % 04/19/21 2322 97.8 °F (36.6 °C) (!) 48 18 (!) 142/64 94 % 04/19/21 2027 97.6 °F (36.4 °C) 64 18 (!) 155/78 95 % 04/19/21 1533 97.9 °F (36.6 °C) (!) 43 18 (!) 129/52 97 % Intake/Output Summary (Last 24 hours) at 4/20/2021 1256 Last data filed at 4/20/2021 3682 Gross per 24 hour Intake 720 ml Output 1351 ml Net -631 ml I had a face to face encounter and independently examined this patient on 4/20/2021, as outlined below: PHYSICAL EXAM: 
General: WD, WN. Alert, cooperative, no acute distress EENT:  EOMI. Anicteric sclerae. MMM Resp:  CTA bilaterally, no wheezing or rales. No accessory muscle use CV:  Regular  rhythm,  LE edema noted + GI:  Soft, Non distended, Non tender. +Bowel sounds Neurologic:  Alert and oriented X 3, normal speech, Psych:   Good insight. Not anxious nor agitated Skin:  B/L R>L LE erythema. rash noted +  No jaundice Extremities: R arm in sling noted + Reviewed most current lab test results and cultures  YES Reviewed most current radiology test results   YES Review and summation of old records today    NO Reviewed patient's current orders and MAR    YES 
PMH/SH reviewed - no change compared to H&P 
________________________________________________________________________ Care Plan discussed with: 
  Comments Patient x Family  x  patient's daughter-in-law at bedside RN x Care Manager x Betina Guerrero Consultant Multidiciplinary team rounds were held today with , nursing, pharmacist and clinical coordinator. Patient's plan of care was discussed; medications were reviewed and discharge planning was addressed. ________________________________________________________________________ Total NON critical care TIME:  35 Minutes Total CRITICAL CARE TIME Spent:   Minutes non procedure based Comments >50% of visit spent in counseling and coordination of care    
________________________________________________________________________ Lewis Cook MD  
 
Procedures: see electronic medical records for all procedures/Xrays and details which were not copied into this note but were reviewed prior to creation of Plan. LABS: 
I reviewed today's most current labs and imaging studies. Pertinent labs include: 
Recent Labs 04/18/21 
0506 04/17/21 
1429 WBC 7.5 10.3 HGB 12.3 12.3 HCT 37.1 37.5  297 Recent Labs 04/19/21 
1150 04/18/21 
0506 04/17/21 
1429  139 137  
K 3.7 3.6 3.9  107 105 CO2 28 27 26 * 118* 119* BUN 16 10 10 CREA 0.76 0.75 0.65* CA 8.8 8.7 8.6 MG  --  2.2  --   
ALB  --   --  3.2* TBILI  --   --  0.8 ALT  --   --  16 Signed: Lewis Cook MD

## 2021-04-20 NOTE — PROGRESS NOTES
Bedside and Verbal shift change report given to [address] [facility] (oncoming nurse) by Griffin aHile (offgoing nurse). Report included the following information SBAR, Kardex, Intake/Output, MAR and Recent Results.

## 2021-04-20 NOTE — PROGRESS NOTES
Problem: Mobility Impaired (Adult and Pediatric) Goal: *Acute Goals and Plan of Care (Insert Text) Description: FUNCTIONAL STATUS PRIOR TO ADMISSION: Patient was modified independent using a rolling walker for functional mobility. HOME SUPPORT PRIOR TO ADMISSION: The patient lived alone in independent living with family and staff to provide assistance. Physical Therapy Goals Initiated 4/18/2021 1. Patient will move from supine to sit and sit to supine , scoot up and down, and roll side to side in bed with minimal assistance/contact guard assist within 7 day(s). 2.  Patient will transfer from bed to chair and chair to bed with minimal assistance/contact guard assist using the least restrictive device within 7 day(s). 3.  Patient will perform sit to stand with minimal assistance/contact guard assist within 7 day(s). 4.  Patient will ambulate with minimal assistance/contact guard assist for 100 feet with the least restrictive device within 7 day(s). Outcome: Progressing Towards Goal 
 PHYSICAL THERAPY TREATMENT Patient: Ella Santos (4 y.o. male) Date: 4/20/2021 Diagnosis: Closed right scapular fracture [S42.101A] <principal problem not specified> Precautions: Fall, Skin, NWB(NWB RUE in sling) Chart, physical therapy assessment, plan of care and goals were reviewed. ASSESSMENT Patient continues with skilled PT services and is progressing towards goals. Pt presents with decreased strength and hypotension. Pt received in bedside chair with daughter in room. Pt appeared pale and diaphoretic . Pt performed sit to stand transfer at min Ax2. Pt ambulated 4ft with gary walker at min A. Pt requiring cueing for sequencing but with improved posture and balance for last session. Pt assisted to supine at mod A due decreased BP. Pts BP increased once supine. Once BP improve pt should increase ambulation distance.     
 
Current Level of Function Impacting Discharge (mobility/balance): sit to stand transfer at min Ax2, ambulation at min A, bed mobility at mod Ax2 Other factors to consider for discharge: Orthostatic BP  
    
 
PLAN : 
Patient continues to benefit from skilled intervention to address the above impairments. Continue treatment per established plan of care. to address goals. Recommendation for discharge: (in order for the patient to meet his/her long term goals) Therapy up to 5 days/week in SNF setting This discharge recommendation: 
Has been made in collaboration with the attending provider and/or case management IF patient discharges home will need the following DME: gary walker SUBJECTIVE:  
Patient stated  I feel a little dizzy.  OBJECTIVE DATA SUMMARY:  
Critical Behavior: 
Neurologic State: Alert Orientation Level: Oriented to person, Oriented to place Cognition: Follows commands Safety/Judgement: Fall prevention Functional Mobility Training: 
Bed Mobility: 
Rolling: Moderate assistance Sit to Supine: Moderate assistance(for legs amd min to CGA for UB) Transfers: 
Sit to Stand: Minimum assistance;Assist x2 Stand to Sit: Contact guard assistance Bed to Chair: Minimum assistance Balance: 
Sitting: Intact Standing: Impaired; Without support Standing - Static: Fair;Constant support Standing - Dynamic : Fair;Constant support Ambulation/Gait Training: 
Distance (ft): 4 Feet (ft) Assistive Device: Gait belt;Walker gary Ambulation - Level of Assistance: Minimal assistance Gait Abnormalities: Decreased step clearance Base of Support: Narrowed Speed/Margot: Slow Step Length: Right shortened;Left shortened Interventions: Verbal cues; Tactile cues Pain Rating: Pt with no complaints of pain Activity Tolerance:  
Fair, requires rest breaks, and signs and symptoms of orthostatic hypotension After treatment patient left in no apparent distress:  
Supine in bed, Call bell within reach, Caregiver / family present, and Side rails x 3 
 
COMMUNICATION/COLLABORATION:  
The patients plan of care was discussed with: Occupational therapist and Registered nurse. Laura Hayes, ALEXY Time Calculation: 18 mins

## 2021-04-20 NOTE — PROGRESS NOTES
Cardiology Progress Note 4/20/2021 9:08 AM 
 
Admit Date: 4/17/2021 Subjective:  Currently sleeping. Some bradycardia this am.   
 
 
 
Visit Vitals BP (!) 151/65 (BP 1 Location: Left arm, BP Patient Position: At rest) Pulse (!) 58 Temp 97.9 °F (36.6 °C) Resp 18 Ht 5' 10\" (1.778 m) Wt 97 kg (213 lb 13.5 oz) SpO2 95% BMI 30.68 kg/m² 04/18 1901 - 04/20 0700 In: 720 [P.O.:720] Out: 1695 [ZGIBP:0774] Objective:  
  
Physical Exam: 
VS as above Data Review:  
Labs: BUN 16 
Creat 0.8 Telemetry: SB Assessment: 1. Probably sick sinus syndrome with sinus bradycardia, nocturnal sinus bradycardia and junctional rhythm with bradycardia. 2. First degree AV block in addition to LBBB at baseline, nocturnal second degree AV block with bradycardia. 3. Nonsustained VT seen on a prior admission. 4. Admitted with difficulty walking s/p fall and R shoulder fracture also with lower extremity cellulitis. 5. Ischemic cardiomyopathy with prior coronary artery bypass grafting.  EF 45-50% on last echo 4/2019. 
6. Status post prior transcatheter aortic valve replacement (TAVR) for severe aortic stenosis. 7. Hypertension. 8. Dyslipidemia. 9. Prior transient ischemic attack with history of vertebrobasilar insufficiency. 10. History of prostate cancer. 11. Peripheral neuropathy. 12. Advanced age.  DNR. 
  
Plan:  Cont current Rx . Can f/u at next scheduled appt. At this point he is not interested in a pacemaker.

## 2021-04-21 NOTE — PROGRESS NOTES
Bedside and Verbal shift change report given to [address] (oncoming nurse) by Juan De Leon (offgoing nurse). Report included the following information SBAR, Kardex, Intake/Output, MAR and Recent Results.

## 2021-04-21 NOTE — PROGRESS NOTES
Problem: Mobility Impaired (Adult and Pediatric) Goal: *Acute Goals and Plan of Care (Insert Text) Description: FUNCTIONAL STATUS PRIOR TO ADMISSION: Patient was modified independent using a rolling walker for functional mobility. HOME SUPPORT PRIOR TO ADMISSION: The patient lived alone in independent living with family and staff to provide assistance. Physical Therapy Goals Initiated 4/18/2021 1. Patient will move from supine to sit and sit to supine , scoot up and down, and roll side to side in bed with minimal assistance/contact guard assist within 7 day(s). 2.  Patient will transfer from bed to chair and chair to bed with minimal assistance/contact guard assist using the least restrictive device within 7 day(s). 3.  Patient will perform sit to stand with minimal assistance/contact guard assist within 7 day(s). 4.  Patient will ambulate with minimal assistance/contact guard assist for 100 feet with the least restrictive device within 7 day(s). Outcome: Progressing Towards Goal 
 PHYSICAL THERAPY TREATMENT Patient: Salvador Garcia (63 y.o. male) Date: 4/21/2021 Diagnosis: Closed right scapular fracture [S42.101A] <principal problem not specified> Precautions: Fall, Skin, NWB(NWB RUE in sling) Chart, physical therapy assessment, plan of care and goals were reviewed. ASSESSMENT Patient continues with skilled PT services and is progressing towards goals. Pt presents with decreased strength and orthostatic BP. Pt performed bed mobility at mod A/min A with most assistance needed for trunk. Pt reported feeling dizzy once sitting EOB. Pts BP stable in sitting. Pt performed sit to stand transfer at min A/CGA. Pt with initial posterior lean but able to correct with time. Pts BP decreased in standing. Pt ambulated 15ft x2 with gary walker at min A/CGA. Pt requiring assistance for walker management. Pt with one LOB to the right requiring assistance to correct.  Pt with improved mobility tolerance and safety with gary walker. Pts BP stabilized in sitting. Current Level of Function Impacting Discharge (mobility/balance): bed mobility at min A/mod A, sit to stand transfer at min A/CGA, ambulation at min A with gary walker. Other factors to consider for discharge: orthostatic BP  
    
 
PLAN : 
Patient continues to benefit from skilled intervention to address the above impairments. Continue treatment per established plan of care. to address goals. Recommendation for discharge: (in order for the patient to meet his/her long term goals) Therapy up to 5 days/week in SNF setting This discharge recommendation: 
Has been made in collaboration with the attending provider and/or case management IF patient discharges home will need the following DME: to be determined (TBD) SUBJECTIVE:  
Patient stated  I feel a little dizzy.  OBJECTIVE DATA SUMMARY:  
Critical Behavior: 
Neurologic State: Alert Orientation Level: Oriented X4 Cognition: Appropriate safety awareness, Follows commands Safety/Judgement: Fall prevention Functional Mobility Training: 
Bed Mobility: 
Rolling: Minimum assistance Supine to Sit: Minimum assistance; Moderate assistance Scooting: Stand-by assistance Transfers: 
Sit to Stand: Minimum assistance;Contact guard assistance Stand to Sit: Contact guard assistance Bed to Chair: Minimum assistance;Contact guard assistance Balance: 
Sitting: Intact Standing: Impaired; With support Standing - Static: Fair;Constant support Standing - Dynamic : Fair;Constant support Ambulation/Gait Training: 
Distance (ft): 15 Feet (ft)(x2) 
Assistive Device: Gait belt;Walker gary Ambulation - Level of Assistance: Minimal assistance;Contact guard assistance Gait Abnormalities: Decreased step clearance; Step to gait Base of Support: Narrowed Speed/Margot: Slow Step Length: Right shortened;Left shortened Interventions: Verbal cues; Tactile cues Pain Rating: Pt with no complaints of pain Activity Tolerance:  
Fair, SpO2 stable on RA, requires rest breaks, observed SOB with activity and signs and symptoms of orthostatic hypotension After treatment patient left in no apparent distress:  
Sitting in chair, Call bell within reach, Bed / chair alarm activated and Caregiver / family present COMMUNICATION/COLLABORATION:  
The patients plan of care was discussed with: Registered nurse. Darrel English PTA Time Calculation: 26 mins

## 2021-04-21 NOTE — PROGRESS NOTES
I prayed with Marisela Woods and his son and celebrated with Marisela Woods the [address]. Father Sheba Dawkins

## 2021-04-21 NOTE — PROGRESS NOTES
Transition of Care Plan: 
RUR: 12%  
Disposition: transition to healthcare at [facility] initiated 4/19 Follow up appointments: PCP 
DME needed: patient has a rollator  
Transportation at Discharge: BLS [place] or means to access home: n/a   
IM Medicare letter: 2nd IM needed before Caregiver Contact: Daughter in law Claudia Denis [phone] Discharge Caregiver contacted prior to discharge?  to be contacted prior to d/c 
 
10:45am 
CM spoke with Dyllan Velez at [employer] who reported that insurance is reviewing the referral for insurance auth now and requested updated therapy notes. CM promptly faxed Dyllan Velez updated therapy notes. Steve Atwood, [address], [address]

## 2021-04-22 PROBLEM — S42.101A CLOSED RIGHT SCAPULAR FRACTURE: Status: RESOLVED | Noted: 2021-01-01 | Resolved: 2021-01-01
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
## 2021-04-22 PROBLEM — I49.8 BRADYARRHYTHMIA: Status: RESOLVED | Noted: 2021-01-01 | Resolved: 2021-01-01
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
## 2021-04-22 PROBLEM — R27.0 ATAXIA: Status: RESOLVED | Noted: 2017-02-07 | Resolved: 2021-01-01

## 2021-04-22 NOTE — PROGRESS NOTES
[facility]bar report called [facility] [phone] with opportunity for questions and clarification,pt going to room  226.

## 2021-04-22 NOTE — DISCHARGE SUMMARY
Hospitalist Discharge Summary Patient ID: 
Cristina Wood 
[id number] 
55 y.o. 
2/19/1928 4/17/2021 PCP on record: Samara Ochoa MD 
 
Admit date: 4/17/2021 Discharge date and time: 4/22/2021 DISCHARGE DIAGNOSIS: 
 
Acute right scapula wing fracture, POA- pain management, Sling, non operative management, Outpatient follow up with Dr Evna Damon (ortho) S/p Mechanical Fall at [facility] POA- SNF at [facility] healthcare section as recommendations Difficulty walking/ambulating due to above, POA--due to dependent on rollator and now difficulty using due to right arm in shoulder splint from scapular fracture RLE cellulitis vs. Venous stasis POA- cont doxycycline for now Bilateral leg edema, POA R>L  - LE doppler neg for DVT Intermittent Bradycardia, likely SSS- seen by Cardiology Dr Rk Garcias, deferred PPM placement as pt asymptomatic & Pt not interested at this time, outpatient follow up with Dr Bean Tillman in 2 weeks as recommended if wants to consider it HTN Hyperlipidemia Insomnia BPH Macular degeneration Legally blind in right eye Sinus bradycardia DNR/DNI  
 
 
CONSULTATIONS: 
IP CONSULT TO ORTHOPEDIC SURGERY 
IP CONSULT TO CARDIOLOGY Excerpted HPI from H&P of Julio César Major MD: 
Edmund.Rocker y.o. male with CAD, HTN, hyperlipidemia, macular degeneration, presents from [facility] independent living after a fall earlier this morning. Patient uses rollator at baseline. Was in kitchen and turned around and one of his shoes \"stuck\" and he fell against wall hitting right shoulder and slid down onto floor. Denies any head injury or dizziness or passing out. He pressed alert necklace to get help.   
  
In ER, xray right shoulder and CT chest shows acute fracture of right scapula wing. CT head negative. Patient with severe pain when he is moving, especially moving right arm.   Minimal pain at rest.  Admission referred as patient is now unable to walk using rollator due to right arm needing to be in sling and unable to be placed into [address] [place] [address] section over weekend. 
  
Per daughter in law, saw pcp yesterday and given doxycycline for RLE cellulitis, to have doppler US right leg this Monday at [facility] to eval for DVT, started on lasix 20mg for b/l leg edema and trazodone 50mg qhs for insomnia. He has not yet started lasix or trazodone. 
  
We were asked to admit for work up and evaluation of the above problems\" ______________________________________________________________________ DISCHARGE SUMMARY/HOSPITAL COURSE:  for full details see H&P, daily progress notes, labs, consult notes. Acute right scapula wing fracture, POA 
S/p Mechanical Fall at [address] [employer] 
--ortho consult to Dr. Amilcar Hagen, outpatient fu and right arm sling immobilizer --tylenol q6h, add prn oxycodone and morphine severe pain IP PT/OT eval noted- SNF recommended IP case management for discharge planning- will need Health care section at [facility] placement 
  
Difficulty walking/ambulating due to above, POA 
--due to dependent on rollator and now difficulty using due to right arm in shoulder splint from scapular fracture 
  
RLE cellulitis vs. Venous stasis Bilateral leg edema, POA R>L   
--continue doxycycline prescribed by pcp yesterday for RLE cellulitis --doppler US right lower leg negative DVT 
--start lasix 20mg daily and monitor weight, I/O, BMP Sinus bradycardia Cardiology and I discussed with patient and he is not interested in pacemaker at this point 
  
BPH 
--monitor for urinary retention 
  
Insomnia 
--retry melatonin 
  
  
HTN 
--continue amlodipine 
  
Hyperlipidemia 
--continue lipitor 
  
Macular degeneration Legally blind in right eye 
--continue eye drops 
  
Check labs:  CMP, CBC 
  
  
Body mass index is 30.68 kg/m². 
  
Code:  DDNR in chart 
 
 
 
_______________________________________________________________________ Patient seen and examined by me on discharge day.  
Pertinent Findings: 
Gen:    Not in distress Chest: Clear lungs CVS:   Regular rhythm. No edema Abd:  Soft, not distended, not tender Neuro:  Alert, oriented  x3 
_______________________________________________________________________ DISCHARGE MEDICATIONS:  
Current Discharge Medication List  
  
START taking these medications Details  
senna-docusate (PERICOLACE) 8.6-50 mg per tablet Take 1 Tab by mouth two (2) times daily as needed for Constipation. Qty: 30 Tab, Refills: 0 CONTINUE these medications which have NOT CHANGED Details  
doxycycline (MONODOX) 100 mg capsule Take 100 mg by mouth two (2) times a day. X 10 days  
  
furosemide (Lasix) 20 mg tablet Take 20 mg by mouth daily. traZODone (DESYREL) 50 mg tablet Take 50 mg by mouth nightly. omeprazole (PRILOSEC) 40 mg capsule Take 40 mg by mouth daily. ergocalciferol (Vitamin D2) 1,250 mcg (50,000 unit) capsule Take 50,000 Units by mouth every Sunday. citalopram (CELEXA) 20 mg tablet TAKE 1 TABLET BY MOUTH EVERY DAY Qty: 30 Tab, Refills: 1 Associated Diagnoses: Anxiety  
  
aspirin delayed-release 81 mg tablet Take 81 mg by mouth daily. amLODIPine (NORVASC) 2.5 mg tablet Take 1 Tab by mouth daily. Qty: 30 Tab, Refills: 0  
  
ticagrelor (BRILINTA) 90 mg tablet Take 1 Tab by mouth every twelve (12) hours every twelve (12) hours. Qty: 60 Tab, Refills: 1  
  
lovastatin (MEVACOR) 40 mg tablet Take 40 mg by mouth nightly. brimonidine (ALPHAGAN) 0.15 % ophthalmic solution Administer 1 Drop to both eyes two (2) times a day. SYNTHROID 125 mcg tablet Take 1 Tab by mouth Daily (before breakfast). Qty: 90 Tab, Refills: 4 Associated Diagnoses: Hypothyroidism, postsurgical  
  
dutasteride (AVODART) 0.5 mg capsule Take 0.5 mg by mouth daily. cyanocobalamin (VITAMIN B-12) 1,000 mcg tablet Take 1,000 mcg by mouth daily. tamsulosin (FLOMAX) 0.4 mg capsule Take 0.4 mg by mouth two (2) times a day. dorzolamide-timolol (COSOPT) 22.3-6.8 mg/mL ophthalmic solution Administer 1 Drop to both eyes daily. latanoprost (XALATAN) 0.005 % ophthalmic solution Administer 1 Drop to both eyes nightly. Patient Follow Up Instructions: Activity: Activity as tolerated Diet: Cardiac Diet and Low fat, Low cholesterol Follow-up with PCP, Dr Brent Bruno (orthopedic surgeon)   in 2 weeks. Follow-up Information Follow up With Specialties Details Why Contact Info Maxwell Bradley, [address] [facility] [address] 
[phone] Beatriz Amezcua DO Orthopedic Surgery In 1 week for Scapular fracture followup [address] [facility] [phone] Mk Aldrich MD Cardiology In 2 weeks intermittent asymptomatic bradycardia followup as discussed by Dr Celsa Lyons [id number] [address] 
[phone] 
  
  
 
________________________________________________________________ Risk of deterioration: Low 
 
Condition at Discharge:  Stable 
__________________________________________________________________ Disposition SNF/C Healthcare section at [facility] 
 
____________________________________________________________________ Code Status: DNR/DNI 
___________________________________________________________________ Total time in minutes spent coordinating this discharge (includes going over instructions, follow-up, prescriptions, and preparing report for sign off to her PCP) :  >30 minutes Signed: 
Janene Overton MD

## 2021-04-22 NOTE — PROGRESS NOTES
Bedside and Verbal shift change report given to [place] (oncoming nurse) by Humberto Jefferson (offgoing nurse). Report included the following information SBAR, Kardex, Intake/Output, MAR, Recent Results and Med Rec Status.  \

## 2021-04-22 NOTE — PROGRESS NOTES
Spiritual Care Assessment/Progress Note [address] 
 
 
NAME: Sagar Armstrong      MRN: [id number] AGE: 80 y.o. SEX: male Latter-day Affiliation: Gnosticism Language: [place] 4/22/2021     Total Time (in minutes): 15 Spiritual Assessment begun in [facility] ORTHOPEDICS through conversation with: 
  
    [x]Patient        [x] Family    [] Friend(s) Reason for Consult: Initial/Spiritual assessment, patient floor Spiritual beliefs: (Please include comment if needed) [x] Identifies with a sarah tradition:   Gnosticism  
   [] Supported by a sarah community:        
   [] Claims no spiritual orientation:       
   [] Seeking spiritual identity:            
   [] Adheres to an individual form of spirituality:       
   [] Not able to assess:                   
 
    
Identified resources for coping:  
   [x] Prayer                           
   [] Music                  [] Guided Imagery [x] Family/friends                 [] Pet visits [] Devotional reading                         [] Unknown 
   [x] Other: Sacraments Interventions offered during this visit: (See comments for more details) Patient Interventions: Affirmation of emotions/emotional suffering, Affirmation of sarah, Catharsis/review of pertinent events in supportive environment, Iconic (affirming the presence of God/Higher Power), Initial/Spiritual assessment, patient floor, Prayer (assurance of), Latter-day beliefs/image of God discussed Family/Friend(s): Affirmation of sarah, Iconic (affirming the presence of God/Higher Power) Plan of Care: 
 
 [] Support spiritual and/or cultural needs  
 [] Support AMD and/or advance care planning process    
 [] Support grieving process 
 [] Coordinate Rites and/or Rituals  
 [] Coordination with community clergy 
 [x] No spiritual needs identified at this time 
 [] Detailed Plan of Care below (See Comments)  [] Make referral to Music Therapy 
[] Make referral to Pet Therapy    
[] Make referral to Addiction services 
[] Make referral to [facility] 
[] Make referral to Spiritual Care Partner 
[] No future visits requested       
[x] Follow up upon further referrals Comments: initial visit on Ortho for spiritual assessment. Patient's daughter-in-law was present. Provided bedside presence and supportive listening as patient spoke briefly about his hospitalization. Mr Raymundo Caldwell is [facility]; he expressed appreciation for visit by Fr. Asher Pelayo yesterday and for the opportunity to receive Anointing of the Sick. He expressed no spiritual concerns this morning, but shared a hope he will return to [employer] soon. Offered assurance of prayer and words of blessing. GARTH Fu, [address], Staff  [facility]  Paging Service  [phone])

## 2021-04-22 NOTE — PROGRESS NOTES
Transition of Care Plan: 
RUR: 12%  
Disposition: [facility] healthcare side Follow up appointments: PCP 
DME needed: n/a Transportation at [address] @ 12:00pm 
[place] or means to access home: n/a   
IM Medicare letter: provided Caregiver Contact: Daughter in law Jelani Eid [phone] Discharge Caregiver contacted prior to discharge?  yes [facility] @ 12:00pm 
Call report [phone] going to room C226 CM spoke with Khushi Kee at [facility] who reported insurance has approved patient for rehab at healthcare side of [facility]. Khushi Kee confirmed they can accept patient today. [facility] arranged for 12:00pm. CM notified MD of insurance auth via perfect serve. CM made room visit with patient and daughter-in-law at bedside. CM informed them of d/c and time, both agreed with plan. Medicare pt has received, reviewed, and signed 2nd IM letter informing them of their right to appeal the discharge. Signed copy has been placed on pt bedside chart. Owen Stewart, [address] [facility], [facility] hospitals

## 2021-04-22 NOTE — PROGRESS NOTES
Pt. D/edilia to [facility] via ambulance, v/s stable,pt. A&0 X 4, no episodes of acute distress noted. Offered no c/o pain.

## 2021-04-22 NOTE — PROGRESS NOTES
Bedside and Verbal shift change report given to Sandra RN (oncoming nurse) by RICARDO Abbott RN (offgoing nurse). Report given with SBAR, Kardex, Intake/Output, MAR and Recent Results.

## 2021-04-22 NOTE — PROGRESS NOTES
Transition of Care Plan to SNF/Rehab 
 
SNF/Rehab Transition: 
Patient has been accepted to [facility] and meets criteria for admission. Patient will transported by [facility] and expected to leave at 12:00pm. 
 
Communication to Patient/Family: Met with patient and daughter-in-law (identified care giver) and they are agreeable to the transition plan. Communication to SNF/Rehab: 
Bedside RN, Colton Tarango, has been notified to update the transition plan to the facility and call report (phone number [phone]). Discharge information has been updated on the AVS. Discharge instructions to be fax'd to facility at [facility] # [phone]). Nursing Please include all hard scripts for controlled substances, med rec and dc summary, and AVS in packet. Reviewed and confirmed with facility, [facility], can manage the patient care needs for the following:  
 
[facility] with (X) only those applicable: 
 
Medication: 
[x]  Medications will be available at the facility []  IV Antibiotics [x]  Controlled Substance - hard copy to be sent with patient  
[]  Weekly Labs Documents: 
[x] Hard RX [x] MAR [x] Kardex [x] AVS 
[x]Transfer Summary 
[x]Discharge Equipment: 
[]  CPAP/BiPAP []  Wound Vacuum 
[]  Orozco or Urinary Device 
[]  PICC/Central Line 
[]  Nebulizer 
[]  Ventilator Treatment: 
[]Isolation (for MRSA, VRE, etc.) []Surgical Drain Management []Tracheostomy Care 
[]Dressing Changes []Dialysis with transportation and chair time. []PEG Care []Oxygen []Daily Weights for Heart Failure Dietary: 
[x]Any diet limitations cardiac diet []Tube Feedings []Total Parenteral Management (TPN) Eligible for Medicaid Long Term Services and Supports Yes: 
[] Eligible for medical assistance or will become eligible within 180 days and UAI completed. [] Provider/Patient and/or support system has requested screening. [] UAI copy provided to patient or responsible party.  
[] UAI unavailable at discharge will send once processed to SNF provider. [] UAI unavailable at discharged mailed to patient No:  
[] Private pay and is not financially eligible for Medicaid within the next 180 days. [] Reside out-of-state. [] A residents of a state owned/operated facility that is licensed 
by [facility] and [facility] or [facility] 
[] Enrollment in [facility] hospice services 
[]  [facility] 
[] Patient /Family declines to have screening completed or provide financial information for screening Financial Resources: 
Medicaid   
[] Initiated and application pending  
[] Full coverage Advanced Care Plan: 
[]Surrogate Decision Maker of Care 
[]POA [x]Communicated Code Status DNR Other Lorena Vera, [address]

## 2021-05-05 NOTE — PROGRESS NOTES
5/5/2021      CC: right shoulder pain    HPI:      This is a 80y.o. year old male who has a history of right scapular fracture, he was doing quite well until he took a fall a couple of days ago, he has now had increased pain, this was relatively short-lived, 3 to 4 days, however he continues to have difficulty on the right side. PMH:  Past Medical History:   Diagnosis Date    Anxiety disorder     Arthritis     CAD (coronary artery disease)     Calculus of kidney     Cancer ([facility] prostate  1996    skin    Depression     Hearing loss     Heart attack ([facility]     Hypercholesterolemia     Hypertension     Incisional hernia 5/10/2011    Movement disorder     Neuropathy     Other ill-defined conditions(799.89)     elevated cholesterol    Other ill-defined conditions(799.89)     glaucoma    Other ill-defined conditions(799.89)     abd hernia    Skipped beats     Stroke (HCC)     left arm tingling    Thyroid disease     Thyroid mass 5/10/2011    Vertigo        PSxHx:  Past Surgical History:   Procedure Laterality Date    HX AORTIC VALVE REPLACEMENT  2015    HX HEENT      thyroid biopsy    HX HEENT      thyroidectomy 2013    HX ORCHIECTOMY  01/2017    HX ORTHOPAEDIC  2/2011    compression fracture d.t. fall (back),no surgery    HX THYROIDECTOMY  1/3/2013    MI CARDIAC SURG PROCEDURE UNLIST  1996    cabg x3    MI PROSTATE BIOPSY, NEEDLE, SATURATION SAMPLING         Meds:    Current Outpatient Medications:     senna-docusate (PERICOLACE) 8.6-50 mg per tablet, Take 1 Tab by mouth two (2) times daily as needed for Constipation. , Disp: 30 Tab, Rfl: 0    doxycycline (MONODOX) 100 mg capsule, Take 100 mg by mouth two (2) times a day. X 10 days, Disp: , Rfl:     furosemide (Lasix) 20 mg tablet, Take 20 mg by mouth daily. , Disp: , Rfl:     traZODone (DESYREL) 50 mg tablet, Take 50 mg by mouth nightly., Disp: , Rfl:     omeprazole (PRILOSEC) 40 mg capsule, Take 40 mg by mouth daily. , Disp: , Rfl:   ergocalciferol (Vitamin D2) 1,250 mcg (50,000 unit) capsule, Take 50,000 Units by mouth every . , Disp: , Rfl:     citalopram (CELEXA) 20 mg tablet, TAKE 1 TABLET BY MOUTH EVERY DAY (Patient taking differently: Take 40 mg by mouth daily.), Disp: 30 Tab, Rfl: 1    aspirin delayed-release 81 mg tablet, Take 81 mg by mouth daily. , Disp: , Rfl:     amLODIPine (NORVASC) 2.5 mg tablet, Take 1 Tab by mouth daily. , Disp: 30 Tab, Rfl: 0    ticagrelor (BRILINTA) 90 mg tablet, Take 1 Tab by mouth every twelve (12) hours every twelve (12) hours. , Disp: 60 Tab, Rfl: 1    lovastatin (MEVACOR) 40 mg tablet, Take 40 mg by mouth nightly., Disp: , Rfl:     brimonidine (ALPHAGAN) 0.15 % ophthalmic solution, Administer 1 Drop to both eyes two (2) times a day., Disp: , Rfl:     SYNTHROID 125 mcg tablet, Take 1 Tab by mouth Daily (before breakfast). , Disp: 90 Tab, Rfl: 4    dutasteride (AVODART) 0.5 mg capsule, Take 0.5 mg by mouth daily. , Disp: , Rfl:     cyanocobalamin (VITAMIN B-12) 1,000 mcg tablet, Take 1,000 mcg by mouth daily. , Disp: , Rfl:     tamsulosin (FLOMAX) 0.4 mg capsule, Take 0.4 mg by mouth two (2) times a day., Disp: , Rfl:     dorzolamide-timolol (COSOPT) 22.3-6.8 mg/mL ophthalmic solution, Administer 1 Drop to both eyes daily. , Disp: , Rfl:     latanoprost (XALATAN) 0.005 % ophthalmic solution, Administer 1 Drop to both eyes nightly., Disp: , Rfl:     All:  No Known Allergies    Social Hx:  Social History     Socioeconomic History    Marital status:      Spouse name: Not on file    Number of children: Not on file    Years of education: Not on file    Highest education level: Not on file   Tobacco Use    Smoking status: Former Smoker     Packs/day: 0.50     Years: 3.00     Pack years: 1.50     Quit date: 1958     Years since quittin.3    Smokeless tobacco: Never Used   Substance and Sexual Activity    Alcohol use: No    Drug use: No       Family Hx:  Family History Problem Relation Age of Onset    Cancer Mother         stomach    Heart Disease Sister     Heart Disease Father     Cancer Brother     Diabetes Son     Heart Disease Son     Anesth Problems Neg Hx          Review of Systems:       General: Denies headache, lethargy, fever, weight loss  Ears/Nose/Throat: Denies ear discharge, drainage, nosebleeds, hoarse voice, dental problems  Cardiovascular: Denies chest pain, shortness of breath  Lungs: Denies chest pain, breathing problems, wheezing, pneumonia  Stomach: Denies stomach pain, heartburn, constipation, irritable bowel  Skin: Denies rash, sores, open wounds  Musculoskeletal: Right shoulder pain  Genitourinary: Denies dysuria, hematuria, polyuria  Gastrointestinal: Denies constipation, obstipation, diarrhea  Neurological: Denies changes in sight, smell, hearing, taste, seizures. Denies loss of consciousness. Psychiatric: Denies depression, sleep pattern changes, anxiety, change in personality  Endocrine: Denies mood swings, heat or cold intolerance  Hematologic/Lymphatic: Denies anemia, purpura, petechia  Allergic/Immunologic: Denies swelling of throat, pain or swelling at lymph nodes      Physical Examination:    There were no vitals taken for this visit. General: AOX3, no apparent distress  Psychiatric: mood and affect appropriate        Diagnostics:    Pertinent Diagnostics: None available    Assessment: Right scapular body fracture, previously nondisplaced  Plan: This patient I had a long discussion regarding treatment options, I would like to begin with a evaluation with x-rays, this potentially could guide our care, however most likely, will simply confirm that he is healing and this was an exacerbation or minor setback. He is going in for pacemaker tomorrow, plan be to have him have x-rays while he is in the hospital and I will review them when they are done. We will follow-up once the completed.   Patient was in [place] at the time of consultation. I was in the office while conducting this encounter. Consent:  He and/or his healthcare decision maker is aware that this patient-initiated Telehealth encounter is a billable service, with coverage as determined by his insurance carrier. He is aware that he may receive a bill and has provided verbal consent to proceed: Yes    This virtual visit was conducted telephone encounter only. -  I affirm this is a Patient Initiated Episode with an Established Patient who has not had a related appointment within my department in the past 7 days or scheduled within the next 24 hours. Note: this encounter is not billable if this call serves to triage the patient into an appointment for the relevant concern. Total Time: minutes: 5-10 minutes. Mr. Ruperto Storey has a reminder for a \"due or due soon\" health maintenance. I have asked that he contact his primary care provider for follow-up on this health maintenance.

## 2021-05-06 PROBLEM — Z95.0 PACEMAKER: Status: ACTIVE | Noted: 2021-01-01

## 2021-05-06 NOTE — PROGRESS NOTES
S/p SJM BIV pacemaker at the left chest without complications, full note to follow. His outside doctor wants to get a R shoulder/arm X-ray (he has had a R scapular body fracture in the OP setting, wearing a sling). Signed By: Nettie Miles MD   
 May 6, 2021

## 2021-05-06 NOTE — Clinical Note
TRANSFER - OUT REPORT:     Verbal report given to: KARLA Bone, (at bedside). Report consisted of patient's Situation, Background, Assessment and   Recommendations(SBAR). Opportunity for questions and clarification was provided. Patient transported with a Registered Nurse, Monitor and Oxygen. Oxygen used for patient = nasal cannula, @ 2 - 6 Liters. Patient transported to: IVCU bed 2163.    transported with CRNA and EP Lab staff

## 2021-05-06 NOTE — ANESTHESIA PREPROCEDURE EVALUATION
Relevant Problems NEUROLOGY  
(+) TIA (transient ischemic attack) ENDOCRINE  
(+) Hypothyroidism, postsurgical  
 
 
Anesthetic History No history of anesthetic complications Review of Systems / Medical History Patient summary reviewed, nursing notes reviewed and pertinent labs reviewed Pulmonary Within defined limits Neuro/Psych CVA 
TIA and psychiatric history Comments: Anxiety disorder Depression Diabetic peripheral neuropathy Hearing loss Vertigo Sensory ataxic gait Dizziness Cardiovascular Hypertension Valvular problems/murmurs: tricuspid insufficiency, mitral insufficiency and aortic insufficiency CHF Dysrhythmias : PVC 
CAD, CABG and hyperlipidemia Exercise tolerance: <4 METS Comments: Cardiomyopathy \"Skipped beats\" S/P CABG x 3 vessels (1996) S/P AVR (2015) VBI (vertebrobasilar insufficiency) Bilateral Carotid Artery Stenosis ECG (4/20/21): Sinus rhythm with 1st degree AV block with premature supraventricular  
complexes Left axis deviation Left bundle branch block TTE (4/28/19): ·Left ventricular low normal systolic function. Estimated left ventricular ejection fraction is 46 - 50%. Left ventricular moderate concentric hypertrophy. No regional wall motion abnormality noted. ·Left atrial cavity size is moderately dilated. ·Aortic valve is prosthetic. Mild aortic valve regurgitation is present. ·Mitral valve non-specific thickening. Mitral annular calcification. Moderate mitral valve regurgitation. ·Mild to moderate tricuspid valve regurgitation is present. Mild pulmonary hypertension is present. GI/Hepatic/Renal 
  
 
 
Renal disease: stones Endo/Other Diabetes: type 2 Hypothyroidism Arthritis and cancer Comments: H/O Prostate Cancer H/O Thyroid mass s/p Thyroidectomy (1/3/13) Other Findings Comments:   
 
 
 
Physical Exam 
 
Airway Mallampati: II 
 
Neck ROM: normal range of motion Mouth opening: Normal 
 
 Cardiovascular Regular rate and rhythm,  S1 and S2 normal,  no murmur, click, rub, or gallop Dental 
 
Dentition: Lower partial plate Comments: Multiple missing teeth, none loose, moderate decay of remaining teeth. Pulmonary Breath sounds clear to auscultation Abdominal 
GI exam deferred Other Findings Anesthetic Plan ASA: 3 Anesthesia type: MAC and total IV anesthesia Induction: Intravenous Anesthetic plan and risks discussed with: Patient

## 2021-05-06 NOTE — H&P
EP/ ARRHYTHMIA Preprocedure Note Patient ID: 
Patient: Anthony Potter  MRN: [id number] Age: 80 y.o.  : 1928 Date of  Admission: 2021  5:57 AM  
PCP:  Teri Mendoza MD 
 
Assessment: 1. Sick sinus syndrome with nonreversible bradycardia. 2. 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 3. LBBB. 4. History of falls. 5. Chronic systolic CHF class 2. 
6. Ischemic cardiomyopathy EF 45%. 7. Remote TAVR. 8. Recent outpatient R scapula fracture. Plan: 1. BIV pacemaker planned. Given the potential benefits, risks, and alternatives, he agrees to proceed. Anthony Potter is a 80 y.o. male with a history of the above, here for his procedure. Past Medical History:  
Diagnosis Date  Anxiety disorder  Arthritis  CAD (coronary artery disease)  Calculus of kidney  Cancer [facility]) prostate    
 skin  Depression  Hearing loss  Heart attack ([address]  Hypercholesterolemia  Hypertension  Incisional hernia 5/10/2011  Movement disorder  Neuropathy  Other ill-defined conditions(799.89)   
 elevated cholesterol  Other ill-defined conditions(799.89)   
 glaucoma  Other ill-defined conditions(799.89)   
 abd hernia  
 Skipped beats  Stroke ([address]   
 left arm tingling  Thyroid disease  Thyroid mass 5/10/2011  Vertigo Past Surgical History:  
Procedure Laterality Date  HX AORTIC VALVE REPLACEMENT    HX HEENT    
 thyroid biopsy  HX HEENT    
 thyroidectomy   HX ORCHIECTOMY  2017  HX ORTHOPAEDIC  2011  
 compression fracture d.t. fall (back),no surgery  HX THYROIDECTOMY  1/3/2013  ND CARDIAC SURG PROCEDURE UNLIST    
 cabg x3  ND PROSTATE BIOPSY, NEEDLE, SATURATION SAMPLING Social History Tobacco Use  Smoking status: Former Smoker Packs/day: 0.50 Years: 3.00 Pack years: 1.50 Quit date: 1958   Years since quittin.3  Smokeless tobacco: Never Used Substance Use Topics  Alcohol use: No  
  
 
Family History Problem Relation Age of Onset  Cancer Mother   
     stomach  
 Heart Disease Sister  Heart Disease Father  Cancer Brother  Diabetes Son   
 Heart Disease Son  Anesth Problems Neg Hx No Known Allergies OP medications reviewed. He's on a baby ASA and Brilinta. Review of Symptoms:  See OV. Objective:  
  
Physical Exam: 
 
21 0725 97.9 °F (36.6 °C) 67 16 (!) 191/69 97 % Nondiaphoretic, not in acute distress. R arm sling. Neuro grossly nonfocal.  No tremor. Awake and appropriate. CARDIOGRAPHICS and STUDIES, I reviewed: 
 
Telemetry:  German Jean MD 
2021

## 2021-05-06 NOTE — ANESTHESIA POSTPROCEDURE EVALUATION
Procedure(s): Insert Ppm Biv Multi. MAC, total IV anesthesia Anesthesia Post Evaluation Patient location during evaluation: PACU Note status: Adequate. Level of consciousness: responsive to verbal stimuli and sleepy but conscious Pain management: satisfactory to patient Airway patency: patent Anesthetic complications: no 
Cardiovascular status: acceptable Respiratory status: acceptable Hydration status: acceptable Comments: +Post-Anesthesia Evaluation and Assessment Patient: Tu Garcia MRN: [id number]  SSN: [id number] YOB: 1928  Age: 80 y.o. Sex: male Cardiovascular Function/Vital Signs BP (!) (P) 170/70 (BP 1 Location: Left arm, BP Patient Position: At rest)   Pulse (P) 78   Temp (P) 36.5 °C (97.7 °F)   Resp (P) 14   Ht 5' 11\" (1.803 m)   Wt 90.7 kg (200 lb)   SpO2 (P) 100%   BMI 27.89 kg/m² Patient is status post Procedure(s): Insert Ppm Biv Multi. Nausea/Vomiting: Controlled. Postoperative hydration reviewed and adequate. Pain: 
Pain Scale 1: Numeric (0 - 10) (05/06/21 1009) Pain Intensity 1: 0 (05/06/21 1009) Managed. Neurological Status: At baseline. Mental Status and Level of Consciousness: Arousable. Pulmonary Status:  
O2 Device: (P) Nasal cannula (05/06/21 1009) Adequate oxygenation and airway patent. Complications related to anesthesia: None Post-anesthesia assessment completed. No concerns. Signed By: Marialuisa Colby MD  
 5/6/2021 Post anesthesia nausea and vomiting:  controlled INITIAL Post-op Vital signs: No vitals data found for the desired time range.

## 2021-05-06 NOTE — PROGRESS NOTES
Images reviewed, nondisplaced scapula fracture unchanged. Sling for comfort, weight bearing as tolerated. Full note to follow.

## 2021-05-06 NOTE — PROGRESS NOTES
Cardiac Cath Lab Recovery Arrival Note: 
 
 
Leah Little arrived to Cardiac Cath Lab, Recovery Area. Staff introduced to patient. Patient identifiers verified with NAME and DATE OF BIRTH. Procedure verified with patient. Consent forms reviewed and signed by patient or authorized representative and verified. Allergies verified. Patient and family oriented to department. Patient and family informed of procedure and plan of care. Questions answered with review. Patient prepped for procedure, per orders from physician, prior to arrival. 
 
Patient on cardiac monitor, non-invasive blood pressure, SPO2 monitor. On RA. Patient is A&Ox 4. Patient reports no complaints. Patient in stretcher, in low position, with side rails up, call bell within reach, patient instructed to call if assistance as needed. Patient prep in: [address], [place] [address]. Patient family has pager # none Family in: CCL waiting area; daughter Arti Veloz . Prep by: Racquel Osborne RN

## 2021-05-07 NOTE — PROGRESS NOTES
Rcvd call from Sona Haynes requesting return call from Dr. Amy Cook. Attempted to call back.  No answer

## 2021-05-07 NOTE — PROGRESS NOTES
0345 - scant amount of oozing noted in middle of incision, pressure dressing placed. Vitals stable. Reinforced importance of immobilizing left arm, patient verbalized understanding. 0630 - no bleeding noted to incision at this time 0730 - bedside report to [facility]

## 2021-05-07 NOTE — PROGRESS NOTES
Patient has complained of right knee discomfort and swelling. Son wants Dr. Stevie Cronin to look at this before he's discharged, make recommendations. Understandable given difficulty getting him around. I'll arrange, discussed with nursing. He's going to need a rapid COVID-19 test prior to heading out.

## 2021-05-07 NOTE — CONSULTS
5/7/2021 Chief Complaint: Right knee pain HPI: This is a 80 y.o. male who complains of right knee pain. Onset was gradual.  The patient has had activity dependent pain for years. The patient has tried activity modification, physical therapy exercises, injections have helped in the past.  The pain is in the knee globally, it is severe in intensity. The patient feels unstable with the knee, fears falling, and has significant limitation with activities of daily living, recreation, and walks with a limp. Past Medical History:  
Diagnosis Date  Anxiety disorder  Arthritis  CAD (coronary artery disease)  Calculus of kidney  Cancer [facility]) prostate  1996  
 skin  Depression  Hearing loss  Heart attack ([address]  Hypercholesterolemia  Hypertension  Incisional hernia 5/10/2011  Movement disorder  Neuropathy  Other ill-defined conditions(799.89)   
 elevated cholesterol  Other ill-defined conditions(799.89)   
 glaucoma  Other ill-defined conditions(799.89)   
 abd hernia  
 Skipped beats  Stroke ([facility]   
 left arm tingling  Thyroid disease  Thyroid mass 5/10/2011  Vertigo Past Surgical History:  
Procedure Laterality Date  HX AORTIC VALVE REPLACEMENT  2015  HX HEENT    
 thyroid biopsy  HX HEENT    
 thyroidectomy 2013  HX ORCHIECTOMY  01/2017  HX ORTHOPAEDIC  2/2011  
 compression fracture d.t. fall (back),no surgery  HX THYROIDECTOMY  1/3/2013  [place] CARDIAC SURG PROCEDURE UNLIST  1996  
 cabg x3  [place] PROSTATE BIOPSY, NEEDLE, SATURATION SAMPLING No current facility-administered medications on file prior to encounter. Current Outpatient Medications on File Prior to Encounter Medication Sig Dispense Refill  doxycycline (MONODOX) 100 mg capsule Take 100 mg by mouth two (2) times a day. X 10 days  furosemide (Lasix) 20 mg tablet Take 20 mg by mouth daily.     
 traZODone (DESYREL) 50 mg tablet Take 50 mg by mouth nightly.  omeprazole (PRILOSEC) 40 mg capsule Take 40 mg by mouth daily.  ergocalciferol (Vitamin D2) 1,250 mcg (50,000 unit) capsule Take 50,000 Units by mouth every Sunday.  citalopram (CELEXA) 20 mg tablet TAKE 1 TABLET BY MOUTH EVERY DAY (Patient taking differently: Take 40 mg by mouth daily.) 30 Tab 1  
 aspirin delayed-release 81 mg tablet Take 81 mg by mouth daily.  lovastatin (MEVACOR) 40 mg tablet Take 40 mg by mouth nightly.  brimonidine (ALPHAGAN) 0.15 % ophthalmic solution Administer 1 Drop to both eyes two (2) times a day.  SYNTHROID 125 mcg tablet Take 1 Tab by mouth Daily (before breakfast). 90 Tab 4  
 dutasteride (AVODART) 0.5 mg capsule Take 0.5 mg by mouth daily.  cyanocobalamin (VITAMIN B-12) 1,000 mcg tablet Take 1,000 mcg by mouth daily.  tamsulosin (FLOMAX) 0.4 mg capsule Take 0.4 mg by mouth two (2) times a day.  dorzolamide-timolol (COSOPT) 22.3-6.8 mg/mL ophthalmic solution Administer 1 Drop to both eyes daily.  senna-docusate (PERICOLACE) 8.6-50 mg per tablet Take 1 Tab by mouth two (2) times daily as needed for Constipation. 30 Tab 0  
 amLODIPine (NORVASC) 2.5 mg tablet Take 1 Tab by mouth daily. 30 Tab 0  
 ticagrelor (BRILINTA) 90 mg tablet Take 1 Tab by mouth every twelve (12) hours every twelve (12) hours. 60 Tab 1  
 latanoprost (Xalatan) 0.005 % ophthalmic solution Administer 1 Drop to both eyes nightly. No Known Allergies Family History Problem Relation Age of Onset  Cancer Mother   
     stomach  
 Heart Disease Sister  Heart Disease Father  Cancer Brother  Diabetes Son   
 Heart Disease Son  Anesth Problems Neg Hx Social History Socioeconomic History  Marital status:  Spouse name: Not on file  Number of children: Not on file  Years of education: Not on file  Highest education level: Not on file Tobacco Use  Smoking status: Former Smoker Packs/day: 0.50 Years: 3.00 Pack years: 1.50 Quit date: 1958 Years since quittin.3  Smokeless tobacco: Never Used Substance and Sexual Activity  Alcohol use: No  
 Drug use: No  
 
 
 
Review of Systems:  
 
 
General: Denies headache, lethargy, fever, weight loss Ears/Nose/Throat: Denies ear discharge, drainage, nosebleeds, hoarse voice, dental problems Cardiovascular: Denies chest pain, shortness of breath Lungs: Denies chest pain, breathing problems, wheezing, pneumonia Stomach: Denies stomach pain, heartburn, constipation, irritable bowel Skin: Denies rash, sores, open wounds Musculoskeletal: Admits to knee pain, no deformity. Genitourinary: Denies dysuria, hematuria, polyuria Gastrointestinal: Denies constipation, obstipation, diarrhea Neurological: Denies changes in sight, smell, hearing, taste, seizures. Denies loss of consciousness. Psychiatric: Denies depression, sleep pattern changes, anxiety, change in personality Endocrine: Denies mood swings, heat or cold intolerance Hematologic/Lymphatic: Denies anemia, purpura, petechia Allergic/Immunologic: Denies swelling of throat, pain or swelling at lymph nodes Physical Examination: 
 
Visit Vitals BP (!) 136/48 Pulse 70 Temp 97.7 °F (36.5 °C) Resp 16 Ht 5' 11\" (1.803 m) Wt 200 lb (90.7 kg) SpO2 96% BMI 27.89 kg/m² General: AOX3, no apparent distress Psychiatric: mood and affect appropriate Lungs: breathing is symmetric and unlabored bilaterally Heart: regular rate and rhythm Abdomen: no guarding Head: normocephalic, atraumatic Skin: No significant abnormalities, good turgor Sensation intact to light touch: L1-S1 dermatomes Muscular exam: 5/5 strength in all major muscle groups unless noted in specialty exam. 
 
Extremities: 
 
 
Left upper extremity: Full active and passive range of motion without pain, deformity, no open wound, strength 5/5 in all major muscle groups. Right upper extremity: Full active and passive range of motion without pain, deformity, no open wound, strength 5/5 in all major muscle groups. Left lower extremity: Full active and passive range of motion without pain, deformity, no open wound, strength 5/5 in all major muscle groups. Right lower extremity:  No deformity is noted. Range of motion of the knee is lmited. Ligamentous testing of the knee indicates stability of the the MCL, LCL, PCL, and ACL. Lachman's, anterior and posterior drawer tests are specifically negative. Medial joint line tenderness to palpation is noted. Popliteal area is unremarkable. 1+  for effusion. + patellar crepitus. Patella tracks centrally. Pivot shift is negative. Strength testing is indicative of 5/5 strength at hip flexion, extension, knee flexion and extension, tibialis anterior, EHL, and FHL. Sensation is intact to light touch in the L1-S1 dermatomes. Capillary refill is less than 2 seconds in the toes. Diagnostics: 
 
Pertinent Diagnostics:  Xrays are available of the right knee, they indicate severe tricompartmental osteoarthritis of the knee joint, no significant other findings, no other osseus abnormalities, fractures, or dislocations. Assessment: Osteoarthritis right knee Plan: This patient and I did discuss the many options in treating knee osteoarthritis. We did discuss that we could continue to seek out nonoperative modalities, such as: NSAIDs, oral and topical analgesics, knee injections, knee braces, physical therapy, stretching, strengthening, and weight loss strategies, activity modification, ambulatory assistive devices. The patient stated their understanding with this, but and would like to proceed with nonsurgical management in the form of injections. Follow up 2 weeks in my office Time on consultation: 55 minutes Procedures: Date of Procedure: 5/7/2021 PROCEDURE NOTE: Right knee injection of Celestone Consent was obtained from the patient. The correct site was identified after confirmation with the patient. Following identification and confirmation of the correct site with the patient, the superolateral right knee was prepped in the normal sterile fashion. A local anesthetic of 1% lidocaine without epinephrine was then administered to the local tissues. Following, an injection of a mixture of  6 mg Celestone and 1% lidocaine without epinephrine was administered to the right knee. The needle was then withdrawn and the injection site dressed with a sterile bandage at the conclusion. The procedure was well tolerated by the patient. No immediate adverse reactions were noted. Post injection instructions were given. Mr. Braxton Longo has a reminder for a \"due or due soon\" health maintenance. I have asked that he contact his primary care provider for follow-up on this health maintenance.

## 2021-05-07 NOTE — PROGRESS NOTES
Hospital to [facility] SBAR Handoff - Judeen Boards 
                                                                      80 y.o.   male [address] [facility]   Room: 2163/01    MRM 2 [address] [facility]  Unit Phone# :  [phone] [address] 
MRM 2 INTRVNTNL CARDIO 
[address] [facility] [place] [address] Dept: [phone] Loc: [id number] SITUATION Admitted:  5/6/2021         Attending Provider:  Suzi Albert MD    
 
Consultations:  IP CONSULT TO ORTHOPEDIC SURGERY 
 
PCP:  Nicol Santiago MD   [phone] Treatment Team: Attending Provider: Suzi Albert MD; Utilization Review: Sari Herrera; Consulting Provider: Migue Lackey DO Admitting Dx:  Cardiomyopathy, unspecified type ([facility] [I42.9] Pacemaker [Z95.0] Principal Problem: <principal problem not specified> 
 
1 Day Post-Op of  
Procedure(s): Insert Ppm Biv Multi BY: Suzi Albert MD             ON: 5/6/2021 Code Status: Full Code Advance Directives:  
Advance Care Planning 5/6/2021 Patient's Healthcare Decision Maker is: -  
Primary Decision Maker Name -  
Primary Decision Maker Phone Number -  
Primary Decision Maker Relationship to Patient -  
Confirm Advance Directive Yes, on file Does the patient have other document types Do Not Resuscitate (Send w/patient) Received Isolation:  There are currently no Active Isolations       MDRO: No current active infections Pain Medications given:      Last dose: NA at Special Equipment needed: no  Type of equipment: 
 
hemodialysis (Not currently on dialysis) (Not currently on dialysis) (Not currently on dialysis) BACKGROUND Allergies: 
No Known Allergies Past Medical History:  
Diagnosis Date  Anxiety disorder  Arthritis  CAD (coronary artery disease)  Calculus of kidney  Cancer [facility]) prostate  1996  
 skin  Depression  Hearing loss  Heart attack ([facility] [address]  Hypercholesterolemia  Hypertension  Incisional hernia 5/10/2011  Movement disorder  Neuropathy  Other ill-defined conditions(799.89)   
 elevated cholesterol  Other ill-defined conditions(799.89)   
 glaucoma  Other ill-defined conditions(799.89)   
 abd hernia  
 Skipped beats  Stroke ([facility] [address]   
 left arm tingling  Thyroid disease  Thyroid mass 5/10/2011  Vertigo Past Surgical History:  
Procedure Laterality Date  HX AORTIC VALVE REPLACEMENT  2015  HX HEENT    
 thyroid biopsy  HX HEENT    
 thyroidectomy 2013  HX ORCHIECTOMY  01/2017  HX ORTHOPAEDIC  2/2011  
 compression fracture d.t. fall (back),no surgery  HX THYROIDECTOMY  1/3/2013  [place] CARDIAC SURG PROCEDURE UNLIST  1996  
 cabg x3  [place] PROSTATE BIOPSY, NEEDLE, SATURATION SAMPLING Medications Prior to Admission Medication Sig  
 doxycycline (MONODOX) 100 mg capsule Take 100 mg by mouth two (2) times a day. X 10 days  furosemide (Lasix) 20 mg tablet Take 20 mg by mouth daily.  traZODone (DESYREL) 50 mg tablet Take 50 mg by mouth nightly.  omeprazole (PRILOSEC) 40 mg capsule Take 40 mg by mouth daily.  ergocalciferol (Vitamin D2) 1,250 mcg (50,000 unit) capsule Take 50,000 Units by mouth every Sunday.  citalopram (CELEXA) 20 mg tablet TAKE 1 TABLET BY MOUTH EVERY DAY (Patient taking differently: Take 40 mg by mouth daily.)  aspirin delayed-release 81 mg tablet Take 81 mg by mouth daily.  lovastatin (MEVACOR) 40 mg tablet Take 40 mg by mouth nightly.  brimonidine (ALPHAGAN) 0.15 % ophthalmic solution Administer 1 Drop to both eyes two (2) times a day.  SYNTHROID 125 mcg tablet Take 1 Tab by mouth Daily (before breakfast).  dutasteride (AVODART) 0.5 mg capsule Take 0.5 mg by mouth daily.  cyanocobalamin (VITAMIN B-12) 1,000 mcg tablet Take 1,000 mcg by mouth daily.     
 tamsulosin (FLOMAX) 0.4 mg capsule Take 0.4 mg by mouth two (2) times a day.  dorzolamide-timolol (COSOPT) 22.3-6.8 mg/mL ophthalmic solution Administer 1 Drop to both eyes daily.  senna-docusate (PERICOLACE) 8.6-50 mg per tablet Take 1 Tab by mouth two (2) times daily as needed for Constipation.  amLODIPine (NORVASC) 2.5 mg tablet Take 1 Tab by mouth daily.  ticagrelor (BRILINTA) 90 mg tablet Take 1 Tab by mouth every twelve (12) hours every twelve (12) hours.  latanoprost (Xalatan) 0.005 % ophthalmic solution Administer 1 Drop to both eyes nightly. Hard scripts included in transfer packet no Vaccinations:   
Immunization History Administered Date(s) Administered  Tdap 02/19/2013 Readmission Risks:    Known Risks: The Charlson [employer] Index tool is an evidenced based tool that has more automatic generated information. The tool looks at many different items such as the age of the patient, how many times they were admitted in the last calendar year, current length of stay in the hospital and their diagnosis. All of these items are pulled automatically from information documented in the chart from various places and will generate a score that predicts whether a patient is at low (less than 13), medium (13-20) or high (21 or greater) risk of being readmitted. ASSESSMENT Temp: 97.7 °F (36.5 °C) (05/07/21 1100) Pulse (Heart Rate): 70 (05/07/21 1100) Resp Rate: 16 (05/07/21 1100)           BP: (!) 136/48 (05/07/21 1216) O2 Sat (%): 96 % (05/07/21 1100) Weight: 90.7 kg (200 lb)    Height: 5' 11\" (180.3 cm) (05/06/21 0724) If above not within 1 hour of discharge: 
 
BP:_____  P:____  R:____ T:_____ O2 Sat: ___%  O2: ______ Active Orders Diet DIET CARDIAC Regular Orientation: oriented to time, place, person and situation Active Behaviors: None Active Lines/Drains:  (Peg Tube / Orozco / CL or S/L?): no 
 
Urinary Status: Voiding Last BM:    
 
Skin Integrity: Incision (comment)(l chest pacer incision cdi) Mobility: Very limited Weight Bearing Status: NWB (Non Weight Bearing) Lab Results Component Value Date/Time Glucose 110 (H) 04/19/2021 11:50 AM  
 Hemoglobin A1c 6.1 04/28/2019 04:12 AM  
 INR 1.1 11/16/2018 08:57 AM  
 HGB 12.3 04/18/2021 05:06 AM  
 HGB 12.3 04/17/2021 02:29 PM  
  
  RECOMMENDATION See After Visit Summary (AVS) for: · Discharge instructions · After [address] · Special equipment needed (entered pre-discharge by Care Management) · Medication Reconciliation · Follow up Appointment(s) Report given/sent by:  Micheal Ferrer RN Verbal report given to: \"Q\"  FAXED to:    
    
Estimated discharge time:  5/7/2021 at

## 2021-05-07 NOTE — PROGRESS NOTES
Doing well. Ortho consult and intervention noted. Rapid COVID-19 test negative. He's been discharged to [facility].

## 2021-05-07 NOTE — PROGRESS NOTES
Discharge instructions reviewed with pt to his satisfaction. Signed copy on pt's paper chart and original given to pt. New rx sent to pt's pharmacy and pt made aware. Iv/tele removed. Pt d/c'd to [facility] transported by son.

## 2021-05-07 NOTE — PROGRESS NOTES
Will stop amlodipine. Start midodrine 5 mg po TID. Will give  cc bolus x1. Will check back on his status tomorrow.

## 2021-05-07 NOTE — PROGRESS NOTES
I prayed with Nancy Martell and his son and celebrated with Nancy Martell the [address]. Father Nithin Jewell

## 2021-05-07 NOTE — DISCHARGE INSTRUCTIONS
DISCHARGE INSTRUCTIONS FOR PATIENTS WITH PACEMAKERS    You had a [employer] biventricular pacemaker implanted by Dr. Sol Nathan on 5/6. THE ONLY CHANGES TO YOUR MEDICATIONS IS ADDING MIDODRINE and STOPPING AMLODIPINE. IGNORE ANY DISCREPANCIES IN THE HOSPITAL DISCHARGE LIST OTHERWISE.    1. Remember to call for an appointment in 2-4 weeks [phone] to check healing and implant programming with Dr. Elena Mast nurse, [facility]. 2. Medic Alert Bracelets are available from your pharmacist to wear at all times if you choose to wear one. 3. Carry your ID card for pacemaker with you at all times. This card will be given to you in the hospital or mailed to you. 4. The pacemaker will bulge slightly under your skin. The bulge will decrease in size over the next few weeks. Please notify the doctor's office if you notice any of the following around your site:   A.  A bruise that does not go away. B.  Soreness or yellow, green, or brown drainage from the site. C. Any swelling from the site. D. If you have a fever of 100 degrees or higher that lasts for a few days. INCISION CARE     1.  Leave skin glue over your site until it starts to fall off, usually in a few weeks. 2.  You may shower after 3 days as long as your incision isnt submerged or directly sprayed upon until well healed. 3.  For comfort, wear loose fitting clothing. 4.  Report any signs of infection, fever, pain, swelling, redness, oozing, or heat at site especially if these symptoms increase after the first 3 to 4 days. ACTIVITY PRECAUTIONS   1. Avoid rough contact with the implant site. 2. No driving for 14 days. 3. Avoid lifting your arm over your head, carrying anything on the affected side, or lifting over 10 pounds for 30 days. For the first 2 days only bend your arm at the elbow. 4. Any extreme activity such as golf, weight lifting or exercise biking should be restricted for 60 days.   5. Do not carry objects by holding them against your implant site. 6.  No shooting rifles or any type of gun with the affected shoulder permanently. SPECIAL PRECAUTIONS:  1. You should avoid all strong magnetic fields, such as arc welding, large transformers, large motors. 2.  You may not have an MRI which uses a strong magnet to take pictures. 3.  Treatments or surgery that requires diathermy or electrocautery should be discussed with your doctor before scheduled. 4. Avoid radio frequency transmitters, including radar. 5. Advise dentist or other medical personnel you see that you have a pacemaker. 6.  Cell phones and microwave oven use is okay. 7.  If you plan to move or take a trip to a new area, the doctor's office will give you a name of a doctor to contact for any problems. ANTIBIOTIC THERAPY  During the first 8 weeks after your pacemaker insertion, you may need antibiotics before any dental work or certain tests or operations. Let the dentist or doctor who is caring for you know that you have had an implanted device.     Signed By: Rocky Hinton MD     May 6, 2021

## 2021-05-07 NOTE — PROGRESS NOTES
RN will need to call report to [phone]. Update 11:15am:  CM contacted Jacky Hall from admissions from [facility]. Informed that rapid COVID is negative and gave and estimate of ETA. Information that was requested was faxed by ANGELITO. CM confirmed with Jacky Hall that it was received. RN made aware. Initial Note: Primary RN came to [facility] stating that pt comes from [facility]. She is not sure if there is anything needed for discharge. CM spoke to Neo Johnson, Admissions from [facility] ([phone]), and updated on pt. Informed that there is no issue with him returning. She states he has had the COVID vaccine. However, she would prefer if he had a rapid COVID done. CM will call back and inform her of a time when pt is to be discharged. CM will fax the discharge summary to her once completed (fax#: [phone]). RN will need to call report. RN made aware. Frankey Holland, RN, BSN, [facility] Care Manager [phone]

## 2021-07-26 PROBLEM — G93.40 ACUTE ENCEPHALOPATHY: Status: ACTIVE | Noted: 2021-01-01

## 2021-07-26 NOTE — ED PROVIDER NOTES
EMERGENCY DEPARTMENT HISTORY AND PHYSICAL EXAM      Date: 7/26/2021  Patient Name: Maude Bryant    History of Presenting Illness     Chief Complaint   Patient presents with    Fatigue     Weakness for a couple of weeks. Very tired but able to answer all questions. No symptoms of weakness localized on either side - no stroke symptoms noted in triage. HPI: Maude Bryant, 80 y.o. male presents to the ED with cc of generalized weakness. He states that for the past several weeks he has been very tired and fatigued, he just feels weak all over. His family is at bedside and helps provide history. They state that he has not been his normal self, has been very lethargic. He also has not had much of an appetite and is not eating or drinking very much. Last Wednesday he had one episode of vomiting, however that has since resolved. He took some Colace and over the past 3 to 4 days has had about 4-5 episodes of nonbloody diarrhea a day. He denies any dysuria or hematuria, however reports that on Thursday he was started on ciprofloxacin for possible urinary tract infection. He denies any numbness, tingling or weakness in the arms or legs, no headaches, no chest pain or shortness of breath. He has gotten both of his Covid shots, he denies any coughing or fevers. He did have a fall in the shower when he slipped on Saturday, he did not hit his head. He has been compliant with his medications, however has not taken a couple doses of his torsemide because family thought that he seemed dehydrated. There are no other complaints, changes, or physical findings at this time. PCP: Harris Olsen MD    No current facility-administered medications on file prior to encounter. Current Outpatient Medications on File Prior to Encounter   Medication Sig Dispense Refill    amLODIPine (Norvasc) 2.5 mg tablet Take 2.5 mg by mouth daily.  torsemide (DEMADEX) 20 mg tablet Take 20 mg by mouth daily.  traMADoL (ULTRAM) 50 mg tablet Take 50 mg by mouth as needed for Pain.  ciprofloxacin HCl (CIPRO) 500 mg tablet Take 500 mg by mouth two (2) times a day. FOR 7 DAYS      citalopram (CeleXA) 20 mg tablet Take 20 mg by mouth daily.  diclofenac (Voltaren) 1 % gel Apply 2 g to affected area four (4) times daily as needed for Pain.  senna-docusate (PERICOLACE) 8.6-50 mg per tablet Take 1 Tab by mouth two (2) times daily as needed for Constipation. 30 Tab 0    omeprazole (PRILOSEC) 40 mg capsule Take 40 mg by mouth daily.  ergocalciferol (Vitamin D2) 1,250 mcg (50,000 unit) capsule Take 50,000 Units by mouth every Sunday.  aspirin delayed-release 81 mg tablet Take 81 mg by mouth daily.  ticagrelor (BRILINTA) 90 mg tablet Take 1 Tab by mouth every twelve (12) hours every twelve (12) hours. 60 Tab 1    lovastatin (MEVACOR) 40 mg tablet Take 40 mg by mouth nightly.  brimonidine (ALPHAGAN) 0.15 % ophthalmic solution Administer 1 Drop to both eyes two (2) times a day.  SYNTHROID 125 mcg tablet Take 1 Tab by mouth Daily (before breakfast). 90 Tab 4    cyanocobalamin (VITAMIN B-12) 1,000 mcg tablet Take 1,000 mcg by mouth daily.  tamsulosin (FLOMAX) 0.4 mg capsule Take 0.4 mg by mouth two (2) times a day.  dorzolamide-timolol (COSOPT) 22.3-6.8 mg/mL ophthalmic solution Administer 1 Drop to both eyes daily.  latanoprost (Xalatan) 0.005 % ophthalmic solution Administer 1 Drop to both eyes nightly.  [DISCONTINUED] doxycycline (MONODOX) 100 mg capsule Take 100 mg by mouth two (2) times a day. X 10 days      [DISCONTINUED] furosemide (Lasix) 20 mg tablet Take 20 mg by mouth daily.  [DISCONTINUED] traZODone (DESYREL) 50 mg tablet Take 50 mg by mouth nightly.  [DISCONTINUED] citalopram (CELEXA) 20 mg tablet TAKE 1 TABLET BY MOUTH EVERY DAY (Patient taking differently: Take 20 mg by mouth.  For one  week) 30 Tab 1    [DISCONTINUED] dutasteride (AVODART) 0.5 mg capsule Take 0.5 mg by mouth daily.            Past History     Past Medical History:  Past Medical History:   Diagnosis Date    Anxiety disorder     Arthritis     CAD (coronary artery disease)     Calculus of kidney     Cancer ([facility] [address] prostate  1996    skin    Depression     Hearing loss     Heart attack ([facility] [address]     Hypercholesterolemia     Hypertension     Incisional hernia 5/10/2011    Movement disorder     Neuropathy     Other ill-defined conditions(799.89)     elevated cholesterol    Other ill-defined conditions(799.89)     glaucoma    Other ill-defined conditions(799.89)     abd hernia    Skipped beats     Stroke (HCC)     left arm tingling    Thyroid disease     Thyroid mass 5/10/2011    Vertigo        Past Surgical History:  Past Surgical History:   Procedure Laterality Date    HX AORTIC VALVE REPLACEMENT      HX HEENT      thyroid biopsy    HX HEENT      thyroidectomy     HX ORCHIECTOMY  2017    HX ORTHOPAEDIC  2011    compression fracture d.t. fall (back),no surgery    HX THYROIDECTOMY  1/3/2013    IL CARDIAC SURG PROCEDURE UNLIST      cabg x3    IL INS NEW/RPLCMT PRM PM W/TRANSV ELTRD ATRIAL&VENT N/A 2021    Insert Ppm Biv Multi performed by Pino Leonard MD at [facility] CARDIAC CATH LAB    IL PROSTATE BIOPSY, NEEDLE, SATURATION SAMPLING         Family History:  Family History   Problem Relation Age of Onset    Cancer Mother         stomach    Heart Disease Sister     Heart Disease Father     Cancer Brother     Diabetes Son     Heart Disease Son     Anesth Problems Neg Hx        Social History:  Social History     Tobacco Use    Smoking status: Former Smoker     Packs/day: 0.50     Years: 3.00     Pack years: 1.50     Quit date: 1958     Years since quittin.6    Smokeless tobacco: Never Used   Substance Use Topics    Alcohol use: No    Drug use: No       Allergies:  No Known Allergies      Review of Systems   no fever  No eye pain  No ear pain  no shortness of breath  no chest pain  no abdominal pain  no dysuria  no leg pain  No rash  No lymphadenopathy  No weight gain    Physical Exam   Physical Exam  Constitutional:       General: He is not in acute distress. Appearance: He is not toxic-appearing. Comments: Sleepy but easily arousable, he does not nod off midsentence   HENT:      Head: Normocephalic and atraumatic. Mouth/Throat:      Mouth: Mucous membranes are moist.   Eyes:      Extraocular Movements: Extraocular movements intact. Pupils: Pupils are equal, round, and reactive to light. Cardiovascular:      Rate and Rhythm: Normal rate and regular rhythm. Pulmonary:      Effort: Pulmonary effort is normal.      Breath sounds: Normal breath sounds. Abdominal:      Palpations: Abdomen is soft. Tenderness: There is no abdominal tenderness. Musculoskeletal:         General: No tenderness or deformity. Cervical back: Neck supple. Skin:     General: Skin is warm and dry. Neurological:      General: No focal deficit present. Comments: He is sleepy but easily arousable, when he is aroused, he is appropriate but does intermittently nod off during questioning. He has symmetric facial expressions, midline tongue protrusion, extraocular movements intact, 5/5 strength with bicep flexion and extension bilaterally, 5/5 strength with ankle flexion and extension bilaterally. Sensation to light touch intact over upper and lower extremities bilaterally.           Diagnostic Study Results     Labs -     Recent Results (from the past 24 hour(s))   EKG, 12 LEAD, INITIAL    Collection Time: 07/26/21  1:35 PM   Result Value Ref Range    Ventricular Rate 71 BPM    Atrial Rate 71 BPM    P-R Interval 184 ms    QRS Duration 164 ms    Q-T Interval 498 ms    QTC Calculation (Bezet) 541 ms    Calculated R Axis 160 degrees    Calculated T Axis -55 degrees    Diagnosis       AV dual-paced rhythm  When compared with ECG of 20-APR-2021 09:29,  Electronic ventricular pacemaker has replaced Sinus rhythm  Confirmed by Aniyah Andrews M.D. ([id number]) on 7/26/2021 3:06:14 PM     CBC WITH AUTOMATED DIFF    Collection Time: 07/26/21  1:40 PM   Result Value Ref Range    WBC 9.7 4.1 - 11.1 K/uL    RBC 4.34 4.10 - 5.70 M/uL    HGB 13.1 12.1 - 17.0 g/dL    HCT 39.2 36.6 - 50.3 %    MCV 90.3 80.0 - 99.0 FL    MCH 30.2 26.0 - 34.0 PG    MCHC 33.4 30.0 - 36.5 g/dL    RDW 14.6 (H) 11.5 - 14.5 %    PLATELET 215 004 - 574 K/uL    MPV 9.2 8.9 - 12.9 FL    NRBC 0.0 0  WBC    ABSOLUTE NRBC 0.00 0.00 - 0.01 K/uL    NEUTROPHILS 74 32 - 75 %    LYMPHOCYTES 13 12 - 49 %    MONOCYTES 11 5 - 13 %    EOSINOPHILS 1 0 - 7 %    BASOPHILS 0 0 - 1 %    IMMATURE GRANULOCYTES 1 (H) 0.0 - 0.5 %    ABS. NEUTROPHILS 7.3 1.8 - 8.0 K/UL    ABS. LYMPHOCYTES 1.2 0.8 - 3.5 K/UL    ABS. MONOCYTES 1.1 (H) 0.0 - 1.0 K/UL    ABS. EOSINOPHILS 0.1 0.0 - 0.4 K/UL    ABS. BASOPHILS 0.0 0.0 - 0.1 K/UL    ABS. IMM. GRANS. 0.1 (H) 0.00 - 0.04 K/UL    DF AUTOMATED     METABOLIC PANEL, COMPREHENSIVE    Collection Time: 07/26/21  1:40 PM   Result Value Ref Range    Sodium 125 (L) 136 - 145 mmol/L    Potassium 4.3 3.5 - 5.1 mmol/L    Chloride 89 (L) 97 - 108 mmol/L    CO2 30 21 - 32 mmol/L    Anion gap 6 5 - 15 mmol/L    Glucose 121 (H) 65 - 100 mg/dL    BUN 20 6 - 20 MG/DL    Creatinine 0.58 (L) 0.70 - 1.30 MG/DL    BUN/Creatinine ratio 34 (H) 12 - 20      GFR est AA >60 >60 ml/min/1.73m2    GFR est non-AA >60 >60 ml/min/1.73m2    Calcium 8.4 (L) 8.5 - 10.1 MG/DL    Bilirubin, total 1.2 (H) 0.2 - 1.0 MG/DL    ALT (SGPT) 28 12 - 78 U/L    AST (SGOT) 24 15 - 37 U/L    Alk.  phosphatase 58 45 - 117 U/L    Protein, total 6.5 6.4 - 8.2 g/dL    Albumin 3.2 (L) 3.5 - 5.0 g/dL    Globulin 3.3 2.0 - 4.0 g/dL    A-G Ratio 1.0 (L) 1.1 - 2.2     POC VENOUS BLOOD GAS    Collection Time: 07/26/21  3:41 PM   Result Value Ref Range    pH, venous (POC) 7.36 7.32 - 7.42      pCO2, venous (POC) 49.0 41 - 51 MMHG    pO2, venous (POC) 38 25 - 40 mmHg    HCO3, venous (POC) 27.7 23.0 - 28.0 MMOL/L    sO2, venous (POC) 69.6 65 - 88 %    Base excess, venous (POC) 1.4 mmol/L    Specimen type (POC) VENOUS BLOOD      Performed by Jacey Vogel \"Cecy\"     Critical value read back DR`MIN VENDITTI    URINALYSIS W/ REFLEX CULTURE    Collection Time: 07/26/21  3:56 PM    Specimen: Urine   Result Value Ref Range    Color DARK YELLOW      Appearance CLOUDY (A) CLEAR      Specific gravity 1.022 1.003 - 1.030      pH (UA) 6.0 5.0 - 8.0      Protein TRACE (A) NEG mg/dL    Glucose Negative NEG mg/dL    Ketone Negative NEG mg/dL    Bilirubin Negative NEG      Blood Negative NEG      Urobilinogen 0.2 0.2 - 1.0 EU/dL    Nitrites Negative NEG      Leukocyte Esterase MODERATE (A) NEG      WBC PENDING /hpf    RBC PENDING /hpf    Epithelial cells PENDING /lpf    Bacteria PENDING /hpf    UA:UC IF INDICATED PENDING        Radiologic Studies -   XR CHEST PORT   Final Result   1. Basilar atelectasis with small effusions      CT HEAD WO CONT    (Results Pending)     CT Results  (Last 48 hours)    None        CXR Results  (Last 48 hours)               07/26/21 1601  XR CHEST PORT Final result    Impression:  1. Basilar atelectasis with small effusions       Narrative:  INDICATION:  sob        Exam: Portable chest 1547. Comparison: 5/6/2021. Findings: Cardiomediastinal silhouette is within normal limits. Pulmonary   vasculature is not engorged. Small bilateral pleural effusions and basilar   atelectasis. Pacer and median sternotomy wires unchanged                   Medical Decision Making   I am the first provider for this patient. I reviewed the vital signs, available nursing notes, past medical history, past surgical history, family history and social history. Vital Signs-Reviewed the patient's vital signs.   Patient Vitals for the past 24 hrs:   Temp Pulse Resp BP SpO2   07/26/21 1515  72 24 122/68 94 %   07/26/21 1332 97.9 °F (36.6 °C) 74 22 125/65 100 %         Provider Notes (Medical Decision Making):   59-year-old male presenting with generalized weakness and poor appetite. Concern for electrolyte or metabolic abnormalities, less likely dysrhythmia, possible urinary tract infection, pneumonia, will obtain CT head to assess for any possible subdural.  His neurologic exam is otherwise nonfocal, symptoms areNot consistent with CVA, more encephalopathy. ED Course:     Initial assessment performed. The patients presenting problems have been discussed, and they are in agreement with the care plan formulated and outlined with them. I have encouraged them to ask questions as they arise throughout their visit. CBC negative for leukocytosis or anemia, UA shows moderate leukocyte Estrace, however negative for bacteria, not overtly consistent with UTI, basic metabolic panel shows hyponatremia of 125, creatinine is not elevated, chloride is low at 89. This may be in the setting of volume depletion and poor p.o., possible medication side effects as well. CT head is unremarkable. On reevaluation, he is resting comfortably, exam is unchanged, vital signs stable. I spoken with nephrology, they recommend holding his Celexa and proceeding with normal saline. Patient will be admitted    Critical Care Time:         Disposition:  admit    PLAN:  1. Current Discharge Medication List        2.    Follow-up Information    None       Return to ED if worse     Diagnosis     Clinical Impression: Acute generalized weakness, acute hyponatremia

## 2021-07-26 NOTE — ED NOTES
Patient is being transferred to [address], Room # 2016. Report given to Nicko Corea RN on Zuhair Alonso for routine progression of care. Report consisted of the following information SBAR, Kardex, ED Summary, Procedure Summary, Intake/Output, MAR, Accordion, Recent Results, Med Rec Status and Cardiac Rhythm SR;Pacemaker. Patient transferred to receiving unit by: transport (RN or tech name). Outstanding consults needed: No     Next labs due: N/A    The following personal items will be sent with the patient during transfer to the floor:     All valuables:    Cardiac monitoring ordered: No     The following CURRENT information was reported to the receiving RN:    Code status: DNR at time of transfer    Last set of vital signs:  Vital Signs  Level of Consciousness: Alert (0) (07/26/21 1830)  Temp: 98.2 °F (36.8 °C) (07/26/21 1830)  Temp Source: Oral (07/26/21 1830)  Pulse (Heart Rate): 75 (07/26/21 1931)  Heart Rate Source: Monitor (07/26/21 1931)  Cardiac Rhythm:  (paced) (07/26/21 1514)  Resp Rate: 20 (07/26/21 1931)  BP: (!) 119/59 (07/26/21 1931)  MAP (Monitor): 76 (07/26/21 1845)  MAP (Calculated): 79 (07/26/21 1931)  BP 1 Location: Left upper arm (07/26/21 1332)  BP 1 Method: Automatic (07/26/21 1332)  BP Patient Position: Sitting (07/26/21 1332)  MEWS Score: 1 (07/26/21 1830)         Oxygen Therapy  O2 Sat (%): 96 % (07/26/21 1931)  Pulse via Oximetry: 70 beats per minute (07/26/21 1845)  O2 Device: Nasal cannula (07/26/21 1931)  O2 Flow Rate (L/min): 2 l/min (07/26/21 1931)      Last pain assessment:  Pain 1  Pain Scale 1: Numeric (0 - 10)  Pain Intensity 1: 0  Patient Stated Pain Goal: 0      Wounds: No     Urinary catheter: voiding  Is there a gunn order: No     LDAs:       Peripheral IV 07/26/21 Right Antecubital (Active)   Site Assessment Clean, dry, & intact 07/26/21 1530   Phlebitis Assessment 0 07/26/21 1530   Infiltration Assessment 0 07/26/21 1530   Dressing Status Clean, dry, & intact 07/26/21 1530   Dressing Type Transparent 07/26/21 1530         Opportunity for questions and clarification was provided.     Jacinta Amador RN

## 2021-07-26 NOTE — H&P
Hospitalist Admission Note    NAME: Laura Johansen   :  1928   MRN:  [id number]   Room Number: RA35/38  @ [facility]     Date/Time:  2021 6:10 PM    Patient PCP: Hemal Martinez MD  ______________________________________________________________________  Given the patient's current clinical presentation, I have a high level of concern for decompensation if discharged from the emergency department. Complex decision making was performed, which includes reviewing the patient's available past medical records, laboratory results, and x-ray films. My assessment of this patient's clinical condition and my plan of care is as follows. Assessment / Plan: Active Problems:    Acute encephalopathy (2021)      Acute metabolic encephalopathy POA   Multifactorial- hyponatremia, urinary tract infection. CT Head interpreted independently-no infarct or hemorrhage noted. - Correct serum sodium gradually  - Treat underlying UTI  - Frequent reorientation, adjust sleep wake cycle  - Hold sedating meds. Hyponatremia POA   Volume depletion POA  Clinically volume depleted. Serum sodium 125, Cl 89.   - Administer 500 cc normal saline followed by gentle hydration with isotonic fluid  - Check urine and serum osm, urine and serum sodium. TSH. Cortisol  - serial sodium checks       Complicated urinary tract infection POA  UA reveals 30-50 WBC. - Ceftriaxone  - Follow up urine culture  - Gentle hydration   - check blood Cx    Urinary retention POA  BPH POA   - Continue tamsulosin 0.4 mg daily  - Bladder scan Q4H, straight cath if post void residual > 300 ml. Fall PO   3 prior to admission. No head trauma or LOC. CT Head interpreted independently - no fracture, hemorrhage noted.     - PT/OT      Prolonged QT interval POA   EKG interpreted independently- AV paced rhythm, QTc 498 ms.     - hold QT prolonging agents. - telemetry   - maintain K>4, Mg >2. CAD POA   HTN POA   HLD POA   On norvasc, Brilinta, aspirin, Torsemide, Lovastatin at home     - BP soft, hold antihypertensives. - Continue aspirin, Ticagrelor, statin    Depression POA   QTc 541 ms. Hold Citalopram       Hypothyroidism POA  Lab Results   Component Value Date/Time    TSH 1.80 04/18/2021 05:06 AM     - repeat TSH  - Continue levothyroxine    Glaucoma POA     - continue brimonidine, latanoprost, dorzolamide    GERD POA   - continue PPI    Body mass index is 28.01 kg/m². Code Status: DNR/DNI  Surrogate Decision Maker:  Sidney Quintero, [phone]    DVT Prophylaxis: Lovenox  GI Prophylaxis: not indicated  Baseline: ambulatory with walker, independent in ADLs        Subjective:   CHIEF COMPLAINT: altered mental status     HISTORY OF PRESENT ILLNESS:     Jaiden Miller is a 80 y.o.  male with PMH of HTN, CAD, Depression, HLD, Hearing loss who presents to ED with c/o altered mental status. Patient is confused and unable to provide any history. History is provided by daughter holger Rodriguez at bedside. Patient lives at [facility] independent living facility and is independent in activities of daily living, ambulates usually with walker. He had one episode of emesis 5 days ago, was seen by PA at PCP office, diagnosed with UTI and prescribed ciprofloxacin. Patient has had gradually worsening appetite, oral intake, confusion, hallucinations. He sustained a ground-level fall fall 2 days prior to admission, no head trauma loss of consciousness. For the past 2 days he was not given torsemide by his family members as he appeared 'dehydrated'. Patient is sleepy but arousable, oriented to place, person, year but not to situation, repeatedly trying to remove pulseox monitor. We were asked to admit for work up and evaluation of the above problems.      Past Medical History:   Diagnosis Date    Anxiety disorder     Arthritis     CAD (coronary artery disease)     Calculus of kidney     Cancer [facility]) prostate      skin    Depression     Hearing loss     Heart attack ([facility] [address]     Hypercholesterolemia     Hypertension     Incisional hernia 5/10/2011    Movement disorder     Neuropathy     Other ill-defined conditions(799.89)     elevated cholesterol    Other ill-defined conditions(799.89)     glaucoma    Other ill-defined conditions(799.89)     abd hernia    Skipped beats     Stroke ([facility] [address]     left arm tingling    Thyroid disease     Thyroid mass 5/10/2011    Vertigo         Past Surgical History:   Procedure Laterality Date    HX AORTIC VALVE REPLACEMENT      HX HEENT      thyroid biopsy    HX HEENT      thyroidectomy     HX ORCHIECTOMY  2017    HX ORTHOPAEDIC  2011    compression fracture d.t. fall (back),no surgery    HX THYROIDECTOMY  1/3/2013    SC CARDIAC SURG PROCEDURE UNLIST      cabg x3    SC INS NEW/RPLCMT PRM PM W/TRANSV ELTRD ATRIAL&VENT N/A 2021    Insert Ppm Biv Multi performed by Lewis Salazar MD at [facility] CARDIAC CATH LAB    SC PROSTATE BIOPSY, NEEDLE, SATURATION SAMPLING         Social History     Tobacco Use    Smoking status: Former Smoker     Packs/day: 0.50     Years: 3.00     Pack years: 1.50     Quit date: 1958     Years since quittin.6    Smokeless tobacco: Never Used   Substance Use Topics    Alcohol use: No        Family History   Problem Relation Age of Onset    Cancer Mother         stomach    Heart Disease Sister     Heart Disease Father     Cancer Brother     Diabetes Son     Heart Disease Son     Anesth Problems Neg Hx      No Known Allergies     Prior to Admission medications    Medication Sig Start Date End Date Taking? Authorizing Provider   amLODIPine (Norvasc) 2.5 mg tablet Take 2.5 mg by mouth daily. Yes Other, MD Erick   torsemide (DEMADEX) 20 mg tablet Take 20 mg by mouth daily. Yes Other, MD Erick   traMADoL (ULTRAM) 50 mg tablet Take 50 mg by mouth as needed for Pain.    Yes Other, MD Erick   ciprofloxacin HCl (CIPRO) 500 mg tablet Take 500 mg by mouth two (2) times a day. FOR 7 DAYS 7/22/21 7/29/21 Yes Erick Perkins MD   citalopram (CeleXA) 20 mg tablet Take 20 mg by mouth daily. Yes Provider, Historical   diclofenac (Voltaren) 1 % gel Apply 2 g to affected area four (4) times daily as needed for Pain. Yes Provider, Historical   senna-docusate (PERICOLACE) 8.6-50 mg per tablet Take 1 Tab by mouth two (2) times daily as needed for Constipation. 4/19/21  Yes Li Harrison MD   omeprazole (PRILOSEC) 40 mg capsule Take 40 mg by mouth daily. Yes Provider, Historical   ergocalciferol (Vitamin D2) 1,250 mcg (50,000 unit) capsule Take 50,000 Units by mouth every Sunday. Yes Provider, Historical   aspirin delayed-release 81 mg tablet Take 81 mg by mouth daily. Yes Provider, Historical   ticagrelor (BRILINTA) 90 mg tablet Take 1 Tab by mouth every twelve (12) hours every twelve (12) hours. 5/3/19  Yes Afsaneh Funes MD   lovastatin (MEVACOR) 40 mg tablet Take 40 mg by mouth nightly. Yes Provider, Historical   brimonidine (ALPHAGAN) 0.15 % ophthalmic solution Administer 1 Drop to both eyes two (2) times a day. Yes Provider, Historical   SYNTHROID 125 mcg tablet Take 1 Tab by mouth Daily (before breakfast). 3/13/13  Yes Sean Macias MD   cyanocobalamin (VITAMIN B-12) 1,000 mcg tablet Take 1,000 mcg by mouth daily. Yes Provider, Historical   tamsulosin (FLOMAX) 0.4 mg capsule Take 0.4 mg by mouth two (2) times a day. Yes Provider, Historical   dorzolamide-timolol (COSOPT) 22.3-6.8 mg/mL ophthalmic solution Administer 1 Drop to both eyes daily. Yes Provider, Historical   latanoprost (Xalatan) 0.005 % ophthalmic solution Administer 1 Drop to both eyes nightly. Yes Provider, Historical       REVIEW OF SYSTEMS:     I am not able to complete the review of systems because:    The patient is intubated and sedated   x The patient has altered mental status due to his acute medical problems    The patient has baseline aphasia from prior stroke(s)    The patient has baseline dementia and is not reliable historian    The patient is in acute medical distress and unable to provide information         Per family member  Total of 12 systems reviewed as follows:       POSITIVE= underlined text  Negative = text not underlined  General:  fever, chills, sweats, generalized weakness, weight loss/gain,      loss of appetite   Eyes:    blurred vision, eye pain, loss of vision, double vision  ENT:    rhinorrhea, pharyngitis   Respiratory:   cough, sputum production, SOB, ENG, wheezing, pleuritic pain   Cardiology:   chest pain, palpitations, orthopnea, PND, edema, syncope   Gastrointestinal:  abdominal pain , N/V, diarrhea, dysphagia, constipation, bleeding   Genitourinary:  frequency, urgency, dysuria, hematuria, incontinence   Muskuloskeletal :  arthralgia, myalgia, back pain  Hematology:  easy bruising, nose or gum bleeding, lymphadenopathy   Dermatological: rash, ulceration, pruritis, color change / jaundice  Endocrine:   hot flashes or polydipsia   Neurological:  headache, dizziness, confusion, focal weakness, paresthesia,     Speech difficulties, memory loss, gait difficulty  Psychological: Feelings of anxiety, depression, agitation    Objective:   VITALS:    Visit Vitals  BP (!) 119/59   Pulse 75   Temp 98.2 °F (36.8 °C)   Resp 20   Ht 5' 10.5\" (1.791 m)   Wt 89.8 kg (198 lb)   SpO2 96%   BMI 28.01 kg/m²       PHYSICAL EXAM:    General:    Sleepy but arousable, cooperative, no distress, appears stated age. HEENT: Atraumatic, anicteric sclerae, pink conjunctivae     No oral ulcers, mucosa dry, throat clear, dentition fair  Neck:  Supple, symmetrical,  thyroid: non tender  Lungs:   Clear to auscultation bilaterally. No Wheezing or Rhonchi. No rales. Chest wall:  No tenderness  No Accessory muscle use.   Heart:   Regular  rhythm, + systolic  murmur   No edema  Abdomen:   Soft, large ventral hernia noted, non-tender. Not distended. Bowel sounds normal  Extremities: No cyanosis. No clubbing,      Skin turgor normal, Capillary refill normal, Radial dial pulse 2+  Skin:     Not pale. Not Jaundiced  No rashes   Psych:  Poor insight. + anxious. Not depressed. Not agitated. Neurologic: Hard of hearing. EOMs intact. No facial asymmetry. Mildly slurred speech. No aphasia. Symmetrical strength, Sensation grossly intact. oriented X 3.     ______________________________________________________________________    Care Plan discussed with:  Patient/Family and Nurse    Expected  Disposition:  SNF/LTC  ________________________________________________________________________  TOTAL TIME:  54 Minutes    Critical Care Provided     Minutes non procedure based      Comments    x Reviewed previous records   >50% of visit spent in counseling and coordination of care x Discussion with patient and/or family and questions answered       ________________________________________________________________________  Signed: Bereket Fam MD    Procedures: see electronic medical records for all procedures/Xrays and details which were not copied into this note but were reviewed prior to creation of Plan.     LAB DATA REVIEWED:    Recent Results (from the past 24 hour(s))   EKG, 12 LEAD, INITIAL    Collection Time: 07/26/21  1:35 PM   Result Value Ref Range    Ventricular Rate 71 BPM    Atrial Rate 71 BPM    P-R Interval 184 ms    QRS Duration 164 ms    Q-T Interval 498 ms    QTC Calculation (Bezet) 541 ms    Calculated R Axis 160 degrees    Calculated T Axis -55 degrees    Diagnosis       AV dual-paced rhythm  When compared with ECG of 20-APR-2021 09:29,  Electronic ventricular pacemaker has replaced Sinus rhythm  Confirmed by Silvia Waggoner M.D. ([id number]) on 7/26/2021 3:06:14 PM     CBC WITH AUTOMATED DIFF    Collection Time: 07/26/21  1:40 PM   Result Value Ref Range    WBC 9.7 4.1 - 11.1 K/uL    RBC 4.34 4.10 - 5.70 M/uL    HGB 13.1 12.1 - 17.0 g/dL    HCT 39.2 36.6 - 50.3 %    MCV 90.3 80.0 - 99.0 FL    MCH 30.2 26.0 - 34.0 PG    MCHC 33.4 30.0 - 36.5 g/dL    RDW 14.6 (H) 11.5 - 14.5 %    PLATELET 126 004 - 822 K/uL    MPV 9.2 8.9 - 12.9 FL    NRBC 0.0 0  WBC    ABSOLUTE NRBC 0.00 0.00 - 0.01 K/uL    NEUTROPHILS 74 32 - 75 %    LYMPHOCYTES 13 12 - 49 %    MONOCYTES 11 5 - 13 %    EOSINOPHILS 1 0 - 7 %    BASOPHILS 0 0 - 1 %    IMMATURE GRANULOCYTES 1 (H) 0.0 - 0.5 %    ABS. NEUTROPHILS 7.3 1.8 - 8.0 K/UL    ABS. LYMPHOCYTES 1.2 0.8 - 3.5 K/UL    ABS. MONOCYTES 1.1 (H) 0.0 - 1.0 K/UL    ABS. EOSINOPHILS 0.1 0.0 - 0.4 K/UL    ABS. BASOPHILS 0.0 0.0 - 0.1 K/UL    ABS. IMM. GRANS. 0.1 (H) 0.00 - 0.04 K/UL    DF AUTOMATED     METABOLIC PANEL, COMPREHENSIVE    Collection Time: 07/26/21  1:40 PM   Result Value Ref Range    Sodium 125 (L) 136 - 145 mmol/L    Potassium 4.3 3.5 - 5.1 mmol/L    Chloride 89 (L) 97 - 108 mmol/L    CO2 30 21 - 32 mmol/L    Anion gap 6 5 - 15 mmol/L    Glucose 121 (H) 65 - 100 mg/dL    BUN 20 6 - 20 MG/DL    Creatinine 0.58 (L) 0.70 - 1.30 MG/DL    BUN/Creatinine ratio 34 (H) 12 - 20      GFR est AA >60 >60 ml/min/1.73m2    GFR est non-AA >60 >60 ml/min/1.73m2    Calcium 8.4 (L) 8.5 - 10.1 MG/DL    Bilirubin, total 1.2 (H) 0.2 - 1.0 MG/DL    ALT (SGPT) 28 12 - 78 U/L    AST (SGOT) 24 15 - 37 U/L    Alk.  phosphatase 58 45 - 117 U/L    Protein, total 6.5 6.4 - 8.2 g/dL    Albumin 3.2 (L) 3.5 - 5.0 g/dL    Globulin 3.3 2.0 - 4.0 g/dL    A-G Ratio 1.0 (L) 1.1 - 2.2     POC VENOUS BLOOD GAS    Collection Time: 07/26/21  3:41 PM   Result Value Ref Range    pH, venous (POC) 7.36 7.32 - 7.42      pCO2, venous (POC) 49.0 41 - 51 MMHG    pO2, venous (POC) 38 25 - 40 mmHg    HCO3, venous (POC) 27.7 23.0 - 28.0 MMOL/L    sO2, venous (POC) 69.6 65 - 88 %    Base excess, venous (POC) 1.4 mmol/L    Specimen type (POC) VENOUS BLOOD      Performed by Yoselin Herrera \"Cecy\"     Critical value read back DR`MIN VINNIE    URINALYSIS W/ REFLEX CULTURE    Collection Time: 07/26/21  3:56 PM    Specimen: Urine   Result Value Ref Range    Color DARK YELLOW      Appearance CLOUDY (A) CLEAR      Specific gravity 1.022 1.003 - 1.030      pH (UA) 6.0 5.0 - 8.0      Protein TRACE (A) NEG mg/dL    Glucose Negative NEG mg/dL    Ketone Negative NEG mg/dL    Bilirubin Negative NEG      Blood Negative NEG      Urobilinogen 0.2 0.2 - 1.0 EU/dL    Nitrites Negative NEG      Leukocyte Esterase MODERATE (A) NEG      WBC 20-50 0 - 4 /hpf    RBC 0-5 0 - 5 /hpf    Epithelial cells FEW FEW /lpf    Bacteria Negative NEG /hpf    UA:UC IF INDICATED URINE CULTURE ORDERED (A) CNI      Triple Phosphate crystals FEW (A) NEG

## 2021-07-26 NOTE — PROGRESS NOTES
Pharmacy Clarification of Prior to Admission Medication Regimen     The patient was not interviewed regarding clarification of the prior to admission medication regimen. Family present in room verified with them current medications and last dose taken. Family also provided a list of medications. They were questioned regarding use of any other inhalers, topical products, over the counter medications, herbal medications, vitamin products or ophthalmic/nasal/otic medication use. Information Obtained From: query, family, medication list provided    Pertinent Pharmacy Findings:  Updated patients preferred outpatient pharmacy to: [facility]      PTA medication list was corrected to the following:     Prior to Admission Medications   Prescriptions Last Dose Informant Taking? SYNTHROID 125 mcg tablet 7/26/2021 at Unknown time Family Member Yes   Sig: Take 1 Tab by mouth Daily (before breakfast). amLODIPine (Norvasc) 2.5 mg tablet 7/26/2021 at Unknown time Family Member Yes   Sig: Take 2.5 mg by mouth daily. aspirin delayed-release 81 mg tablet 7/26/2021 at Unknown time Family Member Yes   Sig: Take 81 mg by mouth daily. brimonidine (ALPHAGAN) 0.15 % ophthalmic solution 7/26/2021 at Unknown time Family Member Yes   Sig: Administer 1 Drop to both eyes two (2) times a day. ciprofloxacin HCl (CIPRO) 500 mg tablet 7/26/2021 at Unknown time Family Member Yes   Sig: Take 500 mg by mouth two (2) times a day. FOR 7 DAYS   citalopram (CeleXA) 20 mg tablet 7/26/2021 at Unknown time Family Member Yes   Sig: Take 20 mg by mouth daily. cyanocobalamin (VITAMIN B-12) 1,000 mcg tablet 7/26/2021 at Unknown time Family Member Yes   Sig: Take 1,000 mcg by mouth daily.      diclofenac (Voltaren) 1 % gel 6/26/2021 at Unknown time Family Member Yes   Sig: Apply 2 g to affected area four (4) times daily as needed for Pain.   dorzolamide-timolol (COSOPT) 22.3-6.8 mg/mL ophthalmic solution 7/26/2021 at Unknown time Family Member Yes Sig: Administer 1 Drop to both eyes daily. ergocalciferol (Vitamin D2) 1,250 mcg (50,000 unit) capsule 7/26/2021 at Unknown time Family Member Yes   Sig: Take 50,000 Units by mouth every Sunday. latanoprost (Xalatan) 0.005 % ophthalmic solution 7/26/2021 at Unknown time Family Member Yes   Sig: Administer 1 Drop to both eyes nightly. lovastatin (MEVACOR) 40 mg tablet 7/25/2021 at Unknown time Family Member Yes   Sig: Take 40 mg by mouth nightly. omeprazole (PRILOSEC) 40 mg capsule 7/26/2021 at Unknown time Family Member Yes   Sig: Take 40 mg by mouth daily. senna-docusate (PERICOLACE) 8.6-50 mg per tablet 7/26/2021 at Unknown time Family Member Yes   Sig: Take 1 Tab by mouth two (2) times daily as needed for Constipation. tamsulosin (FLOMAX) 0.4 mg capsule 7/26/2021 at Unknown time Family Member Yes   Sig: Take 0.4 mg by mouth two (2) times a day. ticagrelor (BRILINTA) 90 mg tablet 7/26/2021 at Unknown time Family Member Yes   Sig: Take 1 Tab by mouth every twelve (12) hours every twelve (12) hours. torsemide (DEMADEX) 20 mg tablet 7/22/2021 at Unknown time Family Member Yes   Sig: Take 20 mg by mouth daily. traMADoL (ULTRAM) 50 mg tablet 6/26/2021 at Unknown time Family Member Yes   Sig: Take 50 mg by mouth as needed for Pain.       Facility-Administered Medications: None        Thank you,  Eliza Telles CPHT  Medication History Pharmacy Technician

## 2021-07-27 NOTE — PROGRESS NOTES
Hospitalist Progress Note    NAME: Sheyla Oakley   :  1928   MRN:  [id number]       Assessment / Plan:    Acute metabolic encephalopathy POA   - Multifactorial- hyponatremia, urinary tract infection. CT Head negative for acute findings. - Treat underlying UTI. Correct Na gradually. - Frequent reorientation, adjust sleep wake cycle  - Hold sedating meds.          Hyponatremia POA   Volume depletion POA  - Clinically volume depleted. Serum sodium 125, Cl 89. Urine Na <5. Urine Osm 611.  - AM cortisol high. TSH wnl  - S/p 500 cc NS bolus and gentle hydration.   -  > 126. Nephrology consulted. Plan to start hypertonic saline. Will monitor BMP closely.  - BMP to be checked at 8pm. Noted plan for nephrologist to be called with the result. Appreciate nephrology help.        Complicated urinary tract infection POA  UA reveals 30-50 WBC. - Ceftriaxone  - Follow up urine culture  - Gentle hydration   - check blood Cx     Urinary retention POA  BPH POA   - Continue tamsulosin 0.4 mg daily     Fall PO   -3x prior to admission. No head trauma or LOC. CT Head interpreted independently - no fracture, hemorrhage noted.   - PT/OT        Prolonged QT interval POA   - QTc 498 ms.   - hold QT prolonging agents. - telemetry   - maintain K>4, Mg >2.      CAD POA   HTN POA   HLD POA   GERD  On norvasc, Brilinta, aspirin, Torsemide, Lovastatin at home  - BP soft, hold antihypertensives. - Continue aspirin, Ticagrelor, statin, PPI     Depression POA   QTc 541 ms. Hold Citalopram     Hypothyroidism POA  -TSH on  was 1.8. Repeat TSH pending. Cont' synthroid      Glaucoma POA   - continue brimonidine, latanoprost, dorzolamide    25.0 - 29.9 Overweight / Body mass index is 28.01 kg/m². Estimated discharge date:}  Barriers: Body mass index is 28.01 kg/m².   Code Status: DNR/DNI  Surrogate Decision Maker:  Brian Melgar, [phone]     DVT Prophylaxis: Lovenox  GI Prophylaxis: not indicated  Baseline: ambulatory with walker, independent in ADLs     Subjective:     Chief Complaint / Reason for Physician Visit  Discussed with RN events overnight. Confused  Denies headache or dizziness    Review of Systems:  Symptom Y/N Comments  Symptom Y/N Comments   Fever/Chills    Chest Pain     Poor Appetite    Edema     Cough    Abdominal Pain     Sputum    Joint Pain     SOB/ENG    Pruritis/Rash     Nausea/vomit    Tolerating PT/OT     Diarrhea    Tolerating Diet     Constipation    Other       Could NOT obtain due to:      Objective:     VITALS:   Last 24hrs VS reviewed since prior progress note. Most recent are:  Patient Vitals for the past 24 hrs:   Temp Pulse Resp BP SpO2   07/27/21 1600  87      07/27/21 1511 97.3 °F (36.3 °C) 77 18 131/71 94 %   07/27/21 1148  72      07/27/21 1139 97.4 °F (36.3 °C) 70 18 135/65 96 %   07/27/21 0756  71      07/27/21 0728 97.5 °F (36.4 °C) 71 18 123/65 97 %   07/27/21 0451 97.6 °F (36.4 °C) 82 19 119/81 93 %   07/27/21 0400  82      07/27/21 0000  82      07/26/21 2338 97.6 °F (36.4 °C) 70 20 (!) 142/75 98 %   07/26/21 2000  82      07/26/21 1931  75 20 (!) 119/59 96 %   07/26/21 1845  71 20 118/62 96 %   07/26/21 1830 98.2 °F (36.8 °C) 71 20 117/61 97 %       Intake/Output Summary (Last 24 hours) at 7/27/2021 1720  Last data filed at 7/27/2021 1530  Gross per 24 hour   Intake 728.33 ml   Output 250 ml   Net 478.33 ml        I had a face to face encounter and independently examined this patient on 7/27/2021, as outlined below:  PHYSICAL EXAM:  General: WD, WN. Alert, cooperative, no acute distress    EENT:  EOMI. Anicteric sclerae. MMM  Resp:  CTA bilaterally, no wheezing or rales. No accessory muscle use  CV:  Regular  rhythm,  No edema  GI:  Soft, Non distended, Non tender. +Bowel sounds  Neurologic:  Alert and oriented X 2, normal speech,   Psych:   Good insight. Not anxious nor agitated  Skin:  No rashes.   No jaundice    Reviewed most current lab test results and cultures  YES  Reviewed most current radiology test results   YES  Review and summation of old records today    NO  Reviewed patient's current orders and MAR    YES  PMH/SH reviewed - no change compared to H&P  ________________________________________________________________________  Care Plan discussed with:    Comments   Patient x    Family  x Care taker   RN     Care Manager     Consultant                        Multidiciplinary team rounds were held today with , nursing, pharmacist and clinical coordinator. Patient's plan of care was discussed; medications were reviewed and discharge planning was addressed. ________________________________________________________________________  Total NON critical care TIME:  45  Minutes    Total CRITICAL CARE TIME Spent:   Minutes non procedure based      Comments   >50% of visit spent in counseling and coordination of care x    ________________________________________________________________________  Jyotsna Martinez MD     Procedures: see electronic medical records for all procedures/Xrays and details which were not copied into this note but were reviewed prior to creation of Plan. LABS:  I reviewed today's most current labs and imaging studies. Pertinent labs include:  Recent Labs     07/27/21  0447 07/26/21  1340   WBC 8.9 9.7   HGB 13.2 13.1   HCT 40.2 39.2    364     Recent Labs     07/27/21  1114 07/27/21  0447 07/27/21  0014 07/26/21  1937 07/26/21  1340   * 126* 125*   < > 125*   K 3.6 3.8  --   --  4.3   CL 91* 92*  --   --  89*   CO2 31 28  --   --  30   * 118*  --   --  121*   BUN 14 16  --   --  20   CREA 0.48* 0.41*  --   --  0.58*   CA 7.7* 7.5*  --   --  8.4*   MG  --  2.2  --   --   --    PHOS  --  2.8  --   --   --    ALB  --   --   --   --  3.2*   TBILI  --   --   --   --  1.2*   ALT  --   --   --   --  28    < > = values in this interval not displayed.        Timothy Jeffery Santy Newton MD

## 2021-07-27 NOTE — PROGRESS NOTES
Problem: Falls - Risk of  Goal: *Absence of Falls  Description: Document Sedrick Basurto Fall Risk and appropriate interventions in the flowsheet.   Outcome: Progressing Towards Goal  Note: Fall Risk Interventions:  Mobility Interventions: Assess mobility with egress test, Bed/chair exit alarm, OT consult for ADLs, Patient to call before getting OOB, PT Consult for mobility concerns, PT Consult for assist device competence, Strengthening exercises (ROM-active/passive)    Mentation Interventions: Adequate sleep, hydration, pain control, Bed/chair exit alarm, Door open when patient unattended, Increase mobility, More frequent rounding, Reorient patient, Room close to nurse's station    Medication Interventions: Bed/chair exit alarm, Patient to call before getting OOB, Teach patient to arise slowly    Elimination Interventions: Bed/chair exit alarm, Call light in reach, Patient to call for help with toileting needs, Toileting schedule/hourly rounds, Urinal in reach    History of Falls Interventions: Bed/chair exit alarm, Consult care management for discharge planning, Door open when patient unattended, Assess for delayed presentation/identification of injury for 48 hrs (comment for end date)

## 2021-07-27 NOTE — CONSULTS
Nephrology Consult Note     Gianluca Rachana     [email or web address]              Phone - [phone]   Patient: Karey Lebron   YOB: 1928    Date- 7/27/2021  MRN: [id number]             REASON FOR CONSULTATION: Hyponatremia  CONSULTING PHYSICIAN:DR. PINA  ADMIT DATE:7/26/2021 PATIENT PCP:James Montes MD     IMPRESSION & PLAN:    Hyponatremia likely due to poor p.o. intake, torsemide use, poor solute intake-urine sodium less than 5 suggest prerenal state   UTI   Metabolic encephalopathy   S/p fall   History of hypertension   Hyperlipidemia   GERD    PLAN-  · Continue normal saline at 100 mL/h  · Check BMP now  · If repeat sodium level remains same he will need hypertonic saline   · goal to increase sodium level to 132 today  · TSH and cortisol is normal  · Check uric acid level  · Bladder scan  · HOLD Amlodipine   · Hold torsemide  ·      · Active Problems:  ·   Acute encephalopathy (7/26/2021)  ·   ·     [x] High complexity decision making was performed  [x] Patient is at high-risk of decompensation with multiple organ involvement    Subjective:   HPI: Karey Lebron is a 80 y.o.  male. He presented to ER with complaint of altered mental status. He is found to have a hyponatremia with sodium of 125 in ER WITH CR 0.41  He was recently seen by his PCP and was diagnosed with UTI and he was started on ciprofloxacin  He had a ground-level fall x2  Prior to admission.   He has been on torsemide as outpatient  His recent sodium level 138 in April 2021  His caretaker is at the bedside and she reports that he has not been eating much or drinking much for last few days  Patient is unable to give me much history   Patient [facility] at [facility] [place]       Review of Systems:   Can't access due to patient's current condition       Past Medical History:   Diagnosis Date    Anxiety disorder     Arthritis     CAD (coronary artery disease)     Calculus of kidney     Cancer [facility]) prostate  1996    skin    Depression     Hearing loss     Heart attack ([address]     Hypercholesterolemia     Hypertension     Incisional hernia 5/10/2011    Movement disorder     Neuropathy     Other ill-defined conditions(799.89)     elevated cholesterol    Other ill-defined conditions(799.89)     glaucoma    Other ill-defined conditions(799.89)     abd hernia    Skipped beats     Stroke ([address]     left arm tingling    Thyroid disease     Thyroid mass 5/10/2011    Vertigo       Past Surgical History:   Procedure Laterality Date    HX AORTIC VALVE REPLACEMENT  2015    HX HEENT      thyroid biopsy    HX HEENT      thyroidectomy 2013    HX ORCHIECTOMY  01/2017    HX ORTHOPAEDIC  2/2011    compression fracture d.t. fall (back),no surgery    HX THYROIDECTOMY  1/3/2013    NH CARDIAC SURG PROCEDURE UNLIST  1996    cabg x3    NH INS NEW/RPLCMT PRM PM W/TRANSV ELTRD ATRIAL&VENT N/A 5/6/2021    Insert Ppm Biv Multi performed by Keyla Ambrocio MD at [facility] CARDIAC CATH LAB    NH PROSTATE BIOPSY, NEEDLE, SATURATION SAMPLING        Prior to Admission medications    Medication Sig Start Date End Date Taking? Authorizing Provider   amLODIPine (Norvasc) 2.5 mg tablet Take 2.5 mg by mouth daily. Yes Other, MD Erick   torsemide (DEMADEX) 20 mg tablet Take 20 mg by mouth daily. Yes Other, MD Erick   traMADoL (ULTRAM) 50 mg tablet Take 50 mg by mouth as needed for Pain. Yes Other, MD Erick   ciprofloxacin HCl (CIPRO) 500 mg tablet Take 500 mg by mouth two (2) times a day. FOR 7 DAYS 7/22/21 7/29/21 Yes Other, MD Erick   citalopram (CeleXA) 20 mg tablet Take 20 mg by mouth daily. Yes Provider, Historical   diclofenac (Voltaren) 1 % gel Apply 2 g to affected area four (4) times daily as needed for Pain. Yes Provider, Historical   senna-docusate (PERICOLACE) 8.6-50 mg per tablet Take 1 Tab by mouth two (2) times daily as needed for Constipation.  4/19/21 Yes Josette Banks MD   omeprazole (PRILOSEC) 40 mg capsule Take 40 mg by mouth daily. Yes Provider, Historical   ergocalciferol (Vitamin D2) 1,250 mcg (50,000 unit) capsule Take 50,000 Units by mouth every . Yes Provider, Historical   aspirin delayed-release 81 mg tablet Take 81 mg by mouth daily. Yes Provider, Historical   ticagrelor (BRILINTA) 90 mg tablet Take 1 Tab by mouth every twelve (12) hours every twelve (12) hours. 5/3/19  Yes Yessi Martinez MD   lovastatin (MEVACOR) 40 mg tablet Take 40 mg by mouth nightly. Yes Provider, Historical   brimonidine (ALPHAGAN) 0.15 % ophthalmic solution Administer 1 Drop to both eyes two (2) times a day. Yes Provider, Historical   SYNTHROID 125 mcg tablet Take 1 Tab by mouth Daily (before breakfast). 3/13/13  Yes Solo Solorio MD   cyanocobalamin (VITAMIN B-12) 1,000 mcg tablet Take 1,000 mcg by mouth daily. Yes Provider, Historical   tamsulosin (FLOMAX) 0.4 mg capsule Take 0.4 mg by mouth two (2) times a day. Yes Provider, Historical   dorzolamide-timolol (COSOPT) 22.3-6.8 mg/mL ophthalmic solution Administer 1 Drop to both eyes daily. Yes Provider, Historical   latanoprost (Xalatan) 0.005 % ophthalmic solution Administer 1 Drop to both eyes nightly.    Yes Provider, Historical     No Known Allergies   Social History     Tobacco Use    Smoking status: Former Smoker     Packs/day: 0.50     Years: 3.00     Pack years: 1.50     Quit date: 1958     Years since quittin.6    Smokeless tobacco: Never Used   Substance Use Topics    Alcohol use: No      Family History   Problem Relation Age of Onset    Cancer Mother         stomach    Heart Disease Sister     Heart Disease Father     Cancer Brother     Diabetes Son     Heart Disease Son     Anesth Problems Neg Hx         Objective:      Patient Vitals for the past 24 hrs:   Temp Pulse Resp BP SpO2   21 0756  71      21 0728 97.5 °F (36.4 °C) 71 18 123/65 97 %   07/27/21 0451 97.6 °F (36.4 °C) 82 19 119/81 93 %   07/27/21 0400  82      07/27/21 0000  82      07/26/21 2338 97.6 °F (36.4 °C) 70 20 (!) 142/75 98 %   07/26/21 2000  82      07/26/21 1931  75 20 (!) 119/59 96 %   07/26/21 1845  71 20 118/62 96 %   07/26/21 1830 98.2 °F (36.8 °C) 71 20 117/61 97 %   07/26/21 1615  71 23 112/69 96 %   07/26/21 1605  75 22  95 %   07/26/21 1600  77 30 117/61 95 %   07/26/21 1545  72 19 120/67 91 %   07/26/21 1530  71 12 123/65 (!) 89 %   07/26/21 1515  72 24 122/68 94 %   07/26/21 1332 97.9 °F (36.6 °C) 74 22 125/65 100 %     No intake/output data recorded.   Last 3 Recorded Weights in this Encounter    07/26/21 1332   Weight: 89.8 kg (198 lb)      Physical Exam:  General:confused No distress,   Eyes:No scleral icterus, No conjunctival pallor  Neck:Supple,no mass palpable,no thyromegaly  Lungs:Clears to auscultation Bilaterally, normal respiratory effort  CVS:RRR, S1 S2 normal,  No rub,  Abdomen:Soft, Non tender, No hepatosplenomegaly  Extremities: no LE edema  Skin:No rash or lesions, Warm and DRY   Psych: Can't access due to patient's current condition   :  No gunn  Musculoskeletal : no redness, no joint tenderness  NEURO: Non focal       CODE STATUS:  DNR  Care Plan discussed with:       Chart reviewed.    y Reviewed previous records   y Discussion with patient and/or family and questions answered       ECG[de-identified] Rev:yes  Xray/CT/US/MRI REV:yes  Lab Data Personally Reviewed: (see below)  Recent Labs     07/27/21  0447 07/27/21  0014 07/26/21  1937 07/26/21  1340   WBC 8.9  --   --  9.7   HGB 13.2  --   --  13.1     --   --  364   ANEU 7.0  --   --  7.3   * 125* 125* 125*   K 3.8  --   --  4.3   *  --   --  121*   BUN 16  --   --  20   CREA 0.41*  --   --  0.58*   ALT  --   --   --  28   TBILI  --   --   --  1.2*   AP  --   --   --  58   CA 7.5*  --   --  8.4*   MG 2.2  --   --   --    PHOS 2.8  --   --   --      Lab Results Component Value Date/Time    Color DARK YELLOW 07/26/2021 03:56 PM    Appearance CLOUDY (A) 07/26/2021 03:56 PM    Specific gravity 1.022 07/26/2021 03:56 PM    pH (UA) 6.0 07/26/2021 03:56 PM    Protein TRACE (A) 07/26/2021 03:56 PM    Glucose Negative 07/26/2021 03:56 PM    Ketone Negative 07/26/2021 03:56 PM    Bilirubin Negative 07/26/2021 03:56 PM    Urobilinogen 0.2 07/26/2021 03:56 PM    Nitrites Negative 07/26/2021 03:56 PM    Leukocyte Esterase MODERATE (A) 07/26/2021 03:56 PM    Epithelial cells FEW 07/26/2021 03:56 PM    Bacteria Negative 07/26/2021 03:56 PM    WBC 20-50 07/26/2021 03:56 PM    RBC 0-5 07/26/2021 03:56 PM       No results found for: IRON, FE, TIBC, IBCT, PSAT, FERR  Lab Results   Component Value Date/Time    Culture result: NO GROWTH AFTER 11 HOURS 07/26/2021 07:36 PM    Culture result: NO GROWTH AFTER 11 HOURS 07/26/2021 07:36 PM    Culture result: NO GROWTH 14 DAYS 04/30/2019 08:40 AM     Prior to Admission Medications   Prescriptions Last Dose Informant Patient Reported? Taking? SYNTHROID 125 mcg tablet 7/26/2021 at Unknown time Family Member No Yes   Sig: Take 1 Tab by mouth Daily (before breakfast). amLODIPine (Norvasc) 2.5 mg tablet 7/26/2021 at Unknown time Family Member Yes Yes   Sig: Take 2.5 mg by mouth daily. aspirin delayed-release 81 mg tablet 7/26/2021 at Unknown time Family Member Yes Yes   Sig: Take 81 mg by mouth daily. brimonidine (ALPHAGAN) 0.15 % ophthalmic solution 7/26/2021 at Unknown time Family Member Yes Yes   Sig: Administer 1 Drop to both eyes two (2) times a day. ciprofloxacin HCl (CIPRO) 500 mg tablet 7/26/2021 at Unknown time Family Member Yes Yes   Sig: Take 500 mg by mouth two (2) times a day. FOR 7 DAYS   citalopram (CeleXA) 20 mg tablet 7/26/2021 at Unknown time Family Member Yes Yes   Sig: Take 20 mg by mouth daily.    cyanocobalamin (VITAMIN B-12) 1,000 mcg tablet 7/26/2021 at Unknown time Family Member Yes Yes   Sig: Take 1,000 mcg by mouth daily. diclofenac (Voltaren) 1 % gel 6/26/2021 at Unknown time Family Member Yes Yes   Sig: Apply 2 g to affected area four (4) times daily as needed for Pain.   dorzolamide-timolol (COSOPT) 22.3-6.8 mg/mL ophthalmic solution 7/26/2021 at Unknown time Family Member Yes Yes   Sig: Administer 1 Drop to both eyes daily. ergocalciferol (Vitamin D2) 1,250 mcg (50,000 unit) capsule 7/26/2021 at Unknown time Family Member Yes Yes   Sig: Take 50,000 Units by mouth every Sunday. latanoprost (Xalatan) 0.005 % ophthalmic solution 7/26/2021 at Unknown time Family Member Yes Yes   Sig: Administer 1 Drop to both eyes nightly. lovastatin (MEVACOR) 40 mg tablet 7/25/2021 at Unknown time Family Member Yes Yes   Sig: Take 40 mg by mouth nightly. omeprazole (PRILOSEC) 40 mg capsule 7/26/2021 at Unknown time Family Member Yes Yes   Sig: Take 40 mg by mouth daily. senna-docusate (PERICOLACE) 8.6-50 mg per tablet 7/26/2021 at Unknown time Family Member No Yes   Sig: Take 1 Tab by mouth two (2) times daily as needed for Constipation. tamsulosin (FLOMAX) 0.4 mg capsule 7/26/2021 at Unknown time Family Member Yes Yes   Sig: Take 0.4 mg by mouth two (2) times a day. ticagrelor (BRILINTA) 90 mg tablet 7/26/2021 at Unknown time Family Member No Yes   Sig: Take 1 Tab by mouth every twelve (12) hours every twelve (12) hours. torsemide (DEMADEX) 20 mg tablet 7/22/2021 at Unknown time Family Member Yes Yes   Sig: Take 20 mg by mouth daily. traMADoL (ULTRAM) 50 mg tablet 6/26/2021 at Unknown time Family Member Yes Yes   Sig: Take 50 mg by mouth as needed for Pain. Facility-Administered Medications: None     Imaging:    Medications list Personally Reviewed   [x]      Yes     []               No    Thank you for allowing us to participate in the care this patient. We will follow patient with you.   Signed By: Satish Tirado MD  [address] [facility]  [facility] [address], [address] [facility] [place], [address] [place]  Phone - [phone]         Fax - [phone] [facility] Office  [address] [address]  Phone - [phone]        Fax - [phone]     [email or web address]

## 2021-07-27 NOTE — PROGRESS NOTES
Gianluca Rachana         NAME:Osmin English     :1928     Repeat na 126  Stop NACL  Give 3 %nacl total 250 ml  Repeat labs tonight    D/w nurse      Labs:  Recent Labs     21  0447 21  1340   WBC 8.9 9.7   HGB 13.2 13.1   HCT 40.2 39.2    364     Recent Labs     21  1114 21  0447 21  0014 21  1937 21  1340 21  1340   * 126* 125*   < >  --  125*   K 3.6 3.8  --   --   --  4.3   CL 91* 92*  --   --   --  89*   CO2 31 28  --   --   --  30   BUN 14 16  --   --   --  20   CREA 0.48* 0.41*  --   --   --  0.58*   * 118*  --   --    < > 121*   CA 7.7* 7.5*  --   --   --  8.4*   MG  --  2.2  --   --   --   --    PHOS  --  2.8  --   --   --   --     < > = values in this interval not displayed. Recent Labs     21  1340   AP 58   TP 6.5   ALB 3.2*   GLOB 3.3     No results for input(s): INR, PTP, APTT, INREXT in the last 72 hours. No results for input(s): CPK, CKMB in the last 72 hours. No lab exists for component: TROPONINI, B-NP  No results for input(s): PHI, PCO2I, PO2I, FIO2I in the last 72 hours.   Oxygen Therapy  O2 Sat (%): 96 % (21 1139)  Pulse via Oximetry: 70 beats per minute (21 1845)  O2 Device: Nasal cannula (21)  O2 Flow Rate (L/min): 2 l/min (21)  Ventilator:       Microbiology:  No results found for: [facility]  Lab Results   Component Value Date/Time    Culture result: NO GROWTH AFTER 11 HOURS 2021 07:36 PM    Culture result: NO GROWTH AFTER 11 HOURS 2021 07:36 PM    Culture result: NO GROWTH 14 DAYS 2019 08:40 AM                             Escobar Andrade MD  [facility]  [facility] [address]  [place], [address] [place]  Phone - [phone]         Fax - [phone] [facility] Office  [address] [place] [address]  Phone - [phone]        Fax - [phone]     [email or web address]

## 2021-07-27 NOTE — PROGRESS NOTES
0700: Bedside shift change report given to Beatriz Alonso (oncoming nurse) by Enoc Woody RN (offgoing nurse). Report included the following information SBAR, Kardex, Intake/Output, MAR and Recent Results. 0700: Patient in bed, caregiver at bedside, bed alarm on, call bell in reach. 1200: Sodium resulted at 126. Dr. Jose Ortiz with results. 1315: Spoke with Drea Brewer from pharmacy and 3% saline can be hung on the floor for 12 hours. Also, can start transfusion in a PIV, but it has to be 18g. Will attempt to place 18.     1324: 18 gauge IV placed successfully. 3% Saline started at this time. [id number]: Patient has only voided 50mL this shift. Bladder scan done, 356 mL in the bladder. Dr. Yony Castorena made aware. 1537: Orders from Dr. Yony Castorena to straight cath patient. 1545: Attempted straight cath on patient X2 and was unsuccessful. Meeting a lot of resistance and a blood in the catheter tubing. Dr. Yony Castorena made aware. Orders to watch him and see if he urinates on his own and to repeat bladder scan in 6 hours. If he does not void, will place urology consult tomorrow. 1810: PRN tylenol given for pain/irriation where straight cath was performed. 1900: End of Shift Note    Bedside shift change report given to Enoc Woody RN (oncoming nurse) by Krystin Landry (offgoing nurse). Report included the following information SBAR, Kardex, Intake/Output, MAR and Recent Results    Shift worked:  [id number]     Shift summary and any significant changes:     hyponatremia, Na not increasing. Started on 3% saline. Repeat Na at 2000. Retaining urine, unable to straaight cath d/t resistance. See above note.       Concerns for physician to address:  possible urology consult in AM?     Zone phone for oncoming shift:   XXX       Activity:  Activity Level: Bed Rest  Number times ambulated in hallways past shift: 0  Number of times OOB to chair past shift: 0    Cardiac:   Cardiac Monitoring: Yes      Cardiac Rhythm: Sinus Rhythm    Access:   Current line(s): PIV     Genitourinary:   Urinary status: voiding    Respiratory:   O2 Device: Nasal cannula  Chronic home O2 use?: NO  Incentive spirometer at bedside: N/A     GI:  Last Bowel Movement Date: 07/26/21  Current diet:  ADULT DIET Regular  Passing flatus: YES  Tolerating current diet: YES       Pain Management:   Patient states pain is manageable on current regimen: YES    Skin:  Gumaro Score: 14  Interventions: float heels, increase time out of bed and PT/OT consult    Patient Safety:  Fall Score:  Total Score: 5  Interventions: bed/chair alarm, assistive device (walker, cane, etc), gripper socks and pt to call before getting OOB  High Fall Risk: Yes    Length of Stay:  Expected LOS: 3d 7h  Actual LOS: [facility]

## 2021-07-27 NOTE — ACP (ADVANCE CARE PLANNING)
Advance Care Planning (ACP) Provider Note - Comprehensive     Date of ACP Conversation: 07/26/21  Persons included in Conversation:  lilly Nam of ACP Conversation in minutes:  <16 minutes (Non-Billable)      Diagnosis  Complicated UTI  Authorized Decision Maker (if patient is incapable of making informed decisions): This person is:  Named in Advance Directive or [address] for ALL Patients with Decision Making Capacity:   Importance of advance care planning, including choosing a healthcare agent to communicate patient's healthcare decisions if patient lost the ability to make decisions, such as after a sudden illness or accident  Understanding of the healthcare agent role was assessed and information provided  Exploration of values, goals, and preferences if recovery is not expected, even with continued medical treatment in the event of: Imminent death  Severe, permanent brain injury  \"In these circumstances, what matters most to you? \"  Care focused more on comfort or quality of life. Opportunity offered to explore how cultural, Methodist, spiritual, or personal beliefs would affect decisions for future care       For Serious or Chronic Illness:  Understanding of medical condition    Understanding of CPR, goals and expected outcomes, benefits and burdens discussed. Information on CPR success rates provided (e.g. for CPR in hospital, survival to d/c at two weeks is 22%, for chronically ill or elderly/frail survival is less than 3%); Individual asked to communicate understanding of information in his/her own words.   Explored fears and concerns regarding CPR or possible outcomes    Interventions Provided:  Entered DNR order (If yes, complete Durable DNR form)

## 2021-07-27 NOTE — PROGRESS NOTES
Problem: Self Care Deficits Care Plan (Adult)  Goal: *Acute Goals and Plan of Care (Insert Text)  Description: FUNCTIONAL STATUS PRIOR TO ADMISSION: Lives in [place] [address] at [facility]. He was in the Health Care unit for about 2 months after hospitalization for a scapula fracture and just recently returned to his apartment with caregivers about 4 hours a day. For about 3 days prior to this admission, family was providing 24 hour supervision/assistance. HOME SUPPORT: Good family support. Will likely go to the Health Care Unit at [address] [place] [address]  Initiated 7/27/2021  1. Patient will perform grooming seated edge of bed with contact guard assist within 7 day(s). 2.  Patient will perform bathing with minimal assistance within 7 day(s). 3.  Patient will perform lower body dressing with minimal assistance within 7 day(s). 4.  Patient will perform toilet transfers with minimal assistance/contact guard assist within 7 day(s). 5.  Patient will perform all aspects of toileting with moderate assistance  within 7 day(s). 6.  Patient will participate in upper extremity therapeutic exercise/activities with Min cues for 10 minutes within 7 day(s). OCCUPATIONAL THERAPY EVALUATION  Patient: Leon Ramirez (78 y.o. male)  Date: 7/27/2021  Primary Diagnosis: Acute encephalopathy [G93.40]        Precautions: Fall       ASSESSMENT  Based on the objective data described below, the patient presents with significant deficits in self-care, primarily due to decreased level of arousal, decreased activity tolerance, weakness, impaired balance, decreased safety, and orthostatic hypotension. He follows simple commands when awake, but is unable to keep his eyes open for an extended amount of time. Patient is functioning well below his baseline level and will benefit from skilled OT treatment to maximize independence and safety in basic self-care.     Current Level of Function Impacting Discharge (ADLs/self-care): up to Total A    Functional Outcome Measure: The patient scored 10/100 on the Barthel Index outcome measure which is indicative of significant functional deficits. Other factors to consider for discharge: will need 24 hour supervision/assistance     Patient will benefit from skilled therapy intervention to address the above noted impairments. PLAN :  Recommendations and Planned Interventions: self care training, functional mobility training, therapeutic exercise, balance training, therapeutic activities, cognitive retraining, endurance activities, neuromuscular re-education, patient education, home safety training, and family training/education    Frequency/Duration: Patient will be followed by occupational therapy 4 times a week to address goals. Recommendation for discharge: (in order for the patient to meet his/her long term goals)  Health Care Unit at [facility]    This discharge recommendation:  A follow-up discussion with the attending provider and/or case management is planned    IF patient discharges home will need the following DME: TBD       SUBJECTIVE:   Patient stated I'm gonna need to sit down.  (c/o dizziness)    OBJECTIVE DATA SUMMARY:   HISTORY:   Past Medical History:   Diagnosis Date    Anxiety disorder     Arthritis     CAD (coronary artery disease)     Calculus of kidney     Cancer (HCC) prostate  1996    skin    Depression     Hearing loss     Heart attack ([facility] [address]     Hypercholesterolemia     Hypertension     Incisional hernia 5/10/2011    Movement disorder     Neuropathy     Other ill-defined conditions(799.89)     elevated cholesterol    Other ill-defined conditions(799.89)     glaucoma    Other ill-defined conditions(799.89)     abd hernia    Skipped beats     Stroke [facility])     left arm tingling    Thyroid disease     Thyroid mass 5/10/2011    Vertigo      Past Surgical History:   Procedure Laterality Date    HX AORTIC VALVE REPLACEMENT  2015    HX HEENT      thyroid biopsy    HX HEENT      thyroidectomy 2013    HX ORCHIECTOMY  01/2017    HX ORTHOPAEDIC  2/2011    compression fracture d.t. fall (back),no surgery    HX THYROIDECTOMY  1/3/2013    DE CARDIAC SURG PROCEDURE UNLIST  1996    cabg x3    DE INS NEW/RPLCMT PRM PM W/TRANSV ELTRD ATRIAL&VENT N/A 5/6/2021    Insert Ppm Biv Multi performed by Freddy Rossi MD at [facility] CARDIAC CATH LAB    DE PROSTATE BIOPSY, NEEDLE, SATURATION SAMPLING         Expanded or extensive additional review of patient history:     Home Situation  Home Environment: [place] [address] Name: [facility]  One/Two Story Residence: One story  Living Alone: No  Support Systems: Family member(s)  Patient Expects to be Discharged to[de-identified] Long-term care  Current DME Used/Available at Home: Walker, rolling, Grab bars, Shower chair  Tub or Shower Type: Shower    Hand dominance: Right    EXAMINATION OF PERFORMANCE DEFICITS:  Cognitive/Behavioral Status:  Neurologic State: Drowsy; Confused; Eyes open to stimulus  Orientation Level: Disoriented to situation;Oriented to person;Oriented to place;Oriented to time  Cognition: Decreased attention/concentration; Follows commands  Perception: Appears intact  Perseveration: No perseveration noted       Hearing: Auditory  Auditory Impairment: None    Vision/Perceptual:                           Acuity: Within Defined Limits    Corrective Lenses: Glasses    Range of Motion:  B UE grossly WFL                            Strength:  Generally decreased, functional                   Balance:  Sitting: Intact  Standing: Impaired  Standing - Static: Fair;Constant support  Standing - Dynamic : Fair;Constant support    Functional Mobility and Transfers for ADLs:  Bed Mobility:  Supine to Sit: Moderate assistance;Assist x2  Sit to Supine: Moderate assistance;Assist x1    Transfers:  Sit to Stand: Minimum assistance;Assist x2  Stand to Sit: Minimum assistance;Assist x2  Toilet Transfer :  Moderate assistance (inferred; recommend [facility])    ADL Assessment:  Feeding: Setup;Supervision (drowsy)    Oral Facial Hygiene/Grooming: Moderate assistance    Bathing: Maximum assistance    Upper Body Dressing: Moderate assistance    Lower Body Dressing: Total assistance    Toileting: Total assistance                ADL Intervention and task modifications:  Educated on proper techniques for bed mobility and sit to stand. Initiated ADL training, but education limited today due to lethargy. Functional Measure:  Barthel Index:    Bathin  Bladder: 0  Bowels: 0  Groomin  Dressin  Feedin  Mobility: 0  Stairs: 0  Toilet Use: 0  Transfer (Bed to Chair and Back): 5  Total: 10/100        The Barthel ADL Index: Guidelines  1. The index should be used as a record of what a patient does, not as a record of what a patient could do. 2. The main aim is to establish degree of independence from any help, physical or verbal, however minor and for whatever reason. 3. The need for supervision renders the patient not independent. 4. A patient's performance should be established using the best available evidence. Asking the patient, friends/relatives and nurses are the usual sources, but direct observation and common sense are also important. However direct testing is not needed. 5. Usually the patient's performance over the preceding 24-48 hours is important, but occasionally longer periods will be relevant. 6. Middle categories imply that the patient supplies over 50 per cent of the effort. 7. Use of aids to be independent is allowed. Alethea Carroll., Barthel, D.W. ([id number]). Functional evaluation: the Barthel Index. [address] [facility] (14)2. YUAN Lala, Barrett Stephen., [facility], [address] [place] [address] (). Measuring the change indisability after inpatient rehabilitation; comparison of the responsiveness of the Barthel Index and Functional [place] Measure.  Journal of Neurology, Neurosurgery, and Psychiatry, 66(4), 395-133. RACHEL Castle, IVETH Cotto, & Giovanni Johnson M.A. (2004.) Assessment of post-stroke quality of life in cost-effectiveness studies: The usefulness of the Barthel Index and the EuroQoL-5D. Quality of Life Research, 15, 057-30        Occupational Therapy Evaluation Charge Determination   History Examination Decision-Making   MEDIUM Complexity : Expanded review of history including physical, cognitive and psychosocial  history  HIGH Complexity : 5 or more performance deficits relating to physical, cognitive , or psychosocial skils that result in activity limitations and / or participation restrictions MEDIUM Complexity : Patient may present with comorbidities that affect occupational performnce. Miniml to moderate modification of tasks or assistance (eg, physical or verbal ) with assesment(s) is necessary to enable patient to complete evaluation       Based on the above components, the patient evaluation is determined to be of the following complexity level: MEDIUM  Pain Rating:  No complaints    Activity Tolerance:   Poor    After treatment patient left in no apparent distress:    Supine in bed, Call bell within reach, Bed / chair alarm activated, and Caregiver / family present    COMMUNICATION/EDUCATION:   The patients plan of care was discussed with: Physical therapist and Registered nurse. Patient is unable to participate in goal setting and plan of care. This patients plan of care is appropriate for delegation to [facility].     Thank you for this referral.  Haydee Das OTR/SHAZIA  Time Calculation: 35 mins

## 2021-07-27 NOTE — PROGRESS NOTES
Problem: Mobility Impaired (Adult and Pediatric)  Goal: *Acute Goals and Plan of Care (Insert Text)  Description: FUNCTIONAL STATUS PRIOR TO ADMISSION: Patient was modified independent using a rolling walker for functional mobility. HOME SUPPORT PRIOR TO ADMISSION: The patient lives in independent living. Right before entering the hospital the patient had 24/7 care for help. Physical Therapy Goals  Initiated 7/27/2021  1. Patient will move from supine to sit and sit to supine  in bed with modified independence within 7 day(s). 2.  Patient will transfer from bed to chair and chair to bed with minimal assistance/contact guard assist using the least restrictive device within 7 day(s). 3.  Patient will perform sit to stand with minimal assistance/contact guard assist within 7 day(s). 4.  Patient will ambulate with minimal assistance/contact guard assist for 15 feet with the least restrictive device within 7 day(s). Outcome: Progressing Towards Goal   PHYSICAL THERAPY EVALUATION  Patient: Milly Fernández (30 y.o. male)  Date: 7/27/2021  Primary Diagnosis: Acute encephalopathy [G93.40]        Precautions:   Fall    ASSESSMENT  Based on the objective data described below, the patient presents with decreased independence with functional mobility S/P admission for acute encephalopathy. He is received supine in bed and very lethargic. When touched and spoken to in a loud voice patient will open his eyes and respond. BP is assessed for orthostatic BP. Please see separate note (patient is orthostatic). He is provided assistance with HOB elevated to sit EOB. He demonstrates good sitting balance EOB and the ability to scoot himself forward to achieve foot flat. He reports dizziness in sitting and standing. Provided Ax2 patient is able to stand and side step. He demonstrates increased posterior lean in standing requiring VC and tactile cues to shift weight anteriorly and through his toes.  He is unable to stand long enough for full standing BP to be taken. Current Level of Function Impacting Discharge (mobility/balance): Min A x2     Functional Outcome Measure: The patient scored 10/100 on the Barthel outcome measure. Other factors to consider for discharge: medical stability, decreased balance, increased risk for falls with hx of falls, increased need for assistance. Patient will benefit from skilled therapy intervention to address the above noted impairments. PLAN :  Recommendations and Planned Interventions: bed mobility training, transfer training, gait training, therapeutic exercises, neuromuscular re-education, edema management/control, patient and family training/education, and therapeutic activities      Frequency/Duration: Patient will be followed by physical therapy:  5 times a week to address goals. Recommendation for discharge: (in order for the patient to meet his/her long term goals)  Therapy up to 5 days/week in SNF setting    This discharge recommendation:  Has not yet been discussed the attending provider and/or case management    IF patient discharges home will need the following DME: patient owns DME required for discharge         SUBJECTIVE:   Patient stated i'm not doing too good.     OBJECTIVE DATA SUMMARY:   HISTORY:    Past Medical History:   Diagnosis Date    Anxiety disorder     Arthritis     CAD (coronary artery disease)     Calculus of kidney     Cancer (HCC) prostate  1996    skin    Depression     Hearing loss     Heart attack ([facility] [address]     Hypercholesterolemia     Hypertension     Incisional hernia 5/10/2011    Movement disorder     Neuropathy     Other ill-defined conditions(799.89)     elevated cholesterol    Other ill-defined conditions(799.89)     glaucoma    Other ill-defined conditions(799.89)     abd hernia    Skipped beats     Stroke [facility])     left arm tingling    Thyroid disease     Thyroid mass 5/10/2011    Vertigo      Past Surgical History:   Procedure Laterality Date    HX AORTIC VALVE REPLACEMENT  2015    HX HEENT      thyroid biopsy    HX HEENT      thyroidectomy 2013    HX ORCHIECTOMY  01/2017    HX ORTHOPAEDIC  2/2011    compression fracture d.t. fall (back),no surgery    HX THYROIDECTOMY  1/3/2013    [place] CARDIAC SURG PROCEDURE UNLIST  1996    cabg x3    FL INS NEW/RPLCMT PRM PM W/TRANSV ELTRD ATRIAL&VENT N/A 5/6/2021    Insert Ppm Biv Multi performed by King Denton MD at [facility] CARDIAC CATH LAB    FL PROSTATE BIOPSY, NEEDLE, SATURATION SAMPLING         Personal factors and/or comorbidities impacting plan of care: please see above    Home Situation  Home Environment: [place] [address] Name: [facility]  One/Two Story Residence: One story  Living Alone: No  Support Systems: Family member(s)  Patient Expects to be Discharged to[de-identified] Long-term care  Current DME Used/Available at Home: Walker, rolling, Grab bars, Shower chair  Tub or Shower Type: Shower    EXAMINATION/PRESENTATION/DECISION MAKING:   Critical Behavior:  Neurologic State: Drowsy, Confused, Eyes open to stimulus  Orientation Level: Disoriented to situation, Oriented to person, Oriented to place, Oriented to time  Cognition: Decreased attention/concentration, Follows commands     Hearing: Auditory  Auditory Impairment: None  Skin:    Edema:   Range Of Motion:  AROM: Generally decreased, functional           PROM: Generally decreased, functional           Strength:    Strength: Generally decreased, functional                    Tone & Sensation:                                  Coordination:  Coordination: Generally decreased, functional  Vision:   Acuity: Within Defined Limits  Corrective Lenses: Glasses  Functional Mobility:  Bed Mobility:     Supine to Sit: Moderate assistance;Assist x2  Sit to Supine:  Moderate assistance;Assist x1     Transfers:  Sit to Stand: Minimum assistance;Assist x2  Stand to Sit: Minimum assistance;Assist x2                       Balance:   Sitting: Intact  Standing: Impaired  Standing - Static: Fair;Constant support  Standing - Dynamic : Fair;Constant support  Ambulation/Gait Training:                                                         Stairs: Therapeutic Exercises:       Functional Measure:  Barthel Index:    Bathin  Bladder: 0  Bowels: 0  Groomin  Dressin  Feedin  Mobility: 0  Stairs: 0  Toilet Use: 0  Transfer (Bed to Chair and Back): 5  Total: 10/100       The Barthel ADL Index: Guidelines  1. The index should be used as a record of what a patient does, not as a record of what a patient could do. 2. The main aim is to establish degree of independence from any help, physical or verbal, however minor and for whatever reason. 3. The need for supervision renders the patient not independent. 4. A patient's performance should be established using the best available evidence. Asking the patient, friends/relatives and nurses are the usual sources, but direct observation and common sense are also important. However direct testing is not needed. 5. Usually the patient's performance over the preceding 24-48 hours is important, but occasionally longer periods will be relevant. 6. Middle categories imply that the patient supplies over 50 per cent of the effort. 7. Use of aids to be independent is allowed. Desire Christianson., Barthel, D.W. ([id number]). Functional evaluation: the Barthel Index. [address] [facility] (14)2. YUAN Leija, Gary Goff.Tiff., [place], [address] [place] [address] (). Measuring the change indisability after inpatient rehabilitation; comparison of the responsiveness of the Barthel Index and Functional [place] Measure. Journal of Neurology, Neurosurgery, and Psychiatry, 66(4), 510-844. Beulah Womack, [place].A, IVETH Cotto, & Wing Cano MTRAY. (2004.) Assessment of post-stroke quality of life in cost-effectiveness studies: The usefulness of the Barthel Index and the EuroQoL-5D.  Quality of Life Research, 13, 448-88            Physical Therapy Evaluation Charge Determination   History Examination Presentation Decision-Making   HIGH Complexity :3+ comorbidities / personal factors will impact the outcome/ POC  LOW Complexity : 1-2 Standardized tests and measures addressing body structure, function, activity limitation and / or participation in recreation  MEDIUM Complexity : Evolving with changing characteristics  Other outcome measures Barthel  HIGH       Based on the above components, the patient evaluation is determined to be of the following complexity level: HIGH     Pain Rating:      Activity Tolerance:   Fair and signs and symptoms of orthostatic hypotension    After treatment patient left in no apparent distress:   Supine in bed, Call bell within reach, Bed / chair alarm activated, and Caregiver / family present    COMMUNICATION/EDUCATION:   The patients plan of care was discussed with: Occupational therapist and Registered nurse. Fall prevention education was provided and the patient/caregiver indicated understanding., Patient/family have participated as able in goal setting and plan of care. , and Patient/family agree to work toward stated goals and plan of care.     Thank you for this referral.  Melissa Waite, PT   Time Calculation: 30 mins

## 2021-07-27 NOTE — PROGRESS NOTES
Orthostatics performed    Supine: 125/68  Sittin/82  Standing 107/46 - patient unable to tolerate standing for full pressure.      Thank you,    Gio NEWMANT

## 2021-07-27 NOTE — ROUTINE PROCESS
Report given to [facility] on Melinda Cueva for routine progression of care. Report consisted of the following information SBAR, Kardex, Procedure Summary, Intake/Output, MAR, Accordion, Recent Results, Med Rec Status and Cardiac Rhythm SR;Pacemaker.

## 2021-07-27 NOTE — PROGRESS NOTES
Transition of Care Plan:    RUR: 17%  Disposition: [facility]   Follow up appointments: Follow up with PCP and/or Specialist   DME needed: TBD by therapist   Transportation at Discharge: Family to transport   [address] or means to access home: Family will have keys to enter home        IM Medicare Letter: 2nd  Medicare Letter to be provided   BCPI-A Bundle Letter:  Caregiver Contact: Yanet Lord (daughter) [phone]  Discharge Caregiver contacted prior to discharge? Family to be contacted at the time of d.c       Reason for Admission:   Acute Encephalopathy                    RUR Score:   17%               PCP: First and Last name:   Shun Hammer MD     Name of Practice:    Are you a current patient: Yes/No: yes    Approximate date of last visit: June 2021   Can you participate in a virtual visit if needed: Yes, if recommended by physician     Do you (patient/family) have any concerns for transition/discharge? No concerns at this time                 Plan for utilizing home health:   Possible [address] recommended by therapist     Current Advanced Directive/Advance Care Plan:  DNR      Healthcare Decision Maker:   Click here to complete [address] including selection of the Healthcare Decision Maker Relationship (ie \"Primary\")            Primary Decision Maker: Kent Brittle - Daughter - [phone]    Transition of Care Plan:                CM spoke with pt's son: Courtney Rosales, via telephone to complete pt assessment. Pt is known to be resident at [facility] (for 10yrs), independent side. Pt is known to be active with PCP: seen June 2021, and uses [facility]. Pt is known to be independent with ADLs, and does not drive. Pt's son will assist with transport home at the time of d/c. Pt is known to use DME at home: walker. Tania Hutchinson reported no HHC, but pt is known to have received services from the health care side at [facility], when he had a past surgery.       Pt's son: Tania Hutchinson is listed as medical decision maker when discussing ACP. Pt's son reported that pt will return back to [facility] at the time of d/c. CM will send referral to [facility], to update them of pt's medical progress. CM will continue to follow. Care Management Interventions  PCP Verified by CM: Yes  Mode of Transport at Discharge:  Other (see comment)  Transition of Care Consult (CM Consult): Discharge Planning  Discharge Durable Medical Equipment: No  Physical Therapy Consult: Yes  Occupational Therapy Consult: Yes  Current Support Network: Assisted Living ([facility] )  Confirm Follow Up Transport: Family  Discharge Location  Discharge Placement: Assisted Living ([facility])  VENECIA Rivera, [address] [place] [address]

## 2021-07-28 NOTE — PROGRESS NOTES
Physical Therapy    Chart reviewed. Spoke with RNDoug who reports pt is very lethargic and has been all day. Pt's family is present and attempting to get pt to eat and drink, as he hasn't done much of either since admission. RN and family request PT defer today. Will defer and continue to follow.

## 2021-07-28 NOTE — PROGRESS NOTES
Received message from patient's nurse stating:  pt has been retaining all day. Straight cath has been unsuccessful, day shift RN reported she was unable to d/t enlarged prostate. Nephrology was consulted and adv day shift nurse to bladder scan him tonight and he is over 450mL. He is voiding but only 25mL-50mL at a time             Discussion / orders:    · Requested a coudé cath to be used to catheterize patient and to leave Orozco in place. This 1 done and per patient's nurse, 700 mL of urine was obtained. · We will leave Orozco in place  · Urology consult in a.m. Please note that this note was dictated using Dragon computer voice recognition software. Quite often unanticipated grammatical, syntax, homophones, and other interpretive errors are inadvertently transcribed by the computer software. Please disregard these errors. Please excuse any errors that have escaped final proofreading.

## 2021-07-28 NOTE — PROGRESS NOTES
Nephrology Progress Note  Gianluca Reich     [facility]  Phone - [phone]   Patient: Tyra Hinton    YOB: 1928        Date- 7/28/2021   Admit Date: 7/26/2021  CC: Follow up for  HYPONATREMIA          IMPRESSION & PLAN:   · hyponatremia likely due to poor p.o. intake, torsemide use, poor solute intake-? SIADH-- he has received NACL on admission without much improvement in SODIUM  · UTI  · hypokalemia  · Metabolic encephalopathy  · S/p fall  · History of hypertension  · Hyperlipidemia   GERD   Urinary retention    PLAN-   Give hypertonic SALINE again today   Appreciate urology input   RECHECK URINE NA   kcl 40 meq x 1     Subjective: Interval History:   -  Na dropped to 126  He received 3% nacl yesterday      Objective:   Vitals:    07/27/21 2306 07/28/21 0400 07/28/21 0711 07/28/21 1104   BP: 120/68 137/65 122/63 106/60   Pulse: 83 69 79 80   Resp: 16 18 18 18   Temp: 97.5 °F (36.4 °C) 97.3 °F (36.3 °C) 96.9 °F (36.1 °C) 97.3 °F (36.3 °C)   SpO2: 96% 94% 97% 99%   Weight:       Height:          07/27 0701 - 07/28 0700  In: 848.3 [P.O.:360; I.V.:488.3]  Out: 800 [Urine:800]  Last 3 Recorded Weights in this Encounter    07/26/21 1332   Weight: 89.8 kg (198 lb)      Physical exam:   GEN: NAD  NECK- Supple, no mass  RESP: No wheezing,clear  b/l  CVS: S1,S2  RRR  NEURO: Can't access due to patient's current condition   EXT: No Edema   PSYCH:  Can't access due to patient's current condition     Chart reviewed. Pertinent Notes reviewed.      Data Review :  Recent Labs     07/28/21  0437 07/27/21  2033 07/27/21  1114 07/27/21  0902 07/27/21  0447 07/27/21  0447   * 128* 126*  --    < > 126*   K 3.7 4.1 3.6  --    < > 3.8   CL 92* 93* 91*  --    < > 92*   CO2 30 32 31  --    < > 28   BUN 11 13 14  --    < > 16   CREA 0.38* 0.41* 0.48*  --    < > 0.41*   * 118* 122*  --    < > 118*   CA 7.7* 7.6* 7.7*  --    < > 7.5*   MG  --   --   --   -- --  2.2   PHOS 2.5*  --   --   --   --  2.8   URICA  --   --   --  4.3  --   --     < > = values in this interval not displayed. Recent Labs     07/27/21  0447 07/26/21  1340   WBC 8.9 9.7   HGB 13.2 13.1   HCT 40.2 39.2    364     No results for input(s): FE, TIBC, PSAT, FERR in the last 72 hours.    Medication list  reviewed  Current Facility-Administered Medications   Medication Dose Route Frequency    3% NaCl Monitoring - Step 1  1 Each Other Q2H    3% NaCl Monitoring - Step 2   1 Each Other Q6H    3% sodium chloride (*HIGH ALERT*CONCENTRATED IV*) infusion  50 mL/hr IntraVENous CONTINUOUS    finasteride (PROSCAR) tablet 5 mg  5 mg Oral DAILY    sodium chloride (NS) flush 5-40 mL  5-40 mL IntraVENous Q8H    sodium chloride (NS) flush 5-40 mL  5-40 mL IntraVENous PRN    acetaminophen (TYLENOL) tablet 650 mg  650 mg Oral Q6H PRN    Or    acetaminophen (TYLENOL) suppository 650 mg  650 mg Rectal Q6H PRN    polyethylene glycol (MIRALAX) packet 17 g  17 g Oral DAILY PRN    ondansetron (ZOFRAN ODT) tablet 4 mg  4 mg Oral Q8H PRN    Or    ondansetron (ZOFRAN) injection 4 mg  4 mg IntraVENous Q6H PRN    enoxaparin (LOVENOX) injection 40 mg  40 mg SubCUTAneous DAILY    latanoprost (XALATAN) 0.005 % ophthalmic solution 1 Drop  1 Drop Both Eyes QHS    atorvastatin (LIPITOR) tablet 10 mg  10 mg Oral QHS    pantoprazole (PROTONIX) tablet 40 mg  40 mg Oral ACB    levothyroxine (SYNTHROID) tablet 125 mcg  125 mcg Oral ACB    tamsulosin (FLOMAX) capsule 0.4 mg  0.4 mg Oral BID    ticagrelor (BRILINTA) tablet 90 mg  90 mg Oral Q12H    cyanocobalamin (VITAMIN B12) tablet 1,000 mcg  1,000 mcg Oral DAILY    dorzolamide-timoloL (COSOPT) 22.3-6.8 mg/mL ophthalmic solution 1 Drop  1 Drop Both Eyes DAILY    brimonidine (ALPHAGAN) 0.2 % ophthalmic solution 1 Drop  1 Drop Both Eyes BID    aspirin delayed-release tablet 81 mg  81 mg Oral DAILY          Mario Ponce MD              [facility] [address] , [place]  [place], [address]  Phone - [phone]               Fax - [phone]

## 2021-07-28 NOTE — PROGRESS NOTES
Oncology End of Shift Note      Bedside shift change report given to KARLA Morales (incoming nurse) by Jose Alfredo Renteria RN (outgoing nurse) on [facility]. Report included the following information SBAR, Kardex and Quality Measures. 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     [x] Yes  [] No    Rhythm: Telemetry: normal sinus rhythm     Gumaro Scale:     Gumaro Score: 14        If Gumaro Scale less than 15   Patient Mobility Ordered  Yes: Comment: Turn team  Speciality Bed Ordered       Boots Applied                  No: turner Renteria RN

## 2021-07-28 NOTE — CONSULTS
Requesting Provider: Marsha Drivers - Reason for Consultation: \"urinary retention\"  Pre-existing [facility] Patient:   yes                Patient: Taiwo Braga MRN: [id number]  SSN: [id number]    YOB: 1928  Age: 80 y.o. Sex: male     Location: 3111/01       Code Status: DNR   PCP: Eve Woods MD  - [phone]   Emergency Contact:  Primary Emergency Contact: Maribell Al, Odilon Phone: [phone]   Race/Alevism/Language: Kevan Nuñez / Ga Giordano / Rakan Askew ENGLISH   Payor: Payor: Vito Beckford / Plan: [employer] 21 / Product Type: [employer] Medicare /    Prior Admission Data: 5/7/21 MRM 2 INTRVNTNL CARDIO Urban Dooley   Hospitalized:  Hospital Day: 3 - Admitted 7/26/2021  2:26 PM     CONSULTANTS  IP CONSULT TO NEPHROLOGY  IP CONSULT TO UROLOGY  IP CONSULT TO HOSPITALIST   ADMISSION DIAGNOSES    ICD-10-CM ICD-9-CM   1. Hyponatremia  E87.1 276.1         Assessment/Plan:       · Urinary Retention  · Hx of Prostate CA s/p b/l orchiectomy 2017  · Hx of BPH with obstruction    - 700 cc of urinary retention. A gunn was placed. Maintain gunn x 5-7 days. Will arrange outpatient follow up for a voiding trial.   - Continue home flomax and finasteride twice daily.   - UCx no growth. Thank you for this consult, please call for further questions. Supervising MD, Dr. Marcio Tony. CC: Fatigue (Weakness for a couple of weeks. Very tired but able to answer all questions. No symptoms of weakness localized on either side - no stroke symptoms noted in triage.)   HPI: He is a 80 y.o. male seen in consultation by Urology for urinary retention. He is followed by Dr. 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 He takes finasteride and flomax twice daily. Last seen earlier this month, see last OV note below.      He has a PMH of HTN, CAD, Depression, HLD, Hearing loss who presented to ED with c/o altered mental status. Recently diagnosed with UTI and prescribed ciprofloxacin. He was admitted for acute metabolic encephalopathy s/p fall, hyponatremia, and UTI. He is [place] and unable to add to further hx collection. Per chart review, bladder scans revealed 356 and 450 cc. A coude catheter was placed by staff with 700 cc UOP. He is AF, VSS. WBC wnl. , Cr 0.38. UA +mod leuks, 20-50 WBCs, 0-5 WBCs, no bacteria. UCx no growth. BCx NGTD. +Flomax.     =======last OV note=====  Zina Scott is a 80year old White male who presents today for \"Prostate Cancer\". He returns for follow-up. Mr. Omid Lawson is accompanied by his son, Nedra Plasencia who is his second son. He reminds me that his oldest son passed away. His son feels during his hospitalization the patient had poor hygiene which started his balanitis and inability to retract foreskin. He is s/p 13 radiation treatments. The buldge on his abdomen is growing but does not hurt and occasionally gets soft. Of note, Nedra Plasencia has had a rising PSA with negative biopsies, being followed by Dr. Mary Martin. Prostate Cancer   Diagnosis Date:  07/09/2003  Prior Status:   Disease palliated with hormone therapy  Pretreatment PSA:  9.0 ng/ml  Clinical Stage:   T1c  TNM Staging:   T2a  Nx  M0    PROSTATE CANCER TREATMENT HISTORY:  First Treatment: Watchful waiting -   Second Treatment: Androgen deprivation therapy-bilateral orchiectomy 1/17/17 -         PAST MEDICAL HISTORY:    Allergies: No known allergies. DENIES: Latex, Shellfish, X-Ray Dye, Iodine.      Medications: Brilinta 90 mg tablet (ticagrelor)   dutasteride 0.5 mg capsule (dutasteride) TAKE 1 CAPSULE BY MOUTH EVERY MORNING  brimonidine 0.15% drops (brimonidine)   diclofenac sodium 1% gel (diclofenac sodium)   clotrimazole-betamethasone 1-0.05% cream (clotrimazole-betamethasone) APPLY SMALL AMOUNT AS DIRECTED TWICE A DAY  lovastatin 40 mg tablet (lovastatin)   famotidine 40 mg tablet (famotidine) TAKE 1 TABLET BY MOUTH TWICE A DAY AS DIRECTED  amlodipine 2.5 mg tablet (amlodipine)   furosemide 40 mg tablet (furosemide)   Senna Plus 8.6-50 mg tablet (sennosides-docusate sodium) TAKE 1 TABLET BY MOUTH TWICE A DAY AS NEEDED FOR CONSTIPATION  tamsulosin 0.4 mg capsule (tamsulosin)   trazodone 50 mg tablet (trazodone)   Synthroid 125 mcg tablet (levothyroxine)   torsemide 20 mg tablet (torsemide)   B-12 DOTS 500 mcg tablet (cyanocobalamin (vitamin b-12)) once a day   D3-2000 50 mcg (2,000 unit) capsule (cholecalciferol (vitamin d3)) once a day   torsemide 20 mg tablet (torsemide)   Monovisc 88 mg/4 mL syringe (hyaluronate sodium, stabilized)     Problems: Balanitis (ICD-607.1) (RQJ81-F78.1)  BPH with urinary obstruction (ICD-600.01) (JQT66-Z63.1)  Nocturia (ICD-788.43) (PLI90-W60.1)  Phimosis (ICD-605) (PAV89-S45.1)  Hypogonadism (ICD-257.2) (WTN20-C43.1)  Dysuria (ICD-788.1) (DJS40-H87.0)  Hx of prostate cancer (ICD-V10.46) (RHP67-H30.46)  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 DENIES: Pacemaker/Defibrillator, Lung Disease, Diabetes, Bowel Problems, Kidney Problems, Bleeding Problems, HIV, or Hepatitis. Surgeries: 1/17/17 bilateral orchiectomy, right radical for path epididymal orchitis with abscess. 2016 radiation treatments for his penile cancer. , and Open Heart Surgery. Family History: Kidney Stones.    DENIES: Prostate cancer, Kidney cancer, Kidney disease. Social History: [employer], [employer] official. Retired. . Smoking status: former smoker. Does not drink alcohol. System Review: Admits to: Dry Mouth, Difficulty Walking, and Easy Bleeding. DENIES: Unexplained Weight Loss, Dry Eyes, Leg Swelling, Shortness of Breath, Constipation, Involuntary Urine Loss, Lower Extremity Weakness, Dry Skin, Psychiatric Problems, Impaired Sex Drive, Rash. EXAMINATION: Vitals: Pulse: 70 BP: 100/53 Weight: 195 lbs Height: 5' 10\" BMI: 28.08 kg/m^2  Appearance: well-developed NAD Respiratory Effort: breathing easily Abdomen/Flank: large right upper abdominal hernia, reduceable Penis: phimosis that is starting to crack without evidence of infection     URINALYSIS  Urine Micro not done    PSA HISTORY  <0.1 ng/ml on 08/13/2020  <0.1 ng/ml on 04/15/2019  <0.1 ng/ml on 04/13/2018  0.233 ng/ml on 10/09/2017  0.233 ng/ml on 10/09/2017  0.277 ng/ml on 03/29/2017  0.277 ng/ml on 03/29/2017  2.04 ng/ml on 11/17/2016  2.23 ng/ml on 05/06/2016  2.04 ng/ml on 04/28/2015    IMPRESSION:    1. PHIMOSIS (MTG55-M65.1) - Deteriorated: Foreskin is not reduceable. We discussed treatment options including conservatively re-examining every 6 months versus circumcision. I do not recommend circumcision at this time and we will proceed conservatively. He may continue to use Clotrimazole PRN. 2. BALANITIS (RVG74-F75.1) - Resolved    3. SQUAMOUS CELL CANCER OF SKIN OF GLANS PENIS (HDI04-D33.9): Unable to examine cancerous area due to phimosis. He should return to clinic in 6 months with repeat exam.     4. HX OF PROSTATE CANCER (USE58-M07.46) - Unchanged: No longer following PSA. PLAN: All questions and concerns were addressed prior to the patient leaving the clinic. The patient understands and agrees with the plan.      cc: MD Dr. Felix Davis by [employer]  [employer] Services  [employer]    Upcoming Orders  Return Office Visit - with Efrain Chambers MD in 6 months  UA        By signing my name below, Tiffany Whitaker, attest that this documentation has been prepared under the direct and in the presence of Efrain Chambers MD, MD.  Electronically Signed: Wilbert Nguyen   2021 3:22 PM    I, Dr. Efrain Chambers MD, personally performed the services described in this documentation. All medical record entries made by the scribe were at my direction and in my presence. I have reviewed the chart and agree that the record reflects my personal performance and is accurate and complete. Electronically signed by Efrain Chambers MD on 2021 at 3:54 PM    ________________________________________________________________________        Temp (24hrs), Av.2 °F (36.2 °C), Min:96.8 °F (36 °C), Max:97.5 °F (36.4 °C)    Urinary Status: Voiding  Creatinine   Date/Time Value Ref Range Status   2021 04:37 AM 0.38 (L) 0.70 - 1.30 MG/DL Final   2021 08:33 PM 0.41 (L) 0.70 - 1.30 MG/DL Final   2021 11:14 AM 0.48 (L) 0.70 - 1.30 MG/DL Final   2021 04:47 AM 0.41 (L) 0.70 - 1.30 MG/DL Final   2021 01:40 PM 0.58 (L) 0.70 - 1.30 MG/DL Final     Current Antimicrobial Therapy (168h ago, onward)    None        Key Anti-Platelet Anticoagulant Meds             aspirin delayed-release 81 mg tablet (Taking) Take 81 mg by mouth daily. ticagrelor (BRILINTA) 90 mg tablet (Taking) Take 1 Tab by mouth every twelve (12) hours every twelve (12) hours.         Diet: ADULT DIET Regular -       Labs     Lab Results   Component Value Date/Time    Lactic acid 1.3 2018 08:57 AM    WBC 8.9 2021 04:47 AM    HCT 40.2 2021 04:47 AM    PLATELET 046  04:47 AM    Sodium 126 (L) 2021 04:37 AM    Potassium 3.7 2021 04:37 AM    Chloride 92 (L) 2021 04:37 AM    CO2 30 2021 04:37 AM    BUN 11 2021 04:37 AM    Creatinine 0.38 (L) 2021 04:37 AM Glucose 109 (H) 07/28/2021 04:37 AM    Calcium 7.7 (L) 07/28/2021 04:37 AM    Magnesium 2.2 07/27/2021 04:47 AM    INR 1.1 11/16/2018 08:57 AM     UA:   Lab Results   Component Value Date/Time    Color DARK YELLOW 07/26/2021 03:56 PM    Appearance CLOUDY (A) 07/26/2021 03:56 PM    Specific gravity 1.022 07/26/2021 03:56 PM    pH (UA) 6.0 07/26/2021 03:56 PM    Protein TRACE (A) 07/26/2021 03:56 PM    Glucose Negative 07/26/2021 03:56 PM    Ketone Negative 07/26/2021 03:56 PM    Bilirubin Negative 07/26/2021 03:56 PM    Urobilinogen 0.2 07/26/2021 03:56 PM    Nitrites Negative 07/26/2021 03:56 PM    Leukocyte Esterase MODERATE (A) 07/26/2021 03:56 PM    Epithelial cells FEW 07/26/2021 03:56 PM    Bacteria Negative 07/26/2021 03:56 PM    WBC 20-50 07/26/2021 03:56 PM    RBC 0-5 07/26/2021 03:56 PM     Imaging     Results for orders placed during the hospital encounter of 07/26/21    CT HEAD WO CONT    Narrative  INDICATION:  Lethargy    Exam: Unenhanced head CT. Comparison 4/17/2021 5 mm axial images were obtained  from the skull base to vertex. Sagittal and coronal reformats were performed. CT  dose reduction was achieved through use of a standardized protocol tailored for  this examination and automatic exposure control for dose modulation. Findings: The ventricles and cisterns are of normal size and configuration. There are no extra-axial fluid collections mass lesions or mass effect. There  are no extra-axial fluid collections or midline shift. No evidence of acute  intracranial hemorrhage or acute infarct. Sinus mucosal inflammatory change  maxillary sinuses. No depressed skull fracture    Impression  1. No acute intracranial abnormality      US Results (most recent):  Results from Hospital Encounter encounter on 11/07/12    US THYROID/PARATHYROID/SOFT TISS    Narrative  **Final Report**      ICD Codes / Adm. Diagnosis: 246.9  246.9 / Unspecified disorder of thyroi  Examination:  US THYROID PARATHYROID  - [id number] - Nov 7 2012  7:49AM  Accession No:  [id number]  Reason:  f/u thyroid nodule      REPORT:  INDICATION:  Thyroid nodule. COMPARISON:  4/7/2011. TECHNIQUE:  Multiple sonographic real time images were obtained. FINDINGS:  The right lobe of the thyroid gland measures 3.2 x 3.9 x 8.2 cm. The left lobe of the thyroid gland measures 1.1 x 1.1 x 4.6 cm. The isthmus  measures 0.37 cm. A 3.1 x 3.8 x 6.1 cm right mid to lower pole mixed  density thyroid nodule is seen. The nodule previously measured 3.9 x 4.2 x  at least 4.6 cm 4/7/2011. IMPRESSION:  3.1 x 3.8 x 6.1 cm right mid to lower pole thyroid nodule.                       Signing/Reading Doctor: Mehnaz Guzman ([id number])  Approved: Mehnaz Guzman ([id number])  Nov 7 2012  8:50AM      Cultures     All Micro Results     Procedure Component Value Units Date/Time    CULTURE, BLOOD #2 [[id number]] Collected: 07/26/21 1936    Order Status: Completed Specimen: Blood Updated: 07/28/21 0745     Special Requests: NO SPECIAL REQUESTS        Culture result: NO GROWTH 2 DAYS       CULTURE, BLOOD #1 [[id number]] Collected: 07/26/21 1936    Order Status: Completed Specimen: Blood Updated: 07/28/21 0745     Special Requests: NO SPECIAL REQUESTS        Culture result: NO GROWTH 2 DAYS       CULTURE, URINE [[id number]] Collected: 07/26/21 1556    Order Status: Completed Specimen: Urine Updated: 07/27/21 1529     Special Requests: --        NO SPECIAL REQUESTS  Reflexed from [id number]       Culture result: No growth (<1,000 CFU/ML)              Past History: (Complete 2+/3 areas)   No Known Allergies   Current Facility-Administered Medications   Medication Dose Route Frequency    3% sodium chloride (*HIGH ALERT*CONCENTRATED IV*) infusion  50 mL/hr IntraVENous CONTINUOUS    sodium chloride (NS) flush 5-40 mL  5-40 mL IntraVENous Q8H    sodium chloride (NS) flush 5-40 mL  5-40 mL IntraVENous PRN    acetaminophen (TYLENOL) tablet 650 mg  650 mg Oral Q6H PRN Or    acetaminophen (TYLENOL) suppository 650 mg  650 mg Rectal Q6H PRN    polyethylene glycol (MIRALAX) packet 17 g  17 g Oral DAILY PRN    ondansetron (ZOFRAN ODT) tablet 4 mg  4 mg Oral Q8H PRN    Or    ondansetron (ZOFRAN) injection 4 mg  4 mg IntraVENous Q6H PRN    enoxaparin (LOVENOX) injection 40 mg  40 mg SubCUTAneous DAILY    latanoprost (XALATAN) 0.005 % ophthalmic solution 1 Drop  1 Drop Both Eyes QHS    atorvastatin (LIPITOR) tablet 10 mg  10 mg Oral QHS    pantoprazole (PROTONIX) tablet 40 mg  40 mg Oral ACB    levothyroxine (SYNTHROID) tablet 125 mcg  125 mcg Oral ACB    tamsulosin (FLOMAX) capsule 0.4 mg  0.4 mg Oral BID    ticagrelor (BRILINTA) tablet 90 mg  90 mg Oral Q12H    cyanocobalamin (VITAMIN B12) tablet 1,000 mcg  1,000 mcg Oral DAILY    dorzolamide-timoloL (COSOPT) 22.3-6.8 mg/mL ophthalmic solution 1 Drop  1 Drop Both Eyes DAILY    brimonidine (ALPHAGAN) 0.2 % ophthalmic solution 1 Drop  1 Drop Both Eyes BID    aspirin delayed-release tablet 81 mg  81 mg Oral DAILY    Prior to Admission medications    Medication Sig Start Date End Date Taking? Authorizing Provider   amLODIPine (Norvasc) 2.5 mg tablet Take 2.5 mg by mouth daily. Yes Other, MD Erick   torsemide (DEMADEX) 20 mg tablet Take 20 mg by mouth daily. Yes Other, MD Erick   traMADoL (ULTRAM) 50 mg tablet Take 50 mg by mouth as needed for Pain. Yes Other, MD Erick   ciprofloxacin HCl (CIPRO) 500 mg tablet Take 500 mg by mouth two (2) times a day. FOR 7 DAYS 7/22/21 7/29/21 Yes Other, MD Erick   citalopram (CeleXA) 20 mg tablet Take 20 mg by mouth daily. Yes Provider, Historical   diclofenac (Voltaren) 1 % gel Apply 2 g to affected area four (4) times daily as needed for Pain. Yes Provider, Historical   senna-docusate (PERICOLACE) 8.6-50 mg per tablet Take 1 Tab by mouth two (2) times daily as needed for Constipation.  4/19/21  Yes Librado Rush MD   omeprazole (PRILOSEC) 40 mg capsule Take 40 mg by mouth daily. Yes Provider, Historical   ergocalciferol (Vitamin D2) 1,250 mcg (50,000 unit) capsule Take 50,000 Units by mouth every Sunday. Yes Provider, Historical   aspirin delayed-release 81 mg tablet Take 81 mg by mouth daily. Yes Provider, Historical   ticagrelor (BRILINTA) 90 mg tablet Take 1 Tab by mouth every twelve (12) hours every twelve (12) hours. 5/3/19  Yes Pinky Jovel MD   lovastatin (MEVACOR) 40 mg tablet Take 40 mg by mouth nightly. Yes Provider, Historical   brimonidine (ALPHAGAN) 0.15 % ophthalmic solution Administer 1 Drop to both eyes two (2) times a day. Yes Provider, Historical   SYNTHROID 125 mcg tablet Take 1 Tab by mouth Daily (before breakfast). 3/13/13  Yes Raimundo Henriquez MD   cyanocobalamin (VITAMIN B-12) 1,000 mcg tablet Take 1,000 mcg by mouth daily. Yes Provider, Historical   tamsulosin (FLOMAX) 0.4 mg capsule Take 0.4 mg by mouth two (2) times a day. Yes Provider, Historical   dorzolamide-timolol (COSOPT) 22.3-6.8 mg/mL ophthalmic solution Administer 1 Drop to both eyes daily. Yes Provider, Historical   latanoprost (Xalatan) 0.005 % ophthalmic solution Administer 1 Drop to both eyes nightly. Yes Provider, Historical        PMHx:  has a past medical history of Anxiety disorder, Arthritis, CAD (coronary artery disease), Calculus of kidney, Cancer ([facility] [address] (prostate  1996), Depression, Hearing loss, Heart attack ([facility] [address] Hypercholesterolemia, Hypertension, Incisional hernia (5/10/2011), Movement disorder, Neuropathy, Other ill-defined conditions(799.89), Other ill-defined conditions(799.89), Other ill-defined conditions(799.89), Skipped beats, Stroke [facility]), Thyroid disease, Thyroid mass (5/10/2011), and Vertigo.    PSurgHx:  has a past surgical history that includes hx thyroidectomy (1/3/2013); hx heent; hx heent; pr cardiac surg procedure unlist (1996); hx aortic valve replacement (2015); hx orthopaedic (2/2011); pr prostate biopsy, needle, saturation sampling; hx orchiectomy (01/2017); and pr ins new/rplcmt prm pm w/transv eltrd atrial&vent (N/A, 5/6/2021). PSocHx:  reports that he quit smoking about 63 years ago. He has a 1.50 pack-year smoking history. He has never used smokeless tobacco. He reports that he does not drink alcohol and does not use drugs. ROS:  (Complete - 10 systems) - DENIES: Weightloss (Constitutional), Dry mouth (ENMT), Chest pain (CV), SOB (Respiratory), Constipation (GI), Weakness (MS), Pallor (Skin), TIA Sx (Neuro), Confusion (Psych), Easy bruising (Heme)    Physical Exam: (Comprehesive - 8+ 1995 Systems)     (1) Constitutional:  NAD; pleasant  FIO2:   on SpO2: O2 Sat (%): 97 %  O2 Device: Nasal cannula O2 Flow Rate (L/min): 2 l/min  Patient Vitals for the past 24 hrs:   BP Temp Pulse Resp SpO2   07/28/21 0711 122/63 96.9 °F (36.1 °C) 79 18 97 %   07/28/21 0400 137/65 97.3 °F (36.3 °C) 69 18 94 %   07/27/21 2306 120/68 97.5 °F (36.4 °C) 83 16 96 %   07/27/21 2030 133/60 96.8 °F (36 °C) 74 18 96 %   07/27/21 1600   87     07/27/21 1511 131/71 97.3 °F (36.3 °C) 77 18 94 %   07/27/21 1148   72     07/27/21 1139 135/65 97.4 °F (36.3 °C) 70 18 96 %       Date 07/27/21 0700 - 07/28/21 0659 07/28/21 0700 - 07/29/21 0659   Shift 9837-7132 9535-3369 24 Hour Total 0138-6708 9962-3562 24 Hour Total   INTAKE   P.O. 360  360        P. O. 360  360      I. V.(mL/kg/hr) 488.3(0.5)  488.3(0.2)        Volume (0.9% sodium chloride infusion) 488. 3  488. 3      Shift Total(mL/kg) 848.3(9.4)  848.3(9.4)      OUTPUT   Urine(mL/kg/hr) 100(0.1) 700(0.6) 800(0.4)        Urine Voided 100  100        Urine Output (mL) (Urinary Catheter 07/27/21)  700 700      Shift Total(mL/kg) 100(1.1) 700(7.8) 800(8.9)      .3 -700 48.3      Weight (kg) 89.8 89.8 89.8 89.8 89.8 89.8      (2) ENMT:   moist mucous membranes, normal sinuses   (3) Respiratory:  breathing easily, no distress   (4) GI:  RUQ hernia, no tenderness   (5) :   Orozco yellow UA, no CVA tenderness; phimosis, unable to retract foreskin    (6) Lymphatic:  no adenopathy, neck supple   (7) Muscloskeletal:  no gross deformity, normal ROM   (8) Skin:  no rash, warm & dry   (9) Neuro:  Alert,  normal speech, confused      Signed By: Duane Boy, NP  - July 28, 2021

## 2021-07-28 NOTE — PROGRESS NOTES
Occupational Therapy    Chart reviewed. Spoke with treating PT and RN - patient very lethargic today. Family now present and encouraging patient to eat / drink. Both family and RN request therapy defer services this date. Will follow-up tomorrow as able and appropriate for continued intervention.     Martha Morrow, MONICAR/L

## 2021-07-28 NOTE — PROGRESS NOTES
Pharmacy Review of  3% Sodium Chloride IV infusion     Indication: Hyponatremia  not responding to Normal Saline infusion.     Patient meets criteria for use: yes -      Labs:          Recent Labs     07/28/21  0437 07/27/21 2033 07/27/21 2033 07/27/21  1114 07/27/21  1114   *  --  128*  --  126*   K 3.7   < > 4.1   < > 3.6   CREA 0.38*   < > 0.41*   < > 0.48*   BUN 11   < > 13   < > 14    < > = values in this interval not displayed.         Impression/Plan:    · 3% NaCl  mL infused over 6 hours expected lab changes per calculator:  ? Expected Change is Serum Sodium (mmol/l): 3.5  ? Expected Rate of Change (mmol/hour): 0.6  ? Expected New Serum Sodium (mmol/l): 129.6  · After discussion with Dr. Roxana Pacheco, we will deviate from standard monitoring protocol. Patient has a history of tolerating 3% saline.  We will check serum sodium levels at 1700 and 2200 tonight.       Thanks,  Noahcole Fernandez, PHARMD

## 2021-07-28 NOTE — ROUTINE PROCESS
Bedside shift change report given to KARLA barrientos (oncoming nurse) by Clarence Andrews RN (offgoing nurse). Report included the following information SBAR, Kardex, Intake/Output, MAR and Recent Results.

## 2021-07-28 NOTE — PROGRESS NOTES
Primary nurse, Doug RN notified of pt having runs of VTACH, bp 117/63 and pulse 80. Pt resting in bed.      -MD notified & aware no new orders placed

## 2021-07-28 NOTE — PROGRESS NOTES
Hospitalist Progress Note    NAME: Anali Walters   :  1928   MRN:  [id number]       Assessment / Plan:    Acute metabolic encephalopathy POA   - Likely multifactorial: hyponatremia, UTI (pseudomonas on recent urine culture at PCP p)  - CT Head negative for acute findings. - Correct Na gradually. Treat UTI  - Frequent reorientation, adjust sleep wake cycle  - Hold sedating meds.         Hypotonic hyponatremia, symptomatic POA   Volume depletion POA  - Clinically volume depleted. Serum sodium 125, Cl 89. Urine Na <5. Urine Osm 611.  - AM cortisol high. TSH wnl  - S/p 500 cc NS bolus and gentle hydration. Na remained low. - S/p 250cc 3% sodium yesterday with no change in sodium level. Na 125 > 126.   - Nephrology on board. Case discussed with Dr Wally Donnelly. Plan for repeat hypertonic saline. Appreciate input. Will follow BMP closely        Complicated urinary tract infection POA  - Pt had a urinalysis and urine culture on  at PCP office. Ucx grew MDR pseudomonas. Resistant to all antibiotics tested except for Imipenem, meropenem, and zosyn. (received the fax today)  - UA here eveals pyuria but culture -ve. He has completed 3 days of empiric ceftriaxone. - Will start zosyn based on sensitivity     Urinary retention POA  BPH   Hx of prostate cancer s/p b/l orchiectomy   - Continue home tamsulosin and flomax  - Catheterized overnight due to urinary retention.   - Urology consulted. rec to maintain gunn x 5-7 days and OP follow up for voiding trial.      Fall PO   -3x prior to admission. No head trauma or LOC. CT Head interpreted independently - no fracture, hemorrhage noted.   - PT/OT        Prolonged QT interval POA   - QTc 498 ms.   - hold QT prolonging agents. - telemetry      CAD POA   HTN POA   HLD POA   GERD  On norvasc, Brilinta, aspirin, Torsemide, Lovastatin at home  - BP soft, hold antihypertensives. - Continue aspirin, Ticagrelor, statin, PPI     Depression POA   QTc 541 ms. Hold Citalopram     Hypothyroidism POA  -TSH on 4/18 was 1.8. Repeat TSH pending. Cont' synthroid      Glaucoma POA   - continue brimonidine, latanoprost, dorzolamide    25.0 - 29.9 Overweight / Body mass index is 28.01 kg/m². Estimated discharge date:[id number]}  Barriers: Body mass index is 28.01 kg/m². Code Status: DNR/DNI  Surrogate Decision Maker:  Sidney Quintero, [phone]     DVT Prophylaxis: Lovenox  GI Prophylaxis: not indicated  Baseline: ambulatory with walker, independent in ADLs     Subjective:     Chief Complaint / Reason for Physician Visit  Discussed with RN events overnight. Remains intermittently confused  Family updated at bedside (daughter in law and 2 other family members)    Review of Systems:  Symptom Y/N Comments  Symptom Y/N Comments   Fever/Chills    Chest Pain     Poor Appetite    Edema     Cough    Abdominal Pain     Sputum    Joint Pain     SOB/ENG    Pruritis/Rash     Nausea/vomit    Tolerating PT/OT     Diarrhea    Tolerating Diet     Constipation    Other       Could NOT obtain due to:      Objective:     VITALS:   Last 24hrs VS reviewed since prior progress note. Most recent are:  Patient Vitals for the past 24 hrs:   Temp Pulse Resp BP SpO2   07/28/21 0711 96.9 °F (36.1 °C) 79 18 122/63 97 %   07/28/21 0400 97.3 °F (36.3 °C) 69 18 137/65 94 %   07/27/21 2306 97.5 °F (36.4 °C) 83 16 120/68 96 %   07/27/21 2030 96.8 °F (36 °C) 74 18 133/60 96 %   07/27/21 1600  87      07/27/21 1511 97.3 °F (36.3 °C) 77 18 131/71 94 %   07/27/21 1148  72      07/27/21 1139 97.4 °F (36.3 °C) 70 18 135/65 96 %   07/27/21 0756  71          Intake/Output Summary (Last 24 hours) at 7/28/2021 0749  Last data filed at 7/28/2021 0159  Gross per 24 hour   Intake 240 ml   Output 800 ml   Net -560 ml        I had a face to face encounter and independently examined this patient on 7/28/2021, as outlined below:  PHYSICAL EXAM:  General: WD, WN.  Alert, cooperative, no acute distress EENT:  EOMI. Anicteric sclerae. MMM  Resp:  CTA bilaterally, no wheezing or rales. No accessory muscle use  CV:  Regular  rhythm,  No edema  GI:  Soft, Non distended, Non tender. +Bowel sounds  Neurologic:  Alert and oriented X 2, normal speech,   Psych:   Good insight. Not anxious nor agitated  Skin:  No rashes. No jaundice    Reviewed most current lab test results and cultures  YES  Reviewed most current radiology test results   YES  Review and summation of old records today    NO  Reviewed patient's current orders and MAR    YES  PMH/SH reviewed - no change compared to H&P  ________________________________________________________________________  Care Plan discussed with:    Comments   Patient x    Family  x Daughter in law   RN     Care Manager     Consultant                        Multidiciplinary team rounds were held today with , nursing, pharmacist and clinical coordinator. Patient's plan of care was discussed; medications were reviewed and discharge planning was addressed. ________________________________________________________________________  Total NON critical care TIME:  45  Minutes    Total CRITICAL CARE TIME Spent:   Minutes non procedure based      Comments   >50% of visit spent in counseling and coordination of care x    ________________________________________________________________________  Kashif Salcido MD     Procedures: see electronic medical records for all procedures/Xrays and details which were not copied into this note but were reviewed prior to creation of Plan. LABS:  I reviewed today's most current labs and imaging studies.   Pertinent labs include:  Recent Labs     07/27/21  0447 07/26/21  1340   WBC 8.9 9.7   HGB 13.2 13.1   HCT 40.2 39.2    364     Recent Labs     07/28/21  0437 07/27/21  2033 07/27/21  1114 07/27/21  0447 07/27/21 0447 07/26/21  1937 07/26/21  1340   * 128* 126*   < > 126*   < > 125*   K 3.7 4.1 3.6   < > 3.8  -- 4.3   CL 92* 93* 91*   < > 92*  --  89*   CO2 30 32 31   < > 28  --  30   * 118* 122*   < > 118*  --  121*   BUN 11 13 14   < > 16  --  20   CREA 0.38* 0.41* 0.48*   < > 0.41*  --  0.58*   CA 7.7* 7.6* 7.7*   < > 7.5*  --  8.4*   MG  --   --   --   --  2.2  --   --    PHOS 2.5*  --   --   --  2.8  --   --    ALB 2.8*  --   --   --   --   --  3.2*   TBILI  --   --   --   --   --   --  1.2*   ALT  --   --   --   --   --   --  28    < > = values in this interval not displayed.        Signed: Bethanie Gray MD

## 2021-07-29 NOTE — PROGRESS NOTES
Problem: Self Care Deficits Care Plan (Adult)  Goal: *Acute Goals and Plan of Care (Insert Text)  Description: FUNCTIONAL STATUS PRIOR TO ADMISSION: Lives in [place] [address] at [facility]. He was in the Health Care unit for about 2 months after hospitalization for a scapula fracture and just recently returned to his apartment with caregivers about 4 hours a day. For about 3 days prior to this admission, family was providing 24 hour supervision/assistance. HOME SUPPORT: Good family support. Will likely go to the Health Care Unit at [address] [place] [address]  Initiated 7/27/2021  1. Patient will perform grooming seated edge of bed with contact guard assist within 7 day(s). 2.  Patient will perform bathing with minimal assistance within 7 day(s). 3.  Patient will perform lower body dressing with minimal assistance within 7 day(s). 4.  Patient will perform toilet transfers with minimal assistance/contact guard assist within 7 day(s). 5.  Patient will perform all aspects of toileting with moderate assistance  within 7 day(s). 6.  Patient will participate in upper extremity therapeutic exercise/activities with Min cues for 10 minutes within 7 day(s). Outcome: Progressing Towards Goal   OCCUPATIONAL THERAPY TREATMENT  Patient: Giuliano Olsen (49 y.o. male)  Date: 7/29/2021  Diagnosis: Acute encephalopathy [G93.40] <principal problem not specified>       Precautions: Fall  Chart, occupational therapy assessment, plan of care, and goals were reviewed. ASSESSMENT  Patient continues with skilled OT services and is progressing towards goals. Pt was supine in bed and very lethargic. He was able to wake up, but needed max asisst to keep his eyes open, during tx. He was mod assist of 2 for supine to sit, and was mod assist for sitting balance and VC to keep his feet on the floor and lean forward. Pt worked on kicking legs, and raising arms in sitting and keeping his balance.   Pt needed min to mod to maintain sitting balance. All VSS and unable to attempt stand due to pt stated that he was not feeling well and was tired. He was mod of 2 for sit to supine and left in bed with HOB elevated, and family in room. Current Level of Function Impacting Discharge (ADLs): total for ADLs              PLAN :  Patient continues to benefit from skilled intervention to address the above impairments. Continue treatment per established plan of care to address goals. Recommend with staff: HOB elevated for meals and during the day    Recommend next OT session: work on grooming at bed level    Recommendation for discharge: (in order for the patient to meet his/her long term goals)  Therapy up to 5 days/week in SNF setting    This discharge recommendation:  Has been made in collaboration with the attending provider and/or case management    IF patient discharges home will need the following DME: tbd       SUBJECTIVE:   Patient stated I am tired. Josefina Carlson    OBJECTIVE DATA SUMMARY:   Cognitive/Behavioral Status:  Neurologic State: Drowsy  Orientation Level: Oriented to person  Cognition: Follows commands; Impaired decision making     Perseveration: No perseveration noted  Safety/Judgement: Fall prevention    Functional Mobility and Transfers for ADLs:  Bed Mobility:  Supine to Sit: Moderate assistance;Assist x2  Sit to Supine: Moderate assistance;Assist x2    Transfers:         Not tested today     Balance:  Sitting: Impaired; Without support  Sitting - Static: Fair (occasional)  Sitting - Dynamic: Fair (occasional)  Standing: Impaired; Without support    ADL Intervention:       Grooming  Grooming Assistance: Maximum assistance    Upper Body Bathing  Bathing Assistance: Maximum assistance    Lower Body Bathing  Bathing Assistance: Maximum assistance       Toileting  Toileting Assistance: Total assistance(dependent)  Bladder Hygiene: Total assistance (dependent)  Bowel Hygiene:  Total assistance (dependent)    Cognitive Retraining  Safety/Judgement: Fall prevention        Activity Tolerance:   Poor    After treatment patient left in no apparent distress:   Supine in bed, Call bell within reach, Bed / chair alarm activated, and Caregiver / family present    COMMUNICATION/COLLABORATION:   The patients plan of care was discussed with: Physical therapist and Registered nurse.      Fabi Geiger OT  Time Calculation: 23 mins

## 2021-07-29 NOTE — PROGRESS NOTES
Transition of Care Plan:    RUR: 17%  Disposition: [facility]palliative currently following   Follow up appointments: Follow up with PCP and/or Specialist   DME needed: TBD by therapist   Transportation at Discharge: Family to transport   [address] or means to access home: Family will have keys to enter home        IM Medicare Letter: 2nd IM Medicare Letter to be provided   BCPI-A Bundle Letter:  Caregiver Contact: Júnior Holman (daughter) [phone]  Discharge Caregiver contacted prior to discharge? Family to be contacted at the time of d.c       CM informed during IDRs, that palliative team is currently on board with pt's care. CM will enter referrals as deemed necessary. CM informed that Nanci Mcarthur ([phone]), is currently following covering for Barb Memos at [facility]. CM sent updated clinicals to Nanci Mcarthur, so that she can follow along with pt's care and plans. ANGELITO informed by Nanci Mcarthur that if pt is not ready for d/c on 7/30/21, than d/c will have to take place on 8/2/21, because facility does not take admissions on the weekend. CM will inform clinical staff of the following.     VENECIA Woodson, [address] [place] [address]

## 2021-07-29 NOTE — PROGRESS NOTES
End of Shift Note    Bedside shift change report given to [facility] (oncoming nurse) by ARIEL Romeo (offgoing nurse).   Report included the following information SBAR    Shift worked:  [id number]   Shift summary and any significant changes:     Patients sodium level 135        Concerns for physician to address:  None   Zone phone for oncoming shift:   [phone]     Patient Information  Leon Ramirez  80 y.o.  7/26/2021  2:26 PM by Ofe Truong MD. Leon Ramirez was admitted from Assisted Living    Problem List  Patient Active Problem List    Diagnosis Date Noted    Acute encephalopathy 07/26/2021    Pacemaker 05/06/2021    Cerebral aneurysm 12/13/2019    TIA (transient ischemic attack) 05/01/2019    Insomnia due to medical condition 05/10/2017    Stenosis of both internal carotid arteries 02/07/2017    Diabetic peripheral neuropathy associated with type 2 diabetes mellitus ([address] 02/07/2017    Sensory ataxic gait 02/07/2017    Monocular diplopia of both eyes 02/07/2017    VBI (vertebrobasilar insufficiency) 02/07/2017    History of stroke 02/07/2017    Vitamin D deficiency 02/07/2017    BPV (benign positional vertigo) 02/07/2017    Dizziness 04/23/2013    Idiopathic small and large fiber sensory neuropathy 04/23/2013    B12 deficiency 04/23/2013    Hypothyroidism, postsurgical 03/13/2013    Thyroid mass 05/10/2011    Incisional hernia 05/10/2011    CVA (cerebral infarction) 05/10/2011     Past Medical History:   Diagnosis Date    Anxiety disorder     Arthritis     CAD (coronary artery disease)     Calculus of kidney     Cancer ([address] prostate  1996    skin    Depression     Hearing loss     Heart attack ([address]     Hypercholesterolemia     Hypertension     Incisional hernia 5/10/2011    Movement disorder     Neuropathy     Other ill-defined conditions(799.89)     elevated cholesterol    Other ill-defined conditions(799.89)     glaucoma    Other ill-defined conditions(799.89) abd hernia    Skipped beats     Stroke [facility])     left arm tingling    Thyroid disease     Thyroid mass 5/10/2011    Vertigo        Core Measures:  CVA: No No  CHF:No No  PNA:No No    Activity:  Activity Level: Bed Rest  Number times ambulated in hallways past shift: 0  Number of times OOB to chair past shift: 0    Cardiac:   Cardiac Monitoring: Yes      Cardiac Rhythm: AV Paced    Access:   Current line(s): PIV     Genitourinary:   Urinary status: gunn  Urinary Catheter? Yes Placement Date 7/27/2021 Reason Medically Necessary acute retention    Respiratory:   O2 Device: Nasal cannula  Chronic home O2 use?: NO  Incentive spirometer at bedside: N/A       GI:  Last Bowel Movement Date: 07/27/21  Current diet:  ADULT DIET Regular  Passing flatus: YES  Tolerating current diet: YES       Pain Management:   Patient states pain is manageable on current regimen: N/A    Skin:  Gumaro Score: 14  Interventions: float heels, increase time out of bed, limit briefs and internal/external urinary devices    Patient Safety:  Fall Score:  Total Score: 4  Interventions: bed/chair alarm, gripper socks, pt to call before getting OOB and sitter at bedside   High Fall Risk: Yes  @Rollbelt  @dexterity to release roll belt  Yes/No ( must document dexterity  here by stating Yes or No here, otherwise this is a restraint and must follow restraint documentation policy.)    DVT prophylaxis:  DVT prophylaxis Med- Yes  DVT prophylaxis SCD or ANDREW- No     Wounds: (If Applicable)  Wounds- Yes  Location; L elbow tear    Active Consults:  IP CONSULT TO NEPHROLOGY  IP CONSULT TO UROLOGY  IP CONSULT TO HOSPITALIST    Length of Stay:  Expected LOS: 3d 7h  Actual LOS: 3  [facility]

## 2021-07-29 NOTE — PROGRESS NOTES
End of Shift Note    Bedside shift change report given to Reji Vargas (oncoming nurse) by ARIEL Bliss (offgoing nurse).   Report included the following information SBAR    Shift worked: 7a-7p   Shift summary and any significant changes:    Repeat sodium levels done       Concerns for physician to address:  None   Zone phone for oncoming shift:   [phone]     Patient Information  Kanu Ravi  80 y.o.  7/26/2021  2:26 PM by Selma Boggs MD. Kanu Ravi was admitted from Assisted Living    Problem List  Patient Active Problem List    Diagnosis Date Noted    Acute encephalopathy 07/26/2021    Pacemaker 05/06/2021    Cerebral aneurysm 12/13/2019    TIA (transient ischemic attack) 05/01/2019    Insomnia due to medical condition 05/10/2017    Stenosis of both internal carotid arteries 02/07/2017    Diabetic peripheral neuropathy associated with type 2 diabetes mellitus ([address] 02/07/2017    Sensory ataxic gait 02/07/2017    Monocular diplopia of both eyes 02/07/2017    VBI (vertebrobasilar insufficiency) 02/07/2017    History of stroke 02/07/2017    Vitamin D deficiency 02/07/2017    BPV (benign positional vertigo) 02/07/2017    Dizziness 04/23/2013    Idiopathic small and large fiber sensory neuropathy 04/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    B12 deficiency 04/23/2013    Hypothyroidism, postsurgical 03/13/2013    Thyroid mass 05/10/2011    Incisional hernia 05/10/2011    CVA (cerebral infarction) 05/10/2011     Past Medical History:   Diagnosis Date    Anxiety disorder     Arthritis     CAD (coronary artery disease)     Calculus of kidney     Cancer ([address] prostate  1996    skin    Depression     Hearing loss     Heart attack ([address]     Hypercholesterolemia     Hypertension     Incisional hernia 5/10/2011    Movement disorder     Neuropathy     Other ill-defined conditions(799.89)     elevated cholesterol    Other ill-defined conditions(799.89)     glaucoma    Other ill-defined conditions(799.89) abd hernia    Skipped beats     Stroke [facility])     left arm tingling    Thyroid disease     Thyroid mass 5/10/2011    Vertigo        Core Measures:  CVA: No No  CHF:No No  PNA:No No    Activity:  Activity Level: Bed Rest  Number times ambulated in hallways past shift: 0  Number of times OOB to chair past shift: 0    Cardiac:   Cardiac Monitoring: Yes      Cardiac Rhythm: AV Paced    Access:   Current line(s): PIV     Genitourinary:   Urinary status: gunn  Urinary Catheter? Yes Placement Date 7/27/2021 Reason Medically Necessary acute retention    Respiratory:   O2 Device: Nasal cannula  Chronic home O2 use?: NO  Incentive spirometer at bedside: N/A       GI:  Last Bowel Movement Date: 07/27/21  Current diet:  ADULT DIET Easy to Chew  DIET ONE TIME MESSAGE  Passing flatus: YES  Tolerating current diet: YES       Pain Management:   Patient states pain is manageable on current regimen: N/A    Skin:  Gumaro Score: 14  Interventions: float heels, increase time out of bed, limit briefs and internal/external urinary devices    Patient Safety:  Fall Score:  Total Score: 5  Interventions: bed/chair alarm, gripper socks, pt to call before getting OOB and sitter at bedside   High Fall Risk: Yes  @Rollbelt  @dexterity to release roll belt  Yes/No ( must document dexterity  here by stating Yes or No here, otherwise this is a restraint and must follow restraint documentation policy.)    DVT prophylaxis:  DVT prophylaxis Med- Yes  DVT prophylaxis SCD or ANDREW- No     Wounds: (If Applicable)  Wounds- Yes  Location; L elbow tear    Active Consults:  IP CONSULT TO NEPHROLOGY  IP CONSULT TO UROLOGY  IP CONSULT TO PALLIATIVE CARE - PROVIDER  IP CONSULT TO HOSPITALIST    Length of Stay:  Expected LOS: 3d 7h  Actual LOS: 3  Discharge Plan: Yes; [facility],

## 2021-07-29 NOTE — PROGRESS NOTES
Hospitalist Progress Note    NAME: Leon Ramirez   :  1928   MRN:  [id number]       Assessment / Plan:    Acute metabolic encephalopathy POA   - Likely multifactorial: hyponatremia, UTI (pseudomonas on recent urine culture at PCP p)  - CT Head negative for acute findings. - Correct Na gradually. Treat UTI  - Frequent reorientation, adjust sleep wake cycle  - Hold sedating meds.         Hypotonic hyponatremia, symptomatic POA   Volume depletion POA  - Serum sodium 125, Cl 89. Urine Na <5. Urine Osm 611 on admission  - AM cortisol high. TSH wnl  - Did not respond to NS IVF. - Was given 3% sodium chloride without significant change in serum sodium level  - S/p Tolvaptan x one dose yesterday. Na S/p 500 cc NS bolus and gentle hydration. Na remained low. - S/p 250cc 3% sodium yesterday with no change in sodium level. Na level now up from 128 to 135 in few hours. Will repeat stat BMP. Concern for overcorrection.   - Nephrology on board. Appreciate input        Complicated urinary tract infection POA  - Pt had a urinalysis and urine culture on  at PCP office. Ucx grew MDR pseudomonas. Resistant to all antibiotics tested except for Imipenem, meropenem, and zosyn. (received the fax today)  - UA here eveals pyuria but culture -ve. He has completed 3 days of empiric ceftriaxone.   - Started on zosyn based on sensitivity, will continue     Urinary retention POA  BPH   Hx of prostate cancer s/p b/l orchiectomy   - Continue home tamsulosin and flomax  - Catheterized due to urinary retention.   - Urology consulted. rec to maintain gunn x 5-7 days and OP follow up for voiding trial.      Fall PO   -3x prior to admission. No head trauma or LOC. CT Head interpreted independently - no fracture, hemorrhage noted.   - PT/OT     Prolonged QT interval POA   Non-sustained VT on : 20 runs on tele  - QTc 498 ms.>  545  - hold QT prolonging agents. Check Mg level.  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 - Continue aspirin, Ticagrelor, statin, PPI     Depression POA   QTc 541 ms. Hold Citalopram     Hypothyroidism POA  -TSH on 4/18 was 1.8. Repeat TSH pending. Cont' synthroid      Glaucoma POA   - continue brimonidine, latanoprost, dorzolamide    25.0 - 29.9 Overweight / Body mass index is 28.01 kg/m². Estimated discharge date:[id number]/2923892}  Barriers: Body mass index is 28.01 kg/m². Code Status: DNR/DNI  Surrogate Decision Maker:  Broderick Ho, [phone]     DVT Prophylaxis: Lovenox  GI Prophylaxis: not indicated  Baseline: ambulatory with walker, independent in ADLs     Subjective:     Chief Complaint / Reason for Physician Visit  Discussed with RN events overnight. No acute events overnight  Alert. Able to tell me where he is at and the year    Review of Systems:  Symptom Y/N Comments  Symptom Y/N Comments   Fever/Chills    Chest Pain     Poor Appetite    Edema     Cough    Abdominal Pain     Sputum    Joint Pain     SOB/ENG    Pruritis/Rash     Nausea/vomit    Tolerating PT/OT     Diarrhea    Tolerating Diet     Constipation    Other       Could NOT obtain due to:      Objective:     VITALS:   Last 24hrs VS reviewed since prior progress note.  Most recent are:  Patient Vitals for the past 24 hrs:   Temp Pulse Resp BP SpO2   07/29/21 0248 97.7 °F (36.5 °C) 81 18 (!) 143/63 92 %   07/28/21 2305 98.3 °F (36.8 °C) 80 14 132/64 95 %   07/28/21 2046 98.2 °F (36.8 °C) 70 18 129/60 99 %   07/28/21 1515 97.3 °F (36.3 °C) 81 19 (!) 131/6 94 %   07/28/21 1420  80 18 117/63 96 %   07/28/21 1104 97.3 °F (36.3 °C) 80 18 106/60 99 %       Intake/Output Summary (Last 24 hours) at 7/29/2021 0758  Last data filed at 7/29/2021 0508  Gross per 24 hour   Intake 60 ml   Output 4720 ml   Net -4660 ml I had a face to face encounter and independently examined this patient on 7/29/2021, as outlined below:  PHYSICAL EXAM:  General: WD, WN. Alert, cooperative, no acute distress    EENT:  EOMI. Anicteric sclerae. MMM  Resp:  CTA bilaterally, no wheezing or rales. No accessory muscle use  CV:  Regular  rhythm,  No edema  GI:  Soft, Non distended, Non tender. +Bowel sounds  Neurologic:  Alert and oriented X 2, normal speech,   Psych:   Good insight. Not anxious nor agitated  Skin:  No rashes. No jaundice    Reviewed most current lab test results and cultures  YES  Reviewed most current radiology test results   YES  Review and summation of old records today    NO  Reviewed patient's current orders and MAR    YES  PMH/SH reviewed - no change compared to H&P  ________________________________________________________________________  Care Plan discussed with:    Comments   Patient x    Family  x Daughter in law   RN     Care Manager     Consultant                        Multidiciplinary team rounds were held today with , nursing, pharmacist and clinical coordinator. Patient's plan of care was discussed; medications were reviewed and discharge planning was addressed. ________________________________________________________________________  Total NON critical care TIME:  45  Minutes    Total CRITICAL CARE TIME Spent:   Minutes non procedure based      Comments   >50% of visit spent in counseling and coordination of care x    ________________________________________________________________________  Ayush Narvaez MD     Procedures: see electronic medical records for all procedures/Xrays and details which were not copied into this note but were reviewed prior to creation of Plan. LABS:  I reviewed today's most current labs and imaging studies.   Pertinent labs include:  Recent Labs     07/29/21  0235 07/27/21  0447 07/26/21  1340   WBC 9.2 8.9 9.7   HGB 13.9 13.2 13.1   HCT 42.8 40.2 39.2  316 364     Recent Labs     07/29/21  0235 07/28/21  2225 07/28/21  1729 07/28/21  0437 07/28/21  0437 07/27/21  1114 07/27/21  0447 07/26/21  1937 07/26/21  1340   * 128* 128*   < > 126*   < > 126*   < > 125*   K 4.1 4.2 4.4   < > 3.7   < > 3.8  --  4.3    95* 94*   < > 92*   < > 92*  --  89*   CO2 33* 29 31   < > 30   < > 28  --  30   * 126* 131*   < > 109*   < > 118*  --  121*   BUN 8 10 11   < > 11   < > 16  --  20   CREA 0.47* 0.41* 0.43*   < > 0.38*   < > 0.41*  --  0.58*   CA 8.3* 7.7* 7.7*   < > 7.7*   < > 7.5*  --  8.4*   MG  --   --   --   --   --   --  2.2  --   --    PHOS  --   --   --   --  2.5*  --  2.8  --   --    ALB  --   --   --   --  2.8*  --   --   --  3.2*   TBILI  --   --   --   --   --   --   --   --  1.2*   ALT  --   --   --   --   --   --   --   --  28    < > = values in this interval not displayed.        Signed: Rodolfo Jones MD

## 2021-07-29 NOTE — PROGRESS NOTES
Notified MD as pt has had very poor PO intake as he only took one bite of banana for breakfast a sip of water.

## 2021-07-29 NOTE — PROGRESS NOTES
Problem: Mobility Impaired (Adult and Pediatric)  Goal: *Acute Goals and Plan of Care (Insert Text)  Description: FUNCTIONAL STATUS PRIOR TO ADMISSION: Patient was modified independent using a rolling walker for functional mobility. HOME SUPPORT PRIOR TO ADMISSION: The patient lives in independent living. Right before entering the hospital the patient had 24/7 care for help. Physical Therapy Goals  Initiated 7/27/2021  1. Patient will move from supine to sit and sit to supine  in bed with modified independence within 7 day(s). 2.  Patient will transfer from bed to chair and chair to bed with minimal assistance/contact guard assist using the least restrictive device within 7 day(s). 3.  Patient will perform sit to stand with minimal assistance/contact guard assist within 7 day(s). 4.  Patient will ambulate with minimal assistance/contact guard assist for 15 feet with the least restrictive device within 7 day(s). Outcome: Not Progressing Towards Goal   PHYSICAL THERAPY TREATMENT  Patient: Javon Martines (85 y.o. male)  Date: 7/29/2021  Diagnosis: Acute encephalopathy [G93.40] <principal problem not specified>       Precautions: Fall  Chart, physical therapy assessment, plan of care and goals were reviewed. ASSESSMENT  Patient continues with skilled PT services and is not progressing towards goals, remaining limited by increased fatigue, drowsiness/lethargy, impaired activity tolerance, impaired sitting balance, and increased weakness. Pt able to be aroused and agreeable to participation with therapy. Pt assumed seated position EOB w/ modAx2. Sitting balance fair with mild posterior lean noted, verbal cues and CGA to facilitate midline sitting. Pt tolerated seated position EOB x 10 minutes while performing BUE/BLE strengthening exercises. Pt fatigued quickly, requesting return to supine position at completion of therapy session.  Pt remains appropriate to return to healthcare portion of [facility] at discharge. Current Level of Function Impacting Discharge (mobility/balance): modAx2 for bed mobility, unable to stand or ambulate this date    Other factors to consider for discharge: mod I prior to admission, falls risk, lives alone         PLAN :  Patient continues to benefit from skilled intervention to address the above impairments. Continue treatment per established plan of care. to address goals. 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     OBJECTIVE DATA SUMMARY:   Critical Behavior:  Neurologic State: Drowsy  Orientation Level: Oriented to person  Cognition: Follows commands, Impaired decision making  Safety/Judgement: Fall prevention  Functional Mobility Training:  Bed Mobility:     Supine to Sit: Moderate assistance;Assist x2  Sit to Supine: Moderate assistance;Assist x2           Transfers:               Did not occur. Balance:  Sitting: Impaired; Without support  Sitting - Static: Fair (occasional)  Sitting - Dynamic: Fair (occasional)  Standing: Impaired; Without support  Ambulation/Gait Training:                                   Ambulation did not occur.                Therapeutic Exercises:   2x 5 LAQ  2x 5 BUE raises    Pain Rating:  Denied c/o pain    Activity Tolerance:   VSS however fair overall as pt fatigued quickly    After treatment patient left in no apparent distress:   Supine in bed, Heels elevated for pressure relief, Call bell within reach, Bed / chair alarm activated, and Caregiver / family present    COMMUNICATION/COLLABORATION:   The patients plan of care was discussed with: Occupational therapist and Registered nurse.      Kentrell Wilson, PT, DPT   Time Calculation: 24 mins

## 2021-07-29 NOTE — PROGRESS NOTES
Problem: Falls - Risk of  Goal: *Absence of Falls  Description: Document Snehal Veliz Fall Risk and appropriate interventions in the flowsheet. Outcome: Progressing Towards Goal  Note: Fall Risk Interventions:  Mobility Interventions: Bed/chair exit alarm, Communicate number of staff needed for ambulation/transfer    Mentation Interventions: Adequate sleep, hydration, pain control, Bed/chair exit alarm, More frequent rounding, Reorient patient    Medication Interventions: Bed/chair exit alarm    Elimination Interventions: Bed/chair exit alarm    History of Falls Interventions: Bed/chair exit alarm         Problem: Patient Education: Go to Patient Education Activity  Goal: Patient/Family Education  Outcome: Progressing Towards Goal     Problem: Pressure Injury - Risk of  Goal: *Prevention of pressure injury  Description: Document Gumaro Scale and appropriate interventions in the flowsheet. Outcome: Progressing Towards Goal  Note: Pressure Injury Interventions:  Sensory Interventions: Assess changes in LOC, Assess need for specialty bed, Avoid rigorous massage over bony prominences    Moisture Interventions: Absorbent underpads, Apply protective barrier, creams and emollients, Assess need for specialty bed, Check for incontinence Q2 hours and as needed    Activity Interventions: Assess need for specialty bed, Pressure redistribution bed/mattress(bed type)    Mobility Interventions: Assess need for specialty bed, Pressure redistribution bed/mattress (bed type), Turn and reposition approx.  every two hours(pillow and wedges)    Nutrition Interventions: Document food/fluid/supplement intake, Offer support with meals,snacks and hydration    Friction and Shear Interventions: Apply protective barrier, creams and emollients, Lift sheet                Problem: Patient Education: Go to Patient Education Activity  Goal: Patient/Family Education  Outcome: Progressing Towards Goal     Problem: Patient Education: Go to Patient Education Activity  Goal: Patient/Family Education  Outcome: Progressing Towards Goal     Problem: Patient Education: Go to Patient Education Activity  Goal: Patient/Family Education  Outcome: Progressing Towards Goal     Problem: Risk for Spread of Infection  Goal: Prevent transmission of infectious organism to others  Description: Prevent the transmission of infectious organisms to other patients, staff members, and visitors.   Outcome: Progressing Towards Goal     Problem: Patient Education:  Go to Education Activity  Goal: Patient/Family Education  Outcome: Progressing Towards Goal

## 2021-07-29 NOTE — PROGRESS NOTES
Pharmacy Review of Tolvaptan    Indication: hyponatremia  Patient meets criteria for use: Na 128 - ordered over telephone by Dr Hanna Allen:  Recent Labs     07/28/21  1729 07/28/21  0437 07/28/21 0437 07/27/21 2033 07/27/21 2033   *  --  126*  --  128*   K 4.4   < > 3.7   < > 4.1   CREA 0.43*   < > 0.38*   < > 0.41*   BUN 11   < > 11   < > 13    < > = values in this interval not displayed. Impression/Plan: hyponatremia likely due to poor p.o. intake, torsemide use, poor solute intake-?  SIADH-- he has received NACL on admission without much improvement in SODIUM - patient has had 3% sodium chloride x 250 ml     Thanks,  Margaret Salmon, [facility]

## 2021-07-29 NOTE — PROGRESS NOTES
Repeat Na at 140. Started on D5W, desmopressin given. Will recheck BMP in 4 hours. Addendum  BMP still not collected. Was supposed to be done at 3PM. Stat BMP ordered. Pefectserved RN. Reached RN. BMP in process. Will follow up. Addendum  Repeat Na at 142. Will give another dose of DDVAP 2mcg and restart D5W. Follow serum sodium level.   Attempting the correction to not exceed 16meq/l in 48 hours

## 2021-07-29 NOTE — PROGRESS NOTES
Gianluca Reich         NAME:Osmin Valentino   RFP:[id number]     Na rise to 140  Rapid na rise POST SAMSCA    Give ddavp and d5w    Repeat labs in evening                        MD [facility]  [facility]  [address], [place]  [place], [address] [facility]  Phone - [phone]         Fax - [phone] [facility] Office  [address] [place] [address]  Phone - [phone]        Fax - [phone]     [email or web address]

## 2021-07-29 NOTE — CONSULTS
Palliative Medicine  [phone]    Received consult and reviewed the chart  Patient seems quite healthy normally, with only one hospitalization in the last 2 years for a shoulder fracture    Now here with an acute issue of hyponatremia which is being actively corrected    Has DDNR, AMD and living will in place already    Would wait to address goals until acute issues are treated  It is likely that he may be too frail to recover from these issues, but as they are still being actively treated, it is reasonable to give him a little more time     Will follow up tomorrow    Ankur Castillo NP

## 2021-07-29 NOTE — PROGRESS NOTES
Nephrology Progress Note  Gianluca Reich     www. [email or web address]  Phone - [phone]   Patient: Taiwo Braga    YOB: 1928        Date- 7/29/2021   Admit Date: 7/26/2021  CC: Follow up for  hyponatremia          IMPRESSION & PLAN:   · Hyponatremia likely due to poor p.o. intake, torsemide use, poor solute intake-? SIADH-- he has received NACL on admission without much improvement in SODIUM ----s/p 3 % NACL AND SAMSCA  · UTI  · hypokalemia  · Metabolic encephalopathy  · S/p fall  · History of hypertension  · Hyperlipidemia   GERD   Urinary retention    PLAN-  · Check bmp now  · If na > 137 we will give him D5W   · avodi diuretics  · AT PRESENT he has appropriate rise in SODIUM level   Subjective: Interval History:   - na improved to 135 with samsca  Urine out put 4720 ml. AFTER SAMSCA  His na was 125 on 7-26-21  He is not answering any questions. Objective:   Vitals:    07/28/21 2046 07/28/21 2305 07/29/21 0248 07/29/21 0758   BP: 129/60 132/64 (!) 143/63 (!) 145/64   Pulse: 70 80 81 95   Resp: 18 14 18 18   Temp: 98.2 °F (36.8 °C) 98.3 °F (36.8 °C) 97.7 °F (36.5 °C) 97.6 °F (36.4 °C)   SpO2: 99% 95% 92% 93%   Weight:       Height:          07/28 0701 - 07/29 0700  In: 60 [P.O.:60]  Out: 4720 [Urine:4720]  Last 3 Recorded Weights in this Encounter    07/26/21 1332   Weight: 89.8 kg (198 lb)      Physical exam:   GEN: NAD  NECK- Supple, no mass  RESP: No wheezing, clear  b/l  CVS: S1,S2  RRR  NEURO: Can't access due to patient's current condition   EXT: no edema  Orozco +  PSYCH:  Can't access due to patient's current condition     Chart reviewed. Pertinent Notes reviewed.      Data Review :  Recent Labs     07/29/21  0235 07/28/21  2225 07/28/21  1729 07/28/21  0437 07/28/21  0437 07/27/21  1114 07/27/21  0902 07/27/21  0447   * 128* 128*   < > 126*   < >  --  126*   K 4.1 4.2 4.4   < > 3.7   < >  --  3.8    95* 94*   < > 92*   < >  --  92* CO2 33* 29 31   < > 30   < >  --  28   BUN 8 10 11   < > 11   < >  --  16   CREA 0.47* 0.41* 0.43*   < > 0.38*   < >  --  0.41*   * 126* 131*   < > 109*   < >  --  118*   CA 8.3* 7.7* 7.7*   < > 7.7*   < >  --  7.5*   MG 2.3  --   --   --   --   --   --  2.2   PHOS  --   --   --   --  2.5*  --   --  2.8   URICA  --   --   --   --   --   --  4.3  --     < > = values in this interval not displayed. Recent Labs     07/29/21  0235 07/27/21  0447 07/26/21  1340   WBC 9.2 8.9 9.7   HGB 13.9 13.2 13.1   HCT 42.8 40.2 39.2    316 364     No results for input(s): FE, TIBC, PSAT, FERR in the last 72 hours.    Medication list  reviewed  Current Facility-Administered Medications   Medication Dose Route Frequency    finasteride (PROSCAR) tablet 5 mg  5 mg Oral DAILY    piperacillin-tazobactam (ZOSYN) 3.375 g in 0.9% sodium chloride (MBP/ADV) 100 mL MBP  3.375 g IntraVENous Q8H    sodium chloride (NS) flush 5-40 mL  5-40 mL IntraVENous Q8H    sodium chloride (NS) flush 5-40 mL  5-40 mL IntraVENous PRN    acetaminophen (TYLENOL) tablet 650 mg  650 mg Oral Q6H PRN    Or    acetaminophen (TYLENOL) suppository 650 mg  650 mg Rectal Q6H PRN    polyethylene glycol (MIRALAX) packet 17 g  17 g Oral DAILY PRN    ondansetron (ZOFRAN ODT) tablet 4 mg  4 mg Oral Q8H PRN    Or    ondansetron (ZOFRAN) injection 4 mg  4 mg IntraVENous Q6H PRN    enoxaparin (LOVENOX) injection 40 mg  40 mg SubCUTAneous DAILY    latanoprost (XALATAN) 0.005 % ophthalmic solution 1 Drop  1 Drop Both Eyes QHS    atorvastatin (LIPITOR) tablet 10 mg  10 mg Oral QHS    pantoprazole (PROTONIX) tablet 40 mg  40 mg Oral ACB    levothyroxine (SYNTHROID) tablet 125 mcg  125 mcg Oral ACB    tamsulosin (FLOMAX) capsule 0.4 mg  0.4 mg Oral BID    ticagrelor (BRILINTA) tablet 90 mg  90 mg Oral Q12H    cyanocobalamin (VITAMIN B12) tablet 1,000 mcg  1,000 mcg Oral DAILY    dorzolamide-timoloL (COSOPT) 22.3-6.8 mg/mL ophthalmic solution 1 Drop 1 Drop Both Eyes DAILY    brimonidine (ALPHAGAN) 0.2 % ophthalmic solution 1 Drop  1 Drop Both Eyes BID    aspirin delayed-release tablet 81 mg  81 mg Oral DAILY          Martín Flannery MD              [facility]  [facility]  [address] [facility] [address]  [place], [address]  Phone - [phone]               Fax - [phone]

## 2021-07-29 NOTE — PROGRESS NOTES
Spiritual Care Assessment/Progress Note  [facility]      NAME: Tang Olivares      MRN: [id number]  AGE: 80 y.o. SEX: male  Latter day Affiliation: Lutheran   Language: English     7/29/2021     Total Time (in minutes): 11     Spiritual Assessment begun in [facility] NEUROSCIENCE TELEMETRY through conversation with:         []Patient        [] Family    [] Friend(s)        Reason for Consult: Palliative Care, Initial/Spiritual Assessment     Spiritual beliefs: (Please include comment if needed)     [x] Identifies with a sarah tradition:    Lutheran     [] Supported by a sarah community:            [] Claims no spiritual orientation:           [] Seeking spiritual identity:                [] Adheres to an individual form of spirituality:           [] Not able to assess:                           Identified resources for coping:      [] Prayer                               [] Music                  [] Guided Imagery     [] Family/friends                 [] Pet visits     [] Devotional reading                         [x] Unknown     [] Other:                                             Interventions offered during this visit: (See comments for more details)    Patient Interventions: Initial visit           Plan of Care:     [x] Support spiritual and/or cultural needs    [] Support AMD and/or advance care planning process      [] Support grieving process   [] Coordinate Rites and/or Rituals    [] Coordination with community clergy   [] No spiritual needs identified at this time   [] Detailed Plan of Care below (See Comments)  [] Make referral to Music Therapy  [] Make referral to Pet Therapy     [] Make referral to Addiction services  [] Make referral to [facility]  [] Make referral to Spiritual Care Partner  [] No future visits requested        [x] Follow up upon further referrals     Comments:  Reviewed chart prior to visit on Neuro for spiritual assessment.   Review of chart indicated patient's Islam sarah is important to him. No family/friends present. Patient appeared to be sleeping;  did not attempt to awaken.    available upon referral by nurse or by patient/family request.       GARTH Hoang, [facility], Staff   SHARATH [facility] Paging Service  [phone])

## 2021-07-30 NOTE — PROGRESS NOTES
Palliative Medicine  [place] [id number] - [phone] (COPE)      Diagnoses: Hypernatremia, patient sleeping much of the time, not eating much (only small bites), did wake up at times during meeting, told daughter Collins Rod who is in the room \"I'm okay\". Patient appears comfortable. From ED notes: Cherelle Lee is a 80 y.o.  male with PMH of HTN, CAD, Depression, HLD, Hearing loss who presents to ED with c/o altered mental status. Patient is confused and unable to provide any history. History is provided by daughter Cordell Castellon at bedside. Patient lives at [facility] independent living facility and is independent in activities of daily living, ambulates usually with walker. He had one episode of emesis 5 days ago, was seen by PA at PCP office, diagnosed with UTI and prescribed ciprofloxacin. Patient has had gradually worsening appetite, oral intake, confusion, hallucinations. He sustained a ground-level fall fall 2 days prior to admission, no head trauma loss of consciousness. For the past 2 days he was not given torsemide by his family members as he appeared 'dehydrated'. Patient is sleepy but arousable, oriented to place, person, year but not to situation, repeatedly trying to remove pulseox monitor. GOALS OF CARE: Family agreeable to a focus on comfort and open to a meeting with hospice [address] [place] [address], one of their family members worked for [address] [place] [address] as an RN). Hossein Weaver shared, Zhou Raphael has been very clear with us, that if he does not have quality of life, that he would not want to live and have interventions that are not going to help\". TREATMENT PREFERENCES:   Code Status: DNR    Advance Care Planning: No mPOA documents in chart other than a Durable POA and Medical Living Will. The durable POA names Anabela Rater as mPOA.  Collins Rod is declining this role today, notes that her   recently under hospice services and emotionally she is not able to cope with the responsibility of her father's decisions. She noted that her brother Cee Cadet is the mPOA and she called him via phone to discuss goals today. [] The [facility] Interdisciplinary Team has updated the ACP Navigator with [address] and Patient Capacity      Primary Decision Maker: Elba Dayton - [phone]    Relationship to patient:  [] Named in a scanned document   [x] Legal Next of Kin  [] Guardian    Family Meeting Documentation    Participants: Maritza Aubree, Shen King, patient (present in the room but unable to participate in any decision making). A caregiver Kemi Villa was also present. Palliative LCSW present. Psychosocial: Patient resides in [place] [address] at [facility], he has a caregiver there who assists. Miguelangel Singer shared that she knows that patient cannot go back to independent living, that he would have to go to the NH level of care due to his current condition. Family very supportive of patient and know his wishes. Miguelangel Singer moved recently from the [facility] area, after the loss of her . She is currently living at patient's home at [facility]. Gil Amaya will be coming to the hospital between 12:30 - 1 pm and will be here most of the day. He is open to meeting with the hospice team at that time. Discussion: We discussed patient's present state and not much improvement in his functional state. Family is very clear that patient would want to focus on comfort and quality of life, he would want to return back to [facility]. They are open to meeting with hospice today. Realistic and accepting of patient's wishes that he shared with them, when he was able to. Gil Amaya spoke to patient via phone and let patient know that Mónica Yonug are working on you returning back to [facility] Outcome / Plan: Discussed above with Dr Kelin Coy and with Nithya.  [facility] that will be coming to them (at family's request). No further Palliative needs at this time.

## 2021-07-30 NOTE — PROGRESS NOTES
Transition of Care Plan:    RUR: 17%  Disposition: [facility] currently following   Follow up appointments: Follow up with PCP and/or Specialist   DME needed: TBD by therapist   Transportation at Discharge: Family to transport   frooly or means to access home: Family will have keys to enter home        IM Medicare Letter: 2nd IM Medicare Letter to be provided   BCPI-A Bundle Letter:  Caregiver Contact: Randee Howell (daughter) [phone]  Discharge Caregiver contacted prior to discharge? Family to be contacted at the time of d.c       CM staffed case during IDRs, that pt will not be medical ready for d/c on today. CM will inform [facility] of the following and send clinicals. CM informed by palliative SW that family is agreeable to hospice. CM was asked to send referral to [facility] to eval and treat for pt's care (completed). CM will continue to follow.     VENECIA Perkins, [address] [place] [address]

## 2021-07-30 NOTE — HOSPICE
[facility] Help to Those in Need  [phone]     Patient Name: Mahnaz Skinner  YOB: 1928  Age: 80 y.o. [facility] RN Note:  Met w/ son, Cee Cadet and his wife in regards to hospice care. Educated family on hospice philosophies, levels of care and services offered. Family on board w/ hospice and would like for patient to return to [facility] w/ hospice support. [facility] and will need to return to the Healthcare side. They understand they are responsible for cost of room and board at [facility]. Patient assessed. Patient alert, lethargic and sleepy. He is currently on 2L NC which is new this admission. He denies overall pain but states that his knee hurts if he keeps it in one position for too long..  Mr. Prince Jules would be appropriate for hospice w/ probable dx of end stage heart failure given his profound weakness, new O2 requirement and frequent hospital admissions. Patient is appropriate for ROUTINE level of hospice care. Per son, [facility] not take [facility] on the weekends and patient and hospice admission RN will need a COVID PCR test prior to returning. Will follow over the weekend and once dispo is set will have son sign consents for hospice admissoin    Thank you for the opportunity to be of service to this patient.

## 2021-07-30 NOTE — PROGRESS NOTES
Nephrology Progress Note  Gianluca Reich     [facility]. [facility]  Phone - [phone]   Patient: Tang Olivares    YOB: 1928        Date- 7/30/2021   Admit Date: 7/26/2021  CC: Follow up for  HYPONATREMIA           IMPRESSION & PLAN:   · Hyponatremia likely due to poor p.o. intake, torsemide use, poor solute intake-? SIADH-- he has received NACL on admission without much improvement in SODIUM ----s/p 3 % NACL AND SAMSCA  · UTI  · hypokalemia  · Metabolic encephalopathy  · S/p fall  · History of hypertension  · Hyperlipidemia   GERD   Urinary retention    PLAN-  · d5w - 100 cc /hr  · No more ddavp  · Stop ivf in afternoon  · Check bmp in am  · Family is concerned about his mental status even after normalisation of SODIUM LEVEL. ? aspiration pneumonia or UTI   Subjective: Interval History:   - na 139 today  He received ddavp and hypotonic fluid d5w yesterday  He is not answering any questions  INPUT AND OUT PUT- NOT documented correctly        7/29  na improved to 135 with samsca  Urine out put 4720 ml. AFTER SAMSCA  His na was 125 on 7-26-21  He is not answering any questions. Objective:   Vitals:    07/29/21 2223 07/30/21 0301 07/30/21 0804 07/30/21 1022   BP: (!) 145/72 136/76 (!) 151/67 (!) 125/59   Pulse: 70 78 81 77   Resp: 16 16 16 16   Temp: 97.9 °F (36.6 °C) 98 °F (36.7 °C) 98.2 °F (36.8 °C) 98.4 °F (36.9 °C)   SpO2: 95% 96% 96% 96%   Weight:       Height:          07/29 0701 - 07/30 0700  In: 460 [P.O.:460]  Out: 2550 [Urine:2550]  Last 3 Recorded Weights in this Encounter    07/26/21 1332   Weight: 89.8 kg (198 lb)      Physical exam:   GEN:NAD, not answering any questions  NECK- no mass  RESP: No wheezing,clear b/l  CVS: S1,S2  RRR  NEURO: Can't access due to patient's current condition   EXT: no edema  Orozco +  PSYCH:  Can't access due to patient's current condition     Chart reviewed. Pertinent Notes reviewed.      Data Review :  Recent Labs 07/30/21  0315 07/29/21  2130 07/29/21  1836 07/29/21  0926 07/29/21  0235 07/28/21  1729 07/28/21  0437     140 142 140   < > 135*   < > 126*   K 3.8  3.8 3.9 3.9   < > 4.1   < > 3.7     101 103 103   < > 100   < > 92*   CO2 36*  37* 36* 35*   < > 33*   < > 30   BUN 7  6 6 7   < > 8   < > 11   CREA 0.49*  0.51* 0.41* 0.53*   < > 0.47*   < > 0.38*   *  131* 122* 173*   < > 131*   < > 109*   CA 8.0*  8.5 8.1* 8.0*   < > 8.3*   < > 7.7*   MG 2.3  --   --   --  2.3  --   --    PHOS 3.0  --   --   --   --   --  2.5*    < > = values in this interval not displayed. Recent Labs     07/29/21 0235   WBC 9.2   HGB 13.9   HCT 42.8        No results for input(s): FE, TIBC, PSAT, FERR in the last 72 hours.    Medication list  reviewed  Current Facility-Administered Medications   Medication Dose Route Frequency    dextrose 5% infusion  150 mL/hr IntraVENous CONTINUOUS    finasteride (PROSCAR) tablet 5 mg  5 mg Oral DAILY    piperacillin-tazobactam (ZOSYN) 3.375 g in 0.9% sodium chloride (MBP/ADV) 100 mL MBP  3.375 g IntraVENous Q8H    sodium chloride (NS) flush 5-40 mL  5-40 mL IntraVENous Q8H    sodium chloride (NS) flush 5-40 mL  5-40 mL IntraVENous PRN    acetaminophen (TYLENOL) tablet 650 mg  650 mg Oral Q6H PRN    Or    acetaminophen (TYLENOL) suppository 650 mg  650 mg Rectal Q6H PRN    polyethylene glycol (MIRALAX) packet 17 g  17 g Oral DAILY PRN    ondansetron (ZOFRAN ODT) tablet 4 mg  4 mg Oral Q8H PRN    Or    ondansetron (ZOFRAN) injection 4 mg  4 mg IntraVENous Q6H PRN    enoxaparin (LOVENOX) injection 40 mg  40 mg SubCUTAneous DAILY    latanoprost (XALATAN) 0.005 % ophthalmic solution 1 Drop  1 Drop Both Eyes QHS    atorvastatin (LIPITOR) tablet 10 mg  10 mg Oral QHS    pantoprazole (PROTONIX) tablet 40 mg  40 mg Oral ACB    levothyroxine (SYNTHROID) tablet 125 mcg  125 mcg Oral ACB    tamsulosin (FLOMAX) capsule 0.4 mg  0.4 mg Oral BID    ticagrelor (BRILINTA) tablet 90 mg  90 mg Oral Q12H    cyanocobalamin (VITAMIN B12) tablet 1,000 mcg  1,000 mcg Oral DAILY    dorzolamide-timoloL (COSOPT) 22.3-6.8 mg/mL ophthalmic solution 1 Drop  1 Drop Both Eyes DAILY    brimonidine (ALPHAGAN) 0.2 % ophthalmic solution 1 Drop  1 Drop Both Eyes BID    aspirin delayed-release tablet 81 mg  81 mg Oral DAILY          Peterson Sandoval MD              [facility]  [facility] / [facility] AND [facility], [address] [place], [address]  Phone - [phone]               Fax - [phone]

## 2021-07-30 NOTE — PROGRESS NOTES
Received message from patient's nurse stating:    Pt had 15 beats of V-tach at 0555, converted back to Paced. Pt stated that he is not in pain. Pt continues to be confused. No signs of distress. Discussion / orders:    · Magnesium level stat  · EKG stat  · Troponin level  · Cardiology consult with Dr. Mallory Jacobs -has seen patient in the past           Please note that this note was dictated using Dragon computer voice recognition software. Quite often unanticipated grammatical, syntax, homophones, and other interpretive errors are inadvertently transcribed by the computer software. Please disregard these errors. Please excuse any errors that have escaped final proofreading.

## 2021-07-30 NOTE — CONSULTS
[facility]    Name:  Meng Roe  MR#:  [id number]  :  1928  ACCOUNT #:  [de-identified]  DATE OF SERVICE:  2021    REQUESTING PHYSICIAN:  May Zaidi NP    CHIEF COMPLAINT:  The patient voices no chief complaint. REASON FOR CONSULTATION:  Evaluate nonsustained ventricular tachycardia. HISTORY OF PRESENT ILLNESS:  The patient is a 60-year-old man who is well known to me. He has a history of an ischemic cardiomyopathy with prior coronary artery bypass grafting. He had undergone a TAVR aortic valve replacement, and in May, underwent a biventricular pacemaker placement. He also has a history of congestive heart failure and hypertension. Most recent EF was 45%-50% in 2019. He has had multiple recent admissions. He has been in somewhat declining health recently. The patient was admitted earlier this week with weakness, encephalopathy, and an apparent UTI as well as hyponatremia. He has been treated with IV antibiotics. He has been noted to have episodes of nonsustained ventricular tachycardia on telemetry with a 50 in V1 earlier this morning. This has been noted before on past admissions. I was asked to see the patient for evaluation. Currently, the patient is awake and denies chest discomfort. No other specific complaints at this time. He is not currently on a beta-blocker. PAST MEDICAL HISTORY:  Also remarkable for prostate cancer, peripheral neuropathy, depression, prior TIA, vertebrobasilar insufficiency. CURRENT MEDICATIONS:  IV Zosyn, aspirin, Brilinta, Lipitor, L-thyroxine, Lovenox 40 mg every 24 hours subcutaneously, Protonix, Proscar, Cosopt eyedrops, Alphagan eyedrops, Xalatan eyedrops. SURGERIES:  Prior thyroidectomy, prior bypass surgery, prior TAVR, prior orchiectomy, prior biventricular pacemaker placement. SOCIAL HISTORY:  The patient is a former smoker and does not abuse alcohol.     FAMILY HISTORY: Noncontributory. REVIEW OF SYSTEMS:  As noted above, otherwise noncontributory. PHYSICAL EXAMINATION:  GENERAL:  Exam reveals a frail-appearing elderly white male, currently in no acute distress and answering simple questions. VITAL SIGNS:  Blood pressure 125/59, pulse 77, respirations 16, temperature 98.4. HEENT:  Pupils are unremarkable. Oropharynx reveals somewhat dry oral mucosa. NECK:  No masses or thyromegaly. No cervical or supraclavicular adenopathy. No carotid bruits. No obvious JVD. CHEST:  Clear anteriorly. CARDIAC:  Regular rate and rhythm. 2/6 systolic murmur. No gallop. ABDOMEN:  Soft, nontender. No masses or organomegaly. Bowel sounds positive. EXTREMITIES:  No cyanosis, clubbing, or edema. Distal pulses are not well palpated. NEURO:  No obvious gross motor deficits. The patient does have some slurred speech, but is awake and answers questions appropriately. SKIN:  Warm and dry. LABORATORY DATA:  Hemoglobin 13.9. BUN 7, creatinine 0.5. Troponin 0.09. EKG showed atrial sensing, ventricular pacing. No obvious pacemaker malfunction. Chest x-ray, bibasilar atelectasis and small pleural effusions. IMPRESSION:  1. Nonsustained ventricular tachycardia, on telemetry. 2.  Ischemic cardiomyopathy with prior coronary artery bypass grafting with prior ejection fraction 45%-50%. 3.  Status post prior transcatheter aortic valve replacement. 4.  Status post biventricular pacemaker placement in May. 5.  Urinary tract infection. 6.  Encephalopathy. 7.  Hyponatremia, improved. 8.  History of prostate cancer. 9.  Prior transient ischemic attacks and vertebrobasilar insufficiency. 8.  Advanced age and frailty. RECOMMENDATIONS:  The patient has had nonsustained ventricular tachycardia in the past.  His EF is above 35%. I would treat conservatively given the overall situation.   We will add a low dose beta-blocker, but I would not start antiarrhythmics or consider any additional therapy. Thank you for this consult. I will see back over the weekend as needed.         Suzan Fiore MD      [id number]  D:  07/30/2021 12:19  T:  07/30/2021 13:12  JOB #:  [id number]  CC:  ARJUN Epstein MD

## 2021-07-30 NOTE — PROGRESS NOTES
Hospitalist Progress Note    NAME: Alfonso Brito   :  1928   MRN:  [id number]       Assessment / Plan:  Acute metabolic encephalopathy POA - improved  - Likely multifactorial: hyponatremia, UTI (pseudomonas on recent urine culture at PCP p)  - CT Head negative for acute findings. - Correct Na gradually- resolved now  Treat UTI  - Frequent reorientation, adjust sleep wake cycle  - Hold sedating meds. IP Hospice consulted-- pt/family ready to transition to Hospice care as per palliative SW Chela-- CM to arranged transfer back to LTC with hospice with [facility] as desired by family     Hypotonic hyponatremia, symptomatic POA   Volume depletion POA  - Serum sodium 125, Cl 89. Urine Na <5. Urine Osm 611 on admission  - AM cortisol high. TSH wnl  - Did not respond to NS IVF initially  S/p 3% sodium chloride without significant change in serum sodium level  - S/p Tolvaptan x one dose . Na S/p 500 cc NS bolus and gentle hydration. Na remained low. - S/p 250cc 3% sodium yesterday with no change in sodium level. Na level went up from 128 to 135 in few hours. IP  Nephrology on board. Appreciate input- D5W till today Afternoon then stop        Complicated urinary tract infection POA  - Pt had a urinalysis and urine culture on  at PCP office. Ucx grew MDR pseudomonas. Resistant to all antibiotics tested except for Imipenem, meropenem, and zosyn. (received the fax today)  - UA here eveals pyuria but culture -ve. He has completed 3 days of empiric ceftriaxone.   - Started on zosyn based on sensitivity, will continue     Urinary retention POA  BPH   Hx of prostate cancer s/p b/l orchiectomy   - Continue home tamsulosin and flomax  - Catheterized due to urinary retention.   - Urology consulted. rec to maintain gunn x 5-7 days and OP follow up for voiding trial.      Fall PO   -3x prior to admission. No head trauma or LOC. CT Head interpreted independently - no fracture, hemorrhage noted. - PT/OT     Prolonged QT interval POA   Non-sustained VT on 7/28: 20 runs on tele  - QTc 498 ms.>  545  - hold QT prolonging agents. Check Mg level. 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 - Continue aspirin, Ticagrelor, statin, PPI     Depression POA   QTc 541 ms. Hold Citalopram     Hypothyroidism POA  -TSH on 4/18 was 1.8. Repeat TSH pending. Cont' synthroid      Glaucoma POA   - continue brimonidine, latanoprost, dorzolamide    25.0 - 29.9 Overweight / Body mass index is 28.01 kg/m². Estimated discharge date:7/2923892}  Barriers: Body mass index is 28.01 kg/m². Code Status: DNR/DNI  Surrogate Decision Maker:  Kelvin Lopez, [phone]     DVT Prophylaxis: Lovenox  GI Prophylaxis: not indicated  Baseline: ambulatory with walker, independent in ADLs     Subjective:     Chief Complaint / Reason for Physician Visit  Discussed with RN events overnight. No acute events overnight  Alert. Able to tell me where he is at and the year    Review of Systems:  Symptom Y/N Comments  Symptom Y/N Comments   Fever/Chills    Chest Pain     Poor Appetite    Edema     Cough    Abdominal Pain     Sputum    Joint Pain     SOB/ENG    Pruritis/Rash     Nausea/vomit    Tolerating PT/OT     Diarrhea    Tolerating Diet     Constipation    Other       Could NOT obtain due to:      Objective:     VITALS:   Last 24hrs VS reviewed since prior progress note.  Most recent are:  Patient Vitals for the past 24 hrs:   Temp Pulse Resp BP SpO2   07/30/21 1022 98.4 °F (36.9 °C) 77 16 (!) 125/59 96 %   07/30/21 0804 98.2 °F (36.8 °C) 81 16 (!) 151/67 96 %   07/30/21 0301 98 °F (36.7 °C) 78 16 136/76 96 %   07/29/21 2223 97.9 °F (36.6 °C) 70 16 (!) 145/72 95 %   07/29/21 1823 97.4 °F (36.3 °C) 73 18 (!) 117/57 96 %   07/29/21 1625 97.2 °F (36.2 °C) 85 17 135/62 96 %       Intake/Output Summary (Last 24 hours) at 7/30/2021 1221  Last data filed at 7/29/2021 2045  Gross per 24 hour   Intake 220 ml   Output 700 ml   Net -480 ml        I had a face to face encounter and independently examined this patient on 7/30/2021, as outlined below:  PHYSICAL EXAM:  General: WD, WN. Alert, cooperative, no acute distress    EENT:  EOMI. Anicteric sclerae. MMM  Resp:  CTA bilaterally, no wheezing or rales. No accessory muscle use  CV:  Regular  rhythm,  No edema  GI:  Soft, Non distended, Non tender. +Bowel sounds  Neurologic:  Alert and oriented X 2, normal speech,   Psych:   Good insight. Not anxious nor agitated  Skin:  No rashes. No jaundice    Reviewed most current lab test results and cultures  YES  Reviewed most current radiology test results   YES  Review and summation of old records today    NO  Reviewed patient's current orders and MAR    YES  PMH/ reviewed - no change compared to H&P  ________________________________________________________________________  Care Plan discussed with:    Comments   Patient x    Family      RN x    Care Manager x    Consultant                       x Multidiciplinary team rounds were held today with , nursing, pharmacist and clinical coordinator. Patient's plan of care was discussed; medications were reviewed and discharge planning was addressed. ________________________________________________________________________  Total NON critical care TIME:  26  Minutes    Total CRITICAL CARE TIME Spent:   Minutes non procedure based      Comments   >50% of visit spent in counseling and coordination of care x    ________________________________________________________________________  Rito Shah MD     Procedures: see electronic medical records for all procedures/Xrays and details which were not copied into this note but were reviewed prior to creation of Plan.       LABS:  I reviewed today's most current labs and imaging studies. Pertinent labs include:  Recent Labs     07/29/21  0235   WBC 9.2   HGB 13.9   HCT 42.8        Recent Labs     07/30/21  0315 07/29/21  2130 07/29/21  1836 07/29/21  0926 07/29/21  0235 07/28/21  1729 07/28/21  0437     140 142 140   < > 135*   < > 126*   K 3.8  3.8 3.9 3.9   < > 4.1   < > 3.7     101 103 103   < > 100   < > 92*   CO2 36*  37* 36* 35*   < > 33*   < > 30   *  131* 122* 173*   < > 131*   < > 109*   BUN 7  6 6 7   < > 8   < > 11   CREA 0.49*  0.51* 0.41* 0.53*   < > 0.47*   < > 0.38*   CA 8.0*  8.5 8.1* 8.0*   < > 8.3*   < > 7.7*   MG 2.3  --   --   --  2.3  --   --    PHOS 3.0  --   --   --   --   --  2.5*   ALB 2.7*  --   --   --   --   --  2.8*    < > = values in this interval not displayed.        Signed: Luna Ocampo MD

## 2021-07-30 NOTE — HOSPICE
[facility]  [phone]    Nursing Note   Patient Name: Maude Bryant  YOB: 1928  Age: 80 y.o. [facility]  Hospice consult noted. Chart reviewed. Will see patient today and contact family if they are not at bedside. Thank you for the opportunity to be of service to this patient and his family.

## 2021-07-30 NOTE — CONSULTS
Pt seen and examined. Full consult dictated    Add low dose metoprolol. Otherwise no specific recc for NSVT.

## 2021-07-31 NOTE — PROGRESS NOTES
End of Shift Note    Bedside shift change report given to Archie Fuentes RN (oncoming nurse) by Kiko Feldman (offgoing nurse). Report included the following information SBAR, Kardex, Intake/Output, MAR, Recent Results and Cardiac Rhythm AV Paced    Shift worked:  Nights     Shift summary and any significant changes:     Pt slept. Private  in room with Pt. Covid nares test performed for placement per orders. Concerns for physician to address:       Zone phone for oncoming shift:   [phone]       Activity:  Activity Level: Bed Rest  Number times ambulated in hallways past shift: 0  Number of times OOB to chair past shift: 0    Cardiac:   Cardiac Monitoring: Yes      Cardiac Rhythm: AV Paced    Access:   Current line(s): PIV     Genitourinary:   Urinary status: voiding and gunn    Respiratory:   O2 Device: Nasal cannula  Chronic home O2 use?: NO  Incentive spirometer at bedside: NO     GI:  Last Bowel Movement Date: 07/27/21  Current diet:  DIET ONE TIME MESSAGE  DIET ONE TIME MESSAGE  ADULT DIET Dysphagia - Pureed  Passing flatus: YES  Tolerating current diet: YES - Poor appetite        Pain Management:   Patient states pain is manageable on current regimen: YES    Skin:  Gumaro Score: 14  Interventions: turn team, float heels, foam dressing, PT/OT consult, limit briefs, internal/external urinary devices and nutritional support     Patient Safety:  Fall Score:  Total Score: 5  Interventions: pt to call before getting OOB and sitter at bedside  (Private sitter)  High Fall Risk: Yes    Length of Stay:  Expected LOS: 3d 7h  Actual LOS: [facility]

## 2021-07-31 NOTE — PROGRESS NOTES
End of Shift Note    Bedside shift change report given to [facility] (oncoming nurse) by Sid Sales RN (offgoing nurse).   Report included the following information SBAR    Shift worked: 7a-3p   Shift summary and any significant changes:    none       Concerns for physician to address:  None   Zone phone for oncoming shift:   [phone]     Patient Information  Shira Lincoln  80 y.o.  7/26/2021  2:26 PM by Michael Boateng MD. Shira Lincoln was admitted from Assisted Living    Problem List  Patient Active Problem List    Diagnosis Date Noted    Acute encephalopathy 07/26/2021    Pacemaker 05/06/2021    Cerebral aneurysm 12/13/2019    TIA (transient ischemic attack) 05/01/2019    Insomnia due to medical condition 05/10/2017    Stenosis of both internal carotid arteries 02/07/2017    Diabetic peripheral neuropathy associated with type 2 diabetes mellitus ([address] 02/07/2017    Sensory ataxic gait 02/07/2017    Monocular diplopia of both eyes 02/07/2017    VBI (vertebrobasilar insufficiency) 02/07/2017    History of stroke 02/07/2017    Vitamin D deficiency 02/07/2017    BPV (benign positional vertigo) 02/07/2017    Dizziness 04/23/2013    Idiopathic small and large fiber sensory neuropathy 04/23/2013    B12 deficiency 04/23/2013    Hypothyroidism, postsurgical 03/13/2013    Thyroid mass 05/10/2011    Incisional hernia 05/10/2011    CVA (cerebral infarction) 05/10/2011     Past Medical History:   Diagnosis Date    Anxiety disorder     Arthritis     CAD (coronary artery disease)     Calculus of kidney     Cancer ([address] prostate  1996    skin    Depression     Hearing loss     Heart attack ([address]     Hypercholesterolemia     Hypertension     Incisional hernia 5/10/2011    Movement disorder     Neuropathy     Other ill-defined conditions(799.89)     elevated cholesterol    Other ill-defined conditions(799.89)     glaucoma    Other ill-defined conditions(799.89)     abd hernia    Skipped beats     Stroke [facility])     left arm tingling    Thyroid disease     Thyroid mass 5/10/2011    Vertigo        Core Measures:  CVA: No No  CHF:No No  PNA:No No    Activity:  Activity Level: Bed Rest  Number times ambulated in hallways past shift: 0  Number of times OOB to chair past shift: 0    Cardiac:   Cardiac Monitoring: Yes      Cardiac Rhythm: AV Paced    Access:   Current line(s): PIV     Genitourinary:   Urinary status: gunn  Urinary Catheter? Yes Placement Date 7/27/2021 Reason Medically Necessary acute retention    Respiratory:   O2 Device: Nasal cannula  Chronic home O2 use?: NO  Incentive spirometer at bedside: N/A       GI:  Last Bowel Movement Date: 07/27/21  Current diet:  DIET ONE TIME MESSAGE  DIET ONE TIME MESSAGE  ADULT DIET Dysphagia - Pureed  Passing flatus: YES  Tolerating current diet: YES       Pain Management:   Patient states pain is manageable on current regimen: N/A    Skin:  Gumaro Score: 15  Interventions: float heels, increase time out of bed, limit briefs and internal/external urinary devices    Patient Safety:  Fall Score:  Total Score: 5  Interventions: bed/chair alarm, gripper socks, pt to call before getting OOB and sitter at bedside   High Fall Risk: Yes  @Rollbelt  @dexterity to release roll belt  Yes/No ( must document dexterity  here by stating Yes or No here, otherwise this is a restraint and must follow restraint documentation policy.)    DVT prophylaxis:  DVT prophylaxis Med- Yes  DVT prophylaxis SCD or ANDREW- No     Wounds: (If Applicable)  Wounds- Yes  Location; L elbow tear    Active Consults:  IP CONSULT TO NEPHROLOGY  IP CONSULT TO UROLOGY  IP CONSULT TO PALLIATIVE CARE - PROVIDER  IP CONSULT TO HOSPITALIST  IP CONSULT TO CARDIOLOGY    Length of Stay:  Expected LOS: 3d 7h  Actual LOS: 5  Discharge Plan: Yes; [address] [place] [address] Rd, RN

## 2021-07-31 NOTE — PROGRESS NOTES
Hospitalist Progress Note    NAME: Kayden Yanez   :  1928   MRN:  [id number]       Assessment / Plan:  Acute metabolic encephalopathy POA - improved  - Likely multifactorial: hyponatremia, UTI (pseudomonas on recent urine culture at PCP p)  - CT Head negative for acute findings. - Correct Na gradually- resolved now  Treat UTI  - Frequent reorientation, adjust sleep wake cycle  - Hold sedating meds. IP Hospice consulted-- pt/family ready to transition to Hospice care as per palliative- seen by [facility] Friday PM-- plan to transfer back to [facility] (but healthcare section, not independent section) with hospice with [facility] as desired by family -- likely anticipated DC on Monday as [facility] doesn't accept over the weekend it seems    Hypotonic hyponatremia, symptomatic POA   Volume depletion POA  - Serum sodium 125, Cl 89. Urine Na <5. Urine Osm 611 on admission  - AM cortisol high. TSH wnl  - Did not respond to NS IVF initially  S/p 3% sodium chloride without significant change in serum sodium level  - S/p Tolvaptan x one dose . Na S/p 500 cc NS bolus and gentle hydration. Na remained low. - S/p 250cc 3% sodium yesterday with no change in sodium level. Na level went up from 128 to 135 in few hours. IP  Nephrology on board. Appreciate input- s/p D5W , now off it        Complicated urinary tract infection POA  - Pt had a urinalysis and urine culture on  at PCP office. Ucx grew MDR pseudomonas. Resistant to all antibiotics tested except for Imipenem, meropenem, and zosyn. (received the fax today)  - UA here eveals pyuria but culture -ve. He has completed 3 days of empiric ceftriaxone.   - Started on zosyn based on sensitivity, will continue     Urinary retention POA  BPH   Hx of prostate cancer s/p b/l orchiectomy   - Continue home tamsulosin and flomax  - Catheterized due to urinary retention.   - Urology consulted.  rec to maintain gunn x 5-7 days and OP follow up for voiding trial.      Fall PO   -3x prior to admission. No head trauma or LOC. CT Head interpreted independently - no fracture, hemorrhage noted.   - PT/OT     Prolonged QT interval POA   Non-sustained VT on 7/28: 20 runs on tele  - QTc 498 ms.>  545  - hold QT prolonging agents. Check Mg level. 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 - Continue aspirin, Ticagrelor, statin, PPI     Depression POA   QTc 541 ms. Hold Citalopram     Hypothyroidism POA  -TSH on 4/18 was 1.8. Repeat TSH pending. Cont' synthroid      Glaucoma POA   - continue brimonidine, latanoprost, dorzolamide    25.0 - 29.9 Overweight / Body mass index is 28.01 kg/m². Estimated discharge date:8/2  Barriers: [facility] wont accept over the weekend    Body mass index is 28.01 kg/m². Code Status: DNR/DNI  Surrogate Decision Maker:  Chelsea Franklin, [phone]     DVT Prophylaxis: Lovenox  GI Prophylaxis: not indicated  Baseline: ambulatory with walker, independent in ADLs     Subjective:     Chief Complaint / Reason for Physician Visit  Discussed with RN events overnight. No acute events overnight  Alert. Able to tell me where he is at and the year    Review of Systems:  Symptom Y/N Comments  Symptom Y/N Comments   Fever/Chills    Chest Pain     Poor Appetite    Edema     Cough    Abdominal Pain     Sputum    Joint Pain     SOB/ENG    Pruritis/Rash     Nausea/vomit    Tolerating PT/OT     Diarrhea    Tolerating Diet     Constipation    Other       Could NOT obtain due to:      Objective:     VITALS:   Last 24hrs VS reviewed since prior progress note.  Most recent are:  Patient Vitals for the past 24 hrs:   Temp Pulse Resp BP SpO2   07/31/21 0802 97.5 °F (36.4 °C) 87 16 128/60 97 %   07/31/21 0354 97.3 °F (36.3 °C) 82 16 (!) 125/59 92 % 07/31/21 0053 97.5 °F (36.4 °C) 79 16 (!) 127/59 96 %   07/30/21 1942 97.7 °F (36.5 °C) 81 16 (!) 113/56 97 %   07/30/21 1547 97.3 °F (36.3 °C) 82 16 133/67 97 %       Intake/Output Summary (Last 24 hours) at 7/31/2021 1030  Last data filed at 7/31/2021 1016  Gross per 24 hour   Intake 0 ml   Output 1600 ml   Net -1600 ml        I had a face to face encounter and independently examined this patient on 7/31/2021, as outlined below:  PHYSICAL EXAM:  General: WD, WN. Alert, cooperative, no acute distress    EENT:  EOMI. Anicteric sclerae. MMM  Resp:  CTA bilaterally, no wheezing or rales. No accessory muscle use  CV:  Regular  rhythm,  No edema  GI:  Soft, Non distended, Non tender. +Bowel sounds  Neurologic:  Alert and oriented X 2, normal speech,   Psych:   Good insight. Not anxious nor agitated  Skin:  No rashes. No jaundice    Reviewed most current lab test results and cultures  YES  Reviewed most current radiology test results   YES  Review and summation of old records today    NO  Reviewed patient's current orders and MAR    YES  PMH/SH reviewed - no change compared to H&P  ________________________________________________________________________  Care Plan discussed with:    Comments   Patient     Family      RN x    Care Manager x    Consultant                        Multidiciplinary team rounds were held today with , nursing, pharmacist and clinical coordinator. Patient's plan of care was discussed; medications were reviewed and discharge planning was addressed.      ________________________________________________________________________  Total NON critical care TIME:  16  Minutes    Total CRITICAL CARE TIME Spent:   Minutes non procedure based      Comments   >50% of visit spent in counseling and coordination of care x    ________________________________________________________________________  Calzada MD Faiza     Procedures: see electronic medical records for all procedures/Xrays and details which were not copied into this note but were reviewed prior to creation of Plan. LABS:  I reviewed today's most current labs and imaging studies. Pertinent labs include:  Recent Labs     07/29/21  0235   WBC 9.2   HGB 13.9   HCT 42.8        Recent Labs     07/30/21  0315 07/29/21  2130 07/29/21  1836 07/29/21  0926 07/29/21  0235     140 142 140   < > 135*   K 3.8  3.8 3.9 3.9   < > 4.1     101 103 103   < > 100   CO2 36*  37* 36* 35*   < > 33*   *  131* 122* 173*   < > 131*   BUN 7  6 6 7   < > 8   CREA 0.49*  0.51* 0.41* 0.53*   < > 0.47*   CA 8.0*  8.5 8.1* 8.0*   < > 8.3*   MG 2.3  --   --   --  2.3   PHOS 3.0  --   --   --   --    ALB 2.7*  --   --   --   --     < > = values in this interval not displayed.        Signed: Luna Ocampo MD

## 2021-08-01 NOTE — PROGRESS NOTES
Problem: Falls - Risk of  Goal: *Absence of Falls  Description: Document Magali Xin Fall Risk and appropriate interventions in the flowsheet.   Outcome: Progressing Towards Goal  Note: Fall Risk Interventions:  Mobility Interventions: Bed/chair exit alarm, OT consult for ADLs, Patient to call before getting OOB, PT Consult for mobility concerns, PT Consult for assist device competence    Mentation Interventions: Adequate sleep, hydration, pain control, Bed/chair exit alarm, Door open when patient unattended, Evaluate medications/consider consulting pharmacy, Family/sitter at bedside, More frequent rounding, Increase mobility, Reorient patient    Medication Interventions: Bed/chair exit alarm, Evaluate medications/consider consulting pharmacy, Assess postural VS orthostatic hypotension, Patient to call before getting OOB, Teach patient to arise slowly    Elimination Interventions: Bed/chair exit alarm, Call light in reach, Stay With Me (per policy), Patient to call for help with toileting needs, Toilet paper/wipes in reach, Toileting schedule/hourly rounds    History of Falls Interventions: Bed/chair exit alarm

## 2021-08-01 NOTE — PROGRESS NOTES
End of Shift Note    Bedside shift change report given to [address] (oncoming nurse) by Pamela Abdul RN (offgoing nurse).   Report included the following information SBAR    Shift worked: 7p-7a   Shift summary and any significant changes:    none       Concerns for physician to address:  None   Zone phone for oncoming shift:   [phone]     Patient Information  Taisha Quinn  80 y.o.  7/26/2021  2:26 PM by Samy Rawls MD. Taisha Quinn was admitted from Assisted Living    Problem List  Patient Active Problem List    Diagnosis Date Noted    Acute encephalopathy 07/26/2021    Pacemaker 05/06/2021    Cerebral aneurysm 12/13/2019    TIA (transient ischemic attack) 05/01/2019    Insomnia due to medical condition 05/10/2017    Stenosis of both internal carotid arteries 02/07/2017    Diabetic peripheral neuropathy associated with type 2 diabetes mellitus ([address] 02/07/2017    Sensory ataxic gait 02/07/2017    Monocular diplopia of both eyes 02/07/2017    VBI (vertebrobasilar insufficiency) 02/07/2017    History of stroke 02/07/2017    Vitamin D deficiency 02/07/2017    BPV (benign positional vertigo) 02/07/2017    Dizziness 04/23/2013    Idiopathic small and large fiber sensory neuropathy 04/23/2013    B12 deficiency 04/23/2013    Hypothyroidism, postsurgical 03/13/2013    Thyroid mass 05/10/2011    Incisional hernia 05/10/2011    CVA (cerebral infarction) 05/10/2011     Past Medical History:   Diagnosis Date    Anxiety disorder     Arthritis     CAD (coronary artery disease)     Calculus of kidney     Cancer ([address] prostate  1996    skin    Depression     Hearing loss     Heart attack ([address]     Hypercholesterolemia     Hypertension     Incisional hernia 5/10/2011    Movement disorder     Neuropathy     Other ill-defined conditions(799.89)     elevated cholesterol    Other ill-defined conditions(799.89)     glaucoma    Other ill-defined conditions(799.89)     abd hernia    Skipped beats     Stroke [facility])     left arm tingling    Thyroid disease     Thyroid mass 5/10/2011    Vertigo        Core Measures:  CVA: No No  CHF:No No  PNA:No No    Activity:  Activity Level: Bed Rest  Number times ambulated in hallways past shift: 0  Number of times OOB to chair past shift: 0    Cardiac:   Cardiac Monitoring: Yes      Cardiac Rhythm: AV Paced    Access:   Current line(s): PIV     Genitourinary:   Urinary status: gunn  Urinary Catheter? Yes Placement Date 7/27/2021 Reason Medically Necessary acute retention    Respiratory:   O2 Device: Nasal cannula  Chronic home O2 use?: NO  Incentive spirometer at bedside: N/A       GI:  Last Bowel Movement Date: 07/27/21  Current diet:  DIET ONE TIME MESSAGE  DIET ONE TIME MESSAGE  ADULT DIET Dysphagia - Pureed  Passing flatus: YES  Tolerating current diet: YES       Pain Management:   Patient states pain is manageable on current regimen: N/A    Skin:  Gumaro Score: 13  Interventions: float heels, increase time out of bed, limit briefs and internal/external urinary devices    Patient Safety:  Fall Score:  Total Score: 5  Interventions: bed/chair alarm, gripper socks, pt to call before getting OOB and sitter at bedside   High Fall Risk: Yes  @Rollbelt  @dexterity to release roll belt  Yes/No ( must document dexterity  here by stating Yes or No here, otherwise this is a restraint and must follow restraint documentation policy.)    DVT prophylaxis:  DVT prophylaxis Med- Yes  DVT prophylaxis SCD or ANDREW- No     Wounds: (If Applicable)  Wounds- Yes  Location; L elbow tear    Active Consults:  IP CONSULT TO NEPHROLOGY  IP CONSULT TO UROLOGY  IP CONSULT TO PALLIATIVE CARE - PROVIDER  IP CONSULT TO HOSPITALIST  IP CONSULT TO CARDIOLOGY    Length of Stay:  Expected LOS: 3d 7h  Actual LOS: 6  Discharge Plan: Yes; [facility]        Vin Neely RN

## 2021-08-01 NOTE — PROGRESS NOTES
End of Shift Note    Bedside shift change report given to Helen Armstrong (oncoming nurse) by Roxana aSntos (offgoing nurse). Report included the following information SBAR    Shift worked: DAYS   Shift summary and any significant changes:    Telemetry was discontinued and vitals are now required daily    Pt will possibly go home tomorrow on hospice    Family came to visit    Pt has little appetite and has been refusing turning in bed. Family wants patient to be as comfortable as possible. CG came and gave patient a bath.       Concerns for physician to address:  None   Zone phone for oncoming shift:   [phone]     Patient Information  Taiwo Braga  80 y.o.  7/26/2021  2:26 PM by Jazmin Jackson MD. Taiwo Braga was admitted from Assisted Living    Problem List  Patient Active Problem List    Diagnosis Date Noted    Acute encephalopathy 07/26/2021    Pacemaker 05/06/2021    Cerebral aneurysm 12/13/2019    TIA (transient ischemic attack) 05/01/2019    Insomnia due to medical condition 05/10/2017    Stenosis of both internal carotid arteries 02/07/2017    Diabetic peripheral neuropathy associated with type 2 diabetes mellitus ([address] 02/07/2017    Sensory ataxic gait 02/07/2017    Monocular diplopia of both eyes 02/07/2017    VBI (vertebrobasilar insufficiency) 02/07/2017    History of stroke 02/07/2017    Vitamin D deficiency 02/07/2017    BPV (benign positional vertigo) 02/07/2017    Dizziness 04/23/2013    Idiopathic small and large fiber sensory neuropathy 04/23/2013    B12 deficiency 04/23/2013    Hypothyroidism, postsurgical 03/13/2013    Thyroid mass 05/10/2011    Incisional hernia 05/10/2011    CVA (cerebral infarction) 05/10/2011     Past Medical History:   Diagnosis Date    Anxiety disorder     Arthritis     CAD (coronary artery disease)     Calculus of kidney     Cancer ([address] prostate  1996    skin    Depression     Hearing loss     Heart attack ([address]     Hypercholesterolemia  Hypertension     Incisional hernia 5/10/2011    Movement disorder     Neuropathy     Other ill-defined conditions(799.89)     elevated cholesterol    Other ill-defined conditions(799.89)     glaucoma    Other ill-defined conditions(799.89)     abd hernia    Skipped beats     Stroke [facility])     left arm tingling    Thyroid disease     Thyroid mass 5/10/2011    Vertigo        Core Measures:  CVA: No No  CHF:No No  PNA:No No    Activity:  Activity Level: Bed Rest  Number times ambulated in hallways past shift: 0  Number of times OOB to chair past shift: 0    Cardiac:   Cardiac Monitoring: Yes      Cardiac Rhythm:  (paced)    Access:   Current line(s): PIV     Genitourinary:   Urinary status: gunn  Urinary Catheter? Yes Placement Date 7/27/2021 Reason Medically Necessary acute retention    Respiratory:   O2 Device: Nasal cannula  Chronic home O2 use?: NO  Incentive spirometer at bedside: N/A       GI:  Last Bowel Movement Date: 07/31/21  Current diet:  DIET ONE TIME MESSAGE  DIET ONE TIME MESSAGE  ADULT DIET Dysphagia - Pureed  Passing flatus: YES  Tolerating current diet: YES       Pain Management:   Patient states pain is manageable on current regimen: N/A    Skin:  Gumaro Score: 15  Interventions: float heels, increase time out of bed, limit briefs and internal/external urinary devices    Patient Safety:  Fall Score:  Total Score: 5  Interventions: bed/chair alarm, gripper socks, pt to call before getting OOB and sitter at bedside   High Fall Risk: Yes  @Rollbelt  @dexterity to release roll belt  Yes/No ( must document dexterity  here by stating Yes or No here, otherwise this is a restraint and must follow restraint documentation policy.)    DVT prophylaxis:  DVT prophylaxis Med- Yes  DVT prophylaxis SCD or ANDREW- No     Wounds: (If Applicable)  Wounds- Yes  Location; L elbow tear    Active Consults:  IP CONSULT TO NEPHROLOGY  IP CONSULT TO UROLOGY  IP CONSULT TO PALLIATIVE CARE - PROVIDER  IP CONSULT TO HOSPITALIST  IP CONSULT TO CARDIOLOGY    Length of Stay:  Expected LOS: 3d 7h  Actual LOS: 6  Discharge Plan: Yes; [address]

## 2021-08-01 NOTE — PROGRESS NOTES
Hospitalist Progress Note    NAME: Yenni Sharma   :  1928   MRN:  [id number]       Assessment / Plan:  Acute metabolic encephalopathy improved clinically likely multifactorial with hyponatremia UTI. CT head unremarkable  Hyponatremia resolved  Complicated UTI patient is currently on Zosyn    Urinary retention POA with history of prostate CA status post colectomy  Catheterizes due to urinary retention  Likely secondary to BPH    Fall    Prolonged QT interval POA  Nonsustained ventricular tachyarrhythmia  Pseudomonas UTI treated    Coronary artery disease    Essential hypertension    Hyperlipidemia    Depression POA    Continue citalopram    Hypothyroidism POA continue Synthroid    Glaucoma POA continue brimonidine latanoprost dorzolamide  Hospice to see   / Body mass index is 28.01 kg/m². Estimated discharge date: 2021  Barriers: Medical stability for possible acceptance at rehab    Code status: DNR/DNI  Prophylaxis: Lovenox  Recommended Disposition: Placement     Subjective:     Chief Complaint / Reason for Physician Visit  . Discussed with RN events overnight. Patient seen and examined at bedside no events noted overnight. Review of Systems:  Symptom Y/N Comments  Symptom Y/N Comments   Fever/Chills n   Chest Pain     Poor Appetite n   Edema     Cough n   Abdominal Pain     Sputum n   Joint Pain     SOB/ENG n   Pruritis/Rash     Nausea/vomit    Tolerating PT/OT     Diarrhea    Tolerating Diet     Constipation    Other       Could NOT obtain due to:      Objective:     VITALS:   Last 24hrs VS reviewed since prior progress note.  Most recent are:  Patient Vitals for the past 24 hrs:   Temp Pulse Resp BP SpO2   21 0904 97.7 °F (36.5 °C) 68 18 (!) 121/55 95 %   21 0430 97.5 °F (36.4 °C) 76 18 135/60 95 %   21 0254 97.7 °F (36.5 °C) 79 18 139/77 94 %   21 2332 96.8 °F (36 °C) 87 17 (!) 130/57 95 %   21 1928 97.8 °F (36.6 °C) 84 18 118/76 94 %   21 1603 97.5 °F (36.4 °C) 82 18 (!) 112/55 96 %       Intake/Output Summary (Last 24 hours) at 8/1/2021 1239  Last data filed at 8/1/2021 7844  Gross per 24 hour   Intake    Output 600 ml   Net -600 ml        I had a face to face encounter and independently examined this patient on 8/1/2021, as outlined below:  PHYSICAL EXAM:  General: WD, WN. Alert, cooperative, no acute distress    EENT:  EOMI. Anicteric sclerae. MMM  Resp:  CTA bilaterally, no wheezing or rales. No accessory muscle use  CV:  Regular  rhythm,  No edema  GI:  Soft, Non distended, Non tender. +Bowel sounds  Neurologic:  Alert and oriented X 3, normal speech,   Psych:   Good insight. Not anxious nor agitated      Reviewed most current lab test results and cultures  YES  Reviewed most current radiology test results   YES  Review and summation of old records today    NO  Reviewed patient's current orders and MAR    YES  PMH/ reviewed - no change compared to H&P  ________________________________________________________________________  Care Plan discussed with:    Comments   Patient x    Family      RN     Care Manager     Consultant                        Multidiciplinary team rounds were held today with , nursing, pharmacist and clinical coordinator. Patient's plan of care was discussed; medications were reviewed and discharge planning was addressed. ________________________________________________________________________  Total NON critical care TIME:    Total CRITICAL CARE TIME Spent:   Minutes non procedure based      Comments   >50% of visit spent in counseling and coordination of care     ________________________________________________________________________  René Mart MD     Procedures: see electronic medical records for all procedures/Xrays and details which were not copied into this note but were reviewed prior to creation of Plan. LABS:  I reviewed today's most current labs and imaging studies.   Pertinent labs include:  No results for input(s): WBC, HGB, HCT, PLT, HGBEXT, HCTEXT, PLTEXT in the last 72 hours.   Recent Labs     07/30/21  0315 07/29/21  2130 07/29/21  1836     140 142 140   K 3.8  3.8 3.9 3.9     101 103 103   CO2 36*  37* 36* 35*   *  131* 122* 173*   BUN 7  6 6 7   CREA 0.49*  0.51* 0.41* 0.53*   CA 8.0*  8.5 8.1* 8.0*   MG 2.3  --   --    PHOS 3.0  --   --    ALB 2.7*  --   --        Signed: Haley Jang MD

## 2021-08-02 NOTE — PROGRESS NOTES
Problem: Falls - Risk of  Goal: *Absence of Falls  Description: Document Helen Martin Fall Risk and appropriate interventions in the flowsheet. Outcome: Progressing Towards Goal  Note: Fall Risk Interventions:  Mobility Interventions: Bed/chair exit alarm    Mentation Interventions: Bed/chair exit alarm    Medication Interventions: Bed/chair exit alarm    Elimination Interventions: Bed/chair exit alarm, Call light in reach    History of Falls Interventions: Bed/chair exit alarm         Problem: Pressure Injury - Risk of  Goal: *Prevention of pressure injury  Description: Document Gumaro Scale and appropriate interventions in the flowsheet.   Outcome: Progressing Towards Goal  Note: Pressure Injury Interventions:  Sensory Interventions: Assess changes in LOC    Moisture Interventions: Absorbent underpads, Minimize layers    Activity Interventions: Pressure redistribution bed/mattress(bed type)    Mobility Interventions: Pressure redistribution bed/mattress (bed type)    Nutrition Interventions: Document food/fluid/supplement intake    Friction and Shear Interventions: Minimize layers

## 2021-08-02 NOTE — PROGRESS NOTES
Problem: Falls - Risk of  Goal: *Absence of Falls  Description: Document Pedro Moreira Fall Risk and appropriate interventions in the flowsheet. Outcome: Progressing Towards Goal  Note: Fall Risk Interventions:  Mobility Interventions: Bed/chair exit alarm    Mentation Interventions: Adequate sleep, hydration, pain control, Bed/chair exit alarm    Medication Interventions: Bed/chair exit alarm    Elimination Interventions: Bed/chair exit alarm, Call light in reach    History of Falls Interventions: Bed/chair exit alarm         Problem: Pressure Injury - Risk of  Goal: *Prevention of pressure injury  Description: Document Gumaro Scale and appropriate interventions in the flowsheet.   Outcome: Progressing Towards Goal  Note: Pressure Injury Interventions:  Sensory Interventions: Assess changes in LOC    Moisture Interventions: Absorbent underpads, Minimize layers    Activity Interventions: Pressure redistribution bed/mattress(bed type)    Mobility Interventions: Pressure redistribution bed/mattress (bed type)    Nutrition Interventions: Document food/fluid/supplement intake    Friction and Shear Interventions: Minimize layers

## 2021-08-02 NOTE — HOSPICE
[facility]  [phone]     Patient Name: Milly Fernández  YOB: 1928  Age: 80 y.o. [address] RN Note:   [address] [address] lab informed me PCR is run at [facility]. I spoke with their  and they only do one PCR run per day at 10am.  Mr Braden Pool test will be run at 10am tomorrow and should have results back by 1pm  Please delay transport to [place] to 1:30pm    Thank you for the opportunity to be of service to this patient.     Sonya Claire RN  Clinical Nurse Liaison   [address]  C)[phone] (j) [phone]

## 2021-08-02 NOTE — PROGRESS NOTES
End of Shift Note    Bedside shift change report given to Candelaria Parmar  (oncoming nurse) by Patito Oakley (offgoing nurse).   Report included the following information SBAR    Shift worked: nights   Shift summary and any significant changes:    Telemetry was discontinued and vitals are now required daily    Pt will possibly go home today on hospice          Concerns for physician to address:  None   Zone phone for oncoming shift:   [phone]     Patient Information  Taisha Quinn  80 y.o.  7/26/2021  2:26 PM by Samy Rawls MD. Taisha Quinn was admitted from Assisted Living    Problem List  Patient Active Problem List    Diagnosis Date Noted    Acute encephalopathy 07/26/2021    Pacemaker 05/06/2021    Cerebral aneurysm 12/13/2019    TIA (transient ischemic attack) 05/01/2019    Insomnia due to medical condition 05/10/2017    Stenosis of both internal carotid arteries 02/07/2017    Diabetic peripheral neuropathy associated with type 2 diabetes mellitus ([address] 02/07/2017    Sensory ataxic gait 02/07/2017    Monocular diplopia of both eyes 02/07/2017    VBI (vertebrobasilar insufficiency) 02/07/2017    History of stroke 02/07/2017    Vitamin D deficiency 02/07/2017    BPV (benign positional vertigo) 02/07/2017    Dizziness 04/23/2013    Idiopathic small and large fiber sensory neuropathy 04/23/2013    B12 deficiency 04/23/2013    Hypothyroidism, postsurgical 03/13/2013    Thyroid mass 05/10/2011    Incisional hernia 05/10/2011    CVA (cerebral infarction) 05/10/2011     Past Medical History:   Diagnosis Date    Anxiety disorder     Arthritis     CAD (coronary artery disease)     Calculus of kidney     Cancer ([address] prostate  1996    skin    Depression     Hearing loss     Heart attack ([address]     Hypercholesterolemia     Hypertension     Incisional hernia 5/10/2011    Movement disorder     Neuropathy     Other ill-defined conditions(799.89)     elevated cholesterol    Other ill-defined conditions(799.89)     glaucoma    Other ill-defined conditions(799.89)     abd hernia    Skipped beats     Stroke [facility])     left arm tingling    Thyroid disease     Thyroid mass 5/10/2011    Vertigo        Core Measures:  CVA: No No  CHF:No No  PNA:No No    Activity:  Activity Level: Bed Rest  Number times ambulated in hallways past shift: 0  Number of times OOB to chair past shift: 0    Cardiac:   Cardiac Monitoring: Yes      Cardiac Rhythm: Sinus Rhythm    Access:   Current line(s): PIV     Genitourinary:   Urinary status: gunn  Urinary Catheter? Yes Placement Date 7/27/2021 Reason Medically Necessary acute retention    Respiratory:   O2 Device: Nasal cannula  Chronic home O2 use?: NO  Incentive spirometer at bedside: N/A       GI:  Last Bowel Movement Date: 07/31/21  Current diet:  DIET ONE TIME MESSAGE  DIET ONE TIME MESSAGE  ADULT DIET Dysphagia - Pureed  Passing flatus: YES  Tolerating current diet: YES       Pain Management:   Patient states pain is manageable on current regimen: N/A    Skin:  Gumaro Score: 15  Interventions: float heels, increase time out of bed, limit briefs and internal/external urinary devices    Patient Safety:  Fall Score:  Total Score: 5  Interventions: bed/chair alarm, gripper socks, pt to call before getting OOB and sitter at bedside   High Fall Risk: Yes  @Rollbelt  @dexterity to release roll belt  Yes/No ( must document dexterity  here by stating Yes or No here, otherwise this is a restraint and must follow restraint documentation policy.)    DVT prophylaxis:  DVT prophylaxis Med- Yes  DVT prophylaxis SCD or ANDREW- No     Wounds: (If Applicable)  Wounds- Yes  Location; L elbow tear    Active Consults:  IP CONSULT TO NEPHROLOGY  IP CONSULT TO UROLOGY  IP CONSULT TO PALLIATIVE CARE - PROVIDER  IP CONSULT TO HOSPITALIST  IP CONSULT TO CARDIOLOGY    Length of Stay:  Expected LOS: 3d 7h  Actual LOS: 7  Discharge Plan: Yes; [address]

## 2021-08-02 NOTE — HOSPICE
[facility]  [phone]    Patient Name: Milly Fernández  YOB: 1928  Age: 80 y.o. [address] RN Note:  Hospice consult noted. Chart reviewed. Plan of care discussed with patients nurse & care manager. In to meet with daughter, Toro Smith. Discussed Hospice philosophy, general plan of care, levels of care, services and on call procedures. Family information packet provided & reviewed with daughter and son Javon Joseph by phone  CTI received from Dr. Vasile Sotelo for end stage heart disease  Consents to be sent to son, Javon Joseph, via [employer] by [employer]  Admission will be scheduled for: 3:00pm 8/3/21  Transport: please set up for 1:00pm tomorrow 8/3/21  Medications to be provided by [address]  [address]  Thank you for the opportunity to be of service to this patient.     Sonya Claire RN  Clinical Nurse Liaison   [address]  E)[phone] (V) [phone]

## 2021-08-02 NOTE — HOSPICE
[address] For Admission  Note:    This MSW met with pt and family for hospice consult. Psychosocial needs; Pt has four children, all agree with hospice decision. Daughter Rosy Cavazos and son Alyssa Venegas stated there is an AMD that names Alyssa Venegas. Consents sent via [employer] to Alyssa Venegas. Alyssa Venegas will bring a copy of AMD    Pt/caregiver; Pt going to LTC at [facility]. Daughter Mica's   on hospice care in 2021. Any additional support for her is appreciated    Pt is DDNR; Yes, on chart    Facility admission is scheduled for Tuesday 8/3/2021 at 3 pm    Consents Reviewed: Yes  Person Reviewed/Signed with: Júnior Holman (son).   Pt was confused at time of visit  Right to NCD Reviewed: Yes  NCD Requested: TBD, admission RN to eval  Admission Nurse/Intake Notified: TBD, admission RN to eval  Planned Start of Care Date: 8/3/2021  Hospice Witness Representative: Elana Councilman, [facility]    [phone]

## 2021-08-02 NOTE — DISCHARGE SUMMARY
Hospitalist Discharge Summary     Patient ID:  Gertrude Hennessy  [id number]  61 y.o.  2/19/1928 7/26/2021    PCP on record: Wanda Michele MD    Admit date: 7/26/2021  Discharge date and time: 8/2/2021    DISCHARGE DIAGNOSIS:  Acute metabolic encephalopathy likely multifactorial hyponatremia urinary tract infection resolved  Dehydration status post IV fluids. Urinary tract infection ruled out cultures are negative status post antibiotics. Urinary retention likely secondary to prostate CA  Prolonged QT  Nonsustained ventricular tachyarrhythmia  Coronary artery disease  Essential hypertension  Hyperlipidemia  CONSULTATIONS:  IP CONSULT TO NEPHROLOGY  IP CONSULT TO UROLOGY  IP CONSULT TO PALLIATIVE CARE - PROVIDER  IP CONSULT TO HOSPITALIST  IP CONSULT TO CARDIOLOGY    Excerpted HPI from H&P of Jass Webb MD:  Daja Cordero is a 80 y.o.  male with PMH of HTN, CAD, Depression, HLD, Hearing loss who presents to ED with c/o altered mental status. Patient is confused and unable to provide any history. History is provided by daughter Krishna Buchanan at bedside. Patient lives at [facility] independent living facility and is independent in activities of daily living, ambulates usually with walker. He had one episode of emesis 5 days ago, was seen by PA at PCP office, diagnosed with UTI and prescribed ciprofloxacin. Patient has had gradually worsening appetite, oral intake, confusion, hallucinations. He sustained a ground-level fall fall 2 days prior to admission, no head trauma loss of consciousness. For the past 2 days he was not given torsemide by his family members as he appeared 'dehydrated'. Patient is sleepy but arousable, oriented to place, person, year but not to situation, repeatedly trying    ______________________________________________________________________  DISCHARGE SUMMARY/HOSPITAL COURSE:  for full details see H&P, daily progress notes, labs, consult notes. Patient is a 24-year-old gentleman  who was admitted because of acute metabolic encephalopathy hyponatremia dehydration also urinary retention present on admission secondary prostate CA. Patient was hydrated with fluids patient improved clinically outpatient family is returning patient to [facility] with hospice. With [facility]. I did talk to patient family the daughter and also a caregiver in detail agree with plan of discharge. Patient is returning to [facility] with wants to call hospice. Patient DO NOT RESUSCITATE.        _______________________________________________________________________  Patient seen and examined by me on discharge day. Pertinent Findings:  Gen:    Not in distress  Chest: Clear lungs  CVS:   Regular rhythm. No edema  Abd:  Soft, not distended, not tender  Neuro: Awake responding to commands. _______________________________________________________________________  DISCHARGE MEDICATIONS:   Current Discharge Medication List      START taking these medications    Details   metoprolol succinate (TOPROL-XL) 25 mg XL tablet Take 1 Tablet by mouth daily. Qty: 30 Tablet, Refills: 0  Start date: 8/3/2021      finasteride (PROSCAR) 5 mg tablet Take 1 Tablet by mouth daily. Qty: 30 Tablet, Refills: 0  Start date: 8/3/2021         CONTINUE these medications which have NOT CHANGED    Details   amLODIPine (Norvasc) 2.5 mg tablet Take 2.5 mg by mouth daily. torsemide (DEMADEX) 20 mg tablet Take 20 mg by mouth daily. traMADoL (ULTRAM) 50 mg tablet Take 50 mg by mouth as needed for Pain. citalopram (CeleXA) 20 mg tablet Take 20 mg by mouth daily. diclofenac (Voltaren) 1 % gel Apply 2 g to affected area four (4) times daily as needed for Pain. senna-docusate (PERICOLACE) 8.6-50 mg per tablet Take 1 Tab by mouth two (2) times daily as needed for Constipation.   Qty: 30 Tab, Refills: 0      omeprazole (PRILOSEC) 40 mg capsule Take 40 mg by mouth daily.      ergocalciferol (Vitamin D2) 1,250 mcg (50,000 unit) capsule Take 50,000 Units by mouth every Sunday. aspirin delayed-release 81 mg tablet Take 81 mg by mouth daily. ticagrelor (BRILINTA) 90 mg tablet Take 1 Tab by mouth every twelve (12) hours every twelve (12) hours. Qty: 60 Tab, Refills: 1      lovastatin (MEVACOR) 40 mg tablet Take 40 mg by mouth nightly. brimonidine (ALPHAGAN) 0.15 % ophthalmic solution Administer 1 Drop to both eyes two (2) times a day. SYNTHROID 125 mcg tablet Take 1 Tab by mouth Daily (before breakfast). Qty: 90 Tab, Refills: 4    Associated Diagnoses: Hypothyroidism, postsurgical      cyanocobalamin (VITAMIN B-12) 1,000 mcg tablet Take 1,000 mcg by mouth daily. tamsulosin (FLOMAX) 0.4 mg capsule Take 0.4 mg by mouth two (2) times a day. dorzolamide-timolol (COSOPT) 22.3-6.8 mg/mL ophthalmic solution Administer 1 Drop to both eyes daily. latanoprost (Xalatan) 0.005 % ophthalmic solution Administer 1 Drop to both eyes nightly. STOP taking these medications       ciprofloxacin HCl (CIPRO) 500 mg tablet Comments:   Reason for Stopping:                 Patient Follow Up Instructions: Activity: As tolerated patient's bedbound  Diet: Puréed diet          Follow-up Information     Follow up With Specialties Details Why [facility]   8/5/21 at 9 AM with KEENA Lee for gunn removal at the [facility].  [phone] [address] [place] [address], [address] [place] [address], [address] [place] [address]  [phone]          ________________________________________________________________    Risk of deterioration: low    Condition at Discharge:  Stable  __________________________________________________________________    Disposition C[facility]ani with [facility]       ____________________________________________________________________    Code Status: full  ___________________________________________________________________      Total time in minutes spent coordinating this discharge (includes going over instructions, follow-up, prescriptions, and preparing report for sign off to her PCP) :  39 minutes    Signed:  Alfredo Toro MD

## 2021-08-02 NOTE — PROGRESS NOTES
Physical Therapy    Chart reviewed, patient is discharging to LTC with hospice so will sign off at this time.       Fortunato Plunkett

## 2021-08-02 NOTE — PROGRESS NOTES
Transition of Care Plan:     RUR: 15% - \"low risk\"  Disposition: Return to [facility]) w/ [facility] (8/3/21)   *Fax: [phone]  Follow up appointments: PCP & specialist as indicated  DME needed: DME to be coordinated/secure through [address] [place] [address] at Discharge: TBD - family vs medical transport  Mariana Belle or means to access home: [facility] resident       IM Medicare Letter: 2nd IM needed prior to d/c  BCPI-A Bundle Letter: N/A  Caregiver Contact: Pt's daughter Reji Mariner: [phone])  Discharge Caregiver contacted prior to discharge? To be contacted prior to d/c  COVID-19 test: Pt will need a PCR COVID-19 test completed prior to returning to [facility] (per facility's protocol); MD to order, RN to complete     Update - 1:13 PM: CM faxed updated clinical documentation (MD progress notes, signed DDNR, & MAR) to [facility] per request.    Update - 12:43 PM: CM consulted with RN regarding need for PCR COVID-19 test; RN to request order from MD & complete asap. Initial note: CM acknowledged d/c. CM reviewed pt's chart prior to moving forward with d/c planning. CM contacted [facility] admission liaison Rashaun Sanchezden: [phone]) to check in and discuss pt's transition to back to the facility. Melyssa Jarquin reported the facility is anticipating pt's return with [facility]. Melyssa Jarquin reiterated pt will need a PCR COVID-19 test completed prior to returning to the facility; CM to inform medical team. Melyssa Jarquin reported the facility will plan for pt to return tomorrow (8/3/21) upon completion of PCR COVID-19 test. Melyssa Jarquin requested for clinical updates to be faxed to [facility] for review (f: [phone]); CM to complete request. CM will enter a delay in d/c to reflect the current barriers preventing pt from transitioning out of [address]. CM will check in with pt and family to ensure all parties are on the same page regarding the d/c plan. CM will continue to follow & remain accessible for d/c planning.     Pancho Escobar [facility], [facility] Jhonatan King, DANYELLE [facility]  [phone]

## 2021-08-03 NOTE — PROGRESS NOTES
Transition of Care Plan to SNF/Rehab    SNF/Rehab Transition:  Patient has been accepted to [facility] and meets criteria for admission. Patient will transported by [facility] and expected to leave at [address] [place] [address] to Patient/Family:  Met with patient and KRUPA Lopez  (identified care giver) and they are agreeable to the transition plan. Communication to SNF/Rehab:  Bedside RN, Sanjay Abdullahi, has been notified to update the transition plan to the facility and call report (phone number [phone]. Discharge information has been updated on the AVS.     Discharge instructions to be fax'd to facility at [facility] # [phone]). Nursing Please include all hard scripts for controlled substances, med rec and dc summary, and AVS in packet. Reviewed and confirmed with facility, [facility], can manage the patient care needs for the following:     [place] with (X) only those applicable:    Medication:  []  Medications will be available at the facility  []  IV Antibiotics   []  Controlled Substance - hard copy to be sent with patient   []  Weekly Labs   Documents:  [] Hard RX  [x] MAR  [] Kardex  [x] AVS  []Transfer Summary  [x]Discharge   Equipment:  []  CPAP/BiPAP  []  Wound Vacuum  []  Orozco or Urinary Device  []  PICC/Central Line  []  Nebulizer  []  Ventilator   Treatment:  []Isolation (for MRSA, VRE, etc.)  []Surgical Drain Management  []Tracheostomy Care  []Dressing Changes  []Dialysis with transportation and chair time   []PEG Care  []Oxygen  []Daily Weights for Heart Failure   Dietary:  []Any diet limitations  []Tube Feedings   []Total Parenteral Management (TPN)   Eligible for Medicaid Long Term Services and Supports  Yes:  [] Eligible for medical assistance or will become eligible within 180 days and UAI completed. [] Provider/Patient and/or support system has requested screening.   [] UAI copy provided to patient or responsible party,   [] UAI unavailable at discharge will send once processed to SNF provider. [] UAI unavailable at discharged mailed to patient  No:   [x] Private pay and is not financially eligible for Medicaid within the next 180 days. [] Reside out-of-state. [] A residents of a state owned/operated facility that is licensed  by [facility] and [facility] Services or [facility]  [] Enrollment in [facility] hospice services  [] [address] [place] [address]  [] Patient /Family declines to have screening completed or provide financial information for screening     Financial Resources:  Medicaid    [] Initiated and application pending   [] Full coverage     Advanced Care Plan:  []Surrogate Decision Maker of Care  []POA  []Communicated Code Status DNR (DDNR\", \"Full\")    Other  Patient returning to [facility] [address] - with [address] admissions nurse to meet patient at facility this afternoon.      Nidia Gosselin, RN, BSN, [facility]    Coordinator  [phone]

## 2021-08-03 NOTE — PROGRESS NOTES
Problem: Falls - Risk of  Goal: *Absence of Falls  Description: Document Lucero Guilford Fall Risk and appropriate interventions in the flowsheet. Outcome: Progressing Towards Goal  Note: Fall Risk Interventions:  Mobility Interventions: Bed/chair exit alarm    Mentation Interventions: Bed/chair exit alarm    Medication Interventions: Bed/chair exit alarm    Elimination Interventions: Call light in reach, Bed/chair exit alarm    History of Falls Interventions: Bed/chair exit alarm         Problem: Pressure Injury - Risk of  Goal: *Prevention of pressure injury  Description: Document Gumaro Scale and appropriate interventions in the flowsheet. Outcome: Progressing Towards Goal  Note: Pressure Injury Interventions:  Sensory Interventions: Assess changes in LOC    Moisture Interventions: Absorbent underpads, Minimize layers    Activity Interventions: Pressure redistribution bed/mattress(bed type)    Mobility Interventions: Pressure redistribution bed/mattress (bed type)    Nutrition Interventions: Document food/fluid/supplement intake    Friction and Shear Interventions: Minimize layers, Apply protective barrier, creams and emollients       Problem: Risk for Spread of Infection  Goal: Prevent transmission of infectious organism to others  Description: Prevent the transmission of infectious organisms to other patients, staff members, and visitors. Outcome: Progressing Towards Goal         1222 Report called to WENDY Mccarty at [facility].    All questions answered

## 2021-08-03 NOTE — DISCHARGE SUMMARY
Patient seen and examined at bedside comfortable no events noted overnight patient is in good spirits vitals remained stable plan is for patient to be discharged to the facility as hospice family in agreement. Caregiver was in the room no events noted and no issues.

## 2021-08-03 NOTE — PROGRESS NOTES
Transition of Care Plan:    RUR:16%  Disposition: [place] (Room A219) with [facility]  Follow up appointments: With Hospice  DME needed: per Hospice  Transportation at Discharge: [facility] or means to access home:      N/A  IM Medicare Letter: 2nd IM letter provided to patient  Is patient a BCPI-A Bundle:        No   If yes, was Bundle Letter given?:     Caregiver Contact: Anne Marie Triana - [phone]   Discharge Caregiver contacted prior to discharge? Discussed with patient's DIL present in room. Agrees with discharge to [facility] with [address] [place] [address]. Care Management Interventions  PCP Verified by CM:  Yes  Mode of Transport at Discharge: BLS  Transition of Care Consult (CM Consult): Assisted Living, Home Hospice ([facility] with [facility])  [address] [place] [address] Street: Yes  Discharge Durable Medical Equipment: No  Physical Therapy Consult: Yes  Occupational Therapy Consult: Yes  Current Support Network: Assisted Living  Confirm Follow Up Transport: Family  The Patient and/or Patient Representative was Provided with a Choice of Provider and Agrees with the Discharge Plan?: Yes  Freedom of Choice List was Provided with Basic Dialogue that Supports the Patient's Individualized Plan of Care/Goals, Treatment Preferences and Shares the Quality Data Associated with the Providers?: Yes  Discharge Location  Discharge Placement: Assisted Living ([facility] w/ Hospice)    Roland Coelho, RN, BSN, [address] [place] [address]    Coordinator  [phone]

## 2021-08-03 NOTE — PROGRESS NOTES
Problem: Falls - Risk of  Goal: *Absence of Falls  Description: Document Yoav Marrero Fall Risk and appropriate interventions in the flowsheet. Outcome: Progressing Towards Goal  Note: Fall Risk Interventions:  Mobility Interventions: Bed/chair exit alarm    Mentation Interventions: Bed/chair exit alarm    Medication Interventions: Bed/chair exit alarm    Elimination Interventions: Bed/chair exit alarm, Call light in reach    History of Falls Interventions: Bed/chair exit alarm         Problem: Patient Education: Go to Patient Education Activity  Goal: Patient/Family Education  Outcome: Progressing Towards Goal     Problem: Pressure Injury - Risk of  Goal: *Prevention of pressure injury  Description: Document Gumaro Scale and appropriate interventions in the flowsheet. Outcome: Progressing Towards Goal  Note: Pressure Injury Interventions:  Sensory Interventions: Assess changes in LOC    Moisture Interventions: Absorbent underpads, Minimize layers    Activity Interventions: Pressure redistribution bed/mattress(bed type)    Mobility Interventions: Pressure redistribution bed/mattress (bed type)    Nutrition Interventions: Document food/fluid/supplement intake    Friction and Shear Interventions: Minimize layers, Apply protective barrier, creams and emollients                Problem: Patient Education: Go to Patient Education Activity  Goal: Patient/Family Education  Outcome: Progressing Towards Goal     Problem: Patient Education: Go to Patient Education Activity  Goal: Patient/Family Education  Outcome: Progressing Towards Goal     Problem: Patient Education: Go to Patient Education Activity  Goal: Patient/Family Education  Outcome: Progressing Towards Goal     Problem: Risk for Spread of Infection  Goal: Prevent transmission of infectious organism to others  Description: Prevent the transmission of infectious organisms to other patients, staff members, and visitors.   Outcome: Progressing Towards Goal

## 2021-08-03 NOTE — PROGRESS NOTES
End of Shift Note    Bedside shift change report given to Umm Salmeron  (oncoming nurse) by Derek Allen (offgoing nurse). Report included the following information SBAR    Shift worked: nights   Shift summary and any significant changes:    Pt will be d/c with hospice to [facility].           Concerns for physician to address:  None   Zone phone for oncoming shift:   [phone]     Patient Information  Mahnaz Skinner  80 y.o.  7/26/2021  2:26 PM by Justin Hansen MD. Mahnaz Skinner was admitted from Assisted Living    Problem List  Patient Active Problem List    Diagnosis Date Noted    Acute encephalopathy 07/26/2021    Pacemaker 05/06/2021    Cerebral aneurysm 12/13/2019    TIA (transient ischemic attack) 05/01/2019    Insomnia due to medical condition 05/10/2017    Stenosis of both internal carotid arteries 02/07/2017    Diabetic peripheral neuropathy associated with type 2 diabetes mellitus ([address] 02/07/2017    Sensory ataxic gait 02/07/2017    Monocular diplopia of both eyes 02/07/2017    VBI (vertebrobasilar insufficiency) 02/07/2017    History of stroke 02/07/2017    Vitamin D deficiency 02/07/2017    BPV (benign positional vertigo) 02/07/2017    Dizziness 04/23/2013    Idiopathic small and large fiber sensory neuropathy 04/23/2013    B12 deficiency 04/23/2013    Hypothyroidism, postsurgical 03/13/2013    Thyroid mass 05/10/2011    Incisional hernia 05/10/2011    CVA (cerebral infarction) 05/10/2011     Past Medical History:   Diagnosis Date    Anxiety disorder     Arthritis     CAD (coronary artery disease)     Calculus of kidney     Cancer ([address] prostate  1996    skin    Depression     Hearing loss     Heart attack ([address]     Hypercholesterolemia     Hypertension     Incisional hernia 5/10/2011    Movement disorder     Neuropathy     Other ill-defined conditions(799.89)     elevated cholesterol    Other ill-defined conditions(799.89)     glaucoma    Other ill-defined conditions(799.89)     abd hernia    Skipped beats     Stroke [facility])     left arm tingling    Thyroid disease     Thyroid mass 5/10/2011    Vertigo        Core Measures:  CVA: No No  CHF:No No  PNA:No No    Activity:  Activity Level: Bed Rest  Number times ambulated in hallways past shift: 0  Number of times OOB to chair past shift: 0    Cardiac:   Cardiac Monitoring: Yes      Cardiac Rhythm: Sinus Rhythm    Access:   Current line(s): PIV     Genitourinary:   Urinary status: gunn  Urinary Catheter? Yes Placement Date 7/27/2021 Reason Medically Necessary acute retention    Respiratory:   O2 Device: Nasal cannula  Chronic home O2 use?: NO  Incentive spirometer at bedside: N/A       GI:  Last Bowel Movement Date: 07/31/21  Current diet:  DIET ONE TIME MESSAGE  DIET ONE TIME MESSAGE  ADULT DIET Dysphagia - Pureed  Passing flatus: YES  Tolerating current diet: YES       Pain Management:   Patient states pain is manageable on current regimen: N/A    Skin:  Gumaro Score: 15  Interventions: float heels, increase time out of bed, limit briefs and internal/external urinary devices    Patient Safety:  Fall Score:  Total Score: 5  Interventions: bed/chair alarm, gripper socks, pt to call before getting OOB and sitter at bedside   High Fall Risk: Yes  @Rollbelt  @dexterity to release roll belt  Yes/No ( must document dexterity  here by stating Yes or No here, otherwise this is a restraint and must follow restraint documentation policy.)    DVT prophylaxis:  DVT prophylaxis Med- Yes  DVT prophylaxis SCD or ANDREW- No     Wounds: (If Applicable)  Wounds- Yes  Location; L elbow tear    Active Consults:  IP CONSULT TO NEPHROLOGY  IP CONSULT TO UROLOGY  IP CONSULT TO PALLIATIVE CARE - PROVIDER  IP CONSULT TO HOSPITALIST  IP CONSULT TO CARDIOLOGY    Length of Stay:  Expected LOS: 3d 7h  Actual LOS: 8  Discharge Plan: Yes; [address] [place] [address]

## 2021-08-03 NOTE — PROGRESS NOTES
Occupational Therapy note:    Chart reviewed, patient is discharging to LTC with hospice. Will sign off at this time.     FARHAD Martinez/L

## 2021-08-04 NOTE — HOSPICE
Patient Name: Derl Collet  Date of Birth: [birth year]  Age: 80    SRK to be delivered this evening by facility [facility] [phone]. Verified that scripts were received. O2 was attempted to deliver earlier today, but went to his old address. Ordered this evening for Wednesday delivery. Facility has provided O2 for today. Please use son Vanessa Jeff as main  at [phone] for visits or visit updates. Principle Hospice Diagnosis: End Stage Cardiac Disease    Diagnoses RELATED to the terminal prognosis: cardiomyopathy, coronary artery disease    Unrelated Diagnoses: hypothyroidism, urinary retention    Date of Hospice Admission: 8/3/21    Hospice Attending Elected by Patient: Dr. Bjorn Sharp    Primary Care Physician:     Admitting RN: Delores [address]  Nurse CM: Guero Carlin  : Wade Has:   Dereje Maya DNR: yes     Service:     Pancho Sal Home: TBD    Direct Observation: patient alert and oriented x 3 at beginning of visit, however, was very confused by end of visit. Patient is bedbound, denies pain, has shallow breathing, diminished lung sounds, pacemaker left upper chest, BSx4, hernia right upper quadrant, urine catheter present draining piyush urine. ER Visits/ Hospitalizations in past year: 2    Onset Date of Hospice Diagnosis: 7/2021    Summary of Disease Progression Leading to Hospice Diagnosis:     Pt came with generalized weakness, recent fall. Hx of cardiomyopathy, CAD s/p CABG, s/p TAVR, Pacemaker, CVA, HLD, HTN, UTI, Urinary retention. Met with daughter, Zia Hoyt, today. Per dtr, he had been living at [facility] independent living until June when he fell and injured his shoulder, he was admitted to [facility] for treatment and also had a pacemaker placed at that time. He discharged after 1 week in the hospital to [facility] for rehab and then transitioned back to independent living where he remained for 2-3 weeks.  Last Monday he had AMS, lethargy and nausea and was [facility] and admitted. He was diagnosed with acute metabolic encephalopathy and hyponatremia. Despite treatment, he has continued to decline. He is sleeping most of the time now with increased confusion and dyspnea. Dtr and son in agreement with hospice at this time. Use LCD Guidelines and list features:   Patients will be considered to be in the terminal stage of heart disease (life expectancy of six months or less) if they meet the following criteria. (1 and 2 should be present. Factors from 3 will add supporting documentation.):  _____x___  1. At the time of initial certification or recertification for hospice, the patient is or has been                           already optimally treated for heart disease or is not a candidate for a surgical procedure or                           has declined a procedure. (Optimally treated means that patients who are not on                           vasodilators have a medical reason for refusing these drugs, e.g., hypotension or renal                           disease.)  _____x___  2. The patient is classified as New York Heart Association (NYHA) Class IV and may have                           significant symptoms of heart failure or angina at rest. (Class IV patients with heart disease                           have an inability to carry on any physical activity without discomfort. Symptoms of heart                           failure or of the anginal syndrome may be present even at rest. If any physical activity is                           undertaken, discomfort is increased.) Significant congestive heart failure may be                          documented by an ejection fraction of ?20%, but is not required if not already available.  ________  3. Documentation of the following factors will support but is not required to establish                           eligibility for hospice care:                            ________  a.  Treatment resistant symptomatic supraventricular or ventricular arrhythmias;                          ________  b. History of cardiac arrest or resuscitation;                          ________  c. History of unexplained syncope;                          ________  d. Brain embolism of cardiac origin;                          ________  e. Concomitant HIV disease. SPIRITUAL/Social/Emotional:  Daughter Mica's   5 months ago under hospice care for dx of Lewy Body Dementia. She will need bereavement support as she has not even grieved the loss of her , and just moved back to [place] 2 weeks ago. Patient's son  approximately a year ago with Lymphoma. Currently this patient has:  Supplemental O2:     Orozco Catheter:           Dr. Susi Childs contacted, agrees to serve as attending provider for hospice. Dr. Candie Boucher provided verbal certification of terminal illness with prognosis of 6 months or less life expectancy. Orders for hospice admission, medications and plan of treatment received. Medication reconciliation completed. MEDS: Hang's orders: Torsemide changed to prn. Dewitt Andrew will stay on for now, but will discussion to discontinue. Flomax discontinued at this time, but monitor.   I have reviewed the patient's medication list with Dr. Candie Boucher and identified the following:  Nonformulary medications: Brilinta  Unrelated medications: Synthroid, eye drops,     DME: oxygen from [employer], bed provided by facility    SUPPLIES: briefs, chux, toothettes, body wash, barrier cream    IDT communication to include MD, SN, SW, CH and support team.

## 2021-08-05 NOTE — HOSPICE
On call visit made to patient at [facility] at family's request for symptom management. Met outside of patient's room by facility WENDY, Abdelrahman Onofre, and facility MD Dr. Yessenia Teague who states he can write orders for patient if needed. Abdelrahman Onofre states multiple family members have been present since early morning and that it seems to be agitating patient. Patient laying in bed upon arrival for visit with daughter, Raul Rachel, daughter-in-law Estrellita Becker, and hired caregiver, Shannan, present for visit. Estrellita Becker and Raul Rachel wish to speak with RN privately to discuss patient's symptoms and changes in condition. Estrellita Becker and Raul Rachel state that patient was more responsive and taking bites of food the day prior and that he has been since \"transitioning\" overnight. Emotional support offered to Estrellita Becker and Raul Rachel and discussed that RN will assess patient and consult MD/facility staff to see what positive changes can be made to make patient more comfortable. Patient noted to show some non-verbal signs of pain, shortness of breath, agitation, and increased secretions. Discussed with Abdelrahman Onofre, WENDY, and Dr. Yessenia Teague regarding patient's assessment. Dr. Yessenia Teague wrote orders for patient's morphine to be increased to Q2 hours PRN, add hyoscyamine 0.125mg SL Q4 hours PRN for secretions, and add risperdal 1mg Q4 hours PRN for agitation as facility does not allow Haldol and patient has had a paradoxical reaction to lorazepam per family report. Dr. Yessenia Teague also gave order to discontinue PO meds as patient no longer able to swallow. Educated family and caregiver, Shannan, on end of life symptoms, comfort measures, and medication changes. Nurse Abdelrahman Onofre administered morphine SL at 9:29am. Family states patient has a pacemaker. Kymberly Ronquillo she will investigate to see if any intervention is needed for pacemaker. Nurse Abdelrahman Onofre also states she will check on longer bed with her manager for patient if patient able to tolerate transferring beds.  Patient's family, including son Roberto Delgadillo, verbalize they would like to continue utilizing [address] at this time and do not wish to revoke. They requested additional nurse visit be made this afternoon and that visits occur twice daily. RN communicated family request and visit update to interdisciplinary team and nurse leaders. RN ensured patient's family and facility nurse had correct number to [address] triage line. Encouraged facility staff and family to call hospice 24/7 with any needs or concerns and informed family that a nurse will be visiting later today to reassess patient. Family would like to be notified 30 minutes- 1 hour prior to [facility] so that all family members who are able may attend SNV.     NEW MEDICATION INITIATION DOCUMENTATION:  Consulted AT MD to report change in patient status: YES  Obtained Order from Provider for initiation of Symptom relief medication / other medication needed:YES   Documentation completed in Telephone/Visit Note in [facility]  Reason medication is being initiated: agitation  MD / Provider name consulted re: change in status / initiation of new medication: Dr. Lenny Ureña Symptom(s): agitation  New Order(s): Risperdal 1mg SL every 4 hours as needed for agitation  Name of person being taught: Evan Cárdenas  Instructions given: YES  Side Effects taught:YES  Response to teaching: verbalizes understanding    NEW MEDICATION INITIATION DOCUMENTATION:  Consulted AT MD to report change in patient status: YES  Obtained Order from Provider for initiation of Symptom relief medication / other medication needed:YES   Documentation completed in Telephone/Visit Note in [facility]  Reason medication is being initiated: increased secretions  MD / Provider name consulted re: change in status / initiation of new medication: Dr. Lenny Ureña Symptom(s): increased secretions  New Order(s): Hyoscyamine 0.125mg SL every 4 hours as needed for increased secretions  Name of person being taught: Evan Cárdenas  Instructions given: YES  Side Effects taught:YES  Response to teaching: verbalizes understanding

## 2021-08-05 NOTE — HOSPICE
Called patient's son to offer support and schedule a visit if needed/desired. The call went to voicemail. I left a message introducting  services and requested a callback. I will wait for callback.

## 2021-08-06 ENCOUNTER — HOME CARE VISIT (OUTPATIENT)
Dept: HOSPICE | Facility: HOSPICE | Age: 86
End: 2021-08-06
Payer: MEDICARE

## 2021-08-06 NOTE — HOSPICE
[address] [facility]  Death Visit Note:    Narrative: LMSW and RN Clinical Supervisor Zain Becker arrived at patients room at [facility] and patient's daughter Olivier Dow and Daughter in [place] were present. Patient's daughter in law Estrellita Becker stated that she believed the patient just took his last breath. RN Clinical Supervisor pronounced patient at 2:25 PM. LMSW provided emotional support to Olivier Dow and Estrellita Becker including active and empathetic listening, validation of feeling, and supportive counseling. Patient's sons Melony and Roberto Delgadillo were en route at time of death and arrived at facility. LMSW offered support to Roberto Delgadillo and Melony who were greiving profoundly but appropriately. Mignon's  home was called as family has a family connection to the War family. Rhina Canela was en route to offer sacrement of the sick when patient . Family coordinated with  to have him arrive at  home to annoint patient's body there instead. Olivier Dow and Estrellita Lupecristofer stated that the family has endured multiple losses in the past year including one of the patient's sons a year ago, Mica's  in February and now the patient. LMSW explained bereavement services available and encouraged a call for any needs. Final Arrangements: Vladislav  Resources Needed/Provided: Bereavement Services   Bereavement Services Explained:  Yes    Silvano Cortes, [address] [facility]    [phone]

## 2021-08-06 NOTE — HOSPICE
Arrived in patient room at [address] [place] [address] with SW.  Daughter and Daughter-in-law were present and verbalized that patient appears to have just taken his last breath. Patient with no response to verbal and tactile stimuli, no respiratory effort and absent heart sounds and pulses. Patient was pronounced dead at 2:25pm.  Family expressed gratitude that he achieve symptom relief and was comfortable at time of death. [facility] called her  Cris Bender and brother-in-law to come to the facility. Orozco catheter was removed and body prepared with assistance of staff. After both sons arrived, [facility] was called for removal.  Family to wait for  home but felt hospice staff not needed to stay. Family verbalized appreciation for hospice services. Reported off to staff nurse and MD notified of death.

## (undated) DEVICE — SUTURE VCRL 2-0 L27IN ABSRB UD PS-2 L19MM 1/2 CIR J428H

## (undated) DEVICE — SOLIDIFIER FLD 2OZ 1500CC N DISINF IN BTL DISP SAFESORB

## (undated) DEVICE — GDWIRE WHISPER HITORQ EDS CSJ -- ACUITY SOLD BY BX ONLY 4648

## (undated) DEVICE — SYSTEM INTRO 9.5FR L13CM STD WHT CAP HEMSTAT SPLITTABLE

## (undated) DEVICE — SUTURE VCRL SZ 4-0 L18IN ABSRB UD L19MM PS-2 3/8 CIR PRIM J496H

## (undated) DEVICE — BLOCK BITE ENDOSCP AD 21 MM W/ DIL BLU LF DISP

## (undated) DEVICE — SYR ASSEMB INFL BLLN 60ML --

## (undated) DEVICE — KIT ACCS INTRO 4FR L10CM NDL 21GA L7CM GWIRE L40CM

## (undated) DEVICE — NEONATAL-ADULT SPO2 SENSOR: Brand: NELLCOR

## (undated) DEVICE — 3M™ IOBAN™ 2 ANTIMICROBIAL INCISE DRAPE 6650EZ: Brand: IOBAN™ 2

## (undated) DEVICE — YANKAUER,TAPERED BULBOUS TIP,W/O VENT: Brand: MEDLINE

## (undated) DEVICE — Device

## (undated) DEVICE — TOWEL 4 PLY TISS 19X30 SUE WHT

## (undated) DEVICE — FORCEPS BX L160CM DIA8MM GRSP DISECT CUP TIP NONLOCKING ROT

## (undated) DEVICE — 1200 GUARD II KIT W/5MM TUBE W/O VAC TUBE: Brand: GUARDIAN

## (undated) DEVICE — Z DISCONTINUED PER MEDLINE LINE GAS SAMPLING O2/CO2 LNG AD 13 FT NSL W/ TBNG FILTERLINE

## (undated) DEVICE — PACEMAKER PACK: Brand: MEDLINE INDUSTRIES, INC.

## (undated) DEVICE — BASIN EMSIS 16OZ GRAPHITE PLAS KID SHP MOLD GRAD FOR ORAL

## (undated) DEVICE — MEDI-TRACE CADENCE ADULT, DEFIBRILLATION ELECTRODE -RTS  (10 PR/PK) - PHILIPS: Brand: MEDI-TRACE CADENCE

## (undated) DEVICE — CATH IV AUTOGRD BC PNK 20GA 25 -- INSYTE

## (undated) DEVICE — CONTAINER SPEC 20 ML LID NEUT BUFF FORMALIN 10 % POLYPR STS

## (undated) DEVICE — REM POLYHESIVE ADULT PATIENT RETURN ELECTRODE: Brand: VALLEYLAB

## (undated) DEVICE — SET ADMIN 16ML TBNG L100IN 2 Y INJ SITE IV PIGGY BK DISP

## (undated) DEVICE — SYR 10ML LUER LOK 1/5ML GRAD --

## (undated) DEVICE — INTRO SHTH 8FR 13X20CM -- TEARAWAY

## (undated) DEVICE — DERMABOND SKIN ADH 0.7ML -- DERMABOND ADVANCED 12/BX

## (undated) DEVICE — TRAY,IRRIGATION,PISTON SYRINGE,60ML,STRL: Brand: MEDLINE

## (undated) DEVICE — CATHETER DIAG 6FR L110CM INTRO 6FR BLLN DIA9MM 1CC PULM ART

## (undated) DEVICE — INTENDED FOR TISSUE SEPARATION, AND OTHER PROCEDURES THAT REQUIRE A SHARP SURGICAL BLADE TO PUNCTURE OR CUT.: Brand: BARD-PARKER ®  SAFETY SCALPED

## (undated) DEVICE — SUT SLK 0 30IN SH BLK --

## (undated) DEVICE — RADIFOCUS GLIDEWIRE: Brand: GLIDEWIRE

## (undated) DEVICE — ADHESIVE SKIN CLSR 1ML TISS HI VISC EXOFIN

## (undated) DEVICE — BAG SPEC BIOHZRD 10 X 10 IN --

## (undated) DEVICE — INTRO SHTH 7FR 13X20CM -- TEARAWAY

## (undated) DEVICE — SYR 3ML LL TIP 1/10ML GRAD --

## (undated) DEVICE — ELECTRODE,RADIOTRANSLUCENT,FOAM,5PK: Brand: MEDLINE

## (undated) DEVICE — NEEDLE HYPO 18GA L1.5IN PNK S STL HUB POLYPR SHLD REG BVL